# Patient Record
Sex: MALE | Race: WHITE | NOT HISPANIC OR LATINO | Employment: OTHER | URBAN - METROPOLITAN AREA
[De-identification: names, ages, dates, MRNs, and addresses within clinical notes are randomized per-mention and may not be internally consistent; named-entity substitution may affect disease eponyms.]

---

## 2017-01-04 ENCOUNTER — COMMUNICATION - HEALTHEAST (OUTPATIENT)
Dept: FAMILY MEDICINE | Facility: CLINIC | Age: 77
End: 2017-01-04

## 2017-01-04 ENCOUNTER — COMMUNICATION - HEALTHEAST (OUTPATIENT)
Dept: NURSING | Facility: CLINIC | Age: 77
End: 2017-01-04

## 2017-01-04 ENCOUNTER — AMBULATORY - HEALTHEAST (OUTPATIENT)
Dept: LAB | Facility: CLINIC | Age: 77
End: 2017-01-04

## 2017-01-04 DIAGNOSIS — I48.91 ATRIAL FIBRILLATION (H): ICD-10-CM

## 2017-01-05 ENCOUNTER — AMBULATORY - HEALTHEAST (OUTPATIENT)
Dept: NURSING | Facility: CLINIC | Age: 77
End: 2017-01-05

## 2017-01-05 DIAGNOSIS — I48.91 A-FIB (H): ICD-10-CM

## 2017-01-10 ENCOUNTER — COMMUNICATION - HEALTHEAST (OUTPATIENT)
Dept: FAMILY MEDICINE | Facility: CLINIC | Age: 77
End: 2017-01-10

## 2017-01-10 DIAGNOSIS — I48.91 A-FIB (H): ICD-10-CM

## 2017-01-11 ENCOUNTER — AMBULATORY - HEALTHEAST (OUTPATIENT)
Dept: FAMILY MEDICINE | Facility: CLINIC | Age: 77
End: 2017-01-11

## 2017-01-11 ENCOUNTER — COMMUNICATION - HEALTHEAST (OUTPATIENT)
Dept: FAMILY MEDICINE | Facility: CLINIC | Age: 77
End: 2017-01-11

## 2017-01-26 ENCOUNTER — AMBULATORY - HEALTHEAST (OUTPATIENT)
Dept: LAB | Facility: CLINIC | Age: 77
End: 2017-01-26

## 2017-01-26 ENCOUNTER — COMMUNICATION - HEALTHEAST (OUTPATIENT)
Dept: NURSING | Facility: CLINIC | Age: 77
End: 2017-01-26

## 2017-01-26 DIAGNOSIS — I48.91 A-FIB (H): ICD-10-CM

## 2017-01-26 DIAGNOSIS — I48.91 ATRIAL FIBRILLATION (H): ICD-10-CM

## 2017-01-31 ENCOUNTER — RECORDS - HEALTHEAST (OUTPATIENT)
Dept: ADMINISTRATIVE | Facility: OTHER | Age: 77
End: 2017-01-31

## 2017-02-01 ENCOUNTER — RECORDS - HEALTHEAST (OUTPATIENT)
Dept: ADMINISTRATIVE | Facility: OTHER | Age: 77
End: 2017-02-01

## 2017-02-01 ENCOUNTER — COMMUNICATION - HEALTHEAST (OUTPATIENT)
Dept: FAMILY MEDICINE | Facility: CLINIC | Age: 77
End: 2017-02-01

## 2017-02-09 ENCOUNTER — COMMUNICATION - HEALTHEAST (OUTPATIENT)
Dept: NURSING | Facility: CLINIC | Age: 77
End: 2017-02-09

## 2017-02-09 ENCOUNTER — AMBULATORY - HEALTHEAST (OUTPATIENT)
Dept: LAB | Facility: CLINIC | Age: 77
End: 2017-02-09

## 2017-02-09 DIAGNOSIS — I48.91 A-FIB (H): ICD-10-CM

## 2017-02-09 DIAGNOSIS — I48.91 ATRIAL FIBRILLATION (H): ICD-10-CM

## 2017-02-13 ENCOUNTER — RECORDS - HEALTHEAST (OUTPATIENT)
Dept: ADMINISTRATIVE | Facility: OTHER | Age: 77
End: 2017-02-13

## 2017-02-15 ENCOUNTER — RECORDS - HEALTHEAST (OUTPATIENT)
Dept: ADMINISTRATIVE | Facility: OTHER | Age: 77
End: 2017-02-15

## 2017-02-15 ENCOUNTER — COMMUNICATION - HEALTHEAST (OUTPATIENT)
Dept: FAMILY MEDICINE | Facility: CLINIC | Age: 77
End: 2017-02-15

## 2017-02-23 ENCOUNTER — OFFICE VISIT - HEALTHEAST (OUTPATIENT)
Dept: FAMILY MEDICINE | Facility: CLINIC | Age: 77
End: 2017-02-23

## 2017-02-23 ENCOUNTER — COMMUNICATION - HEALTHEAST (OUTPATIENT)
Dept: NURSING | Facility: CLINIC | Age: 77
End: 2017-02-23

## 2017-02-23 DIAGNOSIS — I48.91 ATRIAL FIBRILLATION (H): ICD-10-CM

## 2017-02-23 DIAGNOSIS — Z01.818 PREOPERATIVE EXAMINATION: ICD-10-CM

## 2017-02-23 LAB
ATRIAL RATE - MUSE: 78 BPM
CREAT SERPL-MCNC: 0.82 MG/DL (ref 0.7–1.3)
DIASTOLIC BLOOD PRESSURE - MUSE: NORMAL MMHG
GFR SERPL CREATININE-BSD FRML MDRD: >60 ML/MIN/1.73M2
INTERPRETATION ECG - MUSE: NORMAL
P AXIS - MUSE: NORMAL DEGREES
PR INTERVAL - MUSE: NORMAL MS
QRS DURATION - MUSE: 144 MS
QT - MUSE: 434 MS
QTC - MUSE: 500 MS
R AXIS - MUSE: -60 DEGREES
SYSTOLIC BLOOD PRESSURE - MUSE: NORMAL MMHG
T AXIS - MUSE: 85 DEGREES
VENTRICULAR RATE- MUSE: 80 BPM

## 2017-02-23 ASSESSMENT — MIFFLIN-ST. JEOR: SCORE: 1647.82

## 2017-02-24 ENCOUNTER — COMMUNICATION - HEALTHEAST (OUTPATIENT)
Dept: FAMILY MEDICINE | Facility: CLINIC | Age: 77
End: 2017-02-24

## 2017-02-24 DIAGNOSIS — R60.9 EDEMA: ICD-10-CM

## 2017-02-24 DIAGNOSIS — E78.5 HYPERLIPIDEMIA: ICD-10-CM

## 2017-03-03 ASSESSMENT — MIFFLIN-ST. JEOR: SCORE: 1631.49

## 2017-03-06 ENCOUNTER — ANESTHESIA - HEALTHEAST (OUTPATIENT)
Dept: SURGERY | Facility: HOSPITAL | Age: 77
End: 2017-03-06

## 2017-03-06 ENCOUNTER — SURGERY - HEALTHEAST (OUTPATIENT)
Dept: SURGERY | Facility: HOSPITAL | Age: 77
End: 2017-03-06

## 2017-03-06 ASSESSMENT — MIFFLIN-ST. JEOR: SCORE: 1623.78

## 2017-03-14 ENCOUNTER — COMMUNICATION - HEALTHEAST (OUTPATIENT)
Dept: NURSING | Facility: CLINIC | Age: 77
End: 2017-03-14

## 2017-03-14 ENCOUNTER — AMBULATORY - HEALTHEAST (OUTPATIENT)
Dept: LAB | Facility: CLINIC | Age: 77
End: 2017-03-14

## 2017-03-14 DIAGNOSIS — I48.91 ATRIAL FIBRILLATION (H): ICD-10-CM

## 2017-03-27 ENCOUNTER — RECORDS - HEALTHEAST (OUTPATIENT)
Dept: ADMINISTRATIVE | Facility: OTHER | Age: 77
End: 2017-03-27

## 2017-03-28 ENCOUNTER — COMMUNICATION - HEALTHEAST (OUTPATIENT)
Dept: NURSING | Facility: CLINIC | Age: 77
End: 2017-03-28

## 2017-03-28 ENCOUNTER — AMBULATORY - HEALTHEAST (OUTPATIENT)
Dept: LAB | Facility: CLINIC | Age: 77
End: 2017-03-28

## 2017-03-28 DIAGNOSIS — I48.91 A-FIB (H): ICD-10-CM

## 2017-03-28 DIAGNOSIS — I48.91 ATRIAL FIBRILLATION (H): ICD-10-CM

## 2017-04-17 ENCOUNTER — AMBULATORY - HEALTHEAST (OUTPATIENT)
Dept: LAB | Facility: CLINIC | Age: 77
End: 2017-04-17

## 2017-04-17 ENCOUNTER — COMMUNICATION - HEALTHEAST (OUTPATIENT)
Dept: NURSING | Facility: CLINIC | Age: 77
End: 2017-04-17

## 2017-04-17 DIAGNOSIS — I48.91 ATRIAL FIBRILLATION (H): ICD-10-CM

## 2017-05-15 ENCOUNTER — AMBULATORY - HEALTHEAST (OUTPATIENT)
Dept: LAB | Facility: CLINIC | Age: 77
End: 2017-05-15

## 2017-05-15 ENCOUNTER — COMMUNICATION - HEALTHEAST (OUTPATIENT)
Dept: NURSING | Facility: CLINIC | Age: 77
End: 2017-05-15

## 2017-05-15 DIAGNOSIS — I48.91 A-FIB (H): ICD-10-CM

## 2017-05-15 DIAGNOSIS — I48.91 ATRIAL FIBRILLATION (H): ICD-10-CM

## 2017-05-31 ENCOUNTER — COMMUNICATION - HEALTHEAST (OUTPATIENT)
Dept: FAMILY MEDICINE | Facility: CLINIC | Age: 77
End: 2017-05-31

## 2017-05-31 DIAGNOSIS — E78.5 HYPERLIPIDEMIA: ICD-10-CM

## 2017-06-14 ENCOUNTER — OFFICE VISIT - HEALTHEAST (OUTPATIENT)
Dept: FAMILY MEDICINE | Facility: CLINIC | Age: 77
End: 2017-06-14

## 2017-06-14 ENCOUNTER — COMMUNICATION - HEALTHEAST (OUTPATIENT)
Dept: SCHEDULING | Facility: CLINIC | Age: 77
End: 2017-06-14

## 2017-06-14 ENCOUNTER — COMMUNICATION - HEALTHEAST (OUTPATIENT)
Dept: NURSING | Facility: CLINIC | Age: 77
End: 2017-06-14

## 2017-06-14 DIAGNOSIS — I48.91 ATRIAL FIBRILLATION (H): ICD-10-CM

## 2017-06-14 DIAGNOSIS — M10.9 GOUT: ICD-10-CM

## 2017-06-14 ASSESSMENT — MIFFLIN-ST. JEOR: SCORE: 1640.56

## 2017-06-19 ENCOUNTER — RECORDS - HEALTHEAST (OUTPATIENT)
Dept: ADMINISTRATIVE | Facility: OTHER | Age: 77
End: 2017-06-19

## 2017-06-26 ENCOUNTER — OFFICE VISIT - HEALTHEAST (OUTPATIENT)
Dept: FAMILY MEDICINE | Facility: CLINIC | Age: 77
End: 2017-06-26

## 2017-06-26 DIAGNOSIS — J20.9 ACUTE BRONCHITIS: ICD-10-CM

## 2017-06-26 DIAGNOSIS — H10.30 ACUTE CONJUNCTIVITIS: ICD-10-CM

## 2017-06-29 ENCOUNTER — COMMUNICATION - HEALTHEAST (OUTPATIENT)
Dept: FAMILY MEDICINE | Facility: CLINIC | Age: 77
End: 2017-06-29

## 2017-07-03 ENCOUNTER — COMMUNICATION - HEALTHEAST (OUTPATIENT)
Dept: NURSING | Facility: CLINIC | Age: 77
End: 2017-07-03

## 2017-07-03 ENCOUNTER — AMBULATORY - HEALTHEAST (OUTPATIENT)
Dept: LAB | Facility: CLINIC | Age: 77
End: 2017-07-03

## 2017-07-03 DIAGNOSIS — I48.91 A-FIB (H): ICD-10-CM

## 2017-07-03 DIAGNOSIS — I48.91 ATRIAL FIBRILLATION (H): ICD-10-CM

## 2017-07-05 ENCOUNTER — RECORDS - HEALTHEAST (OUTPATIENT)
Dept: ADMINISTRATIVE | Facility: OTHER | Age: 77
End: 2017-07-05

## 2017-07-10 ENCOUNTER — RECORDS - HEALTHEAST (OUTPATIENT)
Dept: ADMINISTRATIVE | Facility: OTHER | Age: 77
End: 2017-07-10

## 2017-07-11 ENCOUNTER — AMBULATORY - HEALTHEAST (OUTPATIENT)
Dept: LAB | Facility: CLINIC | Age: 77
End: 2017-07-11

## 2017-07-11 ENCOUNTER — COMMUNICATION - HEALTHEAST (OUTPATIENT)
Dept: NURSING | Facility: CLINIC | Age: 77
End: 2017-07-11

## 2017-07-11 DIAGNOSIS — I48.91 A-FIB (H): ICD-10-CM

## 2017-07-11 DIAGNOSIS — I48.91 ATRIAL FIBRILLATION (H): ICD-10-CM

## 2017-07-14 ENCOUNTER — OFFICE VISIT - HEALTHEAST (OUTPATIENT)
Dept: FAMILY MEDICINE | Facility: CLINIC | Age: 77
End: 2017-07-14

## 2017-07-14 DIAGNOSIS — M67.40 GANGLION CYST: ICD-10-CM

## 2017-07-14 DIAGNOSIS — R25.2 MUSCLE CRAMPING: ICD-10-CM

## 2017-07-14 DIAGNOSIS — R05.9 COUGH: ICD-10-CM

## 2017-07-14 DIAGNOSIS — R63.4 WEIGHT LOSS: ICD-10-CM

## 2017-07-14 LAB
CHOLEST SERPL-MCNC: 146 MG/DL
FASTING STATUS PATIENT QL REPORTED: YES
HDLC SERPL-MCNC: 44 MG/DL
LDLC SERPL CALC-MCNC: 86 MG/DL
PSA SERPL-MCNC: 1 NG/ML (ref 0–6.5)
TRIGL SERPL-MCNC: 79 MG/DL

## 2017-07-20 ENCOUNTER — COMMUNICATION - HEALTHEAST (OUTPATIENT)
Dept: FAMILY MEDICINE | Facility: CLINIC | Age: 77
End: 2017-07-20

## 2017-07-20 DIAGNOSIS — I48.91 ATRIAL FIBRILLATION (H): ICD-10-CM

## 2017-07-28 ENCOUNTER — AMBULATORY - HEALTHEAST (OUTPATIENT)
Dept: LAB | Facility: CLINIC | Age: 77
End: 2017-07-28

## 2017-07-28 ENCOUNTER — COMMUNICATION - HEALTHEAST (OUTPATIENT)
Dept: NURSING | Facility: CLINIC | Age: 77
End: 2017-07-28

## 2017-07-28 DIAGNOSIS — I48.91 A-FIB (H): ICD-10-CM

## 2017-08-08 ENCOUNTER — COMMUNICATION - HEALTHEAST (OUTPATIENT)
Dept: FAMILY MEDICINE | Facility: CLINIC | Age: 77
End: 2017-08-08

## 2017-08-08 DIAGNOSIS — F32.A DEPRESSION: ICD-10-CM

## 2017-08-24 ENCOUNTER — COMMUNICATION - HEALTHEAST (OUTPATIENT)
Dept: NURSING | Facility: CLINIC | Age: 77
End: 2017-08-24

## 2017-08-24 ENCOUNTER — AMBULATORY - HEALTHEAST (OUTPATIENT)
Dept: LAB | Facility: CLINIC | Age: 77
End: 2017-08-24

## 2017-08-24 DIAGNOSIS — I48.91 A-FIB (H): ICD-10-CM

## 2017-08-30 ENCOUNTER — COMMUNICATION - HEALTHEAST (OUTPATIENT)
Dept: FAMILY MEDICINE | Facility: CLINIC | Age: 77
End: 2017-08-30

## 2017-08-30 DIAGNOSIS — I10 HYPERTENSION: ICD-10-CM

## 2017-08-30 DIAGNOSIS — E78.5 HYPERLIPIDEMIA: ICD-10-CM

## 2017-09-20 ENCOUNTER — COMMUNICATION - HEALTHEAST (OUTPATIENT)
Dept: NURSING | Facility: CLINIC | Age: 77
End: 2017-09-20

## 2017-09-20 ENCOUNTER — AMBULATORY - HEALTHEAST (OUTPATIENT)
Dept: LAB | Facility: CLINIC | Age: 77
End: 2017-09-20

## 2017-09-20 DIAGNOSIS — I48.91 A-FIB (H): ICD-10-CM

## 2017-10-12 ENCOUNTER — AMBULATORY - HEALTHEAST (OUTPATIENT)
Dept: NURSING | Facility: CLINIC | Age: 77
End: 2017-10-12

## 2017-10-12 DIAGNOSIS — Z23 NEED FOR VACCINATION: ICD-10-CM

## 2017-10-18 ENCOUNTER — COMMUNICATION - HEALTHEAST (OUTPATIENT)
Dept: FAMILY MEDICINE | Facility: CLINIC | Age: 77
End: 2017-10-18

## 2017-10-18 DIAGNOSIS — I48.91 ATRIAL FIBRILLATION (H): ICD-10-CM

## 2017-10-25 ENCOUNTER — AMBULATORY - HEALTHEAST (OUTPATIENT)
Dept: LAB | Facility: CLINIC | Age: 77
End: 2017-10-25

## 2017-10-25 ENCOUNTER — COMMUNICATION - HEALTHEAST (OUTPATIENT)
Dept: NURSING | Facility: CLINIC | Age: 77
End: 2017-10-25

## 2017-10-25 DIAGNOSIS — I48.91 A-FIB (H): ICD-10-CM

## 2017-11-07 ENCOUNTER — RECORDS - HEALTHEAST (OUTPATIENT)
Dept: ADMINISTRATIVE | Facility: OTHER | Age: 77
End: 2017-11-07

## 2017-11-22 ENCOUNTER — AMBULATORY - HEALTHEAST (OUTPATIENT)
Dept: LAB | Facility: CLINIC | Age: 77
End: 2017-11-22

## 2017-11-22 ENCOUNTER — COMMUNICATION - HEALTHEAST (OUTPATIENT)
Dept: NURSING | Facility: CLINIC | Age: 77
End: 2017-11-22

## 2017-11-22 DIAGNOSIS — I48.91 A-FIB (H): ICD-10-CM

## 2017-11-28 ENCOUNTER — COMMUNICATION - HEALTHEAST (OUTPATIENT)
Dept: FAMILY MEDICINE | Facility: CLINIC | Age: 77
End: 2017-11-28

## 2017-11-30 ENCOUNTER — RECORDS - HEALTHEAST (OUTPATIENT)
Dept: ADMINISTRATIVE | Facility: OTHER | Age: 77
End: 2017-11-30

## 2017-12-05 ENCOUNTER — RECORDS - HEALTHEAST (OUTPATIENT)
Dept: ADMINISTRATIVE | Facility: OTHER | Age: 77
End: 2017-12-05

## 2017-12-06 ENCOUNTER — COMMUNICATION - HEALTHEAST (OUTPATIENT)
Dept: FAMILY MEDICINE | Facility: CLINIC | Age: 77
End: 2017-12-06

## 2017-12-08 ENCOUNTER — OFFICE VISIT - HEALTHEAST (OUTPATIENT)
Dept: FAMILY MEDICINE | Facility: CLINIC | Age: 77
End: 2017-12-08

## 2017-12-08 DIAGNOSIS — F32.A DEPRESSION: ICD-10-CM

## 2017-12-18 ENCOUNTER — RECORDS - HEALTHEAST (OUTPATIENT)
Dept: ADMINISTRATIVE | Facility: OTHER | Age: 77
End: 2017-12-18

## 2017-12-22 ENCOUNTER — AMBULATORY - HEALTHEAST (OUTPATIENT)
Dept: FAMILY MEDICINE | Facility: CLINIC | Age: 77
End: 2017-12-22

## 2017-12-22 ENCOUNTER — COMMUNICATION - HEALTHEAST (OUTPATIENT)
Dept: SCHEDULING | Facility: CLINIC | Age: 77
End: 2017-12-22

## 2017-12-27 ENCOUNTER — AMBULATORY - HEALTHEAST (OUTPATIENT)
Dept: LAB | Facility: CLINIC | Age: 77
End: 2017-12-27

## 2017-12-27 ENCOUNTER — COMMUNICATION - HEALTHEAST (OUTPATIENT)
Dept: NURSING | Facility: CLINIC | Age: 77
End: 2017-12-27

## 2017-12-27 DIAGNOSIS — I48.91 A-FIB (H): ICD-10-CM

## 2018-01-10 ENCOUNTER — AMBULATORY - HEALTHEAST (OUTPATIENT)
Dept: LAB | Facility: CLINIC | Age: 78
End: 2018-01-10

## 2018-01-10 ENCOUNTER — COMMUNICATION - HEALTHEAST (OUTPATIENT)
Dept: NURSING | Facility: CLINIC | Age: 78
End: 2018-01-10

## 2018-01-10 DIAGNOSIS — I48.91 A-FIB (H): ICD-10-CM

## 2018-01-10 LAB — INR PPP: 2.4 (ref 0.9–1.1)

## 2018-01-18 ENCOUNTER — COMMUNICATION - HEALTHEAST (OUTPATIENT)
Dept: FAMILY MEDICINE | Facility: CLINIC | Age: 78
End: 2018-01-18

## 2018-01-18 DIAGNOSIS — I48.91 ATRIAL FIBRILLATION (H): ICD-10-CM

## 2018-01-22 ENCOUNTER — RECORDS - HEALTHEAST (OUTPATIENT)
Dept: ADMINISTRATIVE | Facility: OTHER | Age: 78
End: 2018-01-22

## 2018-01-26 ENCOUNTER — RECORDS - HEALTHEAST (OUTPATIENT)
Dept: ADMINISTRATIVE | Facility: OTHER | Age: 78
End: 2018-01-26

## 2018-01-31 ENCOUNTER — RECORDS - HEALTHEAST (OUTPATIENT)
Dept: ADMINISTRATIVE | Facility: OTHER | Age: 78
End: 2018-01-31

## 2018-02-02 ENCOUNTER — OFFICE VISIT - HEALTHEAST (OUTPATIENT)
Dept: FAMILY MEDICINE | Facility: CLINIC | Age: 78
End: 2018-02-02

## 2018-02-02 ENCOUNTER — COMMUNICATION - HEALTHEAST (OUTPATIENT)
Dept: NURSING | Facility: CLINIC | Age: 78
End: 2018-02-02

## 2018-02-02 DIAGNOSIS — Q24.8 ABNORMALITY OF HEART VALVE: ICD-10-CM

## 2018-02-02 DIAGNOSIS — Z86.79 S/P ANEURYSM REPAIR: ICD-10-CM

## 2018-02-02 DIAGNOSIS — Z98.890 S/P ANEURYSM REPAIR: ICD-10-CM

## 2018-02-02 DIAGNOSIS — I48.91 A-FIB (H): ICD-10-CM

## 2018-02-02 LAB — INR PPP: 1.9 (ref 0.9–1.1)

## 2018-02-14 ENCOUNTER — AMBULATORY - HEALTHEAST (OUTPATIENT)
Dept: LAB | Facility: CLINIC | Age: 78
End: 2018-02-14

## 2018-02-14 ENCOUNTER — COMMUNICATION - HEALTHEAST (OUTPATIENT)
Dept: NURSING | Facility: CLINIC | Age: 78
End: 2018-02-14

## 2018-02-14 DIAGNOSIS — I48.91 A-FIB (H): ICD-10-CM

## 2018-02-14 LAB — INR PPP: 3.2 (ref 0.9–1.1)

## 2018-02-15 ENCOUNTER — COMMUNICATION - HEALTHEAST (OUTPATIENT)
Dept: FAMILY MEDICINE | Facility: CLINIC | Age: 78
End: 2018-02-15

## 2018-02-22 ENCOUNTER — COMMUNICATION - HEALTHEAST (OUTPATIENT)
Dept: NURSING | Facility: CLINIC | Age: 78
End: 2018-02-22

## 2018-02-22 ENCOUNTER — OFFICE VISIT - HEALTHEAST (OUTPATIENT)
Dept: FAMILY MEDICINE | Facility: CLINIC | Age: 78
End: 2018-02-22

## 2018-02-22 DIAGNOSIS — I48.91 A-FIB (H): ICD-10-CM

## 2018-02-22 DIAGNOSIS — Z01.818 PREOP EXAMINATION: ICD-10-CM

## 2018-02-22 LAB
ATRIAL RATE - MUSE: NORMAL BPM
CREAT SERPL-MCNC: 0.86 MG/DL (ref 0.7–1.3)
DIASTOLIC BLOOD PRESSURE - MUSE: NORMAL MMHG
ERYTHROCYTE [DISTWIDTH] IN BLOOD BY AUTOMATED COUNT: 12.3 % (ref 11–14.5)
GFR SERPL CREATININE-BSD FRML MDRD: >60 ML/MIN/1.73M2
HCT VFR BLD AUTO: 37 % (ref 40–54)
HGB BLD-MCNC: 12.2 G/DL (ref 14–18)
INR PPP: 2.8 (ref 0.9–1.1)
INTERPRETATION ECG - MUSE: NORMAL
MCH RBC QN AUTO: 31.5 PG (ref 27–34)
MCHC RBC AUTO-ENTMCNC: 33.1 G/DL (ref 32–36)
MCV RBC AUTO: 95 FL (ref 80–100)
P AXIS - MUSE: NORMAL DEGREES
PLATELET # BLD AUTO: 166 THOU/UL (ref 140–440)
PMV BLD AUTO: 7.4 FL (ref 7–10)
PR INTERVAL - MUSE: NORMAL MS
QRS DURATION - MUSE: 146 MS
QT - MUSE: 480 MS
QTC - MUSE: 480 MS
R AXIS - MUSE: -60 DEGREES
RBC # BLD AUTO: 3.88 MILL/UL (ref 4.4–6.2)
SYSTOLIC BLOOD PRESSURE - MUSE: NORMAL MMHG
T AXIS - MUSE: 110 DEGREES
VENTRICULAR RATE- MUSE: 60 BPM
WBC: 6.6 THOU/UL (ref 4–11)

## 2018-03-01 ENCOUNTER — RECORDS - HEALTHEAST (OUTPATIENT)
Dept: ADMINISTRATIVE | Facility: OTHER | Age: 78
End: 2018-03-01

## 2018-03-02 ENCOUNTER — RECORDS - HEALTHEAST (OUTPATIENT)
Dept: ADMINISTRATIVE | Facility: OTHER | Age: 78
End: 2018-03-02

## 2018-03-09 ENCOUNTER — OFFICE VISIT - HEALTHEAST (OUTPATIENT)
Dept: FAMILY MEDICINE | Facility: CLINIC | Age: 78
End: 2018-03-09

## 2018-03-09 ENCOUNTER — COMMUNICATION - HEALTHEAST (OUTPATIENT)
Dept: NURSING | Facility: CLINIC | Age: 78
End: 2018-03-09

## 2018-03-09 DIAGNOSIS — I48.91 A-FIB (H): ICD-10-CM

## 2018-03-09 DIAGNOSIS — Z09 HOSPITAL DISCHARGE FOLLOW-UP: ICD-10-CM

## 2018-03-09 LAB — INR PPP: 3.1 (ref 0.9–1.1)

## 2018-03-23 ENCOUNTER — COMMUNICATION - HEALTHEAST (OUTPATIENT)
Dept: NURSING | Facility: CLINIC | Age: 78
End: 2018-03-23

## 2018-03-23 ENCOUNTER — AMBULATORY - HEALTHEAST (OUTPATIENT)
Dept: LAB | Facility: CLINIC | Age: 78
End: 2018-03-23

## 2018-03-23 DIAGNOSIS — I48.91 A-FIB (H): ICD-10-CM

## 2018-03-23 LAB — INR PPP: 2.7 (ref 0.9–1.1)

## 2018-04-19 ENCOUNTER — COMMUNICATION - HEALTHEAST (OUTPATIENT)
Dept: ANTICOAGULATION | Facility: CLINIC | Age: 78
End: 2018-04-19

## 2018-04-19 ENCOUNTER — AMBULATORY - HEALTHEAST (OUTPATIENT)
Dept: LAB | Facility: CLINIC | Age: 78
End: 2018-04-19

## 2018-04-19 DIAGNOSIS — I48.91 A-FIB (H): ICD-10-CM

## 2018-04-19 LAB — INR PPP: 2.9 (ref 0.9–1.1)

## 2018-05-04 ENCOUNTER — COMMUNICATION - HEALTHEAST (OUTPATIENT)
Dept: FAMILY MEDICINE | Facility: CLINIC | Age: 78
End: 2018-05-04

## 2018-05-14 ENCOUNTER — RECORDS - HEALTHEAST (OUTPATIENT)
Dept: ADMINISTRATIVE | Facility: OTHER | Age: 78
End: 2018-05-14

## 2018-05-17 ENCOUNTER — RECORDS - HEALTHEAST (OUTPATIENT)
Dept: ADMINISTRATIVE | Facility: OTHER | Age: 78
End: 2018-05-17

## 2018-05-18 ENCOUNTER — COMMUNICATION - HEALTHEAST (OUTPATIENT)
Dept: ANTICOAGULATION | Facility: CLINIC | Age: 78
End: 2018-05-18

## 2018-05-18 ENCOUNTER — AMBULATORY - HEALTHEAST (OUTPATIENT)
Dept: LAB | Facility: CLINIC | Age: 78
End: 2018-05-18

## 2018-05-18 DIAGNOSIS — I48.91 A-FIB (H): ICD-10-CM

## 2018-05-18 LAB — INR PPP: 2.5 (ref 0.9–1.1)

## 2018-06-05 ENCOUNTER — COMMUNICATION - HEALTHEAST (OUTPATIENT)
Dept: FAMILY MEDICINE | Facility: CLINIC | Age: 78
End: 2018-06-05

## 2018-06-05 DIAGNOSIS — I48.91 A-FIB (H): ICD-10-CM

## 2018-06-09 ENCOUNTER — COMMUNICATION - HEALTHEAST (OUTPATIENT)
Dept: FAMILY MEDICINE | Facility: CLINIC | Age: 78
End: 2018-06-09

## 2018-06-09 DIAGNOSIS — E78.5 HYPERLIPIDEMIA: ICD-10-CM

## 2018-06-13 ENCOUNTER — COMMUNICATION - HEALTHEAST (OUTPATIENT)
Dept: FAMILY MEDICINE | Facility: CLINIC | Age: 78
End: 2018-06-13

## 2018-06-13 DIAGNOSIS — I48.91 A-FIB (H): ICD-10-CM

## 2018-06-19 ENCOUNTER — COMMUNICATION - HEALTHEAST (OUTPATIENT)
Dept: ANTICOAGULATION | Facility: CLINIC | Age: 78
End: 2018-06-19

## 2018-06-19 DIAGNOSIS — I48.91 A-FIB (H): ICD-10-CM

## 2018-06-22 ENCOUNTER — RECORDS - HEALTHEAST (OUTPATIENT)
Dept: GENERAL RADIOLOGY | Facility: CLINIC | Age: 78
End: 2018-06-22

## 2018-06-22 ENCOUNTER — COMMUNICATION - HEALTHEAST (OUTPATIENT)
Dept: FAMILY MEDICINE | Facility: CLINIC | Age: 78
End: 2018-06-22

## 2018-06-22 ENCOUNTER — COMMUNICATION - HEALTHEAST (OUTPATIENT)
Dept: ANTICOAGULATION | Facility: CLINIC | Age: 78
End: 2018-06-22

## 2018-06-22 ENCOUNTER — OFFICE VISIT - HEALTHEAST (OUTPATIENT)
Dept: FAMILY MEDICINE | Facility: CLINIC | Age: 78
End: 2018-06-22

## 2018-06-22 DIAGNOSIS — Z01.818 PREOPERATIVE EXAMINATION: ICD-10-CM

## 2018-06-22 DIAGNOSIS — I48.91 A-FIB (H): ICD-10-CM

## 2018-06-22 DIAGNOSIS — R05.9 COUGH: ICD-10-CM

## 2018-06-22 LAB
BASOPHILS # BLD AUTO: 0 THOU/UL (ref 0–0.2)
BASOPHILS NFR BLD AUTO: 0 % (ref 0–2)
CREAT SERPL-MCNC: 1.08 MG/DL (ref 0.7–1.3)
EOSINOPHIL # BLD AUTO: 0.2 THOU/UL (ref 0–0.4)
EOSINOPHIL NFR BLD AUTO: 5 % (ref 0–6)
ERYTHROCYTE [DISTWIDTH] IN BLOOD BY AUTOMATED COUNT: 14.2 % (ref 11–14.5)
GFR SERPL CREATININE-BSD FRML MDRD: >60 ML/MIN/1.73M2
HCT VFR BLD AUTO: 36.3 % (ref 40–54)
HGB BLD-MCNC: 11.9 G/DL (ref 14–18)
INR PPP: 2 (ref 0.9–1.1)
LYMPHOCYTES # BLD AUTO: 0.9 THOU/UL (ref 0.8–4.4)
LYMPHOCYTES NFR BLD AUTO: 18 % (ref 20–40)
MCH RBC QN AUTO: 29.2 PG (ref 27–34)
MCHC RBC AUTO-ENTMCNC: 32.8 G/DL (ref 32–36)
MCV RBC AUTO: 89 FL (ref 80–100)
MONOCYTES # BLD AUTO: 0.7 THOU/UL (ref 0–0.9)
MONOCYTES NFR BLD AUTO: 14 % (ref 2–10)
NEUTROPHILS # BLD AUTO: 3 THOU/UL (ref 2–7.7)
NEUTROPHILS NFR BLD AUTO: 63 % (ref 50–70)
PLATELET # BLD AUTO: 130 THOU/UL (ref 140–440)
PMV BLD AUTO: 7 FL (ref 7–10)
RBC # BLD AUTO: 4.08 MILL/UL (ref 4.4–6.2)
WBC: 4.8 THOU/UL (ref 4–11)

## 2018-06-22 ASSESSMENT — MIFFLIN-ST. JEOR: SCORE: 1571.29

## 2018-06-25 ENCOUNTER — AMBULATORY - HEALTHEAST (OUTPATIENT)
Dept: LAB | Facility: CLINIC | Age: 78
End: 2018-06-25

## 2018-06-25 ENCOUNTER — COMMUNICATION - HEALTHEAST (OUTPATIENT)
Dept: ANTICOAGULATION | Facility: CLINIC | Age: 78
End: 2018-06-25

## 2018-06-25 DIAGNOSIS — I48.91 A-FIB (H): ICD-10-CM

## 2018-06-25 LAB — INR PPP: 2 (ref 0.9–1.1)

## 2018-07-09 ENCOUNTER — COMMUNICATION - HEALTHEAST (OUTPATIENT)
Dept: LAB | Facility: CLINIC | Age: 78
End: 2018-07-09

## 2018-07-09 ENCOUNTER — COMMUNICATION - HEALTHEAST (OUTPATIENT)
Dept: ANTICOAGULATION | Facility: CLINIC | Age: 78
End: 2018-07-09

## 2018-07-09 ENCOUNTER — AMBULATORY - HEALTHEAST (OUTPATIENT)
Dept: LAB | Facility: CLINIC | Age: 78
End: 2018-07-09

## 2018-07-09 DIAGNOSIS — I48.91 A-FIB (H): ICD-10-CM

## 2018-07-09 LAB — INR PPP: 2.4 (ref 0.9–1.1)

## 2018-07-10 ENCOUNTER — AMBULATORY - HEALTHEAST (OUTPATIENT)
Dept: FAMILY MEDICINE | Facility: CLINIC | Age: 78
End: 2018-07-10

## 2018-07-10 ENCOUNTER — COMMUNICATION - HEALTHEAST (OUTPATIENT)
Dept: FAMILY MEDICINE | Facility: CLINIC | Age: 78
End: 2018-07-10

## 2018-07-10 DIAGNOSIS — R05.9 COUGH: ICD-10-CM

## 2018-07-12 ENCOUNTER — RECORDS - HEALTHEAST (OUTPATIENT)
Dept: GENERAL RADIOLOGY | Facility: CLINIC | Age: 78
End: 2018-07-12

## 2018-07-12 ENCOUNTER — OFFICE VISIT - HEALTHEAST (OUTPATIENT)
Dept: FAMILY MEDICINE | Facility: CLINIC | Age: 78
End: 2018-07-12

## 2018-07-12 DIAGNOSIS — Z87.09 HISTORY OF BRONCHITIS: ICD-10-CM

## 2018-07-12 DIAGNOSIS — Z87.09 PERSONAL HISTORY OF OTHER DISEASES OF THE RESPIRATORY SYSTEM: ICD-10-CM

## 2018-07-12 DIAGNOSIS — Z01.818 PREOPERATIVE EXAMINATION: ICD-10-CM

## 2018-07-12 LAB
CREAT SERPL-MCNC: 0.99 MG/DL (ref 0.7–1.3)
ERYTHROCYTE [DISTWIDTH] IN BLOOD BY AUTOMATED COUNT: 14.6 % (ref 11–14.5)
GFR SERPL CREATININE-BSD FRML MDRD: >60 ML/MIN/1.73M2
HCT VFR BLD AUTO: 37.7 % (ref 40–54)
HGB BLD-MCNC: 12.4 G/DL (ref 14–18)
MCH RBC QN AUTO: 29.3 PG (ref 27–34)
MCHC RBC AUTO-ENTMCNC: 32.7 G/DL (ref 32–36)
MCV RBC AUTO: 89 FL (ref 80–100)
PLATELET # BLD AUTO: 131 THOU/UL (ref 140–440)
PMV BLD AUTO: 7.3 FL (ref 7–10)
RBC # BLD AUTO: 4.22 MILL/UL (ref 4.4–6.2)
WBC: 5.1 THOU/UL (ref 4–11)

## 2018-07-12 ASSESSMENT — MIFFLIN-ST. JEOR: SCORE: 1557.95

## 2018-07-30 ENCOUNTER — RECORDS - HEALTHEAST (OUTPATIENT)
Dept: ADMINISTRATIVE | Facility: OTHER | Age: 78
End: 2018-07-30

## 2018-08-03 ENCOUNTER — RECORDS - HEALTHEAST (OUTPATIENT)
Dept: ADMINISTRATIVE | Facility: OTHER | Age: 78
End: 2018-08-03

## 2018-08-04 ENCOUNTER — RECORDS - HEALTHEAST (OUTPATIENT)
Dept: LAB | Facility: CLINIC | Age: 78
End: 2018-08-04

## 2018-08-04 LAB
ANION GAP SERPL CALCULATED.3IONS-SCNC: 9 MMOL/L (ref 5–18)
BUN SERPL-MCNC: 34 MG/DL (ref 8–28)
CALCIUM SERPL-MCNC: 9.2 MG/DL (ref 8.5–10.5)
CHLORIDE BLD-SCNC: 101 MMOL/L (ref 98–107)
CO2 SERPL-SCNC: 28 MMOL/L (ref 22–31)
CREAT SERPL-MCNC: 0.93 MG/DL (ref 0.7–1.3)
GFR SERPL CREATININE-BSD FRML MDRD: >60 ML/MIN/1.73M2
GLUCOSE BLD-MCNC: 144 MG/DL (ref 70–125)
INR PPP: 1.49 (ref 0.9–1.1)
INR PPP: 1.49 (ref 0.9–1.1)
POTASSIUM BLD-SCNC: 4.1 MMOL/L (ref 3.5–5)
SODIUM SERPL-SCNC: 138 MMOL/L (ref 136–145)

## 2018-08-06 ENCOUNTER — RECORDS - HEALTHEAST (OUTPATIENT)
Dept: LAB | Facility: CLINIC | Age: 78
End: 2018-08-06

## 2018-08-06 LAB — INR PPP: 2.07 (ref 0.9–1.1)

## 2018-08-07 ENCOUNTER — AMBULATORY - HEALTHEAST (OUTPATIENT)
Dept: ADMINISTRATIVE | Facility: CLINIC | Age: 78
End: 2018-08-07

## 2018-08-07 RX ORDER — AMOXICILLIN 500 MG/1
2000 CAPSULE ORAL
Status: SHIPPED | COMMUNITY
Start: 2018-08-07

## 2018-08-08 ENCOUNTER — AMBULATORY - HEALTHEAST (OUTPATIENT)
Dept: FAMILY MEDICINE | Facility: CLINIC | Age: 78
End: 2018-08-08

## 2018-08-08 DIAGNOSIS — Z95.1 S/P CABG (CORONARY ARTERY BYPASS GRAFT): ICD-10-CM

## 2018-08-09 ENCOUNTER — OFFICE VISIT - HEALTHEAST (OUTPATIENT)
Dept: GERIATRICS | Facility: CLINIC | Age: 78
End: 2018-08-09

## 2018-08-09 DIAGNOSIS — D64.9 ANEMIA: ICD-10-CM

## 2018-08-09 DIAGNOSIS — I10 ESSENTIAL HYPERTENSION: ICD-10-CM

## 2018-08-09 DIAGNOSIS — I48.91 ATRIAL FIBRILLATION (H): ICD-10-CM

## 2018-08-09 DIAGNOSIS — Z95.1 HX OF CABG: ICD-10-CM

## 2018-08-09 DIAGNOSIS — Z95.2 S/P AVR: ICD-10-CM

## 2018-08-11 ENCOUNTER — RECORDS - HEALTHEAST (OUTPATIENT)
Dept: LAB | Facility: CLINIC | Age: 78
End: 2018-08-11

## 2018-08-13 LAB — INR PPP: 4.16 (ref 0.9–1.1)

## 2018-08-14 ENCOUNTER — RECORDS - HEALTHEAST (OUTPATIENT)
Dept: LAB | Facility: CLINIC | Age: 78
End: 2018-08-14

## 2018-08-14 LAB — INR PPP: 4.49 (ref 0.9–1.1)

## 2018-08-15 ENCOUNTER — RECORDS - HEALTHEAST (OUTPATIENT)
Dept: LAB | Facility: CLINIC | Age: 78
End: 2018-08-15

## 2018-08-16 ENCOUNTER — OFFICE VISIT - HEALTHEAST (OUTPATIENT)
Dept: GERIATRICS | Facility: CLINIC | Age: 78
End: 2018-08-16

## 2018-08-16 DIAGNOSIS — I10 ESSENTIAL HYPERTENSION: ICD-10-CM

## 2018-08-16 DIAGNOSIS — Z95.1 S/P CABG X 1: ICD-10-CM

## 2018-08-16 DIAGNOSIS — D64.9 ANEMIA: ICD-10-CM

## 2018-08-16 DIAGNOSIS — Z95.2 S/P AVR: ICD-10-CM

## 2018-08-16 DIAGNOSIS — I48.91 ATRIAL FIBRILLATION (H): ICD-10-CM

## 2018-08-16 LAB — INR PPP: 2.4 (ref 0.9–1.1)

## 2018-08-20 ENCOUNTER — COMMUNICATION - HEALTHEAST (OUTPATIENT)
Dept: GERIATRICS | Facility: CLINIC | Age: 78
End: 2018-08-20

## 2018-08-20 ENCOUNTER — AMBULATORY - HEALTHEAST (OUTPATIENT)
Dept: GERIATRICS | Facility: CLINIC | Age: 78
End: 2018-08-20

## 2018-08-20 ENCOUNTER — COMMUNICATION - HEALTHEAST (OUTPATIENT)
Dept: ANTICOAGULATION | Facility: CLINIC | Age: 78
End: 2018-08-20

## 2018-08-20 ENCOUNTER — AMBULATORY - HEALTHEAST (OUTPATIENT)
Dept: LAB | Facility: CLINIC | Age: 78
End: 2018-08-20

## 2018-08-20 DIAGNOSIS — I48.91 A-FIB (H): ICD-10-CM

## 2018-08-20 LAB — INR PPP: 2.3 (ref 0.9–1.1)

## 2018-08-28 ENCOUNTER — OFFICE VISIT - HEALTHEAST (OUTPATIENT)
Dept: FAMILY MEDICINE | Facility: CLINIC | Age: 78
End: 2018-08-28

## 2018-08-28 ENCOUNTER — COMMUNICATION - HEALTHEAST (OUTPATIENT)
Dept: ANTICOAGULATION | Facility: CLINIC | Age: 78
End: 2018-08-28

## 2018-08-28 DIAGNOSIS — I48.91 A-FIB (H): ICD-10-CM

## 2018-08-28 DIAGNOSIS — R60.9 EDEMA: ICD-10-CM

## 2018-08-28 DIAGNOSIS — D64.9 ANEMIA: ICD-10-CM

## 2018-08-28 DIAGNOSIS — I25.810 CORONARY ARTERY DISEASE INVOLVING CORONARY BYPASS GRAFT OF NATIVE HEART WITHOUT ANGINA PECTORIS: ICD-10-CM

## 2018-08-28 LAB
ALBUMIN SERPL-MCNC: 3.2 G/DL (ref 3.5–5)
ALP SERPL-CCNC: 180 U/L (ref 45–120)
ALT SERPL W P-5'-P-CCNC: 51 U/L (ref 0–45)
ANION GAP SERPL CALCULATED.3IONS-SCNC: 10 MMOL/L (ref 5–18)
AST SERPL W P-5'-P-CCNC: 57 U/L (ref 0–40)
BILIRUB SERPL-MCNC: 0.7 MG/DL (ref 0–1)
BUN SERPL-MCNC: 24 MG/DL (ref 8–28)
CALCIUM SERPL-MCNC: 8.9 MG/DL (ref 8.5–10.5)
CHLORIDE BLD-SCNC: 103 MMOL/L (ref 98–107)
CO2 SERPL-SCNC: 24 MMOL/L (ref 22–31)
CREAT SERPL-MCNC: 0.96 MG/DL (ref 0.7–1.3)
ERYTHROCYTE [DISTWIDTH] IN BLOOD BY AUTOMATED COUNT: 14.6 % (ref 11–14.5)
GFR SERPL CREATININE-BSD FRML MDRD: >60 ML/MIN/1.73M2
GLUCOSE BLD-MCNC: 127 MG/DL (ref 70–125)
HCT VFR BLD AUTO: 32.2 % (ref 40–54)
HGB BLD-MCNC: 10.8 G/DL (ref 14–18)
INR PPP: 2.1 (ref 0.9–1.1)
MCH RBC QN AUTO: 29.8 PG (ref 27–34)
MCHC RBC AUTO-ENTMCNC: 33.4 G/DL (ref 32–36)
MCV RBC AUTO: 89 FL (ref 80–100)
PLATELET # BLD AUTO: 174 THOU/UL (ref 140–440)
PMV BLD AUTO: 8.2 FL (ref 7–10)
POTASSIUM BLD-SCNC: 4.1 MMOL/L (ref 3.5–5)
PROT SERPL-MCNC: 6.4 G/DL (ref 6–8)
RBC # BLD AUTO: 3.62 MILL/UL (ref 4.4–6.2)
SODIUM SERPL-SCNC: 137 MMOL/L (ref 136–145)
WBC: 4.7 THOU/UL (ref 4–11)

## 2018-08-30 ENCOUNTER — RECORDS - HEALTHEAST (OUTPATIENT)
Dept: ADMINISTRATIVE | Facility: OTHER | Age: 78
End: 2018-08-30

## 2018-08-30 ENCOUNTER — COMMUNICATION - HEALTHEAST (OUTPATIENT)
Dept: FAMILY MEDICINE | Facility: CLINIC | Age: 78
End: 2018-08-30

## 2018-08-31 ENCOUNTER — AMBULATORY - HEALTHEAST (OUTPATIENT)
Dept: CARDIAC REHAB | Facility: HOSPITAL | Age: 78
End: 2018-08-31

## 2018-08-31 ENCOUNTER — COMMUNICATION - HEALTHEAST (OUTPATIENT)
Dept: FAMILY MEDICINE | Facility: CLINIC | Age: 78
End: 2018-08-31

## 2018-08-31 DIAGNOSIS — Z95.2 S/P AVR: ICD-10-CM

## 2018-08-31 DIAGNOSIS — Z95.1 S/P CABG (CORONARY ARTERY BYPASS GRAFT): ICD-10-CM

## 2018-09-01 ENCOUNTER — COMMUNICATION - HEALTHEAST (OUTPATIENT)
Dept: FAMILY MEDICINE | Facility: CLINIC | Age: 78
End: 2018-09-01

## 2018-09-04 ENCOUNTER — AMBULATORY - HEALTHEAST (OUTPATIENT)
Dept: FAMILY MEDICINE | Facility: CLINIC | Age: 78
End: 2018-09-04

## 2018-09-07 ENCOUNTER — AMBULATORY - HEALTHEAST (OUTPATIENT)
Dept: CARDIAC REHAB | Facility: HOSPITAL | Age: 78
End: 2018-09-07

## 2018-09-07 DIAGNOSIS — Z95.1 S/P CABG (CORONARY ARTERY BYPASS GRAFT): ICD-10-CM

## 2018-09-07 DIAGNOSIS — Z95.2 S/P AVR: ICD-10-CM

## 2018-09-10 ENCOUNTER — AMBULATORY - HEALTHEAST (OUTPATIENT)
Dept: CARDIAC REHAB | Facility: HOSPITAL | Age: 78
End: 2018-09-10

## 2018-09-10 DIAGNOSIS — Z95.2 S/P AVR: ICD-10-CM

## 2018-09-10 DIAGNOSIS — Z95.1 S/P CABG (CORONARY ARTERY BYPASS GRAFT): ICD-10-CM

## 2018-09-11 ENCOUNTER — AMBULATORY - HEALTHEAST (OUTPATIENT)
Dept: LAB | Facility: CLINIC | Age: 78
End: 2018-09-11

## 2018-09-11 ENCOUNTER — COMMUNICATION - HEALTHEAST (OUTPATIENT)
Dept: ANTICOAGULATION | Facility: CLINIC | Age: 78
End: 2018-09-11

## 2018-09-11 ENCOUNTER — AMBULATORY - HEALTHEAST (OUTPATIENT)
Dept: FAMILY MEDICINE | Facility: CLINIC | Age: 78
End: 2018-09-11

## 2018-09-11 DIAGNOSIS — I48.91 A-FIB (H): ICD-10-CM

## 2018-09-11 LAB — INR PPP: 2.6 (ref 0.9–1.1)

## 2018-09-14 ENCOUNTER — AMBULATORY - HEALTHEAST (OUTPATIENT)
Dept: CARDIAC REHAB | Facility: HOSPITAL | Age: 78
End: 2018-09-14

## 2018-09-14 DIAGNOSIS — Z95.1 S/P CABG (CORONARY ARTERY BYPASS GRAFT): ICD-10-CM

## 2018-09-14 DIAGNOSIS — Z95.2 S/P AVR: ICD-10-CM

## 2018-09-17 ENCOUNTER — AMBULATORY - HEALTHEAST (OUTPATIENT)
Dept: CARDIAC REHAB | Facility: HOSPITAL | Age: 78
End: 2018-09-17

## 2018-09-17 DIAGNOSIS — Z95.2 S/P AVR: ICD-10-CM

## 2018-09-17 DIAGNOSIS — Z95.1 S/P CABG (CORONARY ARTERY BYPASS GRAFT): ICD-10-CM

## 2018-09-21 ENCOUNTER — AMBULATORY - HEALTHEAST (OUTPATIENT)
Dept: CARDIAC REHAB | Facility: HOSPITAL | Age: 78
End: 2018-09-21

## 2018-09-21 DIAGNOSIS — Z95.2 S/P AVR: ICD-10-CM

## 2018-09-21 DIAGNOSIS — Z95.1 S/P CABG (CORONARY ARTERY BYPASS GRAFT): ICD-10-CM

## 2018-09-24 ENCOUNTER — AMBULATORY - HEALTHEAST (OUTPATIENT)
Dept: CARDIAC REHAB | Facility: HOSPITAL | Age: 78
End: 2018-09-24

## 2018-09-24 DIAGNOSIS — Z95.1 S/P CABG (CORONARY ARTERY BYPASS GRAFT): ICD-10-CM

## 2018-09-24 DIAGNOSIS — Z95.2 S/P AVR: ICD-10-CM

## 2018-09-28 ENCOUNTER — AMBULATORY - HEALTHEAST (OUTPATIENT)
Dept: CARDIAC REHAB | Facility: HOSPITAL | Age: 78
End: 2018-09-28

## 2018-09-28 DIAGNOSIS — Z95.2 S/P AVR: ICD-10-CM

## 2018-09-28 DIAGNOSIS — Z95.1 S/P CABG (CORONARY ARTERY BYPASS GRAFT): ICD-10-CM

## 2018-10-01 ENCOUNTER — AMBULATORY - HEALTHEAST (OUTPATIENT)
Dept: CARDIAC REHAB | Facility: HOSPITAL | Age: 78
End: 2018-10-01

## 2018-10-01 DIAGNOSIS — Z95.1 S/P CABG (CORONARY ARTERY BYPASS GRAFT): ICD-10-CM

## 2018-10-01 DIAGNOSIS — Z95.2 S/P AVR: ICD-10-CM

## 2018-10-03 ENCOUNTER — RECORDS - HEALTHEAST (OUTPATIENT)
Dept: ADMINISTRATIVE | Facility: OTHER | Age: 78
End: 2018-10-03

## 2018-10-05 ENCOUNTER — AMBULATORY - HEALTHEAST (OUTPATIENT)
Dept: CARDIAC REHAB | Facility: HOSPITAL | Age: 78
End: 2018-10-05

## 2018-10-05 DIAGNOSIS — Z95.2 S/P AVR: ICD-10-CM

## 2018-10-05 DIAGNOSIS — Z95.1 S/P CABG (CORONARY ARTERY BYPASS GRAFT): ICD-10-CM

## 2018-10-08 ENCOUNTER — AMBULATORY - HEALTHEAST (OUTPATIENT)
Dept: CARDIAC REHAB | Facility: HOSPITAL | Age: 78
End: 2018-10-08

## 2018-10-08 DIAGNOSIS — Z95.2 S/P AVR: ICD-10-CM

## 2018-10-08 DIAGNOSIS — Z95.1 S/P CABG (CORONARY ARTERY BYPASS GRAFT): ICD-10-CM

## 2018-10-09 ENCOUNTER — AMBULATORY - HEALTHEAST (OUTPATIENT)
Dept: NURSING | Facility: CLINIC | Age: 78
End: 2018-10-09

## 2018-10-09 ENCOUNTER — COMMUNICATION - HEALTHEAST (OUTPATIENT)
Dept: ANTICOAGULATION | Facility: CLINIC | Age: 78
End: 2018-10-09

## 2018-10-09 ENCOUNTER — AMBULATORY - HEALTHEAST (OUTPATIENT)
Dept: LAB | Facility: CLINIC | Age: 78
End: 2018-10-09

## 2018-10-09 DIAGNOSIS — I48.91 A-FIB (H): ICD-10-CM

## 2018-10-09 DIAGNOSIS — Z23 NEED FOR VACCINATION: ICD-10-CM

## 2018-10-09 LAB — INR PPP: 1.9 (ref 0.9–1.1)

## 2018-10-12 ENCOUNTER — AMBULATORY - HEALTHEAST (OUTPATIENT)
Dept: CARDIAC REHAB | Facility: HOSPITAL | Age: 78
End: 2018-10-12

## 2018-10-12 DIAGNOSIS — Z95.2 S/P AVR: ICD-10-CM

## 2018-10-12 DIAGNOSIS — Z95.1 S/P CABG (CORONARY ARTERY BYPASS GRAFT): ICD-10-CM

## 2018-10-15 ENCOUNTER — AMBULATORY - HEALTHEAST (OUTPATIENT)
Dept: CARDIAC REHAB | Facility: HOSPITAL | Age: 78
End: 2018-10-15

## 2018-10-15 ENCOUNTER — RECORDS - HEALTHEAST (OUTPATIENT)
Dept: ADMINISTRATIVE | Facility: OTHER | Age: 78
End: 2018-10-15

## 2018-10-15 DIAGNOSIS — Z95.1 S/P CABG (CORONARY ARTERY BYPASS GRAFT): ICD-10-CM

## 2018-10-19 ENCOUNTER — AMBULATORY - HEALTHEAST (OUTPATIENT)
Dept: CARDIAC REHAB | Facility: HOSPITAL | Age: 78
End: 2018-10-19

## 2018-10-19 DIAGNOSIS — Z95.1 S/P CABG (CORONARY ARTERY BYPASS GRAFT): ICD-10-CM

## 2018-10-23 ENCOUNTER — AMBULATORY - HEALTHEAST (OUTPATIENT)
Dept: LAB | Facility: CLINIC | Age: 78
End: 2018-10-23

## 2018-10-23 ENCOUNTER — COMMUNICATION - HEALTHEAST (OUTPATIENT)
Dept: ANTICOAGULATION | Facility: CLINIC | Age: 78
End: 2018-10-23

## 2018-10-23 DIAGNOSIS — I48.91 A-FIB (H): ICD-10-CM

## 2018-10-23 LAB — INR PPP: 1.7 (ref 0.9–1.1)

## 2018-10-26 ENCOUNTER — AMBULATORY - HEALTHEAST (OUTPATIENT)
Dept: CARDIAC REHAB | Facility: HOSPITAL | Age: 78
End: 2018-10-26

## 2018-10-26 DIAGNOSIS — Z95.1 S/P CABG (CORONARY ARTERY BYPASS GRAFT): ICD-10-CM

## 2018-10-26 DIAGNOSIS — Z95.2 S/P AVR: ICD-10-CM

## 2018-10-29 ENCOUNTER — AMBULATORY - HEALTHEAST (OUTPATIENT)
Dept: CARDIAC REHAB | Facility: HOSPITAL | Age: 78
End: 2018-10-29

## 2018-10-29 DIAGNOSIS — Z95.1 S/P CABG (CORONARY ARTERY BYPASS GRAFT): ICD-10-CM

## 2018-10-31 ENCOUNTER — AMBULATORY - HEALTHEAST (OUTPATIENT)
Dept: CARDIAC REHAB | Facility: HOSPITAL | Age: 78
End: 2018-10-31

## 2018-10-31 DIAGNOSIS — Z95.1 S/P CABG (CORONARY ARTERY BYPASS GRAFT): ICD-10-CM

## 2018-10-31 DIAGNOSIS — Z95.2 S/P AVR: ICD-10-CM

## 2018-11-07 ENCOUNTER — AMBULATORY - HEALTHEAST (OUTPATIENT)
Dept: CARDIAC REHAB | Facility: HOSPITAL | Age: 78
End: 2018-11-07

## 2018-11-07 DIAGNOSIS — Z95.2 S/P AVR: ICD-10-CM

## 2018-11-07 DIAGNOSIS — Z95.1 S/P CABG (CORONARY ARTERY BYPASS GRAFT): ICD-10-CM

## 2018-11-08 ENCOUNTER — COMMUNICATION - HEALTHEAST (OUTPATIENT)
Dept: ANTICOAGULATION | Facility: CLINIC | Age: 78
End: 2018-11-08

## 2018-11-08 ENCOUNTER — AMBULATORY - HEALTHEAST (OUTPATIENT)
Dept: LAB | Facility: CLINIC | Age: 78
End: 2018-11-08

## 2018-11-08 DIAGNOSIS — I48.91 A-FIB (H): ICD-10-CM

## 2018-11-08 LAB — INR PPP: 2.1 (ref 0.9–1.1)

## 2018-11-12 ENCOUNTER — AMBULATORY - HEALTHEAST (OUTPATIENT)
Dept: CARDIAC REHAB | Facility: HOSPITAL | Age: 78
End: 2018-11-12

## 2018-11-12 DIAGNOSIS — Z95.1 S/P CABG (CORONARY ARTERY BYPASS GRAFT): ICD-10-CM

## 2018-11-13 ENCOUNTER — COMMUNICATION - HEALTHEAST (OUTPATIENT)
Dept: FAMILY MEDICINE | Facility: CLINIC | Age: 78
End: 2018-11-13

## 2018-11-13 DIAGNOSIS — I48.91 A-FIB (H): ICD-10-CM

## 2018-11-16 ENCOUNTER — AMBULATORY - HEALTHEAST (OUTPATIENT)
Dept: CARDIAC REHAB | Facility: HOSPITAL | Age: 78
End: 2018-11-16

## 2018-11-16 DIAGNOSIS — Z95.1 S/P CABG (CORONARY ARTERY BYPASS GRAFT): ICD-10-CM

## 2018-11-19 ENCOUNTER — AMBULATORY - HEALTHEAST (OUTPATIENT)
Dept: CARDIAC REHAB | Facility: HOSPITAL | Age: 78
End: 2018-11-19

## 2018-11-19 DIAGNOSIS — Z95.1 S/P CABG (CORONARY ARTERY BYPASS GRAFT): ICD-10-CM

## 2018-11-19 DIAGNOSIS — Z95.2 S/P AVR: ICD-10-CM

## 2018-11-21 ENCOUNTER — COMMUNICATION - HEALTHEAST (OUTPATIENT)
Dept: ANTICOAGULATION | Facility: CLINIC | Age: 78
End: 2018-11-21

## 2018-11-21 ENCOUNTER — AMBULATORY - HEALTHEAST (OUTPATIENT)
Dept: LAB | Facility: CLINIC | Age: 78
End: 2018-11-21

## 2018-11-21 DIAGNOSIS — I48.91 A-FIB (H): ICD-10-CM

## 2018-11-21 LAB — INR PPP: 2.1 (ref 0.9–1.1)

## 2018-11-23 ENCOUNTER — AMBULATORY - HEALTHEAST (OUTPATIENT)
Dept: CARDIAC REHAB | Facility: HOSPITAL | Age: 78
End: 2018-11-23

## 2018-11-23 DIAGNOSIS — Z95.1 S/P CABG (CORONARY ARTERY BYPASS GRAFT): ICD-10-CM

## 2018-11-23 DIAGNOSIS — Z95.2 S/P AVR: ICD-10-CM

## 2018-11-26 ENCOUNTER — COMMUNICATION - HEALTHEAST (OUTPATIENT)
Dept: FAMILY MEDICINE | Facility: CLINIC | Age: 78
End: 2018-11-26

## 2018-11-26 ENCOUNTER — AMBULATORY - HEALTHEAST (OUTPATIENT)
Dept: CARDIAC REHAB | Facility: HOSPITAL | Age: 78
End: 2018-11-26

## 2018-11-26 DIAGNOSIS — Z95.1 S/P CABG (CORONARY ARTERY BYPASS GRAFT): ICD-10-CM

## 2018-11-26 DIAGNOSIS — Z95.2 S/P AVR: ICD-10-CM

## 2018-11-27 ENCOUNTER — AMBULATORY - HEALTHEAST (OUTPATIENT)
Dept: FAMILY MEDICINE | Facility: CLINIC | Age: 78
End: 2018-11-27

## 2018-11-27 DIAGNOSIS — Z12.11 COLON CANCER SCREENING: ICD-10-CM

## 2018-11-30 ENCOUNTER — AMBULATORY - HEALTHEAST (OUTPATIENT)
Dept: CARDIAC REHAB | Facility: HOSPITAL | Age: 78
End: 2018-11-30

## 2018-11-30 ENCOUNTER — COMMUNICATION - HEALTHEAST (OUTPATIENT)
Dept: FAMILY MEDICINE | Facility: CLINIC | Age: 78
End: 2018-11-30

## 2018-11-30 DIAGNOSIS — Z95.2 S/P AVR: ICD-10-CM

## 2018-11-30 DIAGNOSIS — Z95.1 S/P CABG (CORONARY ARTERY BYPASS GRAFT): ICD-10-CM

## 2018-11-30 DIAGNOSIS — F41.9 ANXIETY: ICD-10-CM

## 2018-12-20 ENCOUNTER — COMMUNICATION - HEALTHEAST (OUTPATIENT)
Dept: ANTICOAGULATION | Facility: CLINIC | Age: 78
End: 2018-12-20

## 2018-12-20 ENCOUNTER — AMBULATORY - HEALTHEAST (OUTPATIENT)
Dept: LAB | Facility: CLINIC | Age: 78
End: 2018-12-20

## 2018-12-20 DIAGNOSIS — I48.91 A-FIB (H): ICD-10-CM

## 2018-12-20 LAB — INR PPP: 2.2 (ref 0.9–1.1)

## 2019-01-17 ENCOUNTER — COMMUNICATION - HEALTHEAST (OUTPATIENT)
Dept: ANTICOAGULATION | Facility: CLINIC | Age: 79
End: 2019-01-17

## 2019-01-17 ENCOUNTER — AMBULATORY - HEALTHEAST (OUTPATIENT)
Dept: LAB | Facility: CLINIC | Age: 79
End: 2019-01-17

## 2019-01-17 DIAGNOSIS — I48.91 A-FIB (H): ICD-10-CM

## 2019-01-17 LAB — INR PPP: 1.6 (ref 0.9–1.1)

## 2019-01-24 ENCOUNTER — COMMUNICATION - HEALTHEAST (OUTPATIENT)
Dept: FAMILY MEDICINE | Facility: CLINIC | Age: 79
End: 2019-01-24

## 2019-01-25 ENCOUNTER — AMBULATORY - HEALTHEAST (OUTPATIENT)
Dept: LAB | Facility: CLINIC | Age: 79
End: 2019-01-25

## 2019-01-25 ENCOUNTER — COMMUNICATION - HEALTHEAST (OUTPATIENT)
Dept: ANTICOAGULATION | Facility: CLINIC | Age: 79
End: 2019-01-25

## 2019-01-25 DIAGNOSIS — I48.91 A-FIB (H): ICD-10-CM

## 2019-01-25 LAB — INR PPP: 1.9 (ref 0.9–1.1)

## 2019-02-06 ENCOUNTER — AMBULATORY - HEALTHEAST (OUTPATIENT)
Dept: LAB | Facility: CLINIC | Age: 79
End: 2019-02-06

## 2019-02-06 ENCOUNTER — COMMUNICATION - HEALTHEAST (OUTPATIENT)
Dept: ANTICOAGULATION | Facility: CLINIC | Age: 79
End: 2019-02-06

## 2019-02-06 DIAGNOSIS — I48.91 A-FIB (H): ICD-10-CM

## 2019-02-06 LAB — INR PPP: 1.9 (ref 0.9–1.1)

## 2019-02-18 ENCOUNTER — COMMUNICATION - HEALTHEAST (OUTPATIENT)
Dept: FAMILY MEDICINE | Facility: CLINIC | Age: 79
End: 2019-02-18

## 2019-02-18 DIAGNOSIS — I10 HTN (HYPERTENSION): ICD-10-CM

## 2019-02-18 DIAGNOSIS — E78.5 HYPERLIPIDEMIA LDL GOAL <100: ICD-10-CM

## 2019-02-21 ENCOUNTER — AMBULATORY - HEALTHEAST (OUTPATIENT)
Dept: LAB | Facility: CLINIC | Age: 79
End: 2019-02-21

## 2019-02-21 ENCOUNTER — COMMUNICATION - HEALTHEAST (OUTPATIENT)
Dept: ANTICOAGULATION | Facility: CLINIC | Age: 79
End: 2019-02-21

## 2019-02-21 DIAGNOSIS — I48.91 A-FIB (H): ICD-10-CM

## 2019-02-21 LAB — INR PPP: 2.6 (ref 0.9–1.1)

## 2019-03-08 ENCOUNTER — COMMUNICATION - HEALTHEAST (OUTPATIENT)
Dept: ANTICOAGULATION | Facility: CLINIC | Age: 79
End: 2019-03-08

## 2019-03-08 ENCOUNTER — COMMUNICATION - HEALTHEAST (OUTPATIENT)
Dept: FAMILY MEDICINE | Facility: CLINIC | Age: 79
End: 2019-03-08

## 2019-03-08 ENCOUNTER — AMBULATORY - HEALTHEAST (OUTPATIENT)
Dept: LAB | Facility: CLINIC | Age: 79
End: 2019-03-08

## 2019-03-08 DIAGNOSIS — I48.91 A-FIB (H): ICD-10-CM

## 2019-03-08 LAB — INR PPP: 1.9 (ref 0.9–1.1)

## 2019-03-12 ENCOUNTER — COMMUNICATION - HEALTHEAST (OUTPATIENT)
Dept: FAMILY MEDICINE | Facility: CLINIC | Age: 79
End: 2019-03-12

## 2019-03-18 ENCOUNTER — COMMUNICATION - HEALTHEAST (OUTPATIENT)
Dept: SCHEDULING | Facility: CLINIC | Age: 79
End: 2019-03-18

## 2019-03-22 ENCOUNTER — AMBULATORY - HEALTHEAST (OUTPATIENT)
Dept: LAB | Facility: CLINIC | Age: 79
End: 2019-03-22

## 2019-03-22 ENCOUNTER — COMMUNICATION - HEALTHEAST (OUTPATIENT)
Dept: ANTICOAGULATION | Facility: CLINIC | Age: 79
End: 2019-03-22

## 2019-03-22 DIAGNOSIS — I48.91 A-FIB (H): ICD-10-CM

## 2019-03-22 LAB — INR PPP: 2.8 (ref 0.9–1.1)

## 2019-03-27 ENCOUNTER — RECORDS - HEALTHEAST (OUTPATIENT)
Dept: ADMINISTRATIVE | Facility: OTHER | Age: 79
End: 2019-03-27

## 2019-04-05 ENCOUNTER — COMMUNICATION - HEALTHEAST (OUTPATIENT)
Dept: ANTICOAGULATION | Facility: CLINIC | Age: 79
End: 2019-04-05

## 2019-04-05 ENCOUNTER — AMBULATORY - HEALTHEAST (OUTPATIENT)
Dept: LAB | Facility: CLINIC | Age: 79
End: 2019-04-05

## 2019-04-05 DIAGNOSIS — I48.91 A-FIB (H): ICD-10-CM

## 2019-04-05 LAB — INR PPP: 3.7 (ref 0.9–1.1)

## 2019-04-11 ENCOUNTER — RECORDS - HEALTHEAST (OUTPATIENT)
Dept: ADMINISTRATIVE | Facility: OTHER | Age: 79
End: 2019-04-11

## 2019-04-18 ENCOUNTER — COMMUNICATION - HEALTHEAST (OUTPATIENT)
Dept: ANTICOAGULATION | Facility: CLINIC | Age: 79
End: 2019-04-18

## 2019-04-18 ENCOUNTER — AMBULATORY - HEALTHEAST (OUTPATIENT)
Dept: LAB | Facility: CLINIC | Age: 79
End: 2019-04-18

## 2019-04-18 DIAGNOSIS — I48.91 A-FIB (H): ICD-10-CM

## 2019-04-18 LAB — INR PPP: 2.3 (ref 0.9–1.1)

## 2019-05-03 ENCOUNTER — AMBULATORY - HEALTHEAST (OUTPATIENT)
Dept: LAB | Facility: CLINIC | Age: 79
End: 2019-05-03

## 2019-05-03 ENCOUNTER — COMMUNICATION - HEALTHEAST (OUTPATIENT)
Dept: ANTICOAGULATION | Facility: CLINIC | Age: 79
End: 2019-05-03

## 2019-05-03 DIAGNOSIS — I48.91 A-FIB (H): ICD-10-CM

## 2019-05-03 LAB — INR PPP: 2.8 (ref 0.9–1.1)

## 2019-05-16 ENCOUNTER — COMMUNICATION - HEALTHEAST (OUTPATIENT)
Dept: FAMILY MEDICINE | Facility: CLINIC | Age: 79
End: 2019-05-16

## 2019-05-16 DIAGNOSIS — I48.91 A-FIB (H): ICD-10-CM

## 2019-05-20 ENCOUNTER — COMMUNICATION - HEALTHEAST (OUTPATIENT)
Dept: ANTICOAGULATION | Facility: CLINIC | Age: 79
End: 2019-05-20

## 2019-05-20 DIAGNOSIS — I48.91 A-FIB (H): ICD-10-CM

## 2019-05-24 ENCOUNTER — OFFICE VISIT - HEALTHEAST (OUTPATIENT)
Dept: FAMILY MEDICINE | Facility: CLINIC | Age: 79
End: 2019-05-24

## 2019-05-24 DIAGNOSIS — F32.0 CURRENT MILD EPISODE OF MAJOR DEPRESSIVE DISORDER, UNSPECIFIED WHETHER RECURRENT (H): ICD-10-CM

## 2019-05-24 DIAGNOSIS — G56.01 CARPAL TUNNEL SYNDROME OF RIGHT WRIST: ICD-10-CM

## 2019-05-31 ENCOUNTER — AMBULATORY - HEALTHEAST (OUTPATIENT)
Dept: LAB | Facility: CLINIC | Age: 79
End: 2019-05-31

## 2019-05-31 ENCOUNTER — COMMUNICATION - HEALTHEAST (OUTPATIENT)
Dept: ANTICOAGULATION | Facility: CLINIC | Age: 79
End: 2019-05-31

## 2019-05-31 DIAGNOSIS — I48.91 A-FIB (H): ICD-10-CM

## 2019-05-31 LAB — INR PPP: 2.3 (ref 0.9–1.1)

## 2019-07-02 ENCOUNTER — AMBULATORY - HEALTHEAST (OUTPATIENT)
Dept: LAB | Facility: CLINIC | Age: 79
End: 2019-07-02

## 2019-07-02 ENCOUNTER — COMMUNICATION - HEALTHEAST (OUTPATIENT)
Dept: ANTICOAGULATION | Facility: CLINIC | Age: 79
End: 2019-07-02

## 2019-07-02 DIAGNOSIS — I48.91 A-FIB (H): ICD-10-CM

## 2019-07-02 LAB — INR PPP: 2.4 (ref 0.9–1.1)

## 2019-07-29 ENCOUNTER — OFFICE VISIT - HEALTHEAST (OUTPATIENT)
Dept: FAMILY MEDICINE | Facility: CLINIC | Age: 79
End: 2019-07-29

## 2019-07-29 ENCOUNTER — COMMUNICATION - HEALTHEAST (OUTPATIENT)
Dept: SCHEDULING | Facility: CLINIC | Age: 79
End: 2019-07-29

## 2019-07-29 DIAGNOSIS — S81.802A OPEN WOUND OF LEFT LOWER EXTREMITY, INITIAL ENCOUNTER: ICD-10-CM

## 2019-07-29 ASSESSMENT — MIFFLIN-ST. JEOR: SCORE: 1542.93

## 2019-08-06 ENCOUNTER — AMBULATORY - HEALTHEAST (OUTPATIENT)
Dept: LAB | Facility: CLINIC | Age: 79
End: 2019-08-06

## 2019-08-06 ENCOUNTER — COMMUNICATION - HEALTHEAST (OUTPATIENT)
Dept: ANTICOAGULATION | Facility: CLINIC | Age: 79
End: 2019-08-06

## 2019-08-06 DIAGNOSIS — I48.91 A-FIB (H): ICD-10-CM

## 2019-08-06 LAB — INR PPP: 2.6 (ref 0.9–1.1)

## 2019-08-08 ENCOUNTER — RECORDS - HEALTHEAST (OUTPATIENT)
Dept: GENERAL RADIOLOGY | Facility: CLINIC | Age: 79
End: 2019-08-08

## 2019-08-08 ENCOUNTER — OFFICE VISIT - HEALTHEAST (OUTPATIENT)
Dept: FAMILY MEDICINE | Facility: CLINIC | Age: 79
End: 2019-08-08

## 2019-08-08 DIAGNOSIS — F51.5 NIGHTMARES: ICD-10-CM

## 2019-08-08 DIAGNOSIS — M25.531 RIGHT WRIST PAIN: ICD-10-CM

## 2019-08-08 DIAGNOSIS — M25.531 PAIN IN RIGHT WRIST: ICD-10-CM

## 2019-08-08 DIAGNOSIS — S89.90XD INJURY OF LOWER EXTREMITY, UNSPECIFIED LATERALITY, SUBSEQUENT ENCOUNTER: ICD-10-CM

## 2019-08-19 ENCOUNTER — COMMUNICATION - HEALTHEAST (OUTPATIENT)
Dept: FAMILY MEDICINE | Facility: CLINIC | Age: 79
End: 2019-08-19

## 2019-08-19 DIAGNOSIS — F32.0 CURRENT MILD EPISODE OF MAJOR DEPRESSIVE DISORDER, UNSPECIFIED WHETHER RECURRENT (H): ICD-10-CM

## 2019-08-19 DIAGNOSIS — Z79.01 LONG TERM (CURRENT) USE OF ANTICOAGULANTS: ICD-10-CM

## 2019-08-19 DIAGNOSIS — E78.5 HYPERLIPIDEMIA LDL GOAL <100: ICD-10-CM

## 2019-08-19 DIAGNOSIS — I48.91 A-FIB (H): ICD-10-CM

## 2019-08-21 ENCOUNTER — COMMUNICATION - HEALTHEAST (OUTPATIENT)
Dept: FAMILY MEDICINE | Facility: CLINIC | Age: 79
End: 2019-08-21

## 2019-08-21 DIAGNOSIS — Z00.00 HEALTH CARE MAINTENANCE: ICD-10-CM

## 2019-08-26 ENCOUNTER — RECORDS - HEALTHEAST (OUTPATIENT)
Dept: ADMINISTRATIVE | Facility: OTHER | Age: 79
End: 2019-08-26

## 2019-08-29 ENCOUNTER — COMMUNICATION - HEALTHEAST (OUTPATIENT)
Dept: FAMILY MEDICINE | Facility: CLINIC | Age: 79
End: 2019-08-29

## 2019-08-29 DIAGNOSIS — R60.9 EDEMA, UNSPECIFIED TYPE: ICD-10-CM

## 2019-08-29 DIAGNOSIS — I10 HTN (HYPERTENSION): ICD-10-CM

## 2019-08-29 DIAGNOSIS — F41.9 ANXIETY: ICD-10-CM

## 2019-09-18 ENCOUNTER — COMMUNICATION - HEALTHEAST (OUTPATIENT)
Dept: ANTICOAGULATION | Facility: CLINIC | Age: 79
End: 2019-09-18

## 2019-09-18 ENCOUNTER — AMBULATORY - HEALTHEAST (OUTPATIENT)
Dept: LAB | Facility: CLINIC | Age: 79
End: 2019-09-18

## 2019-09-18 DIAGNOSIS — I48.91 A-FIB (H): ICD-10-CM

## 2019-09-18 LAB — INR PPP: 2.7 (ref 0.9–1.1)

## 2019-10-01 ENCOUNTER — AMBULATORY - HEALTHEAST (OUTPATIENT)
Dept: NURSING | Facility: CLINIC | Age: 79
End: 2019-10-01

## 2019-10-01 DIAGNOSIS — Z23 FLU VACCINE NEED: ICD-10-CM

## 2019-10-28 ENCOUNTER — COMMUNICATION - HEALTHEAST (OUTPATIENT)
Dept: ANTICOAGULATION | Facility: CLINIC | Age: 79
End: 2019-10-28

## 2019-10-28 ENCOUNTER — AMBULATORY - HEALTHEAST (OUTPATIENT)
Dept: LAB | Facility: CLINIC | Age: 79
End: 2019-10-28

## 2019-10-28 DIAGNOSIS — I48.91 A-FIB (H): ICD-10-CM

## 2019-10-28 LAB — INR PPP: 2.5 (ref 0.9–1.1)

## 2019-12-02 ENCOUNTER — COMMUNICATION - HEALTHEAST (OUTPATIENT)
Dept: ANTICOAGULATION | Facility: CLINIC | Age: 79
End: 2019-12-02

## 2019-12-02 ENCOUNTER — AMBULATORY - HEALTHEAST (OUTPATIENT)
Dept: LAB | Facility: CLINIC | Age: 79
End: 2019-12-02

## 2019-12-02 DIAGNOSIS — I48.91 A-FIB (H): ICD-10-CM

## 2019-12-02 LAB — INR PPP: 1.9 (ref 0.9–1.1)

## 2019-12-05 ENCOUNTER — COMMUNICATION - HEALTHEAST (OUTPATIENT)
Dept: FAMILY MEDICINE | Facility: CLINIC | Age: 79
End: 2019-12-05

## 2019-12-05 DIAGNOSIS — F41.9 ANXIETY: ICD-10-CM

## 2019-12-17 ENCOUNTER — OFFICE VISIT - HEALTHEAST (OUTPATIENT)
Dept: FAMILY MEDICINE | Facility: CLINIC | Age: 79
End: 2019-12-17

## 2019-12-17 ENCOUNTER — AMBULATORY - HEALTHEAST (OUTPATIENT)
Dept: LAB | Facility: CLINIC | Age: 79
End: 2019-12-17

## 2019-12-17 ENCOUNTER — COMMUNICATION - HEALTHEAST (OUTPATIENT)
Dept: ANTICOAGULATION | Facility: CLINIC | Age: 79
End: 2019-12-17

## 2019-12-17 ENCOUNTER — COMMUNICATION - HEALTHEAST (OUTPATIENT)
Dept: SCHEDULING | Facility: CLINIC | Age: 79
End: 2019-12-17

## 2019-12-17 DIAGNOSIS — I48.91 A-FIB (H): ICD-10-CM

## 2019-12-17 DIAGNOSIS — R04.0 RECURRENT EPISTAXIS: ICD-10-CM

## 2019-12-17 DIAGNOSIS — Z79.01 LONG TERM CURRENT USE OF ANTICOAGULANT THERAPY: ICD-10-CM

## 2019-12-17 LAB — INR PPP: 2.7 (ref 0.9–1.1)

## 2019-12-18 ENCOUNTER — OFFICE VISIT - HEALTHEAST (OUTPATIENT)
Dept: OTOLARYNGOLOGY | Facility: CLINIC | Age: 79
End: 2019-12-18

## 2019-12-18 DIAGNOSIS — R04.0 EPISTAXIS: ICD-10-CM

## 2019-12-30 ENCOUNTER — COMMUNICATION - HEALTHEAST (OUTPATIENT)
Dept: ANTICOAGULATION | Facility: CLINIC | Age: 79
End: 2019-12-30

## 2019-12-30 ENCOUNTER — AMBULATORY - HEALTHEAST (OUTPATIENT)
Dept: LAB | Facility: CLINIC | Age: 79
End: 2019-12-30

## 2019-12-30 DIAGNOSIS — I48.91 A-FIB (H): ICD-10-CM

## 2019-12-30 LAB — INR PPP: 2.5 (ref 0.9–1.1)

## 2020-01-13 ENCOUNTER — COMMUNICATION - HEALTHEAST (OUTPATIENT)
Dept: SCHEDULING | Facility: CLINIC | Age: 80
End: 2020-01-13

## 2020-01-24 ENCOUNTER — OFFICE VISIT - HEALTHEAST (OUTPATIENT)
Dept: FAMILY MEDICINE | Facility: CLINIC | Age: 80
End: 2020-01-24

## 2020-01-24 ENCOUNTER — COMMUNICATION - HEALTHEAST (OUTPATIENT)
Dept: ANTICOAGULATION | Facility: CLINIC | Age: 80
End: 2020-01-24

## 2020-01-24 DIAGNOSIS — I48.91 A-FIB (H): ICD-10-CM

## 2020-01-24 DIAGNOSIS — L57.0 ACTINIC KERATOSIS: ICD-10-CM

## 2020-01-24 DIAGNOSIS — F32.0 MILD MAJOR DEPRESSION (H): ICD-10-CM

## 2020-01-24 DIAGNOSIS — Z00.00 HEALTH CARE MAINTENANCE: ICD-10-CM

## 2020-01-24 LAB
ALBUMIN SERPL-MCNC: 3.7 G/DL (ref 3.5–5)
ALP SERPL-CCNC: 137 U/L (ref 45–120)
ALT SERPL W P-5'-P-CCNC: 31 U/L (ref 0–45)
ANION GAP SERPL CALCULATED.3IONS-SCNC: 9 MMOL/L (ref 5–18)
AST SERPL W P-5'-P-CCNC: 33 U/L (ref 0–40)
BILIRUB SERPL-MCNC: 0.5 MG/DL (ref 0–1)
BUN SERPL-MCNC: 27 MG/DL (ref 8–28)
CALCIUM SERPL-MCNC: 8.5 MG/DL (ref 8.5–10.5)
CHLORIDE BLD-SCNC: 106 MMOL/L (ref 98–107)
CHOLEST SERPL-MCNC: 131 MG/DL
CO2 SERPL-SCNC: 26 MMOL/L (ref 22–31)
CREAT SERPL-MCNC: 1.19 MG/DL (ref 0.7–1.3)
ERYTHROCYTE [DISTWIDTH] IN BLOOD BY AUTOMATED COUNT: 12.7 % (ref 11–14.5)
FASTING STATUS PATIENT QL REPORTED: ABNORMAL
GFR SERPL CREATININE-BSD FRML MDRD: 59 ML/MIN/1.73M2
GLUCOSE BLD-MCNC: 115 MG/DL (ref 70–125)
HCT VFR BLD AUTO: 35.7 % (ref 40–54)
HDLC SERPL-MCNC: 36 MG/DL
HGB BLD-MCNC: 12.2 G/DL (ref 14–18)
INR PPP: 3.5 (ref 0.9–1.1)
LDLC SERPL CALC-MCNC: 68 MG/DL
MCH RBC QN AUTO: 31.4 PG (ref 27–34)
MCHC RBC AUTO-ENTMCNC: 34.2 G/DL (ref 32–36)
MCV RBC AUTO: 92 FL (ref 80–100)
PLATELET # BLD AUTO: 131 THOU/UL (ref 140–440)
PMV BLD AUTO: 7 FL (ref 7–10)
POTASSIUM BLD-SCNC: 4.8 MMOL/L (ref 3.5–5)
PROT SERPL-MCNC: 6.9 G/DL (ref 6–8)
RBC # BLD AUTO: 3.88 MILL/UL (ref 4.4–6.2)
SODIUM SERPL-SCNC: 141 MMOL/L (ref 136–145)
TRIGL SERPL-MCNC: 135 MG/DL
WBC: 4.5 THOU/UL (ref 4–11)

## 2020-01-24 ASSESSMENT — ANXIETY QUESTIONNAIRES
GAD7 TOTAL SCORE: 5
3. WORRYING TOO MUCH ABOUT DIFFERENT THINGS: NOT AT ALL
7. FEELING AFRAID AS IF SOMETHING AWFUL MIGHT HAPPEN: SEVERAL DAYS
1. FEELING NERVOUS, ANXIOUS, OR ON EDGE: NOT AT ALL
2. NOT BEING ABLE TO STOP OR CONTROL WORRYING: NOT AT ALL
4. TROUBLE RELAXING: MORE THAN HALF THE DAYS
6. BECOMING EASILY ANNOYED OR IRRITABLE: NOT AT ALL
IF YOU CHECKED OFF ANY PROBLEMS ON THIS QUESTIONNAIRE, HOW DIFFICULT HAVE THESE PROBLEMS MADE IT FOR YOU TO DO YOUR WORK, TAKE CARE OF THINGS AT HOME, OR GET ALONG WITH OTHER PEOPLE: VERY DIFFICULT
5. BEING SO RESTLESS THAT IT IS HARD TO SIT STILL: MORE THAN HALF THE DAYS

## 2020-01-24 ASSESSMENT — PATIENT HEALTH QUESTIONNAIRE - PHQ9: SUM OF ALL RESPONSES TO PHQ QUESTIONS 1-9: 11

## 2020-01-27 ENCOUNTER — COMMUNICATION - HEALTHEAST (OUTPATIENT)
Dept: FAMILY MEDICINE | Facility: CLINIC | Age: 80
End: 2020-01-27

## 2020-02-03 ENCOUNTER — COMMUNICATION - HEALTHEAST (OUTPATIENT)
Dept: SCHEDULING | Facility: CLINIC | Age: 80
End: 2020-02-03

## 2020-02-03 ENCOUNTER — AMBULATORY - HEALTHEAST (OUTPATIENT)
Dept: FAMILY MEDICINE | Facility: CLINIC | Age: 80
End: 2020-02-03

## 2020-02-03 DIAGNOSIS — L98.9 SKIN LESION: ICD-10-CM

## 2020-02-07 ENCOUNTER — COMMUNICATION - HEALTHEAST (OUTPATIENT)
Dept: ANTICOAGULATION | Facility: CLINIC | Age: 80
End: 2020-02-07

## 2020-02-07 ENCOUNTER — AMBULATORY - HEALTHEAST (OUTPATIENT)
Dept: LAB | Facility: CLINIC | Age: 80
End: 2020-02-07

## 2020-02-07 DIAGNOSIS — I48.91 A-FIB (H): ICD-10-CM

## 2020-02-07 LAB — INR PPP: 2.3 (ref 0.9–1.1)

## 2020-02-17 ENCOUNTER — COMMUNICATION - HEALTHEAST (OUTPATIENT)
Dept: FAMILY MEDICINE | Facility: CLINIC | Age: 80
End: 2020-02-17

## 2020-02-17 DIAGNOSIS — Z79.01 LONG TERM (CURRENT) USE OF ANTICOAGULANTS: ICD-10-CM

## 2020-02-17 DIAGNOSIS — R60.9 EDEMA, UNSPECIFIED TYPE: ICD-10-CM

## 2020-02-17 DIAGNOSIS — I48.91 A-FIB (H): ICD-10-CM

## 2020-02-21 ENCOUNTER — COMMUNICATION - HEALTHEAST (OUTPATIENT)
Dept: ANTICOAGULATION | Facility: CLINIC | Age: 80
End: 2020-02-21

## 2020-02-21 ENCOUNTER — AMBULATORY - HEALTHEAST (OUTPATIENT)
Dept: LAB | Facility: CLINIC | Age: 80
End: 2020-02-21

## 2020-02-21 DIAGNOSIS — I48.91 A-FIB (H): ICD-10-CM

## 2020-02-21 LAB — INR PPP: 2.7 (ref 0.9–1.1)

## 2020-03-04 ENCOUNTER — RECORDS - HEALTHEAST (OUTPATIENT)
Dept: ADMINISTRATIVE | Facility: OTHER | Age: 80
End: 2020-03-04

## 2020-03-20 ENCOUNTER — AMBULATORY - HEALTHEAST (OUTPATIENT)
Dept: LAB | Facility: CLINIC | Age: 80
End: 2020-03-20

## 2020-03-20 ENCOUNTER — COMMUNICATION - HEALTHEAST (OUTPATIENT)
Dept: ANTICOAGULATION | Facility: CLINIC | Age: 80
End: 2020-03-20

## 2020-03-20 DIAGNOSIS — I48.91 A-FIB (H): ICD-10-CM

## 2020-03-20 LAB — INR PPP: 2.8 (ref 0.9–1.1)

## 2020-04-21 ENCOUNTER — COMMUNICATION - HEALTHEAST (OUTPATIENT)
Dept: FAMILY MEDICINE | Facility: CLINIC | Age: 80
End: 2020-04-21

## 2020-04-28 ENCOUNTER — COMMUNICATION - HEALTHEAST (OUTPATIENT)
Dept: ANTICOAGULATION | Facility: CLINIC | Age: 80
End: 2020-04-28

## 2020-04-28 DIAGNOSIS — I48.91 A-FIB (H): ICD-10-CM

## 2020-05-01 ENCOUNTER — COMMUNICATION - HEALTHEAST (OUTPATIENT)
Dept: ANTICOAGULATION | Facility: CLINIC | Age: 80
End: 2020-05-01

## 2020-05-01 ENCOUNTER — AMBULATORY - HEALTHEAST (OUTPATIENT)
Dept: LAB | Facility: CLINIC | Age: 80
End: 2020-05-01

## 2020-05-01 DIAGNOSIS — I48.91 A-FIB (H): ICD-10-CM

## 2020-05-01 LAB — INR PPP: 2.5 (ref 0.9–1.1)

## 2020-05-12 ENCOUNTER — COMMUNICATION - HEALTHEAST (OUTPATIENT)
Dept: FAMILY MEDICINE | Facility: CLINIC | Age: 80
End: 2020-05-12

## 2020-05-12 DIAGNOSIS — I48.91 A-FIB (H): ICD-10-CM

## 2020-05-12 DIAGNOSIS — Z79.01 LONG TERM (CURRENT) USE OF ANTICOAGULANTS: ICD-10-CM

## 2020-06-05 ENCOUNTER — COMMUNICATION - HEALTHEAST (OUTPATIENT)
Dept: FAMILY MEDICINE | Facility: CLINIC | Age: 80
End: 2020-06-05

## 2020-06-05 DIAGNOSIS — F41.9 ANXIETY: ICD-10-CM

## 2020-06-16 ENCOUNTER — AMBULATORY - HEALTHEAST (OUTPATIENT)
Dept: LAB | Facility: CLINIC | Age: 80
End: 2020-06-16

## 2020-06-16 ENCOUNTER — COMMUNICATION - HEALTHEAST (OUTPATIENT)
Dept: ANTICOAGULATION | Facility: CLINIC | Age: 80
End: 2020-06-16

## 2020-06-16 DIAGNOSIS — I48.91 A-FIB (H): ICD-10-CM

## 2020-06-16 LAB — INR PPP: 3 (ref 0.9–1.1)

## 2020-07-28 ENCOUNTER — HOSPITAL ENCOUNTER (INPATIENT)
Dept: MEDSURG UNIT | Facility: HOSPITAL | Age: 80
Discharge: HOME HEALTH | End: 2020-08-03
Attending: EMERGENCY MEDICINE | Admitting: INTERNAL MEDICINE
Payer: MEDICARE

## 2020-07-28 ENCOUNTER — COMMUNICATION - HEALTHEAST (OUTPATIENT)
Dept: SCHEDULING | Facility: CLINIC | Age: 80
End: 2020-07-28

## 2020-07-28 DIAGNOSIS — R33.9 URINARY RETENTION: ICD-10-CM

## 2020-07-28 DIAGNOSIS — Z79.01 LONG TERM CURRENT USE OF ANTICOAGULANT THERAPY: ICD-10-CM

## 2020-07-28 DIAGNOSIS — T84.59XD INFECTION OF PROSTHETIC TOTAL HIP JOINT, SUBSEQUENT ENCOUNTER: ICD-10-CM

## 2020-07-28 DIAGNOSIS — M25.551 RIGHT HIP PAIN: ICD-10-CM

## 2020-07-28 DIAGNOSIS — Z96.649 INFECTION OF PROSTHETIC TOTAL HIP JOINT, SUBSEQUENT ENCOUNTER: ICD-10-CM

## 2020-07-28 DIAGNOSIS — M25.551 HIP PAIN, RIGHT: ICD-10-CM

## 2020-07-28 LAB
ANION GAP SERPL CALCULATED.3IONS-SCNC: 9 MMOL/L (ref 5–18)
BASOPHILS # BLD AUTO: 0 THOU/UL (ref 0–0.2)
BASOPHILS NFR BLD AUTO: 0 % (ref 0–2)
BUN SERPL-MCNC: 29 MG/DL (ref 8–28)
C REACTIVE PROTEIN LHE: 4.1 MG/DL (ref 0–0.8)
CALCIUM SERPL-MCNC: 8.6 MG/DL (ref 8.5–10.5)
CHLORIDE BLD-SCNC: 104 MMOL/L (ref 98–107)
CO2 SERPL-SCNC: 25 MMOL/L (ref 22–31)
CREAT SERPL-MCNC: 1.25 MG/DL (ref 0.7–1.3)
EOSINOPHIL # BLD AUTO: 0 THOU/UL (ref 0–0.4)
EOSINOPHIL NFR BLD AUTO: 0 % (ref 0–6)
ERYTHROCYTE [DISTWIDTH] IN BLOOD BY AUTOMATED COUNT: 13.7 % (ref 11–14.5)
ERYTHROCYTE [SEDIMENTATION RATE] IN BLOOD BY WESTERGREN METHOD: 12 MM/HR (ref 0–15)
GFR SERPL CREATININE-BSD FRML MDRD: 56 ML/MIN/1.73M2
GLUCOSE BLD-MCNC: 119 MG/DL (ref 70–125)
HCT VFR BLD AUTO: 33.8 % (ref 40–54)
HGB BLD-MCNC: 11.3 G/DL (ref 14–18)
INR PPP: 2.77 (ref 0.9–1.1)
LYMPHOCYTES # BLD AUTO: 0.6 THOU/UL (ref 0.8–4.4)
LYMPHOCYTES NFR BLD AUTO: 8 % (ref 20–40)
MAGNESIUM SERPL-MCNC: 1.8 MG/DL (ref 1.8–2.6)
MCH RBC QN AUTO: 31 PG (ref 27–34)
MCHC RBC AUTO-ENTMCNC: 33.4 G/DL (ref 32–36)
MCV RBC AUTO: 93 FL (ref 80–100)
MONOCYTES # BLD AUTO: 1.2 THOU/UL (ref 0–0.9)
MONOCYTES NFR BLD AUTO: 16 % (ref 2–10)
NEUTROPHILS # BLD AUTO: 5.6 THOU/UL (ref 2–7.7)
NEUTROPHILS NFR BLD AUTO: 75 % (ref 50–70)
PLATELET # BLD AUTO: 131 THOU/UL (ref 140–440)
PMV BLD AUTO: 9.8 FL (ref 8.5–12.5)
POTASSIUM BLD-SCNC: 4.4 MMOL/L (ref 3.5–5)
RBC # BLD AUTO: 3.65 MILL/UL (ref 4.4–6.2)
SARS-COV-2 PCR COMMENT: NORMAL
SARS-COV-2 RNA SPEC QL NAA+PROBE: NEGATIVE
SARS-COV-2 VIRUS SPECIMEN SOURCE: NORMAL
SODIUM SERPL-SCNC: 138 MMOL/L (ref 136–145)
WBC: 7.6 THOU/UL (ref 4–11)

## 2020-07-28 ASSESSMENT — MIFFLIN-ST. JEOR
SCORE: 1562.21
SCORE: 1577.18
SCORE: 1562.21
SCORE: 1577.18

## 2020-07-29 LAB
ALBUMIN UR-MCNC: ABNORMAL MG/DL
ANION GAP SERPL CALCULATED.3IONS-SCNC: 6 MMOL/L (ref 5–18)
APPEARANCE UR: CLEAR
BACTERIA #/AREA URNS HPF: ABNORMAL HPF
BILIRUB UR QL STRIP: NEGATIVE
BUN SERPL-MCNC: 26 MG/DL (ref 8–28)
CALCIUM SERPL-MCNC: 8.2 MG/DL (ref 8.5–10.5)
CHLORIDE BLD-SCNC: 106 MMOL/L (ref 98–107)
CO2 SERPL-SCNC: 26 MMOL/L (ref 22–31)
COLOR UR AUTO: YELLOW
CREAT SERPL-MCNC: 1 MG/DL (ref 0.7–1.3)
CRYSTALS SNV MICRO: NORMAL
GFR SERPL CREATININE-BSD FRML MDRD: >60 ML/MIN/1.73M2
GLUCOSE BLD-MCNC: 109 MG/DL (ref 70–125)
GLUCOSE UR STRIP-MCNC: NEGATIVE MG/DL
HGB UR QL STRIP: ABNORMAL
INR PPP: 1.72 (ref 0.9–1.1)
INR PPP: 2.03 (ref 0.9–1.1)
KETONES UR STRIP-MCNC: ABNORMAL MG/DL
LEUKOCYTE ESTERASE UR QL STRIP: NEGATIVE
MUCOUS THREADS #/AREA URNS LPF: ABNORMAL LPF
NITRATE UR QL: NEGATIVE
PH UR STRIP: 6 [PH] (ref 4.5–8)
POTASSIUM BLD-SCNC: 4.1 MMOL/L (ref 3.5–5)
RBC #/AREA URNS AUTO: >100 HPF
SODIUM SERPL-SCNC: 138 MMOL/L (ref 136–145)
SP GR UR STRIP: 1.02 (ref 1–1.03)
SQUAMOUS #/AREA URNS AUTO: ABNORMAL LPF
UROBILINOGEN UR STRIP-ACNC: ABNORMAL
WBC #/AREA URNS AUTO: ABNORMAL HPF

## 2020-07-30 ENCOUNTER — ANESTHESIA - HEALTHEAST (OUTPATIENT)
Dept: SURGERY | Facility: HOSPITAL | Age: 80
End: 2020-07-30

## 2020-07-30 LAB
CREAT SERPL-MCNC: 0.85 MG/DL (ref 0.7–1.3)
ERYTHROCYTE [DISTWIDTH] IN BLOOD BY AUTOMATED COUNT: 13.5 % (ref 11–14.5)
GFR SERPL CREATININE-BSD FRML MDRD: >60 ML/MIN/1.73M2
HCT VFR BLD AUTO: 30.7 % (ref 40–54)
HGB BLD-MCNC: 10.3 G/DL (ref 14–18)
INR PPP: 1.49 (ref 0.9–1.1)
MCH RBC QN AUTO: 31.2 PG (ref 27–34)
MCHC RBC AUTO-ENTMCNC: 33.6 G/DL (ref 32–36)
MCV RBC AUTO: 93 FL (ref 80–100)
PLATELET # BLD AUTO: 94 THOU/UL (ref 140–440)
PMV BLD AUTO: 9.6 FL (ref 8.5–12.5)
RBC # BLD AUTO: 3.3 MILL/UL (ref 4.4–6.2)
WBC: 6.6 THOU/UL (ref 4–11)

## 2020-07-31 ENCOUNTER — COMMUNICATION - HEALTHEAST (OUTPATIENT)
Dept: SCHEDULING | Facility: CLINIC | Age: 80
End: 2020-07-31

## 2020-07-31 ENCOUNTER — SURGERY - HEALTHEAST (OUTPATIENT)
Dept: SURGERY | Facility: HOSPITAL | Age: 80
End: 2020-07-31

## 2020-07-31 LAB
ABO/RH(D): NORMAL
ANTIBODY SCREEN: NEGATIVE
ATRIAL RATE - MUSE: NORMAL
BACTERIA SPEC CULT: ABNORMAL
DIASTOLIC BLOOD PRESSURE - MUSE: NORMAL
ERYTHROCYTE [DISTWIDTH] IN BLOOD BY AUTOMATED COUNT: 13.4 % (ref 11–14.5)
GLUCOSE BLDC GLUCOMTR-MCNC: 181 MG/DL (ref 70–139)
GLUCOSE BLDC GLUCOMTR-MCNC: 211 MG/DL (ref 70–139)
HCT VFR BLD AUTO: 30.3 % (ref 40–54)
HGB BLD-MCNC: 10.2 G/DL (ref 14–18)
INR PPP: 1.38 (ref 0.9–1.1)
INTERPRETATION ECG - MUSE: NORMAL
MCH RBC QN AUTO: 31.5 PG (ref 27–34)
MCHC RBC AUTO-ENTMCNC: 33.7 G/DL (ref 32–36)
MCV RBC AUTO: 94 FL (ref 80–100)
P AXIS - MUSE: NORMAL
PLATELET # BLD AUTO: 132 THOU/UL (ref 140–440)
PMV BLD AUTO: 9.6 FL (ref 8.5–12.5)
PR INTERVAL - MUSE: NORMAL
QRS DURATION - MUSE: 152 MS
QT - MUSE: 482 MS
QTC - MUSE: 482 MS
R AXIS - MUSE: -65 DEGREES
RBC # BLD AUTO: 3.24 MILL/UL (ref 4.4–6.2)
SYSTOLIC BLOOD PRESSURE - MUSE: NORMAL
T AXIS - MUSE: 77 DEGREES
VENTRICULAR RATE- MUSE: 60 BPM
WBC: 5.6 THOU/UL (ref 4–11)

## 2020-08-01 LAB
CREAT SERPL-MCNC: 0.89 MG/DL (ref 0.7–1.3)
GFR SERPL CREATININE-BSD FRML MDRD: >60 ML/MIN/1.73M2
GLUCOSE BLDC GLUCOMTR-MCNC: 123 MG/DL (ref 70–139)
GLUCOSE BLDC GLUCOMTR-MCNC: 132 MG/DL (ref 70–139)
GLUCOSE BLDC GLUCOMTR-MCNC: 155 MG/DL (ref 70–139)
GLUCOSE BLDC GLUCOMTR-MCNC: 194 MG/DL (ref 70–139)
HBA1C MFR BLD: 5.6 %
HGB BLD-MCNC: 9.3 G/DL (ref 14–18)
INR PPP: 1.56 (ref 0.9–1.1)
POTASSIUM BLD-SCNC: 4.5 MMOL/L (ref 3.5–5)

## 2020-08-01 ASSESSMENT — MIFFLIN-ST. JEOR
SCORE: 1593.97
SCORE: 1593.97

## 2020-08-02 LAB
AEROBIC BLOOD CULTURE BOTTLE: NO GROWTH
AEROBIC BLOOD CULTURE BOTTLE: NO GROWTH
ANAEROBIC BLOOD CULTURE BOTTLE: NO GROWTH
ANAEROBIC BLOOD CULTURE BOTTLE: NO GROWTH
BACTERIA SPEC CULT: ABNORMAL
GLUCOSE BLDC GLUCOMTR-MCNC: 101 MG/DL (ref 70–139)
GLUCOSE BLDC GLUCOMTR-MCNC: 103 MG/DL (ref 70–139)
GRAM STAIN RESULT: ABNORMAL
GRAM STAIN RESULT: ABNORMAL
HGB BLD-MCNC: 9.1 G/DL (ref 14–18)
INR PPP: 1.74 (ref 0.9–1.1)

## 2020-08-03 ENCOUNTER — COMMUNICATION - HEALTHEAST (OUTPATIENT)
Dept: FAMILY MEDICINE | Facility: CLINIC | Age: 80
End: 2020-08-03

## 2020-08-03 LAB
BACTERIA SPEC CULT: ABNORMAL
BACTERIA SPEC CULT: NORMAL
GRAM STAIN RESULT: ABNORMAL
GRAM STAIN RESULT: ABNORMAL
INR PPP: 1.95 (ref 0.9–1.1)

## 2020-08-03 RX ORDER — ACETAMINOPHEN 500 MG
1000 TABLET ORAL 3 TIMES DAILY
Refills: 0 | Status: SHIPPED | COMMUNITY
Start: 2020-08-03 | End: 2021-08-23

## 2020-08-04 ENCOUNTER — AMBULATORY - HEALTHEAST (OUTPATIENT)
Dept: ANTICOAGULATION | Facility: CLINIC | Age: 80
End: 2020-08-04

## 2020-08-04 ENCOUNTER — COMMUNICATION - HEALTHEAST (OUTPATIENT)
Dept: FAMILY MEDICINE | Facility: CLINIC | Age: 80
End: 2020-08-04

## 2020-08-04 ENCOUNTER — COMMUNICATION - HEALTHEAST (OUTPATIENT)
Dept: ANTICOAGULATION | Facility: CLINIC | Age: 80
End: 2020-08-04

## 2020-08-04 ENCOUNTER — RECORDS - HEALTHEAST (OUTPATIENT)
Dept: LAB | Facility: HOSPITAL | Age: 80
End: 2020-08-04

## 2020-08-04 DIAGNOSIS — I48.91 A-FIB (H): ICD-10-CM

## 2020-08-04 LAB — INR PPP: 2.33 (ref 0.9–1.1)

## 2020-08-05 ENCOUNTER — COMMUNICATION - HEALTHEAST (OUTPATIENT)
Dept: INFECTIOUS DISEASES | Facility: CLINIC | Age: 80
End: 2020-08-05

## 2020-08-06 ENCOUNTER — COMMUNICATION - HEALTHEAST (OUTPATIENT)
Dept: FAMILY MEDICINE | Facility: CLINIC | Age: 80
End: 2020-08-06

## 2020-08-06 DIAGNOSIS — I48.91 A-FIB (H): ICD-10-CM

## 2020-08-06 LAB — INR PPP: 3.4 (ref 0.9–1.1)

## 2020-08-07 ENCOUNTER — RECORDS - HEALTHEAST (OUTPATIENT)
Dept: ADMINISTRATIVE | Facility: OTHER | Age: 80
End: 2020-08-07

## 2020-08-07 ENCOUNTER — COMMUNICATION - HEALTHEAST (OUTPATIENT)
Dept: FAMILY MEDICINE | Facility: CLINIC | Age: 80
End: 2020-08-07

## 2020-08-07 LAB
CAP COMMENT: NORMAL
LAB AP CHARGES (HE HISTORICAL CONVERSION): NORMAL
LAB MED GENERAL PATH INTERP (HE HISTORICAL CONVERSION): NORMAL
PATH REPORT.COMMENTS IMP SPEC: NORMAL
PATH REPORT.COMMENTS IMP SPEC: NORMAL
PATH REPORT.FINAL DX SPEC: NORMAL
PATH REPORT.MICROSCOPIC SPEC OTHER STN: NORMAL
PATH REPORT.RELEVANT HX SPEC: NORMAL
SPECIMEN DESCRIPTION: NORMAL

## 2020-08-11 ENCOUNTER — COMMUNICATION - HEALTHEAST (OUTPATIENT)
Dept: FAMILY MEDICINE | Facility: CLINIC | Age: 80
End: 2020-08-11

## 2020-08-11 ENCOUNTER — OFFICE VISIT - HEALTHEAST (OUTPATIENT)
Dept: FAMILY MEDICINE | Facility: CLINIC | Age: 80
End: 2020-08-11

## 2020-08-11 ENCOUNTER — RECORDS - HEALTHEAST (OUTPATIENT)
Dept: ADMINISTRATIVE | Facility: OTHER | Age: 80
End: 2020-08-11

## 2020-08-11 DIAGNOSIS — I48.91 A-FIB (H): ICD-10-CM

## 2020-08-11 DIAGNOSIS — Z96.649 INFECTION OF PROSTHETIC HIP JOINT, SUBSEQUENT ENCOUNTER: ICD-10-CM

## 2020-08-11 DIAGNOSIS — R60.9 EDEMA, UNSPECIFIED TYPE: ICD-10-CM

## 2020-08-11 DIAGNOSIS — F41.9 ANXIETY: ICD-10-CM

## 2020-08-11 DIAGNOSIS — I48.20 CHRONIC ATRIAL FIBRILLATION (H): ICD-10-CM

## 2020-08-11 DIAGNOSIS — T84.59XD INFECTION OF PROSTHETIC HIP JOINT, SUBSEQUENT ENCOUNTER: ICD-10-CM

## 2020-08-11 DIAGNOSIS — I10 HTN (HYPERTENSION): ICD-10-CM

## 2020-08-11 DIAGNOSIS — E78.5 HYPERLIPIDEMIA LDL GOAL <100: ICD-10-CM

## 2020-08-11 LAB
ANION GAP SERPL CALCULATED.3IONS-SCNC: 10 MMOL/L (ref 5–18)
BUN SERPL-MCNC: 25 MG/DL (ref 8–28)
CALCIUM SERPL-MCNC: 8.5 MG/DL (ref 8.5–10.5)
CHLORIDE BLD-SCNC: 103 MMOL/L (ref 98–107)
CO2 SERPL-SCNC: 27 MMOL/L (ref 22–31)
CREAT SERPL-MCNC: 1.12 MG/DL (ref 0.7–1.3)
GFR SERPL CREATININE-BSD FRML MDRD: >60 ML/MIN/1.73M2
GLUCOSE BLD-MCNC: 113 MG/DL (ref 70–125)
INR PPP: 3.9 (ref 0.9–1.1)
POTASSIUM BLD-SCNC: 4.6 MMOL/L (ref 3.5–5)
SODIUM SERPL-SCNC: 140 MMOL/L (ref 136–145)

## 2020-08-11 ASSESSMENT — MIFFLIN-ST. JEOR: SCORE: 1603.6

## 2020-08-12 ENCOUNTER — COMMUNICATION - HEALTHEAST (OUTPATIENT)
Dept: FAMILY MEDICINE | Facility: CLINIC | Age: 80
End: 2020-08-12

## 2020-08-17 ENCOUNTER — COMMUNICATION - HEALTHEAST (OUTPATIENT)
Dept: FAMILY MEDICINE | Facility: CLINIC | Age: 80
End: 2020-08-17

## 2020-08-18 ENCOUNTER — AMBULATORY - HEALTHEAST (OUTPATIENT)
Dept: ANTICOAGULATION | Facility: CLINIC | Age: 80
End: 2020-08-18

## 2020-08-18 ENCOUNTER — RECORDS - HEALTHEAST (OUTPATIENT)
Dept: ADMINISTRATIVE | Facility: OTHER | Age: 80
End: 2020-08-18

## 2020-08-19 ENCOUNTER — COMMUNICATION - HEALTHEAST (OUTPATIENT)
Dept: FAMILY MEDICINE | Facility: CLINIC | Age: 80
End: 2020-08-19

## 2020-08-19 ENCOUNTER — OFFICE VISIT - HEALTHEAST (OUTPATIENT)
Dept: FAMILY MEDICINE | Facility: CLINIC | Age: 80
End: 2020-08-19

## 2020-08-19 ENCOUNTER — RECORDS - HEALTHEAST (OUTPATIENT)
Dept: ADMINISTRATIVE | Facility: OTHER | Age: 80
End: 2020-08-19

## 2020-08-19 DIAGNOSIS — F32.0 CURRENT MILD EPISODE OF MAJOR DEPRESSIVE DISORDER, UNSPECIFIED WHETHER RECURRENT (H): ICD-10-CM

## 2020-08-19 DIAGNOSIS — D50.9 IRON DEFICIENCY ANEMIA, UNSPECIFIED IRON DEFICIENCY ANEMIA TYPE: ICD-10-CM

## 2020-08-19 DIAGNOSIS — M25.551 HIP PAIN, RIGHT: ICD-10-CM

## 2020-08-19 DIAGNOSIS — L03.90 CELLULITIS, UNSPECIFIED CELLULITIS SITE: ICD-10-CM

## 2020-08-19 DIAGNOSIS — R06.01 ORTHOPNEA: ICD-10-CM

## 2020-08-20 ENCOUNTER — COMMUNICATION - HEALTHEAST (OUTPATIENT)
Dept: FAMILY MEDICINE | Facility: CLINIC | Age: 80
End: 2020-08-20

## 2020-08-20 ENCOUNTER — OFFICE VISIT - HEALTHEAST (OUTPATIENT)
Dept: INFECTIOUS DISEASES | Facility: CLINIC | Age: 80
End: 2020-08-20

## 2020-08-20 DIAGNOSIS — T84.50XD INFECTION OF PROSTHETIC JOINT, SUBSEQUENT ENCOUNTER: ICD-10-CM

## 2020-08-20 LAB — INR PPP: 3 (ref 0.9–1.1)

## 2020-08-21 ENCOUNTER — RECORDS - HEALTHEAST (OUTPATIENT)
Dept: ADMINISTRATIVE | Facility: OTHER | Age: 80
End: 2020-08-21

## 2020-08-24 ENCOUNTER — RECORDS - HEALTHEAST (OUTPATIENT)
Dept: ADMINISTRATIVE | Facility: OTHER | Age: 80
End: 2020-08-24

## 2020-08-24 LAB
CREAT SERPL-MCNC: 1.15 MG/DL (ref 0.73–1.18)
GFR ESTIMATE EXT - HISTORICAL: 60 ML/MIN/1.73M2

## 2020-08-25 ENCOUNTER — RECORDS - HEALTHEAST (OUTPATIENT)
Dept: ADMINISTRATIVE | Facility: OTHER | Age: 80
End: 2020-08-25

## 2020-08-25 ENCOUNTER — COMMUNICATION - HEALTHEAST (OUTPATIENT)
Dept: FAMILY MEDICINE | Facility: CLINIC | Age: 80
End: 2020-08-25

## 2020-08-25 LAB
ALT SERPL W/O P-5'-P-CCNC: 54 U/L (ref 0–55)
AST SERPL-CCNC: 45 U/L (ref 10–40)
CREAT SERPL-MCNC: 1.12 MG/DL (ref 0.73–1.18)
GFR ESTIMATE EXT - HISTORICAL: >60 ML/MIN/1.73M2
INR PPP: 2.9 (ref 0.9–1.1)

## 2020-08-27 ENCOUNTER — RECORDS - HEALTHEAST (OUTPATIENT)
Dept: ADMINISTRATIVE | Facility: OTHER | Age: 80
End: 2020-08-27

## 2020-08-28 ENCOUNTER — RECORDS - HEALTHEAST (OUTPATIENT)
Dept: ADMINISTRATIVE | Facility: OTHER | Age: 80
End: 2020-08-28

## 2020-08-28 ENCOUNTER — COMMUNICATION - HEALTHEAST (OUTPATIENT)
Dept: INFECTIOUS DISEASES | Facility: CLINIC | Age: 80
End: 2020-08-28

## 2020-08-31 ENCOUNTER — COMMUNICATION - HEALTHEAST (OUTPATIENT)
Dept: FAMILY MEDICINE | Facility: CLINIC | Age: 80
End: 2020-08-31

## 2020-09-01 ENCOUNTER — RECORDS - HEALTHEAST (OUTPATIENT)
Dept: ADMINISTRATIVE | Facility: OTHER | Age: 80
End: 2020-09-01

## 2020-09-01 ENCOUNTER — COMMUNICATION - HEALTHEAST (OUTPATIENT)
Dept: FAMILY MEDICINE | Facility: CLINIC | Age: 80
End: 2020-09-01

## 2020-09-01 LAB
CREAT SERPL-MCNC: 1.06 MG/DL (ref 0.73–1.18)
GFR ESTIMATE EXT - HISTORICAL: >60 ML/MIN/1.73M2
INR PPP: 2.4 (ref 0.9–1.1)

## 2020-09-02 ENCOUNTER — RECORDS - HEALTHEAST (OUTPATIENT)
Dept: ADMINISTRATIVE | Facility: OTHER | Age: 80
End: 2020-09-02

## 2020-09-02 ENCOUNTER — COMMUNICATION - HEALTHEAST (OUTPATIENT)
Dept: FAMILY MEDICINE | Facility: CLINIC | Age: 80
End: 2020-09-02

## 2020-09-02 DIAGNOSIS — R35.0 BENIGN PROSTATIC HYPERPLASIA WITH URINARY FREQUENCY: ICD-10-CM

## 2020-09-02 DIAGNOSIS — N40.1 BENIGN PROSTATIC HYPERPLASIA WITH URINARY FREQUENCY: ICD-10-CM

## 2020-09-03 ENCOUNTER — RECORDS - HEALTHEAST (OUTPATIENT)
Dept: ADMINISTRATIVE | Facility: OTHER | Age: 80
End: 2020-09-03

## 2020-09-04 ENCOUNTER — RECORDS - HEALTHEAST (OUTPATIENT)
Dept: HEALTH INFORMATION MANAGEMENT | Facility: CLINIC | Age: 80
End: 2020-09-04

## 2020-09-08 ENCOUNTER — COMMUNICATION - HEALTHEAST (OUTPATIENT)
Dept: FAMILY MEDICINE | Facility: CLINIC | Age: 80
End: 2020-09-08

## 2020-09-08 LAB — INR PPP: 2.1 (ref 0.9–1.1)

## 2020-09-09 ENCOUNTER — COMMUNICATION - HEALTHEAST (OUTPATIENT)
Dept: FAMILY MEDICINE | Facility: CLINIC | Age: 80
End: 2020-09-09

## 2020-09-09 DIAGNOSIS — Z00.00 HEALTH CARE MAINTENANCE: ICD-10-CM

## 2020-09-10 ENCOUNTER — RECORDS - HEALTHEAST (OUTPATIENT)
Dept: HEALTH INFORMATION MANAGEMENT | Facility: CLINIC | Age: 80
End: 2020-09-10

## 2020-09-11 ENCOUNTER — RECORDS - HEALTHEAST (OUTPATIENT)
Dept: ADMINISTRATIVE | Facility: OTHER | Age: 80
End: 2020-09-11

## 2020-09-14 ENCOUNTER — RECORDS - HEALTHEAST (OUTPATIENT)
Dept: ADMINISTRATIVE | Facility: OTHER | Age: 80
End: 2020-09-14

## 2020-09-15 ENCOUNTER — COMMUNICATION - HEALTHEAST (OUTPATIENT)
Dept: FAMILY MEDICINE | Facility: CLINIC | Age: 80
End: 2020-09-15

## 2020-09-15 ENCOUNTER — RECORDS - HEALTHEAST (OUTPATIENT)
Dept: ADMINISTRATIVE | Facility: OTHER | Age: 80
End: 2020-09-15

## 2020-09-15 LAB — INR PPP: 2.2 (ref 0.9–1.1)

## 2020-09-16 ENCOUNTER — COMMUNICATION - HEALTHEAST (OUTPATIENT)
Dept: FAMILY MEDICINE | Facility: CLINIC | Age: 80
End: 2020-09-16

## 2020-09-17 ENCOUNTER — RECORDS - HEALTHEAST (OUTPATIENT)
Dept: ADMINISTRATIVE | Facility: OTHER | Age: 80
End: 2020-09-17

## 2020-09-22 ENCOUNTER — COMMUNICATION - HEALTHEAST (OUTPATIENT)
Dept: FAMILY MEDICINE | Facility: CLINIC | Age: 80
End: 2020-09-22

## 2020-09-22 DIAGNOSIS — F41.9 ANXIETY: ICD-10-CM

## 2020-09-29 ENCOUNTER — COMMUNICATION - HEALTHEAST (OUTPATIENT)
Dept: FAMILY MEDICINE | Facility: CLINIC | Age: 80
End: 2020-09-29

## 2020-09-29 LAB — INR PPP: 2.4 (ref 0.9–1.1)

## 2020-09-30 ENCOUNTER — RECORDS - HEALTHEAST (OUTPATIENT)
Dept: ADMINISTRATIVE | Facility: OTHER | Age: 80
End: 2020-09-30

## 2020-10-20 ENCOUNTER — COMMUNICATION - HEALTHEAST (OUTPATIENT)
Dept: ANTICOAGULATION | Facility: CLINIC | Age: 80
End: 2020-10-20

## 2020-10-20 ENCOUNTER — AMBULATORY - HEALTHEAST (OUTPATIENT)
Dept: LAB | Facility: CLINIC | Age: 80
End: 2020-10-20

## 2020-10-20 DIAGNOSIS — I48.91 A-FIB (H): ICD-10-CM

## 2020-10-20 LAB — INR PPP: 1.6 (ref 0.9–1.1)

## 2020-10-28 ENCOUNTER — COMMUNICATION - HEALTHEAST (OUTPATIENT)
Dept: FAMILY MEDICINE | Facility: CLINIC | Age: 80
End: 2020-10-28

## 2020-11-03 ENCOUNTER — AMBULATORY - HEALTHEAST (OUTPATIENT)
Dept: LAB | Facility: CLINIC | Age: 80
End: 2020-11-03

## 2020-11-03 ENCOUNTER — COMMUNICATION - HEALTHEAST (OUTPATIENT)
Dept: ANTICOAGULATION | Facility: CLINIC | Age: 80
End: 2020-11-03

## 2020-11-03 DIAGNOSIS — I48.91 A-FIB (H): ICD-10-CM

## 2020-11-03 LAB — INR PPP: 1.7 (ref 0.9–1.1)

## 2020-11-04 ENCOUNTER — OFFICE VISIT - HEALTHEAST (OUTPATIENT)
Dept: FAMILY MEDICINE | Facility: CLINIC | Age: 80
End: 2020-11-04

## 2020-11-04 DIAGNOSIS — N40.1 BENIGN PROSTATIC HYPERPLASIA WITH URINARY FREQUENCY: ICD-10-CM

## 2020-11-04 DIAGNOSIS — F41.9 ANXIETY: ICD-10-CM

## 2020-11-04 DIAGNOSIS — D50.9 IRON DEFICIENCY ANEMIA, UNSPECIFIED IRON DEFICIENCY ANEMIA TYPE: ICD-10-CM

## 2020-11-04 DIAGNOSIS — F32.0 MILD MAJOR DEPRESSION (H): ICD-10-CM

## 2020-11-04 DIAGNOSIS — H61.23 BILATERAL IMPACTED CERUMEN: ICD-10-CM

## 2020-11-04 DIAGNOSIS — R35.0 BENIGN PROSTATIC HYPERPLASIA WITH URINARY FREQUENCY: ICD-10-CM

## 2020-11-04 LAB
ERYTHROCYTE [DISTWIDTH] IN BLOOD BY AUTOMATED COUNT: 13.2 % (ref 11–14.5)
FERRITIN SERPL-MCNC: 108 NG/ML (ref 27–300)
HCT VFR BLD AUTO: 32.3 % (ref 40–54)
HGB BLD-MCNC: 11 G/DL (ref 14–18)
MCH RBC QN AUTO: 31.1 PG (ref 27–34)
MCHC RBC AUTO-ENTMCNC: 33.9 G/DL (ref 32–36)
MCV RBC AUTO: 92 FL (ref 80–100)
PLATELET # BLD AUTO: 123 THOU/UL (ref 140–440)
PMV BLD AUTO: 7.6 FL (ref 7–10)
RBC # BLD AUTO: 3.52 MILL/UL (ref 4.4–6.2)
WBC: 4.6 THOU/UL (ref 4–11)

## 2020-11-04 RX ORDER — HYDROXYZINE HYDROCHLORIDE 25 MG/1
25 TABLET, FILM COATED ORAL EVERY 6 HOURS PRN
Qty: 360 TABLET | Refills: 0 | Status: SHIPPED | OUTPATIENT
Start: 2020-11-04 | End: 2024-08-12

## 2020-11-04 ASSESSMENT — PATIENT HEALTH QUESTIONNAIRE - PHQ9: SUM OF ALL RESPONSES TO PHQ QUESTIONS 1-9: 12

## 2020-11-05 ENCOUNTER — COMMUNICATION - HEALTHEAST (OUTPATIENT)
Dept: FAMILY MEDICINE | Facility: CLINIC | Age: 80
End: 2020-11-05

## 2020-11-17 ENCOUNTER — COMMUNICATION - HEALTHEAST (OUTPATIENT)
Dept: ANTICOAGULATION | Facility: CLINIC | Age: 80
End: 2020-11-17

## 2020-11-17 ENCOUNTER — AMBULATORY - HEALTHEAST (OUTPATIENT)
Dept: LAB | Facility: CLINIC | Age: 80
End: 2020-11-17

## 2020-11-17 DIAGNOSIS — I48.91 A-FIB (H): ICD-10-CM

## 2020-11-17 LAB — INR PPP: 1.9 (ref 0.9–1.1)

## 2020-11-23 ENCOUNTER — COMMUNICATION - HEALTHEAST (OUTPATIENT)
Dept: FAMILY MEDICINE | Facility: CLINIC | Age: 80
End: 2020-11-23

## 2020-11-30 ENCOUNTER — AMBULATORY - HEALTHEAST (OUTPATIENT)
Dept: LAB | Facility: CLINIC | Age: 80
End: 2020-11-30

## 2020-11-30 ENCOUNTER — COMMUNICATION - HEALTHEAST (OUTPATIENT)
Dept: ANTICOAGULATION | Facility: CLINIC | Age: 80
End: 2020-11-30

## 2020-11-30 DIAGNOSIS — I48.91 A-FIB (H): ICD-10-CM

## 2020-11-30 LAB — INR PPP: 2.2 (ref 0.9–1.1)

## 2020-12-10 ENCOUNTER — COMMUNICATION - HEALTHEAST (OUTPATIENT)
Dept: FAMILY MEDICINE | Facility: CLINIC | Age: 80
End: 2020-12-10

## 2020-12-10 DIAGNOSIS — F41.9 ANXIETY: ICD-10-CM

## 2020-12-14 ENCOUNTER — COMMUNICATION - HEALTHEAST (OUTPATIENT)
Dept: FAMILY MEDICINE | Facility: CLINIC | Age: 80
End: 2020-12-14

## 2020-12-14 ENCOUNTER — AMBULATORY - HEALTHEAST (OUTPATIENT)
Dept: LAB | Facility: CLINIC | Age: 80
End: 2020-12-14

## 2020-12-14 ENCOUNTER — COMMUNICATION - HEALTHEAST (OUTPATIENT)
Dept: ANTICOAGULATION | Facility: CLINIC | Age: 80
End: 2020-12-14

## 2020-12-14 DIAGNOSIS — I48.91 A-FIB (H): ICD-10-CM

## 2020-12-14 DIAGNOSIS — R60.9 EDEMA, UNSPECIFIED TYPE: ICD-10-CM

## 2020-12-14 LAB — INR PPP: 2.5 (ref 0.9–1.1)

## 2021-01-11 ENCOUNTER — COMMUNICATION - HEALTHEAST (OUTPATIENT)
Dept: ANTICOAGULATION | Facility: CLINIC | Age: 81
End: 2021-01-11

## 2021-01-11 ENCOUNTER — AMBULATORY - HEALTHEAST (OUTPATIENT)
Dept: LAB | Facility: CLINIC | Age: 81
End: 2021-01-11

## 2021-01-11 DIAGNOSIS — I48.91 A-FIB (H): ICD-10-CM

## 2021-01-11 LAB — INR PPP: 2.4 (ref 0.9–1.1)

## 2021-01-20 ENCOUNTER — COMMUNICATION - HEALTHEAST (OUTPATIENT)
Dept: FAMILY MEDICINE | Facility: CLINIC | Age: 81
End: 2021-01-20

## 2021-01-20 DIAGNOSIS — I48.20 CHRONIC ATRIAL FIBRILLATION (H): ICD-10-CM

## 2021-01-20 DIAGNOSIS — E78.5 HYPERLIPIDEMIA LDL GOAL <100: ICD-10-CM

## 2021-01-20 DIAGNOSIS — N40.1 BENIGN PROSTATIC HYPERPLASIA WITH URINARY FREQUENCY: ICD-10-CM

## 2021-01-20 DIAGNOSIS — R35.0 BENIGN PROSTATIC HYPERPLASIA WITH URINARY FREQUENCY: ICD-10-CM

## 2021-01-20 RX ORDER — ATORVASTATIN CALCIUM 40 MG/1
40 TABLET, FILM COATED ORAL AT BEDTIME
Qty: 90 TABLET | Refills: 3 | Status: SHIPPED | OUTPATIENT
Start: 2021-01-20 | End: 2021-12-27

## 2021-02-02 ENCOUNTER — AMBULATORY - HEALTHEAST (OUTPATIENT)
Dept: LAB | Facility: CLINIC | Age: 81
End: 2021-02-02

## 2021-02-02 DIAGNOSIS — L03.115 CELLULITIS OF LEG, RIGHT: ICD-10-CM

## 2021-02-02 DIAGNOSIS — R60.9 EDEMA, UNSPECIFIED TYPE: ICD-10-CM

## 2021-02-02 LAB
ANION GAP SERPL CALCULATED.3IONS-SCNC: 8 MMOL/L (ref 5–18)
BUN SERPL-MCNC: 24 MG/DL (ref 8–28)
CALCIUM SERPL-MCNC: 8.8 MG/DL (ref 8.5–10.5)
CHLORIDE BLD-SCNC: 102 MMOL/L (ref 98–107)
CO2 SERPL-SCNC: 28 MMOL/L (ref 22–31)
CREAT SERPL-MCNC: 1.09 MG/DL (ref 0.7–1.3)
ERYTHROCYTE [DISTWIDTH] IN BLOOD BY AUTOMATED COUNT: 12.6 % (ref 11–14.5)
GFR SERPL CREATININE-BSD FRML MDRD: >60 ML/MIN/1.73M2
GLUCOSE BLD-MCNC: 113 MG/DL (ref 70–125)
HCT VFR BLD AUTO: 35.7 % (ref 40–54)
HGB BLD-MCNC: 12 G/DL (ref 14–18)
MCH RBC QN AUTO: 30.3 PG (ref 27–34)
MCHC RBC AUTO-ENTMCNC: 33.6 G/DL (ref 32–36)
MCV RBC AUTO: 90 FL (ref 80–100)
PLATELET # BLD AUTO: 147 THOU/UL (ref 140–440)
PMV BLD AUTO: 7.2 FL (ref 7–10)
POTASSIUM BLD-SCNC: 4.6 MMOL/L (ref 3.5–5)
RBC # BLD AUTO: 3.96 MILL/UL (ref 4.4–6.2)
SODIUM SERPL-SCNC: 138 MMOL/L (ref 136–145)
WBC: 4.5 THOU/UL (ref 4–11)

## 2021-02-08 ENCOUNTER — COMMUNICATION - HEALTHEAST (OUTPATIENT)
Dept: ANTICOAGULATION | Facility: CLINIC | Age: 81
End: 2021-02-08

## 2021-02-08 ENCOUNTER — AMBULATORY - HEALTHEAST (OUTPATIENT)
Dept: LAB | Facility: CLINIC | Age: 81
End: 2021-02-08

## 2021-02-08 DIAGNOSIS — I48.91 A-FIB (H): ICD-10-CM

## 2021-02-08 LAB — INR PPP: 2.2 (ref 0.9–1.1)

## 2021-02-11 ENCOUNTER — COMMUNICATION - HEALTHEAST (OUTPATIENT)
Dept: FAMILY MEDICINE | Facility: CLINIC | Age: 81
End: 2021-02-11

## 2021-02-11 DIAGNOSIS — F41.9 ANXIETY: ICD-10-CM

## 2021-02-11 DIAGNOSIS — F32.0 CURRENT MILD EPISODE OF MAJOR DEPRESSIVE DISORDER, UNSPECIFIED WHETHER RECURRENT (H): ICD-10-CM

## 2021-02-11 RX ORDER — BUPROPION HYDROCHLORIDE 150 MG/1
150 TABLET ORAL EVERY MORNING
Qty: 90 TABLET | Refills: 3 | Status: SHIPPED | OUTPATIENT
Start: 2021-02-11 | End: 2021-12-27

## 2021-02-11 RX ORDER — SERTRALINE HYDROCHLORIDE 100 MG/1
TABLET, FILM COATED ORAL
Qty: 90 TABLET | Refills: 3 | Status: SHIPPED | OUTPATIENT
Start: 2021-02-11 | End: 2022-03-21

## 2021-02-17 ENCOUNTER — COMMUNICATION - HEALTHEAST (OUTPATIENT)
Dept: FAMILY MEDICINE | Facility: CLINIC | Age: 81
End: 2021-02-17

## 2021-03-01 ENCOUNTER — COMMUNICATION - HEALTHEAST (OUTPATIENT)
Dept: ADMINISTRATIVE | Facility: CLINIC | Age: 81
End: 2021-03-01

## 2021-03-01 DIAGNOSIS — I10 HTN (HYPERTENSION): ICD-10-CM

## 2021-03-01 DIAGNOSIS — R60.9 EDEMA, UNSPECIFIED TYPE: ICD-10-CM

## 2021-03-01 RX ORDER — METOPROLOL SUCCINATE 50 MG/1
TABLET, EXTENDED RELEASE ORAL
Qty: 90 TABLET | Refills: 3 | Status: SHIPPED | OUTPATIENT
Start: 2021-03-01 | End: 2021-12-27

## 2021-03-15 ENCOUNTER — AMBULATORY - HEALTHEAST (OUTPATIENT)
Dept: LAB | Facility: CLINIC | Age: 81
End: 2021-03-15

## 2021-03-15 ENCOUNTER — COMMUNICATION - HEALTHEAST (OUTPATIENT)
Dept: ANTICOAGULATION | Facility: CLINIC | Age: 81
End: 2021-03-15

## 2021-03-15 DIAGNOSIS — I48.91 A-FIB (H): ICD-10-CM

## 2021-03-15 LAB — INR PPP: 2.3 (ref 0.9–1.1)

## 2021-04-26 ENCOUNTER — COMMUNICATION - HEALTHEAST (OUTPATIENT)
Dept: ANTICOAGULATION | Facility: CLINIC | Age: 81
End: 2021-04-26

## 2021-04-26 ENCOUNTER — AMBULATORY - HEALTHEAST (OUTPATIENT)
Dept: LAB | Facility: CLINIC | Age: 81
End: 2021-04-26

## 2021-04-26 DIAGNOSIS — I48.91 A-FIB (H): ICD-10-CM

## 2021-04-26 LAB — INR PPP: 2.2 (ref 0.9–1.1)

## 2021-04-27 ENCOUNTER — COMMUNICATION - HEALTHEAST (OUTPATIENT)
Dept: ANTICOAGULATION | Facility: CLINIC | Age: 81
End: 2021-04-27

## 2021-04-27 DIAGNOSIS — Z79.01 LONG TERM (CURRENT) USE OF ANTICOAGULANTS: ICD-10-CM

## 2021-04-27 DIAGNOSIS — I48.91 ATRIAL FIBRILLATION (H): ICD-10-CM

## 2021-04-27 DIAGNOSIS — I48.92 ATRIAL FLUTTER, PAROXYSMAL (H): ICD-10-CM

## 2021-05-02 ENCOUNTER — COMMUNICATION - HEALTHEAST (OUTPATIENT)
Dept: FAMILY MEDICINE | Facility: CLINIC | Age: 81
End: 2021-05-02

## 2021-05-02 DIAGNOSIS — R60.9 EDEMA, UNSPECIFIED TYPE: ICD-10-CM

## 2021-05-03 RX ORDER — FUROSEMIDE 20 MG
TABLET ORAL
Qty: 270 TABLET | Refills: 2 | Status: SHIPPED | OUTPATIENT
Start: 2021-05-03 | End: 2021-08-23

## 2021-05-05 ENCOUNTER — COMMUNICATION - HEALTHEAST (OUTPATIENT)
Dept: FAMILY MEDICINE | Facility: CLINIC | Age: 81
End: 2021-05-05

## 2021-05-05 DIAGNOSIS — Z00.00 HEALTH CARE MAINTENANCE: ICD-10-CM

## 2021-05-06 RX ORDER — NITROGLYCERIN 0.4 MG/1
TABLET SUBLINGUAL
Qty: 25 TABLET | Refills: 0 | Status: SHIPPED | OUTPATIENT
Start: 2021-05-06 | End: 2022-11-11

## 2021-05-25 ENCOUNTER — RECORDS - HEALTHEAST (OUTPATIENT)
Dept: FAMILY MEDICINE | Facility: CLINIC | Age: 81
End: 2021-05-25

## 2021-05-26 ENCOUNTER — RECORDS - HEALTHEAST (OUTPATIENT)
Dept: ADMINISTRATIVE | Facility: CLINIC | Age: 81
End: 2021-05-26

## 2021-05-26 NOTE — TELEPHONE ENCOUNTER
Patient Returning Call  Reason for call:  Patient returned missed call  Information relayed to patient:  Please call patient back when available  Patient has additional questions:  No  If YES, what are your questions/concerns:  n/a  Okay to leave a detailed message?: Yes     Attempted to transfer to number left on chart but went to voicemail.

## 2021-05-27 ASSESSMENT — PATIENT HEALTH QUESTIONNAIRE - PHQ9
SUM OF ALL RESPONSES TO PHQ QUESTIONS 1-9: 11
SUM OF ALL RESPONSES TO PHQ QUESTIONS 1-9: 12

## 2021-05-27 NOTE — TELEPHONE ENCOUNTER
ANTICOAGULATION  MANAGEMENT    Assessment     Today's INR result of 3.7 is Supratherapeutic (goal INR of 2.0-3.0)        Warfarin taken differently than instructed, but no impact to total weekly dose    No new diet changes affecting INR    No new medication/supplements affecting INR    Continues to tolerate warfarin with no reported s/s of bleeding or thromboembolism     Previous INR was Therapeutic     4/11 Colonoscopy procedure  ( see encounter dated 3/12/19)    Plan:     Spoke with Ileana regarding INR result and instructed:     Warfarin Dosing Instructions:  take 5 mg today and 5 mg tomorrow    Warfarin interruption plan for colonoscopy procedure:  1. Start holding warfarin 4/7 to 4/10    Post procedure  1. Restart warfarin on 4/11 after the procedure if given okay by [facility] provider  Have patient take extra 2.5 mg on day one of resuming doses then start new warfarin doses of    5 mg every Tue, Thu, Sat; 7.5 mg all other days     5% change        Instructed patient to follow up no later than: one week after resuming warfarin dose    Education provided: importance of therapeutic range and target INR goal and significance of current INR result    Ileana verbalizes understanding and agrees to warfarin dosing plan.    Instructed to call the [facility] for any changes, questions or concerns. (#[phone])   ?   Lorena Barnhart RN    Subjective/Objective:      Parish Bills, a 78 y.o. male is on warfarin.     Parish reports:     Home warfarin dose: verbally confirmed home dose with Ileana and updated on anticoagulation calendar     Missed doses: No     Medication changes:  No     S/S of bleeding or thromboembolism:  No     New Injury or illness:  No     Changes in diet or alcohol consumption:  No     Upcoming surgery, procedure or cardioversion:  Yes: Colonoscopy on 4/11    Anticoagulation Episode Summary     Current INR goal:   2.0-3.0   TTR:   82.4 % (4.3 y)   Next INR check:   4/18/2019   INR from last check:    3.70! (4/5/2019)   Weekly max warfarin dose:      Target end date:      INR check location:      Preferred lab:      Send INR reminders to:   ANTICOAGULATION POOL C (DTN,VAD,CGR,GAV)    Indications    A-fib (H) [I48.91]           Comments:            Anticoagulation Care Providers     Provider Role Specialty Phone number    Isidro Au MD Referring Family Medicine [phone]

## 2021-05-27 NOTE — TELEPHONE ENCOUNTER
ANTICOAGULATION  MANAGEMENT    Assessment     Today's INR result of 2.3 is Therapeutic (goal INR of 2.0-3.0)     Patient had Colonoscopy done on 4/11 and was holding warfarin doses 4 days prior to procedure.      More warfarin taken than instructed which may be affecting INR    No new diet changes affecting INR    No new medication/supplements affecting INR    Continues to tolerate warfarin with no reported s/s of bleeding or thromboembolism     Previous INR was Supratherapeutic    Plan:     Spoke with Parish regarding INR result and instructed:     Warfarin Dosing Instructions:  Continue current warfarin dose    5 mg every Tue, Thu, Sat; 7.5 mg all other days        (0 % change)    Instructed patient to follow up no later than:  2 weeks - appointment made.    Education provided: importance of therapeutic range, target INR goal and significance of current INR result and importance of taking warfarin as instructed    Parish verbalizes understanding and agrees to warfarin dosing plan.    Instructed to call the [facility] for any changes, questions or concerns. (#[phone])   ?   Lorena Barnhart RN    Subjective/Objective:      Parish Bills, a 78 y.o. male is on warfarin.     Parish reports:     Home warfarin dose: verbally confirmed home dose with Parish and updated on anticoagulation calendar   Patient confirmed that he restarted warfarin doses on 4/11 and took 7.5 mg booster dose  Then took 5 mg on Sat and 7.5 mg all there days.    Missed doses: No     Medication changes:  No     S/S of bleeding or thromboembolism:  No     New Injury or illness:  No     Changes in diet or alcohol consumption:  No     Upcoming surgery, procedure or cardioversion:  No    Anticoagulation Episode Summary     Current INR goal:   2.0-3.0   TTR:   82.1 % (4.4 y)   Next INR check:   5/2/2019   INR from last check:   2.30 (4/18/2019)   Weekly max warfarin dose:      Target end date:      INR check location:      Preferred lab:      Send INR  reminders to:   ANTICOAGULATION POOL C (DTN,VAD,CGR,GAV)    Indications    A-fib (H) [I48.91]           Comments:            Anticoagulation Care Providers     Provider Role Specialty Phone number    Isidro Au MD [facility] [phone]

## 2021-05-28 ENCOUNTER — RECORDS - HEALTHEAST (OUTPATIENT)
Dept: ADMINISTRATIVE | Facility: CLINIC | Age: 81
End: 2021-05-28

## 2021-05-28 ASSESSMENT — ANXIETY QUESTIONNAIRES: GAD7 TOTAL SCORE: 5

## 2021-05-28 NOTE — TELEPHONE ENCOUNTER
Lab Results   Component Value Date    INR 2.80 (H) 05/03/2019    INR 2.30 (H) 04/18/2019    INR 3.70 (H) 04/05/2019       Patient's current Warfarin doses:    5 mg every Tue, Thu, Sat; 7.5 mg all other days       Next INR check is on 5/31/19      Patient's last OV with PCP was on 8/28/18    Warfarin prescription 3 month supply  sent to patient's pharmacy today.    Lorena Barnhart RN

## 2021-05-28 NOTE — TELEPHONE ENCOUNTER
Anticoagulation Annual Referral Renewal Review    Parish Bills's chart reviewed for annual renewal of referral to anticoagulation monitoring.        Criteria for anticoagulation nurse and/or pharmacist renewal met   Warfarin indication: Atrial Fibrillation Yes, per indication   Current with INR monitoring/compliant Yes Yes   Date of last office visit 8/28/18 Yes, had office visit within last year   Time in Therapeutic Range (TTR) 69.03 % Yes, TTR > 60%       Parish Bills met all criteria for anticoagulation management program initiated renewal.  New INR standing orders and anticoagulation referral renewal placed.      Trina Orr RN  2:16 PM

## 2021-05-28 NOTE — TELEPHONE ENCOUNTER
RN cannot approve Refill Request    RN can NOT refill this medication med is not covered by policy/route to provider.      Amrita Chase, [facility] Triage/Med Refill 5/16/2019    Requested Prescriptions   Pending Prescriptions Disp Refills     warfarin (COUMADIN/JANTOVEN) 5 MG tablet [Pharmacy Med Name: WARFARIN SODIUM 5 MG TABLET] 110 tablet 0     Sig: TAKE 1 TO 1 & 1/2 TABLETS ( 5- 7.5 MG) BY MOUTH DAILY. ADJUST DOSE PER INR RESULTS AS DIRECTED       Warfarin Refill Protocol  Failed - 5/16/2019  8:20 AM        Failed -  Route to appropriate pool/provider     Last Anticoagulation Summary:   Anticoagulation Episode Summary     Current INR goal:   2.0-3.0   TTR:   82.3 % (4.4 y)   Next INR check:   5/31/2019   INR from last check:   2.80 (5/3/2019)   Weekly max warfarin dose:      Target end date:      INR check location:      Preferred lab:      Send INR reminders to:   ANTICOAGULATION POOL C (DTN,VAD,CGR,GAV)    Indications    A-fib (H) [I48.91]           Comments:            Anticoagulation Care Providers     Provider Role Specialty Phone number    Isidro Au MD Referring Family Medicine [phone]                Passed - Provider visit in last year     Last office visit with prescriber/PCP: 8/28/2018 Isidro Au MD OR same dept: 8/28/2018 Isidro Au MD OR same specialty: 8/28/2018 Isidro Au MD  Last physical: 7/12/2018 Last [facility] visit: Visit date not found    Next appt within 3 mo: Visit date not found Next physical within 3 mo: Visit date not found  Prescriber OR PCP: Isidro Au MD  Last diagnosis associated with med order: 1. A-fib (H)  - warfarin (COUMADIN/JANTOVEN) 5 MG tablet [Pharmacy Med Name: WARFARIN SODIUM 5 MG TABLET]; TAKE 1 TO 1 & 1/2 TABLETS ( 5- 7.5 MG) BY MOUTH DAILY. ADJUST DOSE PER INR RESULTS AS DIRECTED  Dispense: 110 tablet; Refill: 0    If protocol passes may refill for 6 months if within 3 months of last provider visit (or a  total of 9 months).

## 2021-05-28 NOTE — TELEPHONE ENCOUNTER
ANTICOAGULATION  MANAGEMENT    Assessment     Today's INR result of 2.8 is Therapeutic (goal INR of 2.0-3.0)        Warfarin taken as previously instructed    No new diet changes affecting INR    No new medication/supplements affecting INR    Continues to tolerate warfarin with no reported s/s of bleeding or thromboembolism     Previous INR was Therapeutic    Plan:     Spoke with Ileana regarding INR result and instructed:     Warfarin Dosing Instructions:  Continue current warfarin dose    5 mg every Tue, Thu, Sat; 7.5 mg all other days      (0 % change)    Instructed patient to follow up no later than: 4 weeks, appointment made.    Education provided: importance of therapeutic range and target INR goal and significance of current INR result    Ileana verbalizes understanding and agrees to warfarin dosing plan.    Instructed to call the [facility] for any changes, questions or concerns. (#[phone])   ?   Lorena Barnhart RN    Subjective/Objective:      Parish SANCHEZ Sanju, a 78 y.o. male is on warfarin.     Parish reports:     Home warfarin dose: as updated on anticoagulation calendar per template     Missed doses: No     Medication changes:  No     S/S of bleeding or thromboembolism:  No     New Injury or illness:  No     Changes in diet or alcohol consumption:  No     Upcoming surgery, procedure or cardioversion:  No    Anticoagulation Episode Summary     Current INR goal:   2.0-3.0   TTR:   82.3 % (4.4 y)   Next INR check:   5/31/2019   INR from last check:   2.80 (5/3/2019)   Weekly max warfarin dose:      Target end date:      INR check location:      Preferred lab:      Send INR reminders to:   ANTICOAGULATION POOL C (DTN,VAD,CGR,GAV)    Indications    A-fib (H) [I48.91]           Comments:            Anticoagulation Care Providers     Provider Role Specialty Phone number    Isidro Au MD Referring Family Medicine [phone]

## 2021-05-29 ENCOUNTER — RECORDS - HEALTHEAST (OUTPATIENT)
Dept: ADMINISTRATIVE | Facility: CLINIC | Age: 81
End: 2021-05-29

## 2021-05-29 NOTE — PROGRESS NOTES
ASSESSMENT/PLAN  1. Current mild episode of major depressive disorder, unspecified whether recurrent (H)  Patient wife present with concerns over her depression not under ideal control  Patient is been lacking energy and motivation and does not find much pleasure what typically would bring him happiness  He is more bad days than good  He has been on sertraline 150 mg daily and we discussed adding Wellbutrin to his regimen  He will add 150 mg extended release once daily over the next month and follow back up with me at clinic  Contact me sooner for any worsening symptoms or side effects noted  - buPROPion (WELLBUTRIN XL) 150 MG 24 hr tablet; Take 1 tablet (150 mg total) by mouth every morning.  Dispense: 30 tablet; Refill: 2    2. Carpal tunnel syndrome of right wrist  Patient was having symptoms of carpal tunnel in his right wrist and hand  Discussed with him bracing  He chooses to purchase a brace at his pharmacy  We discussed wearing the bracing the majority of the time only take it after shower  Observing and cutting down in activities of repetition that could be exacerbating symptoms        SUBJECTIVE:   Chief Complaint   Patient presents with     Wrist Pain     c/o right wist discomfort, lump in palm of the hand, intermittent tingling/numbness sensation      Depression     Medication check      Parish DANIEL Bills 78 y.o. male    Current Outpatient Medications   Medication Sig Dispense Refill     acetaminophen (TYLENOL) 325 MG tablet Take 325-650 mg by mouth every 4 (four) hours as needed for pain.       amoxicillin (AMOXIL) 500 MG capsule Take 2,000 mg by mouth as needed. As needed for Hx mitral valve repair. Take one hour before appointment       aspirin (ASPIRIN LOW DOSE) 81 MG EC tablet Take 1 tablet by mouth daily.       atorvastatin (LIPITOR) 40 MG tablet Take 1 tablet (40 mg total) by mouth at bedtime. 90 tablet 1     folic acid/multivit-min/lutein (CENTRUM SILVER ORAL) Take 1 tablet by mouth daily.        furosemide (LASIX) 20 MG tablet TAKE 1 TABLET BY MOUTH 2 TIMES A  tablet 0     hydrOXYzine HCl (ATARAX) 25 MG tablet TAKE 1 TABLET BY MOUTH EVERY 6 HOURS AS NEEDED FOR ANXIETY 360 tablet 0     metoprolol succinate (TOPROL-XL) 50 MG 24 hr tablet TAKE ONE TABLET BY MOUTH DAILY. HOLD FOR SYSTOLIC BP <95 OR HEART RATE <55 90 tablet 1     nitroglycerin (NITROSTAT) 0.4 MG SL tablet Place 0.4 mg under the tongue every 5 (five) minutes as needed for chest pain.       sertraline (ZOLOFT) 100 MG tablet TAKE 1&1/2 TABLETS (150 MG TOTAL) BY MOUTH DAILY 135 tablet 2     sertraline (ZOLOFT) 50 MG tablet Take 150 mg by mouth at bedtime.       warfarin (COUMADIN) 7.5 MG tablet Take 7.5 mg by mouth daily. Until 8/12/18  INR 2.07 next INR 8/13       warfarin (COUMADIN/JANTOVEN) 5 MG tablet TAKE 1 TO 1 & 1/2 TABLETS ( 5- 7.5 MG) BY MOUTH DAILY. ADJUST DOSE PER INR RESULTS AS DIRECTED 110 tablet 0     buPROPion (WELLBUTRIN XL) 150 MG 24 hr tablet Take 1 tablet (150 mg total) by mouth every morning. 30 tablet 2     No current facility-administered medications for this visit.      Allergies: Hydrocodone-acetaminophen; Oxycodone-acetaminophen; and Amiodarone   No LMP for male patient.    HPI:   Patient is here for discussion over wrist and hand discomfort on the right side and depression.  Patient has had no injury but is been noting increasing numbness and tingling and discomfort in his right hand-he can sometimes awaken at night with numbness that improves with shaking his hand  We discussed the diagnosis of carpal tunnel we discussed wrist bracing  He wishes to purchase his brace on his own from his pharmacy  He will contact me if symptoms are not improving    Patient has been on sertraline for some time for depression  Both him and his wife are present they do not feel that his depression symptoms are under ideal control  We discussed changing medications versus adding something to his current dosing of sertraline  We discussed  adding Wellbutrin which she is in agreement  We will start 150 mg extended release over the next month and have him update me with symptoms  He will contact me sooner if any side effects or worsening symptoms develop  Patient has noted lack of motivation daily as well as dysthymia not finding pleasure with things that he typically would enjoy doing    ROS: negative except as per HPI    OBJECTIVE:   The patient appears well, alert, oriented x 3, in no distress.  /62 (Patient Site: Left Arm, Patient Position: Sitting, Cuff Size: Adult Regular)   Pulse 60   Temp 97  F (36.1  C) (Oral)   Resp 16   Wt 186 lb 3.2 oz (84.5 kg)   BMI 27.10 kg/m      Lungs: clear, good air entry, no wheezes, rhonchi or rales.   Cardiac: S1 and S2 normal, no murmurs, regular rate and rhythm.   Extremities: show no edema, normal peripheral pulses. Right wrist normal ROM, no swelling  Neurological: normal, no focal findings.  Skin: clear, dry, no rashes/lesions  Psych- normal mood and affect      Pt states an understanding and agrees to the above plan.  Greater than 25 minutes was spent today in interview and examination with Parish Bills with more than 50% of that time in counseling and coordination of care.

## 2021-05-29 NOTE — TELEPHONE ENCOUNTER
ANTICOAGULATION  MANAGEMENT    Assessment     Today's INR result of 2.3 is Therapeutic (goal INR of 2.0-3.0)        Warfarin taken as previously instructed    No new diet changes affecting INR    No interaction expected between Wellbutrin (started on 5/24) and warfarin    Continues to tolerate warfarin with no reported s/s of bleeding or thromboembolism     Previous INR was Therapeutic    Plan:     Left a detailed message for Parish regarding INR result and instructed:     Warfarin Dosing Instructions:  Continue current warfarin dose 5 mg daily on Tues/Thurs/SAt; and 7.5 mg daily rest of week  (0 % change)    Instructed patient to follow up no later than: 4-6 weeks    Education provided: importance of therapeutic range and no interaction anticipated between warfarin and Wellbutrin    Instructed to call the [facility] for any changes, questions or concerns. (#[phone])   ?   Trina Orr RN    Subjective/Objective:      Parish Bills, a 78 y.o. male is on warfarin.     Parish reports:     Home warfarin dose: as updated on anticoagulation calendar per template     Missed doses: No     Medication changes:  Yes Wellbutrin started on 5/24     S/S of bleeding or thromboembolism:  No     New Injury or illness:  No     Changes in diet or alcohol consumption:  No     Upcoming surgery, procedure or cardioversion:  No    Anticoagulation Episode Summary     Current INR goal:   2.0-3.0   TTR:   82.6 % (4.5 y)   Next INR check:   7/12/2019   INR from last check:   2.30 (5/31/2019)   Weekly max warfarin dose:      Target end date:      INR check location:      Preferred lab:      Send INR reminders to:   ANTICOAGULATION POOL C (DTN,VAD,CGR,GAV)    Indications    A-fib (H) [I48.91]           Comments:            Anticoagulation Care Providers     Provider Role Specialty Phone number    Isidro Au MD Referring Family Medicine [phone]

## 2021-05-30 ENCOUNTER — RECORDS - HEALTHEAST (OUTPATIENT)
Dept: ADMINISTRATIVE | Facility: CLINIC | Age: 81
End: 2021-05-30

## 2021-05-30 VITALS — HEIGHT: 69 IN | WEIGHT: 202.7 LBS | BODY MASS INDEX: 30.02 KG/M2

## 2021-05-30 VITALS — WEIGHT: 208 LBS | HEIGHT: 69 IN | BODY MASS INDEX: 30.81 KG/M2

## 2021-05-30 NOTE — TELEPHONE ENCOUNTER
"  Skin wound - possibly infected after fall    > Fell overnight Wed into Thurs am at home     > Not sure how he injured himself - possibly tripped / fell over heavy piece of furniture       Has a puncture wound / deep laceration on L leg -anterior aspect 1\" long which is about 4-5\" above the ankle       Yes does appear infected - red / hot / swollen - no drainage  - not bleeding - scabbed over      Is on warfarin       Has been washed / cleaned       A/P:   > Agree with at home care thus far    > In for appt today to eval     Nothing at usual clinic - did find appt at [facility] for this afternoon        Jose Rodriguez, RN   Triage and Medication Refills      Reason for Disposition    Skin redness around the wound larger than 2 inches (5 cm)    Protocols used: WOUND INFECTION-A-OH      "

## 2021-05-30 NOTE — TELEPHONE ENCOUNTER
ANTICOAGULATION  MANAGEMENT    Assessment     Today's INR result of 2.4 is Therapeutic (goal INR of 2.0-3.0)        Warfarin taken as previously instructed    No new diet changes affecting INR    No new medication/supplements affecting INR    Continues to tolerate warfarin with no reported s/s of bleeding or thromboembolism     Previous INR was Therapeutic    Plan:     Spoke with Parish regarding INR result and instructed:     Warfarin Dosing Instructions:  Continue current warfarin dose    5 mg every Tue, Thu, Sat; 7.5 mg all other days       Instructed patient to follow up no later than: 4-6 weeks    Education provided: importance of therapeutic range    Parish verbalizes understanding and agrees to warfarin dosing plan.    Instructed to call the [facility] for any changes, questions or concerns. (#[phone])   ?   Trina Orr RN    Subjective/Objective:      Parish Bills, a 78 y.o. male is on warfarin.     Parish reports:     Home warfarin dose: as updated on anticoagulation calendar per template     Missed doses: No     Medication changes:  No     S/S of bleeding or thromboembolism:  No     New Injury or illness:  No     Changes in diet or alcohol consumption:  No     Upcoming surgery, procedure or cardioversion:  No    Anticoagulation Episode Summary     Current INR goal:   2.0-3.0   TTR:   82.9 % (4.6 y)   Next INR check:   8/13/2019   INR from last check:   2.40 (7/2/2019)   Weekly max warfarin dose:      Target end date:      INR check location:      Preferred lab:      Send INR reminders to:   [facility]    Indications    A-fib (H) [I48.91]           Comments:            Anticoagulation Care Providers     Provider Role Specialty Phone number    Isidro Au MD Referring Family Medicine [phone]

## 2021-05-31 ENCOUNTER — RECORDS - HEALTHEAST (OUTPATIENT)
Dept: ADMINISTRATIVE | Facility: CLINIC | Age: 81
End: 2021-05-31

## 2021-05-31 VITALS — BODY MASS INDEX: 29.47 KG/M2 | WEIGHT: 201 LBS

## 2021-05-31 VITALS — WEIGHT: 206.4 LBS | HEIGHT: 69 IN | BODY MASS INDEX: 30.57 KG/M2

## 2021-05-31 VITALS — BODY MASS INDEX: 30.35 KG/M2 | WEIGHT: 207 LBS

## 2021-05-31 NOTE — TELEPHONE ENCOUNTER
Refill Approved    Rx renewed per Medication Renewal Policy. Medication was last renewed on 2/20/19.    Ov: 8/8/19    Elizabeth Jansen, [facility] Triage/Med Refill 8/19/2019     Requested Prescriptions   Pending Prescriptions Disp Refills     atorvastatin (LIPITOR) 40 MG tablet [Pharmacy Med Name: ATORVASTATIN 40 MG TABLET] 90 tablet 0     Sig: TAKE 1 TABLET BY MOUTH AT BEDTIME.       Statins Refill Protocol (Hmg CoA Reductase Inhibitors) Passed - 8/19/2019 10:10 AM        Passed - PCP or prescribing provider visit in past 12 months      Last office visit with prescriber/PCP: 8/8/2019 Isidro Au MD OR same dept: 8/8/2019 Isidro Au MD OR same specialty: 8/8/2019 Isidro Au MD  Last physical: 7/12/2018 Last MTM visit: Visit date not found   Next visit within 3 mo: Visit date not found  Next physical within 3 mo: Visit date not found  Prescriber OR PCP: Isidro Au MD  Last diagnosis associated with med order: 1. Hyperlipidemia LDL goal <100  - atorvastatin (LIPITOR) 40 MG tablet [Pharmacy Med Name: ATORVASTATIN 40 MG TABLET]; TAKE 1 TABLET BY MOUTH AT BEDTIME.  Dispense: 90 tablet; Refill: 0    2. A-fib (H)  - warfarin (COUMADIN/JANTOVEN) 5 MG tablet [Pharmacy Med Name: WARFARIN SODIUM 5 MG TABLET]; TAKE 1 TO 1 & 1/2 TABLETS ( 5- 7.5 MG) BY MOUTH DAILY. ADJUST DOSE PER INR RESULTS AS DIRECTED  Dispense: 110 tablet; Refill: 0    If protocol passes may refill for 12 months if within 3 months of last provider visit (or a total of 15 months).             warfarin (COUMADIN/JANTOVEN) 5 MG tablet [Pharmacy Med Name: WARFARIN SODIUM 5 MG TABLET] 110 tablet 0     Sig: TAKE 1 TO 1 & 1/2 TABLETS ( 5- 7.5 MG) BY MOUTH DAILY. ADJUST DOSE PER INR RESULTS AS DIRECTED       Warfarin Refill Protocol  Failed - 8/19/2019 10:10 AM        Failed -  Route to appropriate pool/provider     Last Anticoagulation Summary:   Anticoagulation Episode Summary     Current INR goal:   2.0-3.0    TTR:   83.3 % (4.7 y)   Next INR check:   9/17/2019   INR from last check:   2.60 (8/6/2019)   Weekly max warfarin dose:      Target end date:      INR check location:      Preferred lab:      Send INR reminders to:   [facility]""    Indications    A-fib (H) [I48.91]           Comments:            Anticoagulation Care Providers     Provider Role Specialty Phone number    Isidro Au MD Referring Family Medicine [phone]                Passed - Provider visit in last year     Last office visit with prescriber/PCP: 8/8/2019 Isidro Au MD OR same dept: 8/8/2019 Isidro Au MD OR same specialty: 8/8/2019 Isidro Au MD  Last physical: 7/12/2018 Last MTM visit: Visit date not found    Next appt within 3 mo: Visit date not found Next physical within 3 mo: Visit date not found  Prescriber OR PCP: Isidro Au MD  Last diagnosis associated with med order: 1. Hyperlipidemia LDL goal <100  - atorvastatin (LIPITOR) 40 MG tablet [Pharmacy Med Name: ATORVASTATIN 40 MG TABLET]; TAKE 1 TABLET BY MOUTH AT BEDTIME.  Dispense: 90 tablet; Refill: 0    2. A-fib (H)  - warfarin (COUMADIN/JANTOVEN) 5 MG tablet [Pharmacy Med Name: WARFARIN SODIUM 5 MG TABLET]; TAKE 1 TO 1 & 1/2 TABLETS ( 5- 7.5 MG) BY MOUTH DAILY. ADJUST DOSE PER INR RESULTS AS DIRECTED  Dispense: 110 tablet; Refill: 0    If protocol passes may refill for 6 months if within 3 months of last provider visit (or a total of 9 months).          buPROPion (WELLBUTRIN XL) 150 MG 24 hr tablet [Pharmacy Med Name: BUPROPION HCL  MG TABLET] 30 tablet 0     Sig: TAKE 1 TABLET BY MOUTH EVERY MORNING       Tricyclics/Misc Antidepressant/Antianxiety Meds Refill Protocol Passed - 8/19/2019 10:10 AM        Passed - PCP or prescribing provider visit in last year     Last office visit with prescriber/PCP: 8/8/2019 Isidro Au MD OR same dept: 8/8/2019 Isidro Au MD OR same specialty:  8/8/2019 Isidro Au MD  Last physical: 7/12/2018 Last MTM visit: Visit date not found   Next visit within 3 mo: Visit date not found  Next physical within 3 mo: Visit date not found  Prescriber OR PCP: Isidro Au MD  Last diagnosis associated with med order: 1. Hyperlipidemia LDL goal <100  - atorvastatin (LIPITOR) 40 MG tablet [Pharmacy Med Name: ATORVASTATIN 40 MG TABLET]; TAKE 1 TABLET BY MOUTH AT BEDTIME.  Dispense: 90 tablet; Refill: 0    2. A-fib (H)  - warfarin (COUMADIN/JANTOVEN) 5 MG tablet [Pharmacy Med Name: WARFARIN SODIUM 5 MG TABLET]; TAKE 1 TO 1 & 1/2 TABLETS ( 5- 7.5 MG) BY MOUTH DAILY. ADJUST DOSE PER INR RESULTS AS DIRECTED  Dispense: 110 tablet; Refill: 0    If protocol passes may refill for 12 months if within 3 months of last provider visit (or a total of 15 months).

## 2021-05-31 NOTE — TELEPHONE ENCOUNTER
Routed to the anticoagulation pool    Elizabeth Jansen, RN     [facility] Medication Refill and Triage Nurse  10:34 AM  8/19/2019

## 2021-05-31 NOTE — TELEPHONE ENCOUNTER
RN cannot approve Refill Request    RN can NOT refill this medication Protocol failed and NO refill given      Amrita Chase, [facility] Triage/Med Refill 8/30/2019    Requested Prescriptions   Pending Prescriptions Disp Refills     furosemide (LASIX) 20 MG tablet [Pharmacy Med Name: FUROSEMIDE 20 MG TABLET] 180 tablet 0     Sig: TAKE 1 TABLET BY MOUTH 2 TIMES A DAY       Diuretics/Combination Diuretics Refill Protocol  Failed - 8/29/2019 10:28 AM        Failed - Serum Potassium in past 12 months      No results found for: LN-POTASSIUM          Failed - Serum Sodium in past 12 months      No results found for: LN-SODIUM          Failed - Serum Creatinine in past 12 months      Creatinine   Date Value Ref Range Status   08/28/2018 0.96 0.70 - 1.30 mg/dL Final             Passed - Visit with PCP or prescribing provider visit in past 12 months     Last office visit with prescriber/PCP: 8/8/2019 Isidro Au MD OR same dept: 8/8/2019 Isidro Au MD OR same specialty: 8/8/2019 Isidro Au MD  Last physical: 7/12/2018 Last MTM visit: Visit date not found   Next visit within 3 mo: Visit date not found  Next physical within 3 mo: Visit date not found  Prescriber OR PCP: Isidro Au MD  Last diagnosis associated with med order: 1. HTN (hypertension)  - metoprolol succinate (TOPROL-XL) 50 MG 24 hr tablet; TAKE ONE TABLET BY MOUTH DAILY. HOLD FOR SYSTOLIC BP <95 OR HEART RATE <55  Dispense: 90 tablet; Refill: 3    2. Anxiety  - sertraline (ZOLOFT) 100 MG tablet; TAKE ONE TABLET BY MOUTH ONCE DAILY ALONG WITH 50MG TO EQUAL 150MG  Dispense: 90 tablet; Refill: 3    If protocol passes may refill for 12 months if within 3 months of last provider visit (or a total of 15 months).             Passed - Blood pressure on file in past 12 months     BP Readings from Last 1 Encounters:   08/08/19 109/59           Signed Prescriptions Disp Refills    metoprolol succinate (TOPROL-XL) 50 MG 24 hr  tablet 90 tablet 3     Sig: TAKE ONE TABLET BY MOUTH DAILY. HOLD FOR SYSTOLIC BP <95 OR HEART RATE <55       Beta-Blockers Refill Protocol Passed - 8/29/2019 10:28 AM        Passed - PCP or prescribing provider visit in past 12 months or next 3 months     Last office visit with prescriber/PCP: 8/8/2019 Isidro Au MD OR same dept: 8/8/2019 Isidro Au MD OR same specialty: 8/8/2019 Isidro Au MD  Last physical: 7/12/2018 Last MTM visit: Visit date not found   Next visit within 3 mo: Visit date not found  Next physical within 3 mo: Visit date not found  Prescriber OR PCP: Isidro Au MD  Last diagnosis associated with med order: 1. HTN (hypertension)  - metoprolol succinate (TOPROL-XL) 50 MG 24 hr tablet; TAKE ONE TABLET BY MOUTH DAILY. HOLD FOR SYSTOLIC BP <95 OR HEART RATE <55  Dispense: 90 tablet; Refill: 3    2. Anxiety  - sertraline (ZOLOFT) 100 MG tablet; TAKE ONE TABLET BY MOUTH ONCE DAILY ALONG WITH 50MG TO EQUAL 150MG  Dispense: 90 tablet; Refill: 3    If protocol passes may refill for 12 months if within 3 months of last provider visit (or a total of 15 months).             Passed - Blood pressure filed in past 12 months     BP Readings from Last 1 Encounters:   08/08/19 109/59            sertraline (ZOLOFT) 100 MG tablet 90 tablet 3     Sig: TAKE ONE TABLET BY MOUTH ONCE DAILY ALONG WITH 50MG TO EQUAL 150MG       [facility] Refill Protocol  Passed - 8/29/2019 10:28 AM        Passed - PCP or prescribing provider visit in last year     Last office visit with prescriber/PCP: 8/8/2019 Isidro Au MD OR same dept: 8/8/2019 Isidro Au MD OR same specialty: 8/8/2019 Isidro Au MD  Last physical: 7/12/2018 Last MTM visit: Visit date not found   Next visit within 3 mo: Visit date not found  Next physical within 3 mo: Visit date not found  Prescriber OR PCP: Isidro Au MD  Last diagnosis associated with med order: 1. HTN  (hypertension)  - metoprolol succinate (TOPROL-XL) 50 MG 24 hr tablet; TAKE ONE TABLET BY MOUTH DAILY. HOLD FOR SYSTOLIC BP <95 OR HEART RATE <55  Dispense: 90 tablet; Refill: 3    2. Anxiety  - sertraline (ZOLOFT) 100 MG tablet; TAKE ONE TABLET BY MOUTH ONCE DAILY ALONG WITH 50MG TO EQUAL 150MG  Dispense: 90 tablet; Refill: 3    If protocol passes may refill for 12 months if within 3 months of last provider visit (or a total of 15 months).

## 2021-05-31 NOTE — TELEPHONE ENCOUNTER
ANTICOAGULATION  MANAGEMENT    Assessment     Today's INR result of 2.6 is Therapeutic (goal INR of 2.0-3.0)        Warfarin taken as previously instructed    No new diet changes affecting INR    No new medication/supplements affecting INR    Continues to tolerate warfarin with no reported s/s of bleeding or thromboembolism     Previous INR was Therapeutic    Plan:     Spoke with Parish regarding INR result and instructed:     Warfarin Dosing Instructions:  Continue current warfarin dose 5 mg daily on Tues/Thurs/Sat; and 7.5 mg daily rest of week  (0 % change)    Instructed patient to follow up no later than: 4-6 weeks    Education provided: importance of therapeutic range    Parish verbalizes understanding and agrees to warfarin dosing plan.    Instructed to call the [facility] for any changes, questions or concerns. (#[phone])   ?   Trina Orr RN    Subjective/Objective:      Parish Bills, a 79 y.o. male is on warfarin.     Parish reports:     Home warfarin dose: as updated on anticoagulation calendar per template     Missed doses: No     Medication changes:  No     S/S of bleeding or thromboembolism:  No     New Injury or illness:  No     Changes in diet or alcohol consumption:  No     Upcoming surgery, procedure or cardioversion:  No    Anticoagulation Episode Summary     Current INR goal:   2.0-3.0   TTR:   83.3 % (4.7 y)   Next INR check:   9/17/2019   INR from last check:   2.60 (8/6/2019)   Weekly max warfarin dose:      Target end date:      INR check location:      Preferred lab:      Send INR reminders to:   [facility]    Indications    A-fib (H) [I48.91]           Comments:            Anticoagulation Care Providers     Provider Role Specialty Phone number    Isidro Au MD Referring Family Medicine [phone]

## 2021-05-31 NOTE — TELEPHONE ENCOUNTER
RN cannot approve Refill Request    RN can NOT refill this medication med is not covered by policy/route to provider. Last office visit: 8/8/2019 Isidro Au MD Last Physical: 7/12/2018 Last MTM visit: Visit date not found Last visit same specialty: 8/8/2019 Isidro Au MD.  Next visit within 3 mo: Visit date not found  Next physical within 3 mo: Visit date not found      Jodi Loera, [facility] Triage/Med Refill 8/21/2019    Requested Prescriptions   Pending Prescriptions Disp Refills     nitroglycerin (NITROSTAT) 0.4 MG SL tablet [Pharmacy Med Name: NITROGLYCERIN 0.4 MG TABLET] 25 tablet 0     Sig: PLACE 1 TABLET UNDER THE TONGUE EVERY 5 MINUTES UP TO 3 DOSES AS NEEDED; CALL 911 AFTER TAKING FIRST DOSE       There is no refill protocol information for this order

## 2021-05-31 NOTE — TELEPHONE ENCOUNTER
Refill Approved    Rx renewed per Medication Renewal Policy. Medication was last renewed on 2/20/19.1/29/19    Amrita Chase, [facility] Triage/Med Refill 8/30/2019     Requested Prescriptions   Pending Prescriptions Disp Refills     metoprolol succinate (TOPROL-XL) 50 MG 24 hr tablet [Pharmacy Med Name: METOPROLOL SUCC ER 50 MG TAB] 90 tablet 0     Sig: TAKE ONE TABLET BY MOUTH DAILY. HOLD FOR SYSTOLIC BP <95 OR HEART RATE <55       Beta-Blockers Refill Protocol Passed - 8/29/2019 10:28 AM        Passed - PCP or prescribing provider visit in past 12 months or next 3 months     Last office visit with prescriber/PCP: 8/8/2019 Isidro Au MD OR same dept: 8/8/2019 Isidro Au MD OR same specialty: 8/8/2019 Isidro Au MD  Last physical: 7/12/2018 Last MTM visit: Visit date not found   Next visit within 3 mo: Visit date not found  Next physical within 3 mo: Visit date not found  Prescriber OR PCP: Isidro Au MD  Last diagnosis associated with med order: 1. HTN (hypertension)  - metoprolol succinate (TOPROL-XL) 50 MG 24 hr tablet [Pharmacy Med Name: METOPROLOL SUCC ER 50 MG TAB]; TAKE ONE TABLET BY MOUTH DAILY. HOLD FOR SYSTOLIC BP <95 OR HEART RATE <55  Dispense: 90 tablet; Refill: 0    If protocol passes may refill for 12 months if within 3 months of last provider visit (or a total of 15 months).             Passed - Blood pressure filed in past 12 months     BP Readings from Last 1 Encounters:   08/08/19 109/59             sertraline (ZOLOFT) 100 MG tablet [Pharmacy Med Name: SERTRALINE  MG TABLET] 90 tablet 0     Sig: TAKE ONE TABLET BY MOUTH ONCE DAILY ALONG WITH 50MG TO EQUAL 150MG       [facility] Refill Protocol  Passed - 8/29/2019 10:28 AM        Passed - PCP or prescribing provider visit in last year     Last office visit with prescriber/PCP: 8/8/2019 Isidro Au MD OR same dept: 8/8/2019 Isidro Au MD OR same specialty: 8/8/2019  Isidro Au MD  Last physical: 7/12/2018 Last MTM visit: Visit date not found   Next visit within 3 mo: Visit date not found  Next physical within 3 mo: Visit date not found  Prescriber OR PCP: Isidro Au MD  Last diagnosis associated with med order: 1. HTN (hypertension)  - metoprolol succinate (TOPROL-XL) 50 MG 24 hr tablet [Pharmacy Med Name: METOPROLOL SUCC ER 50 MG TAB]; TAKE ONE TABLET BY MOUTH DAILY. HOLD FOR SYSTOLIC BP <95 OR HEART RATE <55  Dispense: 90 tablet; Refill: 0    If protocol passes may refill for 12 months if within 3 months of last provider visit (or a total of 15 months).             furosemide (LASIX) 20 MG tablet [Pharmacy Med Name: FUROSEMIDE 20 MG TABLET] 180 tablet 0     Sig: TAKE 1 TABLET BY MOUTH 2 TIMES A DAY       Diuretics/Combination Diuretics Refill Protocol  Failed - 8/29/2019 10:28 AM        Failed - Serum Potassium in past 12 months      No results found for: LN-POTASSIUM          Failed - Serum Sodium in past 12 months      No results found for: LN-SODIUM          Failed - Serum Creatinine in past 12 months      Creatinine   Date Value Ref Range Status   08/28/2018 0.96 0.70 - 1.30 mg/dL Final             Passed - Visit with PCP or prescribing provider visit in past 12 months     Last office visit with prescriber/PCP: 8/8/2019 Isidro Au MD OR same dept: 8/8/2019 Isidro Au MD OR same specialty: 8/8/2019 Isidro Au MD  Last physical: 7/12/2018 Last MTM visit: Visit date not found   Next visit within 3 mo: Visit date not found  Next physical within 3 mo: Visit date not found  Prescriber OR PCP: Isidro Au MD  Last diagnosis associated with med order: 1. HTN (hypertension)  - metoprolol succinate (TOPROL-XL) 50 MG 24 hr tablet [Pharmacy Med Name: METOPROLOL SUCC ER 50 MG TAB]; TAKE ONE TABLET BY MOUTH DAILY. HOLD FOR SYSTOLIC BP <95 OR HEART RATE <55  Dispense: 90 tablet; Refill: 0    If protocol passes may  refill for 12 months if within 3 months of last provider visit (or a total of 15 months).             Passed - Blood pressure on file in past 12 months     BP Readings from Last 1 Encounters:   08/08/19 109/59

## 2021-05-31 NOTE — PROGRESS NOTES
ASSESSMENT/PLAN  1. 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 Injury of lower extremity, unspecified laterality, subsequent encounter  Area of injury slowly improving  Erythema receding away from border drawn at outside clinic  Continue to monitor for signs of infection    3. Nightmares  Patient periodically has been having some nightmares at night  Discussed that this could be due to his sertraline -he does not feel the problem is bad enough to warrant medication changes and he like to maintain on current dosing at this time        SUBJECTIVE:   Chief Complaint   Patient presents with     Wrist Pain     Follow up regarding right wrist, feels it has gotten worse      Medication Management     Discuss medications - having nightmares about once a week      Bleeding/Bruising     Parish Bills 79 y.o. male    Current Outpatient Medications   Medication Sig Dispense Refill     amoxicillin (AMOXIL) 500 MG capsule Take 2,000 mg by mouth as needed. As needed for Hx mitral valve repair. Take one hour before appointment       aspirin (ASPIRIN LOW DOSE) 81 MG EC tablet Take 1 tablet by mouth daily.       atorvastatin (LIPITOR) 40 MG tablet Take 1 tablet (40 mg total) by mouth at bedtime. 90 tablet 1     buPROPion (WELLBUTRIN XL) 150 MG 24 hr tablet Take 1 tablet (150 mg total) by mouth every morning. 30 tablet 2     folic acid/multivit-min/lutein (CENTRUM SILVER ORAL) Take 1 tablet by mouth daily.       furosemide (LASIX) 20 MG tablet TAKE 1 TABLET BY MOUTH 2 TIMES A  tablet 0     hydrOXYzine HCl (ATARAX) 25 MG tablet TAKE 1 TABLET BY MOUTH EVERY 6 HOURS AS NEEDED FOR ANXIETY  360 tablet 0     metoprolol succinate (TOPROL-XL) 50 MG 24 hr tablet TAKE ONE TABLET BY MOUTH DAILY. HOLD FOR SYSTOLIC BP <95 OR HEART RATE <55 90 tablet 1     nitroglycerin (NITROSTAT) 0.4 MG SL tablet Place 0.4 mg under the tongue every 5 (five) minutes as needed for chest pain.       sertraline (ZOLOFT) 100 MG tablet TAKE 1&1/2 TABLETS (150 MG TOTAL) BY MOUTH DAILY 135 tablet 2     sertraline (ZOLOFT) 50 MG tablet Take 150 mg by mouth at bedtime.       warfarin (COUMADIN) 7.5 MG tablet Take 7.5 mg by mouth daily. Until 8/12/18  INR 2.07 next INR 8/13       warfarin (COUMADIN/JANTOVEN) 5 MG tablet TAKE 1 TO 1 & 1/2 TABLETS ( 5- 7.5 MG) BY MOUTH DAILY. ADJUST DOSE PER INR RESULTS AS DIRECTED 110 tablet 0     No current facility-administered medications for this visit.      Allergies: Hydrocodone-acetaminophen; Lisinopril; Oxycodone-acetaminophen; and Amiodarone   No LMP for male patient.    HPI:   Patient is here to follow-up in regards to his right wrist and discuss nightmares and reevaluate in lower extremity injury that he sustained a few weeks ago.  Patient has been wearing a wrist immobilizer for suspected carpal tunnel symptoms in his right wrist but this is not been improving his symptoms in the back should been slightly get worse.  Will obtain x-ray imaging today which showed advancing osteoarthritic changes of the wrist and we discussed Tylenol use to try to dull his day-to-day discomfort-due to his anticoagulation is not a candidate for NSAIDs.  We discussed referral options to orthopedics for further treatment as well.  Lower extremity injury seems to be healing well-area of erythema is receding from prior and there is no pain in the region.  Patient is monitoring daily    He has noted some change in his dreaming at night he sometimes has nightmares-we discussed that this could be secondary to his sertraline use-he does not feel the problem is large enough to be changing medications and he wants to  continue with current dosing    ROS: negative except as per HPI    OBJECTIVE:   The patient appears well, alert, oriented x 3, in no distress.  /59 (Patient Site: Left Arm, Patient Position: Sitting, Cuff Size: Adult Regular)   Pulse 62   Temp 98  F (36.7  C) (Oral)   Resp 16   Wt 186 lb (84.4 kg)   BMI 27.07 kg/m        Lungs: clear, good air entry, no wheezes, rhonchi or rales.   Cardiac: S1 and S2 normal, no murmurs, regular rate and regular rhythm.   Abdomen: normal bowel sounds, soft without tenderness  Extremities: show no edema, normal peripheral pulses. 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  Skin: clear, dry, no rashes/lesions  Psych- normal mood and affect      Pt states an understanding and agrees to the above plan.  Greater than 25 minutes was spent today in interview and examination with Parish Bills with more than 50% of that time in counseling and coordination of care.

## 2021-06-01 ENCOUNTER — RECORDS - HEALTHEAST (OUTPATIENT)
Dept: ADMINISTRATIVE | Facility: CLINIC | Age: 81
End: 2021-06-01

## 2021-06-01 VITALS — BODY MASS INDEX: 29.39 KG/M2 | WEIGHT: 200.44 LBS

## 2021-06-01 VITALS — BODY MASS INDEX: 29.18 KG/M2 | WEIGHT: 199 LBS

## 2021-06-01 VITALS — BODY MASS INDEX: 26.81 KG/M2 | WEIGHT: 187.31 LBS | HEIGHT: 70 IN

## 2021-06-01 VITALS — HEIGHT: 69 IN | BODY MASS INDEX: 28.44 KG/M2 | WEIGHT: 192 LBS

## 2021-06-01 VITALS — WEIGHT: 204 LBS | BODY MASS INDEX: 29.91 KG/M2

## 2021-06-01 NOTE — TELEPHONE ENCOUNTER
ANTICOAGULATION  MANAGEMENT    Assessment     Today's INR result of 2.7 is Therapeutic (goal INR of 2.0-3.0)        Warfarin taken as previously instructed    No new diet changes affecting INR    No new medication/supplements affecting INR    Continues to tolerate warfarin with no reported s/s of bleeding or thromboembolism     Previous INR was Therapeutic    Plan:     Left a detailed message for Parish regarding INR result and instructed:     Warfarin Dosing Instructions:  Continue current warfarin dose 5 mg daily on Tues/Thurs/Sat; and 7.5 mg daily rest of week  (0 % change)    Instructed patient to follow up no later than: 6-8 weeks    Education provided: importance of therapeutic range    Instructed to call [facility] for any changes, questions or concerns. (#[phone])   ?   Trina Orr RN    Subjective/Objective:      Parish Bills, a 79 y.o. male is on warfarin.     Parish reports:     Home warfarin dose: as updated on anticoagulation calendar per template     Missed doses: No     Medication changes:  No     S/S of bleeding or thromboembolism:  No     New Injury or illness:  No     Changes in diet or alcohol consumption:  No     Upcoming surgery, procedure or cardioversion:  No    Anticoagulation Episode Summary     Current INR goal:   2.0-3.0   TTR:   74.8 %   Next INR check:   11/13/2019   INR from last check:   2.70 (9/18/2019)   Weekly max warfarin dose:      Target end date:      INR check location:      Preferred lab:      Send INR reminders to:   [facility]    Indications    A-fib (H) [I48.91]           Comments:            Anticoagulation Care Providers     Provider Role Specialty Phone number    Isidro Au MD Referring Family Medicine [phone]

## 2021-06-02 VITALS — WEIGHT: 179 LBS | BODY MASS INDEX: 26.05 KG/M2

## 2021-06-02 VITALS — BODY MASS INDEX: 26.35 KG/M2 | WEIGHT: 181 LBS

## 2021-06-02 VITALS — BODY MASS INDEX: 26.2 KG/M2 | WEIGHT: 180 LBS

## 2021-06-02 VITALS — WEIGHT: 178 LBS | BODY MASS INDEX: 25.91 KG/M2

## 2021-06-02 VITALS — WEIGHT: 180 LBS | BODY MASS INDEX: 26.2 KG/M2

## 2021-06-02 VITALS — WEIGHT: 181 LBS | BODY MASS INDEX: 26.35 KG/M2

## 2021-06-02 VITALS — BODY MASS INDEX: 26.05 KG/M2 | WEIGHT: 179 LBS

## 2021-06-02 VITALS — BODY MASS INDEX: 25.91 KG/M2 | WEIGHT: 178 LBS

## 2021-06-02 VITALS — BODY MASS INDEX: 26.64 KG/M2 | WEIGHT: 183 LBS

## 2021-06-02 VITALS — BODY MASS INDEX: 26.49 KG/M2 | WEIGHT: 182 LBS

## 2021-06-02 VITALS — WEIGHT: 178.6 LBS | BODY MASS INDEX: 26 KG/M2

## 2021-06-02 NOTE — TELEPHONE ENCOUNTER
ANTICOAGULATION  MANAGEMENT    Assessment     Today's INR result of 2.5 is Therapeutic (goal INR of 2.0-3.0)        Warfarin taken as previously instructed    No new diet changes affecting INR    No new medication/supplements affecting INR    Continues to tolerate warfarin with no reported s/s of bleeding or thromboembolism     Previous INR was Therapeutic    Plan:     Left a detailed message for Parish regarding INR result and instructed:     Warfarin Dosing Instructions:  Continue current warfarin dose 5 mg daily on Tuesdays, Thursdays and Saturdays; and 7.5 mg daily rest of week  (0 % change)    Instructed patient to follow up no later than: 6-8 weeks.    Education provided:     Instructed to call the [facility] for any changes, questions or concerns. (#[phone])   ?   Laura Hauser RN    Subjective/Objective:      Parish Bills, a 79 y.o. male is on warfarin.     Parish reports:     Home warfarin dose: as updated on anticoagulation calendar per template     Missed doses: No     Medication changes:  No     S/S of bleeding or thromboembolism:  No     New Injury or illness:  No     Changes in diet or alcohol consumption:  No     Upcoming surgery, procedure or cardioversion:  No    Anticoagulation Episode Summary     Current INR goal:   2.0-3.0   TTR:   81.0 %   Next INR check:   12/23/2019   INR from last check:   2.50 (10/28/2019)   Weekly max warfarin dose:      Target end date:      INR check location:      Preferred lab:      Send INR reminders to:   [facility]    Indications    A-fib (H) [I48.91]           Comments:            Anticoagulation Care Providers     Provider Role Specialty Phone number    Isidro Au MD Referring Family Medicine [phone]

## 2021-06-03 VITALS — WEIGHT: 186.2 LBS | BODY MASS INDEX: 27.1 KG/M2

## 2021-06-03 VITALS — BODY MASS INDEX: 27.07 KG/M2 | WEIGHT: 186 LBS

## 2021-06-03 VITALS — WEIGHT: 184 LBS | HEIGHT: 70 IN | BODY MASS INDEX: 26.34 KG/M2

## 2021-06-03 NOTE — TELEPHONE ENCOUNTER
ANTICOAGULATION  MANAGEMENT    Assessment     Today's INR result of 1.9 is Subtherapeutic (goal INR of 2.0-3.0)        Warfarin taken as previously instructed    Increased greens/vitamin K intake may be affecting INR    No new medication/supplements affecting INR    Continues to tolerate warfarin with no reported s/s of bleeding or thromboembolism     Previous INR was Therapeutic    Plan:     Spoke with Parish regarding INR result and instructed:     Warfarin Dosing Instructions:  one time booster dose of 10mg tonight then continue current warfarin dose 5 mg daily on Tues/Thurs/Sat; and 7.5 mg daily rest of week  (0 % change)    Instructed patient to follow up no later than: two weeks    Education provided: importance of therapeutic range  Parish plans on going back to his normal diet  Parish verbalizes understanding and agrees to warfarin dosing plan.    Instructed to call the [facility] for any changes, questions or concerns. (#[phone])   ?   Trina Orr RN    Subjective/Objective:      Parish Bills, a 79 y.o. male is on warfarin.     Parish reports:     Home warfarin dose: as updated on anticoagulation calendar per template     Missed doses: No     Medication changes:  No     S/S of bleeding or thromboembolism:  No     New Injury or illness:  No     Changes in diet or alcohol consumption:  Possibly more greens over the holiday     Upcoming surgery, procedure or cardioversion:  No    Anticoagulation Episode Summary     Current INR goal:   2.0-3.0   TTR:   81.4 % (1 y)   Next INR check:   12/16/2019   INR from last check:   1.90! (12/2/2019)   Weekly max warfarin dose:      Target end date:      INR check location:      Preferred lab:      Send INR reminders to:   [facility]    Indications    A-fib (H) [I48.91]           Comments:            Anticoagulation Care Providers     Provider Role Specialty Phone number    Isidro Au MD Referring Family Medicine [phone]

## 2021-06-04 VITALS
TEMPERATURE: 98.9 F | WEIGHT: 200 LBS | BODY MASS INDEX: 29.62 KG/M2 | SYSTOLIC BLOOD PRESSURE: 114 MMHG | HEART RATE: 60 BPM | DIASTOLIC BLOOD PRESSURE: 54 MMHG | HEIGHT: 69 IN | OXYGEN SATURATION: 97 %

## 2021-06-04 VITALS
SYSTOLIC BLOOD PRESSURE: 109 MMHG | TEMPERATURE: 98.6 F | HEART RATE: 64 BPM | WEIGHT: 191 LBS | DIASTOLIC BLOOD PRESSURE: 51 MMHG | BODY MASS INDEX: 27.8 KG/M2 | RESPIRATION RATE: 16 BRPM

## 2021-06-04 VITALS
BODY MASS INDEX: 29.18 KG/M2 | WEIGHT: 197 LBS | HEIGHT: 69 IN | WEIGHT: 197 LBS | HEIGHT: 69 IN | BODY MASS INDEX: 29.18 KG/M2

## 2021-06-04 VITALS
DIASTOLIC BLOOD PRESSURE: 74 MMHG | SYSTOLIC BLOOD PRESSURE: 114 MMHG | BODY MASS INDEX: 27.96 KG/M2 | HEART RATE: 59 BPM | OXYGEN SATURATION: 97 % | WEIGHT: 192.1 LBS

## 2021-06-04 NOTE — TELEPHONE ENCOUNTER
Refill Approved    Rx renewed per Medication Renewal Policy. Medication was last renewed on 12/2/18.    Stephanie Scott, [facility] Connection Triage/Med Refill 12/6/2019     Requested Prescriptions   Pending Prescriptions Disp Refills     hydrOXYzine HCl (ATARAX) 25 MG tablet [Pharmacy Med Name: HYDROXYZINE HCL 25 MG TABLET] 360 tablet 0     Sig: TAKE 1 TABLET BY MOUTH EVERY 6 HOURS AS NEEDED FOR ANXIETY       Antihistamine Refill Protocol Passed - 12/5/2019 10:55 AM        Passed - Patient has had office visit/physical in last year     Last office visit with prescriber/PCP: 8/8/2019 Isidro Au MD OR same dept: 8/8/2019 Isidro Au MD OR same specialty: 8/8/2019 Isidro Au MD  Last physical: 7/12/2018 Last MTM visit: Visit date not found   Next visit within 3 mo: Visit date not found  Next physical within 3 mo: Visit date not found  Prescriber OR PCP: Isidro Au MD  Last diagnosis associated with med order: 1. Anxiety  - hydrOXYzine HCl (ATARAX) 25 MG tablet [Pharmacy Med Name: HYDROXYZINE HCL 25 MG TABLET]; TAKE 1 TABLET BY MOUTH EVERY 6 HOURS AS NEEDED FOR ANXIETY  Dispense: 360 tablet; Refill: 0    If protocol passes may refill for 12 months if within 3 months of last provider visit (or a total of 15 months).

## 2021-06-04 NOTE — TELEPHONE ENCOUNTER
ANTICOAGULATION  MANAGEMENT    Assessment     Today's INR result of 2.7 is Therapeutic (goal INR of 2.0-3.0)        Warfarin taken as previously instructed    No new diet changes affecting INR    No new medication/supplements affecting INR    Nosebleeds, saw by doctor today (see note) and will follow up with ENT tomorrow. Per note, okay for patient to continue warfarin     Previous INR was Subtherapeutic    Plan:     Spoke with Parish regarding INR result and instructed:     Warfarin Dosing Instructions:  Continue current warfarin dose 5 mg daily on tues/thurs/sat; and 7.5 mg daily rest of week  (0 % change)    Instructed patient to follow up no later than: 2-3 weeks    Education provided: importance of therapeutic range and target INR goal and significance of current INR result    Parish verbalizes understanding and agrees to warfarin dosing plan.    Instructed to call the [facility] for any changes, questions or concerns. (#[phone])   ?   Trina Orr RN    Subjective/Objective:      Parish Bills, a 79 y.o. male is on warfarin.     Parish reports:     Home warfarin dose: as updated on anticoagulation calendar per template     Missed doses: No     Medication changes:  No     S/S of bleeding or thromboembolism:  Yes: nosebleeds     New Injury or illness:  No     Changes in diet or alcohol consumption:  No     Upcoming surgery, procedure or cardioversion:  No    Anticoagulation Episode Summary     Current INR goal:   2.0-3.0   TTR:   80.9 % (1 y)   Next INR check:   1/7/2020   INR from last check:   2.70 (12/17/2019)   Weekly max warfarin dose:      Target end date:      INR check location:      Preferred lab:      Send INR reminders to:   [facility]    Indications    A-fib (H) [I48.91]           Comments:            Anticoagulation Care Providers     Provider Role Specialty Phone number    Isidro Au MD Referring Family Medicine [phone]

## 2021-06-04 NOTE — TELEPHONE ENCOUNTER
"Pt reports \"nosebleed problem.\"  Onset \"one week ago.\"  \"Sometimes wake up at night and the blood is just coming out.\"  Episodes occur \"4x over the past week.\"  Most recent episode last overnight.    NO bleeding at this time.    Pt states \"history of nosebleeds even before Coumadin\".  \"Had cauterization procedure in the past.\"    Pt is currently at [facility] for lab INR check.  Agrees to also schedule OV with a provider there as well to discuss next best steps for frequent nosebleeds.  Warm transferred to a  for this purpose now.  (Discussed if any severe bleeding recurs while at clinic, go to ED -> Pt verbalizes understanding; agrees to plan.)    Rashmi Hamilton RN  Care Connection Triage     Reason for Disposition    Taking Coumadin (warfarin) or other strong blood thinner, or known bleeding disorder (e.g., thrombocytopenia)    Protocols used: NOSEBLEED-A-OH      "

## 2021-06-04 NOTE — TELEPHONE ENCOUNTER
ANTICOAGULATION  MANAGEMENT    Assessment     Today's INR result of 2.5 is Therapeutic (goal INR of 2.0-3.0)        Warfarin taken as previously instructed    No new diet changes affecting INR    No new medication/supplements affecting INR    Continues to tolerate warfarin with reported s/s of bleeding or thromboembolism -per chart nose bleeds that he seen ENT for cauterization.     Previous INR was Therapeutic    Plan:     Left a detailed message for Parish regarding INR result and instructed:     Warfarin Dosing Instructions:  Continue current warfarin dose 5 mg daily on Tuesdays, Thursdays and Saturdays; and 7.5 mg daily rest of week  (0 % change)    Instructed patient to follow up no later than: one month.    Education provided:     Instructed to call the [facility] for any changes, questions or concerns. (#[phone])   ?   Luara Hauser RN    Subjective/Objective:      Parish Bills, a 79 y.o. male is on warfarin.     Parish reports:     Home warfarin dose: as updated on anticoagulation calendar per template     Missed doses: No     Medication changes:  No     S/S of bleeding or thromboembolism:  Yes: had some nose bleeds that he seen ENT for and had cauterization done per chart.     New Injury or illness:  No     Changes in diet or alcohol consumption:  No     Upcoming surgery, procedure or cardioversion:  No    Anticoagulation Episode Summary     Current INR goal:   2.0-3.0   TTR:   81.0 % (1 y)   Next INR check:   1/27/2020   INR from last check:   2.50 (12/30/2019)   Weekly max warfarin dose:      Target end date:      INR check location:      Preferred lab:      Send INR reminders to:   [facility]    Indications    A-fib (H) [I48.91]           Comments:            Anticoagulation Care Providers     Provider Role Specialty Phone number    Isidro Au MD Referring Family Medicine [phone]

## 2021-06-04 NOTE — PROGRESS NOTES
Assessment/Plan:      Problem List Items Addressed This Visit     None      Visit Diagnoses     Recurrent epistaxis    -  Primary    Relevant Orders    Ambulatory referral to ENT    Long term current use of anticoagulant therapy        Relevant Orders    Ambulatory referral to ENT      I am not able to identify clear source of bleeding. I suspect the source is posterior. Evaluation by ENT this week recommended. Our referral coordinator will assist with scheduling. No change in coumadin dosing at this time.    Patient Instructions   No change in your coumadin dosing. Continue follow up as recommended by INR nurse.  Evaluation by ENT as scheduled.      Subjective:   Parish Bills is a 79 y.o. male who presents today with intermittent nosebleeds for about a week. He reports that the bleeds have been intermittent on the left side only. He is on coumadin. INR this morning was 2.7. No recent dose changes. No other abnormal bleeding or bruising.    Patient Active Problem List   Diagnosis     Aortic Stenosis     Bicuspid Aortic Valve     Benign Prostatic Hypertrophy     Abrasion Of The Ear     Hyperlipidemia     Cardiomyopathy     Strained Left Pectoralis Major Muscle     Essential Hypercholesterolemia     Aneurysm Of The Abdominal Aorta     Chest Pain     Neck Strain     Osteoarthritis     Lumbar Strain     Blood In Urine     Atrial Fibrillation     Esophageal Reflux     Subconjunctival Hemorrhage     Post-operative pain     HTN (hypertension)     Hypotension     Acute blood loss anemia     A-fib (H)     Cough     Edema     Lumbar radiculopathy     Malignant neoplasm of skin     Wheezing     Pacemaker     Rectal cancer (H)      Past Medical History:   Diagnosis Date     Abdominal aortic aneurysm (H)      Alcohol abuse      Anemia      Aortic stenosis      Arthritis      Atrial fibrillation (H)      BPH (benign prostatic hyperplasia)      CAD (coronary artery disease)      Cardiomyopathy (H)      Cataract     left      Chest pain      CHF (congestive heart failure) (H)      Congenital insufficiency of aortic valve      Coronary artery disease      Disease of pancreas     gallstones related     Dysthymia      Enlarged prostate      FH: mitral valve repair      H/O aortic valve repair '     High blood pressure      Hypercholesteremia      Hyperlipidemia      Hypertrophy of prostate      Lumbar radiculopathy      Mitral valve prolapse      Neck strain      Nonrheumatic aortic (valve) stenosis      Osteoarthrosis      Pacemaker      Rectal cancer (H)      Reflux esophagitis      Subconjunctival bleed      Past Surgical History:   Procedure Laterality Date     ABDOMINAL AORTIC ANEURYSM REPAIR  2009     AORTIC VALVE REPLACEMENT       CARDIOVERSION      X3     CARDIOVERSION       CERVICAL FUSION  1972     CHOLECYSTECTOMY       CORONARY ARTERY BYPASS GRAFT      x1 with LIMA to LAD     CT coronary angiogram       EMBOLIZATION  01/31/2018     [facility] AAA REPAIR EXPOSE ILIAC ART ABD INCIS UNILAT   2009     ILIAC ARTERY ANEURYSM REPAIR  03/29/2012     JOINT REPLACEMENT Right     RAMÓN     LAPAROSCOPIC CHOLECYSTECTOMY       LUMBAR LAMINECTOMY Right 9/15/2014    Procedure: OPEN DECOMPRESSION L2-4 RIGHT ;  Surgeon: Elroy French MD;  Location: [facility];  Service:      MITRAL VALVE REPAIR  2006     KY ARTHRODESIS ANT INTERBODY MIN DISCECTOMY, CERVICAL BELOW C2      Description: Cervical Vertebral Fusion;  Recorded: 04/18/2008;     KY EXCIS RECTAL TUMOR, TRANSANAL, PARTIAL THICKNESS N/A 3/6/2017    Procedure: TRANSANAL EXCISION OF RECTAL TUMOR AND PROCTOSCOPY;  Surgeon: Alexander Mcgrath MD;  Location: [facility];  Service: General     KY REANEURYSM/GRFT INS,ABDOMINAL AORTA      Description: Abdominal Aortic Aneurysm Repair For Dilation Or Occlusion;  Recorded: 04/05/2012;     KY SURG ONLY REMOVE CATARACT INTRACAP INSERT LENS   2010     STERNOTOMY      redo     TOTAL HIP ARTHROPLASTY Right 2004     TRANSURETHRAL RESECTION OF  PROSTATE         Review of Systems      Objective:     Vitals:    12/17/19 1100   BP: 114/74   Pulse: (!) 59   SpO2: 97%   Weight: 192 lb 1.6 oz (87.1 kg)       Physical Exam  Vitals signs reviewed.   Constitutional:       General: He is not in acute distress.     Appearance: He is not ill-appearing.   HENT:      Nose:      Comments: Nasal mucosa normal bilaterally. No active bleeding. No visible enlarged vessels or ulceration. Source of bleeding is not apparent.

## 2021-06-04 NOTE — PROGRESS NOTES
HISTORY OF PRESENT ILLNESS  Asked to see by Dr. Padgett for evaluation of epistaxis. Patient reports that he has been bleeding from the left nose. His last nosebleed was yesterday morning. Lasted about 30 minutes. He had the right nose cauterized in 2014. Patient is on blood thinners. No green or purulent nasal discharge. No recent sinus infection. No pain.    REVIEW OF SYSTEMS  Review of Systems: a 10-system review was performed. Pertinent positives are noted in the HPI and on a separate scanned document in the chart.    PMH, PSH, FH and SH has documented in the EHR.      EXAM    CONSTITUTIONAL  General Appearance:  Normal, well developed, well nourished, no obvious distress  Ability to Communicate:  communicates appropriately.    HEAD AND FACE  Appearance and Symmetry:  Normal, no scalp or facial scarring or suspicious lesions.  Paranasal sinuses tenderness:  Normal, Paranasal sinuses non tender    EARS  Clinical speech reception threshold:  Normal, able to hear normal speech.  Auricle:  Normal, Auricles without scars, lesions, masses.  External auditory canal:  Normal, External auditory canal normal.  Tympanic membrane:  Normal, Tympanic membranes normal without swelling or erythema.  Tympanic membrane mobility:  Normal, Normal tympanic membrane mobility.    NOSE (speculum or scope)  Architecture:  Normal, Grossly normal external nasal architecture with no masses or lesions.  Mucosa:  Prominent vascularity left anterior septum.  Septum:  Normal, Septum non-obstructing.  Turbinates:  Normal, No turbinate abnormalities    ORAL CAVITY AND OROPHARYNX  Lips:  Normal.  Dental and gingiva:  Normal, No obvious dental or gingival disease.  Mucosa:  Normal, Moist mucous membranes.  Tongue:  Normal, Tongue mobile with no mucosal abnormalities  Hard and soft palate:  Normal, Hard and soft palate without cleft or mucosal lesions.  Oral pharynx:  Normal, Posterior pharynx without lesions or remarkable asymmetry.  Saliva:   Normal, Clear saliva.  Masses:  Normal, No palpable masses or pathologically enlarged lymph nodes.    NECK  Masses/lymph nodes:  Normal, No worrisome neck masses or lymph nodes.  Salivary glands:  Normal, Parotid and submandibular glands.  Trachea and larynx position:  Normal, Trachea and larynx midline.  Thyroid:  Normal, No thyroid abnormality.  Tenderness:  Normal, No cervical tenderness.  Suppleness:  Normal, Neck supple    NEUROLOGICAL  Speech pattern:  Normal, Proasaic    RESPIRATORY  Symmetry and Respiratory effort:  Normal, Symmetric chest movement and expansion with no increased intercostal retractions or use of accessory muscles.     IMPRESSION  Left epistaxis    RECOMMENDATION    EPISTAXIS TREATMENT  The left nose was sprayed with lidocaine plus epinephrine solution.  After allowing adequate time for anesthesia the area in questions was treated with topical silver nitrate.  The patient tolerated the procedure without difficulty.     Portillo White MD

## 2021-06-04 NOTE — PATIENT INSTRUCTIONS - HE
No change in your coumadin dosing. Continue follow up as recommended by INR nurse.  Evaluation by ENT as scheduled.

## 2021-06-05 ENCOUNTER — RECORDS - HEALTHEAST (OUTPATIENT)
Dept: FAMILY MEDICINE | Facility: CLINIC | Age: 81
End: 2021-06-05

## 2021-06-05 VITALS
DIASTOLIC BLOOD PRESSURE: 55 MMHG | BODY MASS INDEX: 28.15 KG/M2 | RESPIRATION RATE: 20 BRPM | SYSTOLIC BLOOD PRESSURE: 110 MMHG | TEMPERATURE: 99.2 F | HEART RATE: 62 BPM | WEIGHT: 190.6 LBS

## 2021-06-05 NOTE — TELEPHONE ENCOUNTER
Upcoming Appointment Question  When is the appointment:   What is your appointment for?: medication follow up  Who is your appointment scheduled with?: PCP only  What is your question/concern?: Patient has some concerns with how he's been feeling while on the medication he's on. He was hoping to be seen a lot sooner than late February if possible. Please call patient to go over options. He wasn't able to make appointment this week because of a .  Okay to leave a detailed message?: Yes

## 2021-06-05 NOTE — PROGRESS NOTES
ASSESSMENT/PLAN  1. Health care maintenance  Patient is due for labs today he is not fasting so we will obtain a random cholesterol levels and a comp panel  Follow-up based on results  - Comprehensive Metabolic Panel  - Lipid Cascade RANDOM  - HM2(CBC w/o Differential)    2. A-fib (H)  Patient is due for an INR for his history of atrial fibrillation we will check INR today  - INR    3. Actinic keratosis  Patient is an area on his left lateral neck 3 to 4 mm in diameter of an irritated actinic keratosis with scabbing  Discussed treatment with cryotherapy today which patient is in agreement  Liquid nitrogen was used to freeze the area multiple times with a Q-tip  Patient tolerated the procedure well and after cares were discussed  If lesion does not fall away would recommend biopsy    4. Mild major depression (H)  Majority of time spent discussing patient's depression and fatigue with lack of motivation  Patient is moved to a new residence with his wife in independent living  He has lack of motivation and does not get out of the apartment much when he does not have to  He is feeling his energy decreasing and his wife feels that he sleeping more  Discussed with patient and wife that his worsening depression will be worsened by staying in the home and needs to be active and get involved in things in the community to get himself outside of the home otherwise the symptoms will worsen  Patient will work on becoming more active and try to get himself involved in some activities at his current residence        SUBJECTIVE:   Chief Complaint   Patient presents with     Memory Loss     Concerns for short term memory     Skin/SubQ Lesion     Check skin lesion on left neck and scalp      Fatigue     No motivation, tired all the time      Depression     Medication check      INR Check     Parish Bills 79 y.o. male    Current Outpatient Medications   Medication Sig Dispense Refill     amoxicillin (AMOXIL) 500 MG capsule Take  2,000 mg by mouth as needed. As needed for Hx mitral valve repair. Take one hour before appointment       aspirin (ASPIRIN LOW DOSE) 81 MG EC tablet Take 1 tablet by mouth daily.       atorvastatin (LIPITOR) 40 MG tablet TAKE 1 TABLET BY MOUTH AT BEDTIME. 90 tablet 3     buPROPion (WELLBUTRIN XL) 150 MG 24 hr tablet TAKE 1 TABLET BY MOUTH EVERY MORNING 90 tablet 3     folic acid/multivit-min/lutein (CENTRUM SILVER ORAL) Take 1 tablet by mouth daily.       furosemide (LASIX) 20 MG tablet TAKE 1 TABLET BY MOUTH 2 TIMES A  tablet 0     hydrOXYzine HCl (ATARAX) 25 MG tablet Take 1 tablet (25 mg total) by mouth every 6 (six) hours as needed for anxiety. 360 tablet 0     metoprolol succinate (TOPROL-XL) 50 MG 24 hr tablet TAKE ONE TABLET BY MOUTH DAILY. HOLD FOR SYSTOLIC BP <95 OR HEART RATE <55 90 tablet 3     nitroglycerin (NITROSTAT) 0.4 MG SL tablet PLACE 1 TABLET UNDER THE TONGUE EVERY 5 MINUTES UP TO 3 DOSES AS NEEDED; CALL 911 AFTER TAKING FIRST DOSE 25 tablet 0     sertraline (ZOLOFT) 100 MG tablet TAKE ONE TABLET BY MOUTH ONCE DAILY ALONG WITH 50MG TO EQUAL 150MG 90 tablet 3     sertraline (ZOLOFT) 50 MG tablet Take 150 mg by mouth at bedtime.       warfarin (COUMADIN) 7.5 MG tablet Take 7.5 mg by mouth daily. Until 8/12/18  INR 2.07 next INR 8/13       warfarin (COUMADIN/JANTOVEN) 5 MG tablet Take 1 tablet (5mg) to 1 & 1/2 tablets (7.5mg) by mouth daily, as directed.  Adjust dose based on INR results. 120 tablet 1     No current facility-administered medications for this visit.      Allergies: Hydrocodone-acetaminophen; Lisinopril; Oxycodone-acetaminophen; and Amiodarone   No LMP for male patient.    HPI:   Patient is here for an INR check today  Patient is having trouble with increasing lack of motivation fatigue and some memory issues  We discussed the necessity for cognition and to become not only physically active but mentally active  In this discussion we discussed with him and his wife present the  necessity for him to be involved and get outside of his home on a regular basis which he has not been doing  Has been sitting around watching a lot of TV and we discussed that the symptoms that he is concerned with would regards to fatigue and energy some memory changes and lack of motivation will all worsen if he is not get himself out and being active-by recommendation would importance of being more active and become involved in some activities at his current residence.  Patient is a lesion of the left lateral side of his neck that is been irritated and scabbing.  Discussed with patient this is concerning for an irritated actinic keratosis and we will use liquid nitrogen to freeze the area today-this should fall off and if not we need to biopsy the region.    ROS: negative except as per HPI    OBJECTIVE:   The patient appears well, alert, oriented x 3, in no distress.  /51 (Patient Site: Left Arm, Patient Position: Sitting, Cuff Size: Adult Large)   Pulse 64   Temp 98.6  F (37  C) (Oral)   Resp 16   Wt 191 lb (86.6 kg)   BMI 27.80 kg/m      Lungs: clear, good air entry, no wheezes, rhonchi or rales.   Cardiac: Irregular rhythm regular rate  Abdomen: normal bowel sounds, soft  Extremities: show no edema, normal peripheral pulses.   Neurological: normal, no focal findings.  Skin: clear, dry, no rashes/lesions  Psych- normal mood and affect      Pt states an understanding and agrees to the above plan.  Greater than 25 minutes was spent today in interview and examination with Parish Bills with more than 50% of that time in counseling and coordination of care.

## 2021-06-05 NOTE — TELEPHONE ENCOUNTER
Patient Returning Call  Reason for call:  Returning call  Information relayed to patient:  Message below.  Patient did not answer the questions and advised that he will just keep the appointment for 02-20-20. However, patient then did advise that the appointment is for the depression medications and that he does not like the way he feels with the medications that the provider has put him on. Patient declined nurse triage.  Patient has additional questions:  No  If YES, what are your questions/concerns:  n/a  Okay to leave a detailed message?: No call back needed

## 2021-06-05 NOTE — TELEPHONE ENCOUNTER
ANTICOAGULATION  MANAGEMENT    Assessment     Today's INR result of 2.3 is Therapeutic (goal INR of 2.0-3.0)        Warfarin taken as previously instructed    No new diet changes affecting INR    No new medication/supplements affecting INR    Continues to tolerate warfarin with no reported s/s of bleeding or thromboembolism     Previous INR was Supratherapeutic    Plan:     Left a detailed message for Parish regarding INR result and instructed:     Warfarin Dosing Instructions:  Continue current warfarin dose 5 mg daily on Tues/Thurs/Sat; and 7.5 mg daily rest of week  (0 % change)    Instructed patient to follow up no later than: two weeks    Education provided: importance of therapeutic range    Instructed to call the [facility] for any changes, questions or concerns. (#[phone])   ?   Trina Orr RN    Subjective/Objective:      Parish Bills, a 79 y.o. male is on warfarin.     Parish reports:     Home warfarin dose: as updated on anticoagulation calendar per template     Missed doses: No     Medication changes:  No     S/S of bleeding or thromboembolism:  No     New Injury or illness:  No     Changes in diet or alcohol consumption:  No     Upcoming surgery, procedure or cardioversion:  No      Anticoagulation Episode Summary     Current INR goal:   2.0-3.0   TTR:   86.6 % (1 y)   Next INR check:   2/21/2020   INR from last check:   2.30 (2/7/2020)   Weekly max warfarin dose:      Target end date:      INR check location:      Preferred lab:      Send INR reminders to:   [facility]    Indications    A-fib (H) [I48.91]           Comments:            Anticoagulation Care Providers     Provider Role Specialty Phone number    Isidro Au MD Referring Family Medicine [phone]

## 2021-06-05 NOTE — TELEPHONE ENCOUNTER
Anticoagulation Management    Unable to reach Parish today.    Today's INR result of 3.5 is Supratherapeutic (goal INR of 2.0-3.0).     Follow up required to discuss out of range INR .    left message to take a lower dose of 5mg tonight then continue same dosing over weekend       ACN to follow up    Trina Orr RN

## 2021-06-05 NOTE — TELEPHONE ENCOUNTER
RN Assessment/Reason for Call:   Okay to leave Detailed Message  Parish calling in, Dr took something off his neck and Dr told hm if it  doesn't fall off needs to have someone else look at it.    RN Action/Disposition:  Will send to PCP and call him back who he should be referred to.  Agrees to plan.   Rabia RAY contacted, he is out today.    Stephanie Scott, RN    [facility] Triage/med refill  2/3/2020  11:45 AM

## 2021-06-05 NOTE — TELEPHONE ENCOUNTER
Left message to call back for: need more information  Information to relay to patient:  Please find out from pt what symptoms he is having, for how long, and what medication he is concerned about.

## 2021-06-05 NOTE — TELEPHONE ENCOUNTER
ANTICOAGULATION  MANAGEMENT    Assessment     Friday's INR result of 3.5 is Supratherapeutic (goal INR of 2.0-3.0)        Warfarin taken as previously instructed    No new diet changes affecting INR    No new medication/supplements affecting INR    Continues to tolerate warfarin with no reported s/s of bleeding or thromboembolism     Previous INR was Therapeutic    Plan:     Spoke with Parish regarding INR result and instructed:     Warfarin Dosing Instructions:  Continue current warfarin dose 5 mg daily on Tues/thurs/sat; and 7.5 mg daily rest of week  (0 % change)  Took lower dose of 2.5mg on Saturday instead of Friday  Instructed patient to follow up no later than: two weeks    Education provided: importance of therapeutic range    Parish verbalizes understanding and agrees to warfarin dosing plan.    Instructed to call the [facility] for any changes, questions or concerns. (#[phone])   ?   Trina Orr RN    Subjective/Objective:      Parish Bills, a 79 y.o. male is on warfarin.     Parish reports:     Home warfarin dose: as updated on anticoagulation calendar per template     Missed doses: No     Medication changes:  No     S/S of bleeding or thromboembolism:  No     New Injury or illness:  No     Changes in diet or alcohol consumption:  No     Upcoming surgery, procedure or cardioversion:  No    Anticoagulation Episode Summary     Current INR goal:   2.0-3.0   TTR:   85.1 % (1 y)   Next INR check:   2/7/2020   INR from last check:   3.50! (1/24/2020)   Weekly max warfarin dose:      Target end date:      INR check location:      Preferred lab:      Send INR reminders to:   [facility]    Indications    A-fib (H) [I48.91]           Comments:            Anticoagulation Care Providers     Provider Role Specialty Phone number    Isidro uA MD Referring Family Medicine [phone]

## 2021-06-05 NOTE — TELEPHONE ENCOUNTER
Please call and have patient make appointment to derm- referral placed  [facility]  PHONE: [phone]    Recommend Dr. Lopez

## 2021-06-05 NOTE — TELEPHONE ENCOUNTER
----- Message from Isidro Au MD sent at 1/27/2020 11:09 AM CST -----  Please inform patient that kidney function is stable.  Liver function is normal.  Cholesterol levels are at goal range.  No concerns on blood work.

## 2021-06-06 NOTE — TELEPHONE ENCOUNTER
ANTICOAGULATION  MANAGEMENT    Assessment     Today's INR result of 2.7 is Therapeutic (goal INR of 2.0-3.0)        Warfarin taken as previously instructed    No new diet changes affecting INR    No new medication/supplements affecting INR    Continues to tolerate warfarin with no reported s/s of bleeding or thromboembolism     Previous INR was Therapeutic    Plan:     Left a detailed message for Parish regarding INR result and instructed:     Warfarin Dosing Instructions:  Continue current warfarin dose 5 mg daily on Tues/Thurs/Sat; and 7.5 mg daily rest of week  (0 % change)    Instructed patient to follow up no later than: 4 weeks    Education provided: importance of therapeutic range    Instructed to call the [facility] for any changes, questions or concerns. (#[phone])   ?   Trina rOr RN    Subjective/Objective:      Parish Bills, a 79 y.o. male is on warfarin.     Parish reports:     Home warfarin dose: as updated on anticoagulation calendar per template     Missed doses: No     Medication changes:  No     S/S of bleeding or thromboembolism:  No     New Injury or illness:  No     Changes in diet or alcohol consumption:  No     Upcoming surgery, procedure or cardioversion:  No    Anticoagulation Episode Summary     Current INR goal:   2.0-3.0   TTR:   87.1 % (1 y)   Next INR check:   3/20/2020   INR from last check:   2.70 (2/21/2020)   Weekly max warfarin dose:      Target end date:      INR check location:      Preferred lab:      Send INR reminders to:   [facility]    Indications    A-fib (H) [I48.91]           Comments:            Anticoagulation Care Providers     Provider Role Specialty Phone number    Isidro Au MD Referring Family Medicine [phone]

## 2021-06-07 ENCOUNTER — AMBULATORY - HEALTHEAST (OUTPATIENT)
Dept: LAB | Facility: CLINIC | Age: 81
End: 2021-06-07

## 2021-06-07 ENCOUNTER — COMMUNICATION - HEALTHEAST (OUTPATIENT)
Dept: ANTICOAGULATION | Facility: CLINIC | Age: 81
End: 2021-06-07

## 2021-06-07 DIAGNOSIS — Z79.01 LONG TERM (CURRENT) USE OF ANTICOAGULANTS: ICD-10-CM

## 2021-06-07 DIAGNOSIS — I48.92 ATRIAL FLUTTER, PAROXYSMAL (H): ICD-10-CM

## 2021-06-07 DIAGNOSIS — I48.91 ATRIAL FIBRILLATION (H): ICD-10-CM

## 2021-06-07 LAB — INR PPP: 2.1 (ref 0.9–1.1)

## 2021-06-07 NOTE — TELEPHONE ENCOUNTER
Anticoagulation Annual Referral Renewal Review    Parish Bills's chart reviewed for annual renewal of referral to anticoagulation monitoring.        Criteria for anticoagulation nurse and/or pharmacist renewal met   Warfarin indication: Atrial Fibrillation Yes, per indication   Current with INR monitoring/compliant Yes Yes   Date of last office visit 1/24/20 Yes, had office visit within last year   Time in Therapeutic Range (TTR) 88.1 % Yes, TTR > 60%       Parish Bills met all criteria for anticoagulation management program initiated renewal.  New INR standing orders and anticoagulation referral renewal placed.      Trina Orr RN  2:33 PM

## 2021-06-07 NOTE — TELEPHONE ENCOUNTER
ANTICOAGULATION  MANAGEMENT    Assessment     Today's INR result of 2.5 is Therapeutic (goal INR of 2.0-3.0)        Warfarin taken as previously instructed    No new diet changes affecting INR    No new medication/supplements affecting INR    Continues to tolerate warfarin with no reported s/s of bleeding or thromboembolism     Previous INR was Therapeutic    Plan:     Left a detailed message for Parish regarding INR result and instructed:     Warfarin Dosing Instructions:  Continue current warfarin dose 5 mg daily on tues/thurs/sat; and 7.5 mg daily rest of week  (0 % change)    Instructed patient to follow up no later than: 4-6 weeks    Education provided: importance of therapeutic range    Instructed to call the [facility] for any changes, questions or concerns. (#[phone])   ?   Trina Orr RN    Subjective/Objective:      Parish Bills, a 79 y.o. male is on warfarin.     Parish reports:     Home warfarin dose: as updated on anticoagulation calendar per template     Missed doses: No     Medication changes:  No     S/S of bleeding or thromboembolism:  No     New Injury or illness:  No     Changes in diet or alcohol consumption:  No     Upcoming surgery, procedure or cardioversion:  No    Anticoagulation Episode Summary     Current INR goal:   2.0-3.0   TTR:   92.9 % (1 y)   Next INR check:   6/12/2020   INR from last check:   2.50 (5/1/2020)   Weekly max warfarin dose:      Target end date:      INR check location:      Preferred lab:      Send INR reminders to:   [facility]    Indications    A-fib (H) [I48.91]           Comments:            Anticoagulation Care Providers     Provider Role Specialty Phone number    Isidro Au MD Referring Family Medicine [phone]

## 2021-06-08 NOTE — TELEPHONE ENCOUNTER
ANTICOAGULATION  MANAGEMENT    Assessment     Today's INR result of 3.0 is Therapeutic (goal INR of 2.0-3.0)        Warfarin taken as previously instructed    No new diet changes affecting INR    No new medication/supplements affecting INR    Continues to tolerate warfarin with no reported s/s of bleeding or thromboembolism     Previous INR was Therapeutic    Plan:     Spoke with Parish regarding INR result and instructed:     Warfarin Dosing Instructions:  Continue current warfarin dose    5 mg every Tue, Thu, Sat; 7.5 mg all other days        (0 % change)    Instructed patient to follow up no later than: 6 weeks - appointment made.    Education provided: impact of vitamin K foods on INR, importance of therapeutic range and target INR goal and significance of current INR result    Parish verbalizes understanding and agrees to warfarin dosing plan.    Instructed to call the [facility] for any changes, questions or concerns. (#[phone])   ?   Lorena Barnhart RN    Subjective/Objective:      Parish Bills, a 79 y.o. male is on warfarin.     Parish reports:     Home warfarin dose: as updated on anticoagulation calendar per template     Missed doses: No     Medication changes:  No     S/S of bleeding or thromboembolism:  No     New Injury or illness:  No     Changes in diet or alcohol consumption:  No     Upcoming surgery, procedure or cardioversion:  No    Anticoagulation Episode Summary     Current INR goal:   2.0-3.0   TTR:   92.9 % (1 y)   Next INR check:   7/28/2020   INR from last check:   3.00 (6/16/2020)   Weekly max warfarin dose:      Target end date:      INR check location:      Preferred lab:      Send INR reminders to:   [facility]    Indications    A-fib (H) [I48.91]           Comments:            Anticoagulation Care Providers     Provider Role Specialty Phone number    Isidro Au MD Referring Family Medicine [phone]

## 2021-06-08 NOTE — TELEPHONE ENCOUNTER
RN cannot approve Refill Request    RN can NOT refill this medication Protocol failed and NO refill given.       Amrita Chase, [facility] Triage/Med Refill 5/13/2020    Requested Prescriptions   Pending Prescriptions Disp Refills     warfarin ANTICOAGULANT (COUMADIN/JANTOVEN) 5 MG tablet [Pharmacy Med Name: WARFARIN SODIUM 5 MG TABLET] 120 tablet 0     Sig: TAKE 1 TO 1 & 1/2 TABLETS ( 5- 7.5 MG) BY MOUTH DAILY AS DIRECTED.. ADJUST DOSE BASED ON INR RESULTS       Warfarin Refill Protocol  Failed - 5/12/2020 10:07 AM        Failed -  Route to appropriate pool/provider     Last Anticoagulation Summary:   Anticoagulation Episode Summary     Current INR goal:   2.0-3.0   TTR:   92.6 % (11.8 mo)   Next INR check:   6/12/2020   INR from last check:   2.50 (5/1/2020)   Weekly max warfarin dose:      Target end date:      INR check location:      Preferred lab:      Send INR reminders to:   [facility]    Indications    A-fib (H) [I48.91]           Comments:            Anticoagulation Care Providers     Provider Role Specialty Phone number    Isidro Au MD Referring Family Medicine [phone]                Passed - Provider visit in last year     Last office visit with prescriber/PCP: 1/24/2020 Isidro Au MD OR same dept: Visit date not found OR same specialty: 1/24/2020 Isidro Au MD  Last physical: 7/12/2018 Last MTM visit: Visit date not found    Next appt within 3 mo: Visit date not found Next physical within 3 mo: Visit date not found  Prescriber OR PCP: Isidro Au MD  Last diagnosis associated with med order: 1. A-fib (H)  - warfarin ANTICOAGULANT (COUMADIN/JANTOVEN) 5 MG tablet [Pharmacy Med Name: WARFARIN SODIUM 5 MG TABLET]; TAKE 1 TO 1 & 1/2 TABLETS ( 5- 7.5 MG) BY MOUTH DAILY AS DIRECTED.. ADJUST DOSE BASED ON INR RESULTS  Dispense: 120 tablet; Refill: 0    2. Long term (current) use of anticoagulants  - warfarin ANTICOAGULANT (COUMADIN/JANTOVEN)  5 MG tablet [Pharmacy Med Name: WARFARIN SODIUM 5 MG TABLET]; TAKE 1 TO 1 & 1/2 TABLETS ( 5- 7.5 MG) BY MOUTH DAILY AS DIRECTED.. ADJUST DOSE BASED ON INR RESULTS  Dispense: 120 tablet; Refill: 0    If protocol passes may refill for 6 months if within 3 months of last provider visit (or a total of 9 months).

## 2021-06-08 NOTE — TELEPHONE ENCOUNTER
RN cannot approve Refill Request    RN can NOT refill this medication historical medication requested. Last office visit: 1/24/2020 Isidro Au MD Last Physical: 7/12/2018 Last MTM visit: Visit date not found Last visit same specialty: 1/24/2020 Isidro Au MD.  Next visit within 3 mo: Visit date not found  Next physical within 3 mo: Visit date not found      Jodi PRESCOTT Evaristo, [facility] Triage/Med Refill 6/9/2020    Requested Prescriptions   Pending Prescriptions Disp Refills     sertraline (ZOLOFT) 100 MG tablet [Pharmacy Med Name: SERTRALINE  MG TABLET] 90 tablet 0     Sig: TAKE ONE TABLET BY MOUTH ONCE DAILY ALONG WITH 50MG TO EQUAL 150MG       [facility] Refill Protocol  Passed - 6/5/2020  8:47 AM        Passed - PCP or prescribing provider visit in last year     Last office visit with prescriber/PCP: 1/24/2020 Isidro Au MD OR same dept: Visit date not found OR same specialty: 1/24/2020 Isidro Au MD  Last physical: 7/12/2018 Last MTM visit: Visit date not found   Next visit within 3 mo: Visit date not found  Next physical within 3 mo: Visit date not found  Prescriber OR PCP: Isidro Au MD  Last diagnosis associated with med order: 1. Anxiety  - sertraline (ZOLOFT) 100 MG tablet [Pharmacy Med Name: SERTRALINE  MG TABLET]; TAKE ONE TABLET BY MOUTH ONCE DAILY ALONG WITH 50MG TO EQUAL 150MG  Dispense: 90 tablet; Refill: 0    If protocol passes may refill for 12 months if within 3 months of last provider visit (or a total of 15 months).                sertraline (ZOLOFT) 50 MG tablet [Pharmacy Med Name: SERTRALINE HCL 50 MG TABLET] 90 tablet 0     Sig: TAKE ONE TABLET BY MOUTH ONCE DAILY ALONG WITH 100MG TO EQUAL 150MG       [facility] Refill Protocol  Passed - 6/5/2020  8:47 AM        Passed - PCP or prescribing provider visit in last year     Last office visit with prescriber/PCP: 1/24/2020 Isidro Au MD OR same dept: Visit date not found  OR same specialty: 1/24/2020 Isidro Au MD  Last physical: 7/12/2018 Last MTM visit: Visit date not found   Next visit within 3 mo: Visit date not found  Next physical within 3 mo: Visit date not found  Prescriber OR PCP: Isidro Au MD  Last diagnosis associated with med order: 1. Anxiety  - sertraline (ZOLOFT) 100 MG tablet [Pharmacy Med Name: SERTRALINE  MG TABLET]; TAKE ONE TABLET BY MOUTH ONCE DAILY ALONG WITH 50MG TO EQUAL 150MG  Dispense: 90 tablet; Refill: 0    If protocol passes may refill for 12 months if within 3 months of last provider visit (or a total of 15 months).

## 2021-06-09 NOTE — ANESTHESIA PREPROCEDURE EVALUATION
Anesthesia Evaluation        Airway   Mallampati: II   Pulmonary                           Cardiovascular   (+) hypertension well controlled, valvular problems/murmurs AS, CAD, dysrhythmias, CHF, ,        ROS comment: Mitral valve replacement  Has moderate aortic stenosis  50% EF  Chronic at. Fib, with pacer  AAA repair     Neuro/Psych      Endo/Other       GI/Hepatic/Renal    (+) GERD well controlled,             Dental                         Anesthesia Plan  Planned anesthetic: general endotracheal    ASA 3   Induction: intravenous   Anesthetic plan and risks discussed with: patient    Post-op plan: routine recovery

## 2021-06-09 NOTE — PROGRESS NOTES
ASSESSMENT & PLAN:  Parish Bills was seen in preoperative consultation in preparation for transanal excision of a rectal tumor and proctoscopy.  This is a low risk surgery and the patient has no increased risk for major cardiac complications based on exam and history and appears to be medically stable for the proposed  1. Preoperative examination  Patient is cleared for surgery at this time with no anticipated complication.  - Electrocardiogram Perform and Read  - HM2(CBC w/o Differential)  - Potassium  - Creatinine    2. Atrial fibrillation  Patient is due for INR  Patient will hold Coumadin 4 days prior to procedure restart per GIs recommendation based on surgery  - INR      There are no Patient Instructions on file for this visit.    Orders Placed This Encounter   Procedures     HM2(CBC w/o Differential)     Potassium     Creatinine     Electrocardiogram Perform and Read     Medications Discontinued During This Encounter   Medication Reason     sertraline (ZOLOFT) 50 MG tablet Dose adjustment     furosemide (LASIX) 20 MG tablet Dose adjustment     warfarin (COUMADIN) 5 MG tablet Duplicate order     warfarin (COUMADIN) 5 MG tablet Duplicate order     warfarin (COUMADIN) 5 MG tablet Duplicate order     warfarin (COUMADIN) 5 MG tablet Duplicate order     warfarin (COUMADIN) 5 MG tablet Duplicate order       No Follow-up on file.    CHIEF COMPLAINT:  Chief Complaint   Patient presents with     Pre-op Exam     Transanal excision of rectal tumor and proctoscopy on 3/6/17 at [facility] with Dr Mcgrath        HISTORY OF PRESENT ILLNESS:  Parish is a 76 y.o. year-old male here for a pre-operative consultation. The exam is requested by Dr. Mcgrath in preparation for transanal excision of rectal tumor and proctoscopy to be performed at [facility] on March 6, 2017. Today s examination on 02/23/17 is done to review the underlying surgical condition of malignant neoplasm of rectum, clear for anesthesia, and review  medical problems with appropriate changes in medications. He had a colonoscopy done after a stool test returned positive. He had a polyp removed during the colonoscopy that showed a tubular adenoma with some high grade dysplasia and possibly the start of cancer. He states that Dr. Mcgrath could reach the cancerous region with his finger. He hopes to be able to remove the cancer without damaging the sphincter muscle. It was discussed which medications to hold and which to take prior to surgery. He plans to stay in the hospital for at least one night.     Parish has tolerated previous surgeries well without bleeding or anesthesia difficulty.      Atrial Fibrillation: He anticoagulates with warfarin for his atrial fibrillation. He has been told that he is almost always in A-fib. He had to go without warfarin for a few days prior to his colonoscopy, so he is familiar with the protocol that will be required for surgery. He has a three lead pacemaker.     REVIEW OF SYSTEMS:   He has been taking a daily aspirin. He is occasionally out of breath after going up the stairs, but he denies chest pain. He can make his way around the grocery store without any trouble. He has been losing weight since starting sertraline. He has felt good mentally on the medication as well. He denies rashes. He had a valve repaired in the past. All other systems are negative.    PFSH:  Reviewed, as below.     Social History     Social History     Marital status:      Spouse name: N/A     Number of children: N/A     Years of education: N/A     Occupational History     Not on file.     Social History Main Topics     Smoking status: Former Smoker     Quit date: 9/15/1980     Smokeless tobacco: Not on file     Alcohol use No     Drug use: No     Sexual activity: Not on file     Other Topics Concern     Not on file     Social History Narrative    Has not smoked for 35 years.       Past Medical History:   Diagnosis Date     Abdominal aortic  "aneurysm      Arthritis      Atrial fibrillation      CHF (congestive heart failure)      Coronary artery disease      FH: mitral valve repair      High blood pressure      Hyperlipidemia        Past Surgical History:   Procedure Laterality Date     CARDIOVERSION      X3     CHOLECYSTECTOMY       LUMBAR LAMINECTOMY Right 9/15/2014    Procedure: OPEN DECOMPRESSION L2-4 RIGHT ;  Surgeon: Elroy French MD;  Location: [facility];  Service:      MITRAL VALVE REPAIR  2006     IL ARTHRODESIS ANT INTERBODY MIN DISCECTOMY, CERVICAL BELOW C2      Description: Cervical Vertebral Fusion;  Recorded: 04/18/2008;     IL REANEURYSM/GRFT INS,ABDOMINAL AORTA      Description: Abdominal Aortic Aneurysm Repair For Dilation Or Occlusion;  Recorded: 04/05/2012;     IL REMOVAL GALLBLADDER      Description: Cholecystectomy;  Recorded: 04/18/2008;     TOTAL HIP ARTHROPLASTY      Right hip       Allergies   Allergen Reactions     Hydrocodone-Acetaminophen      \"get goofy\"     Oxycodone-Acetaminophen Other (See Comments)     \"get goofy\"     Amiodarone Rash       Active Ambulatory Problems     Diagnosis Date Noted     Aortic Stenosis      Bicuspid Aortic Valve      Benign Prostatic Hypertrophy      Abrasion Of The Ear      Hyperlipidemia      Cardiomyopathy      Strained Left Pectoralis Major Muscle      Essential Hypercholesterolemia      Aneurysm Of The Abdominal Aorta      Chest Pain      Neck Strain      Osteoarthritis      Lumbar Strain      Blood In Urine      Atrial Fibrillation      Esophageal Reflux      Subconjunctival Hemorrhage      Post-operative pain 09/15/2014     HTN (hypertension) 09/15/2014     Hypotension 09/16/2014     Acute blood loss anemia 09/16/2014     A-fib 11/04/2014     Cough 02/09/2015     Edema 02/09/2015     Lumbar radiculopathy 02/09/2015     Malignant neoplasm of skin 02/09/2015     Wheezing 02/09/2015     Pacemaker 01/04/2017     Rectal cancer 02/02/2017     Resolved Ambulatory Problems     " "Diagnosis Date Noted     No Resolved Ambulatory Problems     Past Medical History:   Diagnosis Date     Abdominal aortic aneurysm      Arthritis      Atrial fibrillation      CHF (congestive heart failure)      Coronary artery disease      FH: mitral valve repair      High blood pressure      Hyperlipidemia        No family history on file.    VITALS:  Vitals:    02/23/17 1322   BP: 126/74   Patient Site: Left Arm   Patient Position: Sitting   Cuff Size: Adult Regular   Pulse: 88   Resp: 16   Temp: 98  F (36.7  C)   TempSrc: Oral   Weight: 208 lb (94.3 kg)   Height: 5' 9.25\" (1.759 m)     Wt Readings from Last 3 Encounters:   02/23/17 208 lb (94.3 kg)   10/12/16 214 lb (97.1 kg)   09/08/16 215 lb (97.5 kg)       PHYSICAL:  GENERAL APPEARANCE: Alert, cooperative, no distress, appears stated age   LUNGS: Clear to auscultation bilaterally, respirations unlabored   HEART: Regular rate and rhythm, S1 and S2 normal, no murmur, rub, or gallop. No lower extremity edema.   SKIN: Skin color, texture, turgor normal, no rashes or lesions  EYES: PERRL, conjunctiva/corneas clear, EOM's intact   EARS: Normal TM's and external ear canals, both ears   NOSE: Nares normal, mucosa normal, no drainage   THROAT: Lips, mucosa, and tongue normal; teeth and gums normal   BACK: Symmetric, no curvature, ROM normal, no CVA tenderness   ABDOMEN: Soft, non-tender, bowel sounds active all four quadrants, no masses, no organomegaly   MUSCULOSKELETAL: Normal range of motion. No joint swelling or deformity.   EXTREMITIES: Extremities normal, atraumatic, no cyanosis  NEUROLOGIC: CNII-XII intact. Normal strength, sensation and reflexes   throughout   PSYCHIATRIC: Normal mood and affect.    EKG: Ventricular paced rhythm    ADDITIONAL HISTORY SUMMARIZED (FROM OLD RECORDS OR HISTORY FROM SOMEONE OTHER THAN THE PATIENT OR ANOTHER HEALTHCARE PROVIDER) (2 TOTAL): Reviewed 2/15/2017 colon and rectal surgery note regarding rectal cancer.     DECISION TO " OBTAIN EXTRA INFORMATION (OLD RECORDS REQUESTED OR HISTORY FROM ANOTHER PERSON OR ACCESSING CARE EVERYWHERE) (1 TOTAL): None.     RADIOLOGY TESTS SUMMARIZED OR ORDERED (XRAY/CT/MRI/DXA) (1 TOTAL): None.    LABS REVIEWED OR ORDERED (1 TOTAL): Past labs reviewed. Labs ordered.     MEDICINE TESTS SUMMARIZED OR ORDERED (EKG/ECHO) (1 TOTAL): EKG ordered.     INDEPENDENT REVIEW OF EKG OR X-RAY (2 EACH): EKG from today read    The visit lasted a total of 18 minutes face to face with the patient. Over 50% of the time was spent counseling and educating the patient about his upcoming surgery.    ICatracho, am scribing for and in the presence of, Dr. Au.    I, Dr. Au, personally performed the services described in this documentation, as scribed by Catracho Banuelos in my presence, and it is both accurate and complete.    MEDICATIONS:  Current Outpatient Prescriptions   Medication Sig Dispense Refill     aspirin 81 MG tablet Take 81 mg by mouth daily.       carvedilol (COREG) 3.125 MG tablet 3.125 mg 2 (two) times a day.       carvedilol (COREG) 6.25 MG tablet 6.25 mg 2 (two) times a day with meals.        FOLIC ACID/MULTIVITS-MIN/LUT (CENTRUM SILVER ORAL) Take 1 tablet by mouth daily.       furosemide (LASIX) 20 MG tablet Take 20 mg by mouth daily.       multivitamin capsule Take 1 capsule by mouth daily.       NITROSTAT 0.4 mg SL tablet PLACE 1 TABLET UNDER THE TONGUE EVERY 5 MINUTES UP TO 3 DOSES AS NEEDED; CALL 911 AFTER TAKING FIRST DOSE 25 tablet 0     sertraline (ZOLOFT) 100 MG tablet Take 1 tablet (100 mg total) by mouth daily. 90 tablet 1     simvastatin (ZOCOR) 40 MG tablet TAKE 1 TABLET BY MOUTH EVERY DAY AT BEDTIME 90 tablet 2     warfarin (COUMADIN) 5 MG tablet 7.5 mg (5 mg x 1.5) on Tue, Sat; 5 mg (5 mg x 1) all other days or as last directed by clinic 180 tablet 0     No current facility-administered medications for this visit.        Total Data Points: 6

## 2021-06-09 NOTE — ANESTHESIA CARE TRANSFER NOTE
Last vitals:   Vitals:    03/06/17 1520   BP: 130/71   Pulse: 80   Resp: 16   Temp: 36.2  C (97.2  F)   SpO2: 100%     Patient's level of consciousness is drowsy  Spontaneous respirations: yes  Maintains airway independently: yes  Dentition unchanged: yes  Oropharynx: oropharynx clear of all foreign objects    QCDR Measures:  ASA# 20 - Surgical Safety Checklist: ASA20A - Safety Checks Done  PQRS# 430 - Adult PONV Prevention: 4558F - Pt received => 2 anti-emetic agents (different classes) preop & intraop  ASA# 8 - Peds PONV Prevention: NA - Not pediatric patient, not GA or 2 or more risk factors NOT present  PQRS# 424 - Lyn-op Temp Management: 4559F - At least one body temp DOCUMENTED => 35.5C or 95.9F within required timeframe  PQRS# 426 - PACU Transfer Protocol: - Transfer of care checklist used  ASA# 14 - Acute Post-op Pain: ASA14B - Patient did NOT experience pain >= 7 out of 10    I completed my SBAR handoff to the receiving nurse per policy and procedure.

## 2021-06-09 NOTE — ANESTHESIA POSTPROCEDURE EVALUATION
Patient: Parish Bills  TRANSANAL EXCISION OF RECTAL TUMOR AND PROCTOSCOPY  Anesthesia type: general    Patient location: PACU  Last vitals:   Vitals:    03/06/17 1615   BP: 116/70   Pulse: 80   Resp: 9   Temp: 36.2  C (97.1  F)   SpO2: 98%     Post vital signs: stable  Level of consciousness: awake and responds to simple questions  Post-anesthesia pain: pain controlled  Post-anesthesia nausea and vomiting: no  Pulmonary: unassisted, return to baseline  Cardiovascular: stable and blood pressure at baseline  Hydration: adequate  Anesthetic events: no    QCDR Measures:  ASA# 11 - Lyn-op Cardiac Arrest: ASA11B - Patient did NOT experience unanticipated cardiac arrest  ASA# 12 - Lyn-op Mortality Rate: ASA12B - Patient did NOT die  ASA# 13 - PACU Re-Intubation Rate: ASA13B - Patient did NOT require a new airway mgmt  ASA# 10 - Composite Anes Safety: ASA10A - No serious adverse event  ASA# 38 - New Corneal Injury: ASA38A - No new exposure keratitis or corneal abrasion in PACU    Additional Notes:

## 2021-06-10 ENCOUNTER — COMMUNICATION - HEALTHEAST (OUTPATIENT)
Dept: FAMILY MEDICINE | Facility: CLINIC | Age: 81
End: 2021-06-10

## 2021-06-10 DIAGNOSIS — I48.20 CHRONIC ATRIAL FIBRILLATION (H): ICD-10-CM

## 2021-06-10 DIAGNOSIS — N40.1 BENIGN PROSTATIC HYPERPLASIA WITH URINARY FREQUENCY: ICD-10-CM

## 2021-06-10 DIAGNOSIS — R35.0 BENIGN PROSTATIC HYPERPLASIA WITH URINARY FREQUENCY: ICD-10-CM

## 2021-06-10 DIAGNOSIS — Z79.01 LONG TERM (CURRENT) USE OF ANTICOAGULANTS: ICD-10-CM

## 2021-06-10 NOTE — TELEPHONE ENCOUNTER
Patient Returning Call  Reason for call:  Message from clinic  Information relayed to patient:  How much lasix does patient take?  Patient has additional questions:  Yes  If YES, what are your questions/concerns:  Patient currently takes Lasix 20 mg, two times a day.  Okay to leave a detailed message?: Yes     Arturo now asking for another order.  Please ok the application of ABD pad to right surgical incision, once a day and as needed.

## 2021-06-10 NOTE — PLAN OF CARE
Patients aspiration results now show gram + cocci in pairs    Plan for Right hip I&D, head/liner exchange tomorrow morning    NPO after midnight    Pt will need long term IV abx, appreciate ID recs

## 2021-06-10 NOTE — ANESTHESIA PREPROCEDURE EVALUATION
Anesthesia Evaluation      Patient summary reviewed   No history of anesthetic complications     Airway   Mallampati: II  Neck ROM: limited   Pulmonary     breath sounds clear to auscultation  (+) a smoker (remote smoking history, quit 40 years ago)  (-) asthma, shortness of breath, sleep apnea                         Cardiovascular   Exercise tolerance: > or = 4 METS  (+) pacemaker, hypertension, CAD, dysrhythmias, CHF, ,     ECG reviewed (ventricular paced rhythm at 60 bpm)  Rhythm: regular  Rate: normal,      ROS comment: Atrial fibrillation  AV node ablation s/p biventricular pacemaker  S/p AVR + LIMA to LAD graft 2018    Echo 10/3/2018:  EF 55%, no RWMA, MV repair/ring no stenosis or regurgitation, bioprosthetic AV gradient 9    CTA abdomen pelvis 2019:  IMPRESSION:  1.  Stable to slightly smaller thrombosed abdominal aortic and right common  iliac artery aneurysm status post endovascular repair with bifurcated endograft  and adjunctive embolization of right internal iliac artery. No endoleak,  although no delayed images were obtained.  2.  Normal lower extremity vasculature. No aneurysmal disease in either leg.  3.  Stable hepatic cyst at the dome of the liver. Poorly defined subcentimeter  low-density lesion laterally in the right hepatic lobe, indeterminant.  4.  Cholecystectomy.  5.  Right total hip replacement.     Neuro/Psych    (+) neuromuscular disease (lumbar radiculopathy, numbness in right leg chronic),    (-) no seizures, no CVA    Comments: Hx of cervical fusion    Endo/Other    (-) no diabetes, hypothyroidism, no obesity     GI/Hepatic/Renal    (+) GERD well controlled,        Other findings: Last dose of coumadin 7/27    Covid negative 7/28    Labs 7/31  Hbg 10.2  Plt 132  INR 1.38  Cr 0.85    Labs 7/28  Na 138  K 4.1  BUN/Cr 26/1      Dental      Comment: Fair dentition, no loose or removable teeth                       Anesthesia Plan  Planned anesthetic: general endotracheal  Glidescope  2  PIV  Acetaminophen 1 g (pre-op), Mg 4 g (pre-op), ketamine 30 mg on induction, Precedex gtt  Phenylephrine gtt  Dexamethasone 10 mg, ondansetron 4 mg  Magnet available, to be placed on chest only if needed   Avoid benzodiazepines, anticholinergics    Of note, hx of hallucinations with hydrocodone and oxycodone, tolerates PO hydromorphone.   ASA 3   Induction: intravenous   Anesthetic plan and risks discussed with: patient  Anesthesia plan special considerations: video-assisted, antiemetics, IV therapy two IVs,   Post-op plan: routine recovery

## 2021-06-10 NOTE — PLAN OF CARE
"  Problem: Pain  Goal: Patient's pain/discomfort is manageable  Outcome: Progressing   Pt denied pain at the beginning of the shift. Later he said his pain is 1-2/10. Pt was given scheduled tylenol which was helpful per pt. Pt later got up and sit up in chair. He said his pain started getting bad once he moved to chair. He was given scheduled tylenol again but was not helpful. Writer offered prn dilaudid. 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 I don't want to go back to the same situation. Also it makes me delusional sometimes.\" Pt was grimacing and in pain. Writer offered to give low dose IV dilaudid which he agreed. Gave 0.2 mg dilaudid for right hip pain. His pain was 7/10. After pain medication, his pain came down to 3/10.     Pt was told about the surgery tomorrow and the need to be NPO after midnight.     "

## 2021-06-10 NOTE — PLAN OF CARE
Problem: Pain  Goal: Patient's pain/discomfort is manageable  Outcome: Progressing     Problem: Safety  Goal: Patient will be injury free during hospitalization  Outcome: Progressing     Problem: Daily Care  Goal: Daily care needs are met  Outcome: Progressing   Patient reports minimal pain, Vanco currently running, patient ate 100% of lunch.

## 2021-06-10 NOTE — PROGRESS NOTES
Pharmacy Consult: Warfarin Management    Pharmacy consulted to dose warfarin for Parish Bills, a 80 y.o. male    Ordering provider: Dr. Jerez     Reason for warfarin therapy: Atrial Fibrillation  Goal INR Range: 2-3 (changed from 1.8 - 2.5 for perioperative pharmacoprophylaxis to 2-3 for afib by Dr. CHAUHAN)    Subjective  Medication lists (home and hospital) were reviewed.    New medications that may increase bleeding risk/INR: Rocephin    Restarted home medications that may increase bleeding risk/INR: aspirin, sertraline    Home Warfarin Dosin mg , ,  and 7.5 mg ROW    Other Anticoagulants: none  Patient being bridged: No    Patient Active Problem List   Diagnosis     Aortic Stenosis     Bicuspid Aortic Valve     Benign Prostatic Hypertrophy     Abrasion Of The Ear     Hyperlipidemia     Cardiomyopathy     Strained Left Pectoralis Major Muscle     Essential Hypercholesterolemia     Aneurysm Of The Abdominal Aorta     Chest Pain     Neck Strain     Osteoarthritis     Lumbar Strain     Blood In Urine     Atrial Fibrillation     Esophageal Reflux     Subconjunctival Hemorrhage     Post-operative pain     Hypertension     Hypotension     Postoperative anemia due to acute blood loss     A-fib (H)     Cough     Edema     Lumbar radiculopathy     Malignant neoplasm of skin     Wheezing     Biventricular cardiac pacemaker in situ     Malignant tumor of rectum (H)     Abdominal aortic aneurysm (AAA) (H)     Atrial flutter, paroxysmal (H)     Benign neoplasm of ascending colon     Coagulopathy (H)     Diverticular disease of large intestine     Dysthymia     Encounter for screening for malignant neoplasm of colon     Family history of colon cancer     Family history of malignant neoplasm of digestive organ     H/O mitral valve repair     History of colonic polyps     Iliac artery aneurysm (H)     Nonrheumatic aortic valve stenosis     Polyp of ascending colon, unspecified type     PVC (premature ventricular  contraction)     Rectal polyp     S/P AVR     Stress hyperglycemia     Weight loss     Right hip pain     Infection of prosthetic total hip joint, subsequent encounter    Past Medical History:   Diagnosis Date     Abdominal aortic aneurysm (H)      Alcohol abuse      Anemia      Aortic stenosis      Arthritis      Atrial fibrillation (H)      BPH (benign prostatic hyperplasia)      CAD (coronary artery disease)      Cardiomyopathy (H)      Cataract     left     Chest pain      CHF (congestive heart failure) (H)      Congenital insufficiency of aortic valve      Coronary artery disease      Disease of pancreas     gallstones related     Dysthymia      Enlarged prostate      FH: mitral valve repair      H/O aortic valve repair '     High blood pressure      Hypercholesteremia      Hyperlipidemia      Hypertrophy of prostate      Lumbar radiculopathy      Mitral valve prolapse      Neck strain      Nonrheumatic aortic (valve) stenosis      Osteoarthrosis      Pacemaker      Rectal cancer (H)      Reflux esophagitis      Subconjunctival bleed         Social History     Tobacco Use     Smoking status: Former Smoker     Types: Cigarettes     Last attempt to quit: 9/15/1980     Years since quittin.9     Smokeless tobacco: Never Used   Substance Use Topics     Alcohol use: No     Comment: quit      Drug use: No       Objective   Labs:  Last 3 days:    Recent Labs     20  0609 20  0616   CREATININE 0.89  --    HGB 9.3* 9.1*     Last 7 days:   Recent labs: (last 7 days)     20  0630 20  1301 20  0554 20  0514 20  0609 20  0616 20  0617   INR 2.03* 1.72* 1.49* 1.38* 1.56* 1.74* 1.95*       Warfarin Dosing History:    Date INR Warfarin Dose Comment    1.38 7.5 mg     1.56 7.5 mg    8 1.74 7.5 mg    8/3 1.95 7.5 mg      Assessment  The patient is resuming warfarin for the indication of Atrial Fibrillation with a goal INR of 2-3. INR today is subtherapeutic.  Resume home dosing      Plan  1. Administer warfarin 7.5 mg PO today.  2. Check INR daily or as appropriate.  3. Continue to follow the patient's INR, PLT, and HGB as available.  4. Monitor for potential drug/disease interactions.    Thank you for the consult,  Kaitlin Young, PharmD 8/3/2020 11:37 AM

## 2021-06-10 NOTE — TELEPHONE ENCOUNTER
Please give verbal order.  Please check with patient the name of home nurse- we wanted name to check on getting someone to help with PICC line administration of medication.  Dr. Au

## 2021-06-10 NOTE — PROGRESS NOTES
Pharmacy Note - Admission Medication History    Pertinent Provider Information:      ______________________________________________________________________    Prior To Admission (PTA) med list completed and updated in EMR.       PTA Med List   Medication Sig Last Dose     amoxicillin (AMOXIL) 500 MG capsule Take 2,000 mg by mouth as needed. As needed for Hx mitral valve repair. Take one hour before appointment prn     aspirin (ASPIRIN LOW DOSE) 81 MG EC tablet Take 1 tablet by mouth daily. 7/27/2020 at Unknown time     atorvastatin (LIPITOR) 40 MG tablet TAKE 1 TABLET BY MOUTH AT BEDTIME. 7/27/2020 at Unknown time     buPROPion (WELLBUTRIN XL) 150 MG 24 hr tablet TAKE 1 TABLET BY MOUTH EVERY MORNING 7/27/2020 at Unknown time     folic acid/multivit-min/lutein (CENTRUM SILVER ORAL) Take 1 tablet by mouth daily. 7/27/2020 at Unknown time     furosemide (LASIX) 20 MG tablet TAKE 1 TABLET BY MOUTH 2 TIMES A DAY (Patient taking differently: Take 20 mg by mouth daily. ) 7/27/2020 at Unknown time     hydrOXYzine HCl (ATARAX) 25 MG tablet Take 1 tablet (25 mg total) by mouth every 6 (six) hours as needed for anxiety. prn     metoprolol succinate (TOPROL-XL) 50 MG 24 hr tablet TAKE ONE TABLET BY MOUTH DAILY. HOLD FOR SYSTOLIC BP <95 OR HEART RATE <55 7/27/2020 at Unknown time     nitroglycerin (NITROSTAT) 0.4 MG SL tablet PLACE 1 TABLET UNDER THE TONGUE EVERY 5 MINUTES UP TO 3 DOSES AS NEEDED; CALL 911 AFTER TAKING FIRST DOSE prn     sertraline (ZOLOFT) 100 MG tablet TAKE ONE TABLET BY MOUTH ONCE DAILY ALONG WITH 50MG TO EQUAL 150MG 7/27/2020 at Unknown time     sertraline (ZOLOFT) 50 MG tablet TAKE ONE TABLET BY MOUTH ONCE DAILY ALONG WITH 100MG TO EQUAL 150MG 7/27/2020 at Unknown time     warfarin ANTICOAGULANT (COUMADIN/JANTOVEN) 5 MG tablet Take 5-7.5 mg by mouth See Admin Instructions. Takes 5 mg on Tuesday, Thursday, and Saturday and 7.5 mg all other days 7/27/2020 at Unknown time       Information source(s): Patient  and [facility]    Summary of Changes to PTA Med List  New: n/a  Discontinued: n/a  Changed: furosemide, warfarin    Patient was asked about OTC/herbal products specifically.  PTA med list reflects this.    Based on the pharmacist s assessment, the PTA med list information appears reliable    Patient appears adherent: Yes    Allergies were reviewed, assessed, and updated with the patient.      Patient does not use any multi-dose medications prior to admission.    Thank you for the opportunity to participate in the care of this patient.    Nancy Cagle, PharmD  7/28/2020 12:02 PM

## 2021-06-10 NOTE — TELEPHONE ENCOUNTER
INR result is 3.0  INR   Date Value Ref Range Status   08/17/2020 2.16 (H) 0.90 - 1.10 Final       Will the patient be seen, or did they already see, MD or CNP today? Yes Infectious Disease     Most Recent Warfarin dose day/week  Sunday Monday Tuesday Wednesday Thursday Friday Saturday       5 5 5     Sunday Monday Tuesday Wednesday Thursday Friday Saturday   5 5 5 5          Has the patient missed any doses of Coumadin, Warfarin, Jantoven in the past 7 days? No    Has the patients medications changed since the last visit? No    Has the patient experienced any bleeding recently? No    Has the patient experienced any injuries or illness recently? No    Has the patient experienced any 'new' shortness of breath, severe headaches, or changes in vision recently? No    Has the patient had any changes in their diet, or alcohol consumption? No    Is the patient here today to prepare for any type of upcoming surgery, procedure, or for a cardioversion procedure? No    What phone number can we reach the patient at today? Maddy

## 2021-06-10 NOTE — TELEPHONE ENCOUNTER
Left message to call back for: Verbal orders  Information to relay to patient:  Left detailed message stating ok for verbal orders.

## 2021-06-10 NOTE — PLAN OF CARE
Problem: Physical Therapy  Goal: PT Goals  Description: Patient will demonstrate the following by 08/04/2020, with RAMÓN precautions, in order to maximize independence with functional mobility to facilitate safe discharge:   - Sit<>stand with FWW assistive device, SBA  - Ambulate 300 feet with FWW assistive device, SBA  - Independent with Home Exercise Program for RAMÓN exercise with handout    Goals entered on 8/1/2020 by Liat Vargas DPT      Outcome: Completed     Physical Therapy Discharge Summary    Date of PT Discharge: 8/3/2020   Recommended Equipment: FWW  Discharge Destination: Home with family assist, home care PT  Discharge Comments: d/c to home, goals met. Safe for home dc. Home PT recommended    8/3/2020 by Liat Vargas DPT

## 2021-06-10 NOTE — TELEPHONE ENCOUNTER
INR result is               3.9 via fingerstick   INR   Date Value Ref Range Status   08/06/2020 3.40 (!) 0.9 - 1.1 Final       Will the patient be seen, or did they already see, MD or CNP today? Yes at 2:15 pm with PCP    Most Recent Warfarin dose day/week  Sunday Monday Tuesday Wednesday Thursday Friday Saturday       none 5 5     Sunday Monday Tuesday Wednesday Thursday Friday Saturday   7.5 7.5 on accident was suppose to take 5            Has the patient missed any doses of Coumadin, Warfarin, Jantoven in the past 7 days? No    Has the patients medications changed since the last visit? No    Has the patient experienced any bleeding recently? No    Has the patient experienced any injuries or illness recently? No    Has the patient experienced any 'new' shortness of breath, severe headaches, or changes in vision recently? No    Has the patient had any changes in their diet, or alcohol consumption? No    Is the patient here today to prepare for any type of upcoming surgery, procedure, or for a cardioversion procedure? No    What phone number can we reach the patient at today? home phone listed in demographics.

## 2021-06-10 NOTE — PLAN OF CARE
Problem: Pain  Goal: Patient's pain/discomfort is manageable  Outcome: Progressing     Problem: Safety  Goal: Patient will be injury free during hospitalization  Outcome: Progressing     Problem: Daily Care  Goal: Daily care needs are met  Outcome: Progressing     Problem: Knowledge Deficit  Goal: Patient/family/caregiver demonstrates understanding of disease process, treatment plan, medications, and discharge instructions  Outcome: Progressing     Patient has denied pain tonight. Dressing to right hip CDI. WBAT. Patient declines abductor pillow; however does have folded up pillow between legs. Has been turning self tonight. Indwelling garcia in place and is patent and draining. Tolerating regular diet; therefore IV fluids have been discontinued on evenings and patient is saline locked.

## 2021-06-10 NOTE — PLAN OF CARE
Problem: Pain  Goal: Patient's pain/discomfort is manageable  Outcome: Progressing   Pt's pain went from 5 to 2 with scheduled Tylenol    Problem: Safety  Goal: Patient will be injury free during hospitalization  Outcome: Progressing   Pt has call light in reach, which pt uses as needed.  Bed/chair alarm on    Problem: Discharge Barriers  Goal: Patient's discharge needs are met  Outcome: Progressing   Care manager in process of setting up outpatient IV antibiotics,  Hoping to discharge later today.

## 2021-06-10 NOTE — TELEPHONE ENCOUNTER
Please give verbal order for abd pad.  Please have patient increase furosemide to 40mg am and 20mg pm for 4 days and call with update.  Dr. Au

## 2021-06-10 NOTE — TELEPHONE ENCOUNTER
FYI - Status Update  Who is Calling: Home Care  Update: Maddy was checking the status of her request. Caller stated she would like a call back before noon because that is when she will be seeing the patient. Caller stated they had a nurse go out earlier this week and that nurse forgot to change the cap on the PICC line. Caller stated she does need the order in order to do this.  Okay to leave a detailed message?: yes

## 2021-06-10 NOTE — PROGRESS NOTES
Daily Progress Note    CODE STATUS:  Full Code    08/01/20  Assessment/Plan:  80 y.o. 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 fib, AV node ablation s/p BiV pacemaker, hypertension, right hip replacement 16 years ago who presented to ED for evaluation of atraumatic right hip pain for 1 day duration and admitted for further management     Acute right hip pain, atraumatic; infection of prosthetic RAMÓN  History of right hip replacement 16 years ago;  -No reported trauma.  No reported febrile illness, urinary changes or leg weakness  -CT pelvis reported no evidence of periprosthetic fracture, no dislocation.  -Normal WBC count.  Elevated CRP at 4.1  -Patient had right hip arthrocentesis after reversing INR on 7/29 and culture growing gram-positive cocci in pairs and in process.  -Dr. Jerez did right hip I&D and a right hip arthroplasty revision, head and liner exchange on 7/31   -Continue scheduled Tylenol, PRN PO/V Dilaudid.  Monitor for sedation/hallucination  -Continue IV vancomycin.  Appreciated ID input.  PICC line ordered for long-term antibiotic    History of Tissue AVR with LIMA to LAD in 2018;  Chronic atrial fibrillation;  AV node ablation s/p Bi V pacemaker;  -No chest pain, dyspnea on exertion, dizziness.    -Echocardiogram on 10/2018 showed EF 55%  -Continue PTA medications-Lipitor, Toprol-XL, aspirin  -Given patient complex cardiac history, I did discussed with patient's primary cardiologist Dr. Trujillo about anticoagulation/Coumadin which is currently on hold refer above and reported okay to hold and when we resume Coumadin no need to bridge.  -INR subtherapeutic due to holding Coumadin for surgical procedure, restarted on Coumadin on 7/31, monitor INR closely.  Discussed with pharmacist     Acute kidney injury, mild;  -Creatinine improved with gentle hydration.    -Hold home diuretics for now given variable oral intake/intermittent n.p.o. status    Gross  hematuria; resolved  Acute urinary retention due to BPH;  Proteus mirabilis UTI on 7/29;  -Patient reported passing drops of blood when urinating.  Denied suprapubic pain, dysuria, no fever or chills  -UA showed RBC but negative nitrite, leukocyte and WBC but UC positive for Proteus mirabilis on 7/29  -Appreciated urology input.  Patient had bedside cystoscopy with Francis catheter placement and procedure note reported a status post TURP, bladder neck was slightly tight otherwise unremarkable, no tumors  -Started on Flomax, continue.  Plan to do voiding trial before discharge.  Discussed with urology team.    -Started on Keflex for UTI on 7/31      Chronic normocytic anemia;  Chronic intermittent thrombocytopenia;  -Hemoglobin fairly stable.  Platelet count fluctuating. Monitor labs closely    Hyperglycemia, likely due to stress  Patient denied history of diabetes.  A1c 5.6.  Patient reported history of high blood sugar after surgery in the past.  Insulin sliding scale and hypoglycemic protocol.       DVT prophylaxis;  -Resume home Coumadin on 7/31      Disposition; pending  Barrier to discharge; IV antibiotics, culture pending     LOS: 4 days     Subjective:  Interval History: Patient seen and examined.  Notes, labs, imaging report personally reviewed.  Patient denied short of breath or chest pain or dizziness.  Denied abdomen pain, nausea, vomiting.  Reported right hip pain is continued to improve.  Denied fever or chills.  Patient denied history of diabetes.  Discussed with nursing staff.     Review of Systems:   As mentioned in subjective.    Patient Active Problem List   Diagnosis     Aortic Stenosis     Bicuspid Aortic Valve     Benign Prostatic Hypertrophy     Abrasion Of The Ear     Hyperlipidemia     Cardiomyopathy     Strained Left Pectoralis Major Muscle     Essential Hypercholesterolemia     Aneurysm Of The Abdominal Aorta     Chest Pain     Neck Strain     Osteoarthritis     Lumbar Strain     Blood In  Urine     Atrial Fibrillation     Esophageal Reflux     Subconjunctival Hemorrhage     Post-operative pain     Hypertension     Hypotension     Postoperative anemia due to acute blood loss     A-fib (H)     Cough     Edema     Lumbar radiculopathy     Malignant neoplasm of skin     Wheezing     Biventricular cardiac pacemaker in situ     Malignant tumor of rectum (H)     Abdominal aortic aneurysm (AAA) (H)     Atrial flutter, paroxysmal (H)     Benign neoplasm of ascending colon     Coagulopathy (H)     Diverticular disease of large intestine     Dysthymia     Encounter for screening for malignant neoplasm of colon     Family history of colon cancer     Family history of malignant neoplasm of digestive organ     H/O mitral valve repair     History of colonic polyps     Iliac artery aneurysm (H)     Nonrheumatic aortic valve stenosis     Polyp of ascending colon, unspecified type     PVC (premature ventricular contraction)     Rectal polyp     S/P AVR     Stress hyperglycemia     Weight loss     Right hip pain     Infection of prosthetic total hip joint, subsequent encounter       Scheduled Meds:    acetaminophen  1,000 mg Oral TID     aspirin  81 mg Oral Daily     atorvastatin  40 mg Oral QHS     buPROPion  150 mg Oral QAM     cephalexin  500 mg Oral TID     docusate sodium  100 mg Oral BID     furosemide  20 mg Oral DAILY     gabapentin  100 mg Oral QHS     insulin aspart (NovoLOG) injection   Subcutaneous TID with meals     metoprolol succinate  50 mg Oral DAILY     polyethylene glycol  17 g Oral DAILY     senna-docusate  1 tablet Oral BID     sertraline  150 mg Oral DAILY     sodium chloride  2.5 mL Intravenous Line Care     sodium chloride  3 mL Intravenous Line Care     tamsulosin  0.4 mg Oral Daily after brkfst     vancomycin  1,250 mg Intravenous Q24H     warfarin - daily dose required   Other Med Consult or Protocol     warfarin ANTICOAGULANT  7.5 mg Oral Once Warfarin     Continuous Infusions:    PRN  Meds:.acetaminophen, acetaminophen, aluminum-magnesium hydroxide-simethicone, bisacodyL, dextrose 50 % (D50W), glucagon (human recombinant), HYDROmorphone, HYDROmorphone, hydrOXYzine HCL, loratadine, LORazepam, magnesium hydroxide, metoclopramide **OR** metoclopramide **OR** metoclopramide, naloxone **OR** naloxone, nitroglycerin, ondansetron, ondansetron, sodium chloride bacteriostatic, sodium phosphates 133 mL    Objective:  Vital signs in last 24 hours:  Temp:  [97.6  F (36.4  C)-98.1  F (36.7  C)] 98  F (36.7  C)  Heart Rate:  [60-83] 61  Resp:  [16] 16  BP: (109-134)/(58-70) 132/62  Weight:   197 lb (89.4 kg)      Intake/Output Summary (Last 24 hours) at 8/1/2020 1332  Last data filed at 8/1/2020 0852  Gross per 24 hour   Intake 1645 ml   Output 1600 ml   Net 45 ml       Physical Exam:  General: Not in obvious distress.  HEENT: Normocephalic, supple neck  Chest: Clear to auscultation bilateral anteriorly, no wheezing  Heart: S1S2 normal, irregular  Abdomen: Soft. NT, ND. Bowel sounds- active.  Extremities: No legs swelling  Neuro: alert and awake, moves all extremities  Genitourinary; Francis catheter in place, draining yellow urine  Musculoskeletal; right hip dressing dry/clean/intact    Lab Results:(I have personally reviewed the results)  Results from last 7 days   Lab Units 08/01/20  0609 07/30/20  0554 07/29/20  0630 07/28/20  0950   LN-SODIUM mmol/L  --   --  138 138   LN-POTASSIUM mmol/L 4.5  --  4.1 4.4   LN-CHLORIDE mmol/L  --   --  106 104   LN-CO2 mmol/L  --   --  26 25   LN-BLOOD UREA NITROGEN mg/dL  --   --  26 29*   LN-CREATININE mg/dL 0.89 0.85 1.00 1.25   LN-CALCIUM mg/dL  --   --  8.2* 8.6     Results from last 7 days   Lab Units 08/01/20  0609 07/31/20  0514 07/30/20  0554 07/28/20  0950   LN-WHITE BLOOD CELL COUNT thou/uL  --  5.6 6.6 7.6   LN-HEMOGLOBIN g/dL 9.3* 10.2* 10.3* 11.3*   LN-HEMATOCRIT %  --  30.3* 30.7* 33.8*   LN-PLATELET COUNT thou/uL  --  132* 94* 131*         IV Access:   Peripheral IV 07/31/20 Left Wrist-Line Status: Flushed, Capped  Peripheral IV 07/28/20 Right;Anterior (front);Medial (center/inner) Antecubital-Line Status: Flushed, Capped  PICC: Peripheral IV 07/31/20 Left Wrist-Site Skin Assessment: Checked, Clean, Intact, Dry  Peripheral IV 07/28/20 Right;Anterior (front);Medial (center/inner) Antecubital-Site Skin Assessment: Checked, Clean, Dry, Intact      Ct Pelvis Without Oral Without Iv Contrast  Result Date: 7/28/2020  1.  Postoperative changes related to prior right total hip replacement. There is no evidence of a periprosthetic fracture. No dislocation.    Xr Pelvis W 2 Vw Hip Right  Result Date: 7/28/2020  Postoperative changes of right total hip arthroplasty. Components appear well seated. There is moderate severity degenerative change at the left hip joint. No evidence for fracture. Pelvis otherwise negative. Iliac artery stents.    Latest radiology report personally reviewed.    Note created using dragon voice recognition software so sounds alike errors may have escaped editing.    8/1/2020  BRIGID AKBAR MD  HOSPITALIST, [facility]  PAGER NO. [phone]

## 2021-06-10 NOTE — TELEPHONE ENCOUNTER
Parish had hip surgery  16 years ago and now pain in right hip is unbearable.  Today Parish is not able to walk or bear weight on hip.  Parish states that he is going to call 911 and go to [facility].  Parish is requesting a message be sent to MD Au.    COVID 19 Nurse Triage Plan/Patient Instructions    Please be aware that novel coronavirus (COVID-19) may be circulating in the community. If you develop symptoms such as fever, cough, or SOB or if you have concerns about the presence of another infection including coronavirus (COVID-19), please contact your health care provider or visit [email or web address].     Disposition/Instructions    Call to EMS/911 recommended. Follow protocol based instructions.    Bring Your Own Device:  Please also bring your smart device(s) (smart phones, tablets, laptops) and their charging cables for your personal use and to communicate with your care team during your visit.      Thank you for taking steps to prevent the spread of this virus.  o Limit your contact with others.  o Wear a simple mask to cover your cough.  o Wash your hands well and often.    Resources    [facility]: About COVID-19: [email or web address]    CDC: What to Do If You're Sick: www.cdc.gov/coronavirus/2019-ncov/about/steps-when-sick.html    CDC: Ending Home Isolation: www.cdc.gov/coronavirus/2019-ncov/hcp/disposition-in-home-patients.html     CDC: Caring for Someone: www.cdc.gov/coronavirus/2019-ncov/if-you-are-sick/care-for-someone.html     [facility]: Interim Guidance for Hospital Discharge to Home: www.[email or web address]/diseases/coronavirus/hcp/hospdischarge.pdf    [facility] clinical trials (COVID-19 research studies): [email or web address]     Below are the COVID-19 hotlines at the [place] Department [facility] ([facility]). Interpreters are available.   o For health questions: Call 791-823-7528 or 1-594.121.7574 (7 a.m. to 7 p.m.)  o For questions about schools and  childcare: Call 260-682-8079 or 1-532.303.9421 (7 a.m. to 7 p.m.)             Reason for Disposition    Sounds like a life-threatening emergency to the triager    Additional Information    Negative: Looks like a broken bone or dislocated joint (e.g., crooked or deformed)    Protocols used: HIP PAIN-A-AH

## 2021-06-10 NOTE — PLAN OF CARE
"  Problem: Pain  Goal: Patient's pain/discomfort is manageable  Outcome: Progressing   Pt denied pain this shift. He said, he has right hip pain only when he is moving around. He said,\" pain is better today compared to yesterday.\" Pt was given scheduled tylenol. Pt sleeping comfortably at this time.    Pt had temp of 100.5 at the beginning of the shift. MD was notified. PRN tylenol given. UA was done in morning. MD added UC. Results for urine culture and joint aspiration pending at this time. Pt was notified about need to be NPO after midnight. Francis draining well. Urine jag color and no blood clots noted. Output recorded in flowsheet.   "

## 2021-06-10 NOTE — TELEPHONE ENCOUNTER
----- Message from Isidro Au MD sent at 8/12/2020  7:42 AM CDT -----  Please inform patient that kidney function and electrolytes are normal- no concerns with the water pill.

## 2021-06-10 NOTE — CONSULTS
ORTHOPEDIC CONSULTATION    Consultation  Parish Bills,  1940, MRN [id number]    [facility]  Hip pain, right [M25.551]  Long term current use of anticoagulant therapy [Z79.01]    PCP: Isidro Au MD, [phone]   Code status:  Full Code       Extended Emergency Contact Information  Primary Emergency Contact: Ileana Bills  Address: [address] [place] [address]  Home Phone: [phone]  Mobile Phone: [phone]  Relation: Spouse  Secondary Emergency Contact: Monet Andrade   [facility]  Home Phone: [phone]  Relation: Child         IMPRESSION:   Right hip pain x3 days, differential diagnosis includes infection versus hematoma versus muscle strain versus neurologic     PLAN:  1) Activity: Bedrest, Non-WB right LE  2) Diet: NPO until interventional radiology procedure  3) Infection Prophylaxis:  not indicated at this time  4) DVT Prohylaxis:  Mechanical devices.  Guided right hip aspiration apparently patient needs INR less than 1.5 to have the procedure.  At this point he is undergoing a reversal of his Coumadin.  5) Medical cares as per primary medicine service  7) Discussed I would recommend guided aspiration of the right hip.  I discussed with the patient that he could have a deep infection in his hip replacement.  It is somewhat abnormal to spontaneously get it 16 years after the surgery without any other antecedent event however it is still possible.  Also given the patient being on Coumadin he could have some type of hematoma or muscle injury.  At this point we will wait to see what the aspiration culture results show.  If they do show bacteria, operative I&D with head liner exchange would most likely be indicated.  Orthopedics will continue to follow along and follow-up with aspiration results.  All their questions were answered to their satisfaction.  Informed consent was obtained.      Thank you for including  [facility] in the care of Parish Bills. It has been a pleasure participating in his care.        CHIEF COMPLAINT: Right hip pain    The patient is seen in orthopedic consultation at the request of DANIELA Aragon MD.      HISTORY OF PRESENT ILLNESS:    Mr. Bills is a pleasant 80 y.o. male who developed right hip pain this past Sunday after walking on a store with his wife.  He denies any fevers or chills.  He denies any recent traumatic injuries.  He had a hip replacement on that side 16 years ago and has been functioning properly ever since.  They presented to the ER and underwent a evaluation.  Based on the above diagnosis they were admitted to their primary care service and orthopaedics was consulted.  Today they complain of pain their right hip and groin..  They deny pain in the opposite lower extremity.  They deny any pain in their upper extremities.    Denies any neck pain or loss of consciousness.    Pain: moderate  Nausea, Vomiting:  No  Lightheadedness, Dizziness:  No  Neuro:  Patient denies new onset numbness or paresthesias    PAST MEDICAL HISTORY:  Active Ambulatory (Non-Hospital) Problems    Diagnosis     Polyp of ascending colon, unspecified type     Rectal polyp     Abdominal aortic aneurysm (AAA) (H)     Diverticular disease of large intestine     History of colonic polyps     Weight loss     Coagulopathy (H)     S/P AVR     Stress hyperglycemia     Family history of colon cancer     Malignant tumor of rectum (H)     Benign neoplasm of ascending colon     Encounter for screening for malignant neoplasm of colon     Family history of malignant neoplasm of digestive organ     Dysthymia     Nonrheumatic aortic valve stenosis     Biventricular cardiac pacemaker in situ     Cough     Edema     Lumbar radiculopathy     Malignant neoplasm of skin     Wheezing     Atrial flutter, paroxysmal (H)     H/O mitral valve repair     PVC (premature ventricular contraction)     Hypotension     Postoperative  anemia due to acute blood loss     Post-operative pain     Subconjunctival Hemorrhage     Aortic Stenosis     Bicuspid Aortic Valve     Benign Prostatic Hypertrophy     Abrasion Of The Ear     Hyperlipidemia     Cardiomyopathy     Strained Left Pectoralis Major Muscle     Essential Hypercholesterolemia     Aneurysm Of The Abdominal Aorta     Chest Pain     Neck Strain     Osteoarthritis     Lumbar Strain     Blood In Urine     Atrial Fibrillation     Esophageal Reflux     Hypertension     A-fib (H)     Iliac artery aneurysm (H)     Past Medical History:   Diagnosis Date     Abdominal aortic aneurysm (H)      Alcohol abuse      Anemia      Aortic stenosis      Arthritis      Atrial fibrillation (H)      BPH (benign prostatic hyperplasia)      CAD (coronary artery disease)      Cardiomyopathy (H)      Cataract      Chest pain      CHF (congestive heart failure) (H)      Congenital insufficiency of aortic valve      Coronary artery disease      Disease of pancreas      Dysthymia      Enlarged prostate      FH: mitral valve repair      H/O aortic valve repair '     High blood pressure      Hypercholesteremia      Hyperlipidemia      Hypertrophy of prostate      Lumbar radiculopathy      Mitral valve prolapse      Neck strain      Nonrheumatic aortic (valve) stenosis      Osteoarthrosis      Pacemaker      Rectal cancer (H)      Reflux esophagitis      Subconjunctival bleed          ALLERGIES:   Review of patient's allergies indicates   Allergies   Allergen Reactions     Hydrocodone-Acetaminophen Other (See Comments)     hallucination     Lisinopril Cough     Oxycodone-Acetaminophen Other (See Comments)     hallucination     Amiodarone Rash         MEDICATIONS UPON ADMISSION:  Medications were reviewed.  They include:   Medications Prior to Admission   Medication Sig Dispense Refill Last Dose     amoxicillin (AMOXIL) 500 MG capsule Take 2,000 mg by mouth as needed. As needed for Hx mitral valve repair. Take one hour  before appointment   prn     aspirin (ASPIRIN LOW DOSE) 81 MG EC tablet Take 1 tablet by mouth daily.   7/27/2020 at Unknown time     atorvastatin (LIPITOR) 40 MG tablet TAKE 1 TABLET BY MOUTH AT BEDTIME. 90 tablet 3 7/27/2020 at Unknown time     buPROPion (WELLBUTRIN XL) 150 MG 24 hr tablet TAKE 1 TABLET BY MOUTH EVERY MORNING 90 tablet 3 7/27/2020 at Unknown time     folic acid/multivit-min/lutein (CENTRUM SILVER ORAL) Take 1 tablet by mouth daily.   7/27/2020 at Unknown time     furosemide (LASIX) 20 MG tablet TAKE 1 TABLET BY MOUTH 2 TIMES A DAY (Patient taking differently: Take 20 mg by mouth daily. ) 180 tablet 0 7/27/2020 at Unknown time     hydrOXYzine HCl (ATARAX) 25 MG tablet Take 1 tablet (25 mg total) by mouth every 6 (six) hours as needed for anxiety. 360 tablet 0 prn     metoprolol succinate (TOPROL-XL) 50 MG 24 hr tablet TAKE ONE TABLET BY MOUTH DAILY. HOLD FOR SYSTOLIC BP <95 OR HEART RATE <55 90 tablet 3 7/27/2020 at Unknown time     nitroglycerin (NITROSTAT) 0.4 MG SL tablet PLACE 1 TABLET UNDER THE TONGUE EVERY 5 MINUTES UP TO 3 DOSES AS NEEDED; CALL 911 AFTER TAKING FIRST DOSE 25 tablet 0 prn     sertraline (ZOLOFT) 100 MG tablet TAKE ONE TABLET BY MOUTH ONCE DAILY ALONG WITH 50MG TO EQUAL 150MG 90 tablet 0 7/27/2020 at Unknown time     sertraline (ZOLOFT) 50 MG tablet TAKE ONE TABLET BY MOUTH ONCE DAILY ALONG WITH 100MG TO EQUAL 150MG 90 tablet 0 7/27/2020 at Unknown time     warfarin ANTICOAGULANT (COUMADIN/JANTOVEN) 5 MG tablet Take 5-7.5 mg by mouth See Admin Instructions. Takes 5 mg on Tuesday, Thursday, and Saturday and 7.5 mg all other days   7/27/2020 at Unknown time         SOCIAL HISTORY:   he  reports that he quit smoking about 39 years ago. His smoking use included cigarettes. He has never used smokeless tobacco. He reports that he does not drink alcohol or use drugs.      FAMILY HISTORY:  Family history is unknown by patient.      REVIEW OF SYSTEMS:   Reviewed with patient. See HPI,  otherwise negative       PHYSICAL EXAMINATION:  Vitals: Temp:  [98.1  F (36.7  C)-100.2  F (37.9  C)] 98.2  F (36.8  C)  Heart Rate:  [60-97] 61  Resp:  [18-20] 20  BP: (100-159)/(54-72) 124/61    Exam of the right hip lower extremity shows the skin is intact  No erythema, warmth, or evidence of infection  Mild tenderness right groin, moderate pain with internal or external rotation of the hip.  5/5 motor strength distally, however slightly limited by pain  Sensation is intact to light touch throughout extremity  Calves are soft and non-tender. Leg compartments are soft  Dorsalis pedis pulses intact.    Contralateral side= Full range of motion, Negative joint instability findings, 5/5 motor groups about the joint, Non-tender.       RADIOGRAPHIC EVALUATION:  Personally reviewed AP pelvis and lateral of the right hip show the total hip arthroplasty device is in acceptable position.  There is no evidence of dislocation.  No evidence of fracture.  No osteolysis.    EXAM: CT PELVIS WO ORAL WO IV CONTRAST  LOCATION: [facility]  DATE/TIME: 7/28/2020 11:05 AM     INDICATION: Hip replaced, periprosthetic fracture suspected Atraumatic right hip pain, negative XR, S/P hip replacement.  COMPARISON: 7/28/2020.  TECHNIQUE: CT scan of the pelvis was performed without IV contrast. Multiplanar reformats were obtained. Dose reduction techniques were used.  CONTRAST: None.     FINDINGS:  Postoperative changes related to a right total hip replacement. While assessment at the bone metallic interface is difficult due to artifact, there is no dislocation or evidence of an acute periprosthetic fracture. Stable small amount of lucency   surrounding the acetabular cup bone metallic interface, unchanged.     Chronic appearing heterotopic ossification along the right iliac wing.     Chronic bony ankylosis along the anterior aspect of both SI joints. Prior posterior decompressive changes in the lower lumbar spine.     Aortoiliac  stenting for an abdominal aortic aneurysm. Mild scattered colonic diverticulosis. No free fluid in the pelvis.     IMPRESSION:   1.  Postoperative changes related to prior right total hip replacement. There is no evidence of a periprosthetic fracture. No dislocation.        PERTINENT LABS:  Lab Results: personally reviewed.   Lab Results   Component Value Date    WBC 7.6 07/28/2020    WBC 8.6 06/22/2014    HGB 11.3 (L) 07/28/2020    HCT 33.8 (L) 07/28/2020    MCV 93 07/28/2020     (L) 07/28/2020     CRP elevated at 4.1, sed rate within normal limits at 12    Jason Jerez MD  Date: 7/29/2020  Time: 12:56 PM    CC1:   Butch AKBAR MD    CC2:   Isidro Au MD    COMMENT:  Jason Jerez    COMMENT:  Isidro Au MD

## 2021-06-10 NOTE — PROGRESS NOTES
ANTICOAGULATION  MANAGEMENT: Discharge Review    Parish Bills chart reviewed for anticoagulation continuity of care    Hospital admission on  7/28 to 8/3/2020 for right acute hip pain and infection of prosthetic RAMÓN.   - on 7/31/20 s/p Revision, arthroplasty hip w/ femoral head and acetabular liner replacement with irrigation and debridment.   - INR was reversed on 7/29 -Phytonatione 5mg / 5ml   - PICC line placed by ID for IV Rocephin        Discharge disposition: Home with Home Care (with infusion)    INR Results:       Recent labs: (last 7 days)     07/29/20  0630 07/29/20  1301 07/30/20  0554 07/31/20  0514 08/01/20  0609 08/02/20  0616 08/03/20  0617   INR 2.03* 1.72* 1.49* 1.38* 1.56* 1.74* 1.95*       Warfarin inpatient management: reversed with Vitamin- K 5mg on 7/29/20.  Warfarin resumed on 7/31/20.    Warfarin discharge instructions: home regimen continued     Medication Changes Affecting Anticoagulation: Yes:  Ceftriaxone 2 g (IVPB) every 24 hours starting 8/4/20.    Additional Factors Affecting Anticoagulation: No    Plan     No adjustment to anticoagulation plan needed      Patient not contacted - INR will be checked by Home Care    Anticoagulation calendar updated    Kori Ziegler RN

## 2021-06-10 NOTE — TELEPHONE ENCOUNTER
Orders being requested: skilled nursing   1 visit for tomorrow and 3 PRNS   For piccline complications   Reason service is needed/diagnosis: infection and inflammation reaction due to internal right hip prosthetic   When are orders needed by: asap  Where to send Orders: Phone:  [phone]  Okay to leave detailed message?  Yes

## 2021-06-10 NOTE — CONSULTS
Met with patient to discuss DC plan. Patient is aware he will need IV abx. Explained 3 options to him: home infusion, TCU, infusion center. Patient says he would prefer to do home infusion. Agrees to referrals to [facility] in [place] to check coverage. Referrals sent via epic.

## 2021-06-10 NOTE — BRIEF OP NOTE
Brief Operative Note - Immediate    Name:  Parish Bills  :  1940  MRN:  [id number]  Procedure Date:  2020 - 2020    Preoperative Diagnosis:  Infection of prosthetic total hip joint, subsequent encounter [T84.59XD, Z96.649]    Postoperative Diagnosis:  SAME    Procedures:  1.  Right hip incision, irrigation, excisional debridement  2.  Right hip arthroplasty revision, head and liner exchange    Surgeon(s) and Assistants (if any):  Surgeon(s):   Jason Jerez M.D.     Asst.   Circulator: Diya Perez RN  Physician Assistant: Karrie Huitron PA-C  Relief Scrub: Rhea Russell  Scrub Person: Sergio Ruff Tech PA-C assistance was required for patient positioning, instrumentation assistance, soft tissue retraction and patient safety.      Anesthesia:  General    Drains:  None    Specimens:  Sent purulent fluid    Complications: None    Findings/Conclusions: Septic arthritis    Estimated Blood Loss: 200 cc    Condition on discharge from OR:  Satisfactory    Jason Jerez M.D.   Date: 2020  Time: 9:31 AM    Implants:  [unfilled]

## 2021-06-10 NOTE — TELEPHONE ENCOUNTER
Their daughters have have administering medication via PICC line. Daughters were trained to administer the medications by Ayleen the RN that has come out, they did leave a message for Naseem who is the clinical manager.    I called and spoke with Aylene regarding what home health aid was being discontinued and its just the showering aid. They will still be coming out to assist with dressing changes and INR and labs, cleaning of PICC line.   Today Ayleen discussed with them private pay if someone were to come out to administer the meds. Their insurance doesn't cover it and it would be self pay every time they come out of around $300.

## 2021-06-10 NOTE — PLAN OF CARE
Problem: Pain  Goal: Patient's pain/discomfort is manageable  Outcome: Progressing   Pt reports Right Hip pain- pt only wants to try Tylenol. Will continue to monitor.      Problem: Knowledge Deficit  Goal: Patient/family/caregiver demonstrates understanding of disease process, treatment plan, medications, and discharge instructions  Outcome: Progressing   Npo after Midnight- surgery tentative tomorrow based on INR level recheck in a.m. Vit K given x 1 in ED today.    Soco Ching RN

## 2021-06-10 NOTE — OP NOTE
DATE OF SERVICE: 7/31/2020    PREOPERATIVE DIAGNOSIS: Right hip septic arthritis    POSTOPERATIVE DIAGNOSIS: Right hip septic arthritis    PROCEDURE: Right hip incision, irrigation, excisional debridement, arthroplasty revision with femoral head and acetabular liner exchange    SURGEON: Jason Jerez MD    ASSISTANT: Melvi Huitron PA-C; ILIANA assist was essential and required for all portions of the case, including patient positioning, soft tissue retraction, patient safety, assistance with closure of the wound, and postoperative care    ANESTHESIA: General    EBL: 200 cc    COMPLICATIONS: None    IMPLANTS: [place] 36 mm +2.5 cobalt chrome head, [place] acetabular polyethylene +0 neutral for 64 mm cup    INDICATION FOR PROCEDURE: Parish Bills is a pleasant 80 y.o. male who developed severe right hip pain 2 days ago.  He denies any traumatic injuries.  The patient was admitted to the hospital because of the severe pain.  He is on Coumadin for anticoagulation and he cannot have a hip aspiration until his INR was below 1.5.  This aspiration then did show purulent fluid with bacteria.  General  The above procedure was recommended.  For full details please see my clinic notes.  The risks including, but not limited to, bleeding, infection, nerve injury, dvt, implant failure, implant wear, fracture, limb length inequality, anesthetic complications, heart attack, stroke, and even death were discussed.  Pt verbalized understanding.  Informed consent was obtained    DESCRIPTION OF PROCEDURE: The patient was seen and evaluated in the preop area. Discussion of the surgery and any further questions were answered with the patient and family using easily understandable non-medical terms. The correct site was identified with the patient, and I placed my initials at the surgical site. Pre-procedure verification was completed. Relevant information / documentation available, they were reviewed and properly matched to the patient.   I verified the consent was accurate and complete.  I verified that the proper surgical equipment and supplies were available. The patient was taken to the operating room and placed in position according to the procedure in consideration with all extremities well padded. The patient was given successful general anesthesia.  The patient was then placed in the lateral decubitus position with the operative hip up on a standard operating room table with all extremities well padded.   All bony prominences were well padded and an axillary roll placed. Pelvis was secured with [employer] Universal Hip Positioner.  The operative  Hip was then prepped and draped in sterile fashion.  The leg was covered with an Ioban type drape. The patient received preoperative prophylactic antibiotics based on the patient s procedure, BMI, and allergy profile.  A Time Out was conducted just prior to starting procedure to verify the eight required elements: 1-patient identity, 2-consent accurate and complete, 3-position, 4-correct side/site marked (if applicable), 5-procedure, 6-relevant images / results properly labeled and displayed (if applicable), 7-antibiotics / irrigation fluids (if applicable), 8-safety precautions.     A standard posterior approach incision was made about the proximal femur.  Skin and subcutaneous tissues were incised with a scalpel.  Hemostasis was achieved with  electrocautery. The tensor fascia was split longitudinally with electrocautery.  Any bursa tissue was excised.  The gluteus sophia muscle was split bluntly for approximately 3 cm.  A deep east/west retractor was placed.  The hip was internally rotated.  The Piriformis was identified and taken down and tagged, protecting the sciatic nerve.  The short external rotators and posterior hip capsule were taken down off the posterior femur.  They were tagged with #1 ethibond suture for later repair.  There was no purulent fluid identified from the skin down to the hip  capsule.  Once the hip capsule was taken down from the posterior femur, approximately 10 cc of purulent fluid was expressed from the hip joint itself.  There was obvious septic arthritis of the total hip arthroplasty.  Then using electrocautery as well as a scalpel any and all infected or inflamed tissue was excised.  Majority the tissue still looks fairly healthy indicating to me at least that this is an acute infection.  The hip was then dislocated without difficulty.  A mallet and drift was then used to disimpact the femoral head from the femoral stem without complication.  There was no damage to the trunnion of the stem.  The stem was well fixed without any evidence of loosening.  Careful anterior, posterior and superior retractors were placed about the acetabulum.   Then using an osteotome, the polyethylene was removed from the acetabular shell.  The acetabular shell was well fixed with no evidence of loosening.  There is no evidence of infection behind the acetabular polyethylene.  We irrigated this area with antibiotic saline and excised any infected appearing tissue around the rim of the acetabular cup.  I then further excised any other infected appearing tissue.  Blunt dissection around the hip joint did not show any further pockets of purulence.  We then irrigated with multiple liters of antibiotic saline.  The new acetabular polyethylene for 36 head was placed into the acetabulum without complication.  It had good rigid fit and locking.   The new femoral head was then impacted on the cleansed and dried Almazan taper and hip re-reduced and range of motion confirmed. Excellent stability was achieved.  The hip was thoroughly irrigated with antibiotic saline, and inspection showed excellent hemostasis had been established. The posterior capsule was repaired to the posterior aspect of the greater trochanter through drill holes through bone with #1 Ethibond suture into the gluteus minimus proximally.  The tensor  fascia and gluteus sophia were reapproximated using #1 Vicryl and 0 Stratafix  suture.  The subcutaneous layer was closed with 2-0 Vicryl suture.  The skin was closed with 3-0 monocryl suture and dermabond.  A sterile dressing  and abductor pillow was placed onto the leg.  The patient was brought to the post op recovery area in stable condition.  The sponge and needle counts were correct at the end of the case.        POST OP PLAN:  Pain Control:  IV Narcotics, transition to oral narcotics as bowel function returns  Infection Prophylaxis:  IV Antibiotics as per infectious disease service  DVT Prophylaxis: Restart home Coumadin tomorrow  PT/OT: Eval and treat.  WBAT with Walker.  POSTERIOR APPROACH PREACUTIONS  Medical Management: As per primary medical service  Incision Care: Reinforce Dressing prn,  Remove on POD #3  Disposition:  Consult      [unfilled]      COMMENT:  Jason Jerez    COMMENT:  Isidro Au MD

## 2021-06-10 NOTE — PROGRESS NOTES
[facility] Progress Note    SUBJECTIVE:  Stable.  PICC in..  We have a micro dx now.  He does have an incisor upper teeth in bad shape    REVIEW OF SYSTEMS:  Negative unless as listed above.  Social history, Family history, Medications: reviewed.    OBJECTIVE:  Vitals:    08/02/20 0715   BP: 144/67   Pulse: 64   Resp: 16   Temp: 98.2  F (36.8  C)   SpO2: 96%                 PHYSICAL EXAM:  Alert, awake  Vitals tabulated above, reviewed  Neck supple without lymphadenopathy  Sclera normal color, not injected  CARDIOVASCULAR regular rate and rhythm, no murmur  Lungs CLEAR TO AUSCULTATION   Abdomen soft, NT/ND, absent HEPATOSPLENOMEGALY  Skin normal  Joints normal  Neurologic exam non focal    Antibiotics:vanco    Pertinent labs:  Results from last 7 days   Lab Units 08/02/20  0616 08/01/20  0609 07/31/20  0514 07/30/20  0554 07/28/20  0950   LN-WHITE BLOOD CELL COUNT thou/uL  --   --  5.6 6.6 7.6   LN-HEMOGLOBIN g/dL 9.1* 9.3* 10.2* 10.3* 11.3*   LN-HEMATOCRIT %  --   --  30.3* 30.7* 33.8*   LN-PLATELET COUNT thou/uL  --   --  132* 94* 131*       Results from last 7 days   Lab Units 08/01/20  0609  07/29/20  0630   LN-SODIUM mmol/L  --   --  138   LN-POTASSIUM mmol/L 4.5  --  4.1   LN-CHLORIDE mmol/L  --   --  106   LN-CO2 mmol/L  --   --  26   LN-BLOOD UREA NITROGEN mg/dL  --   --  26   LN-CREATININE mg/dL 0.89   < > 1.00   LN-CALCIUM mg/dL  --   --  8.2*    < > = values in this interval not displayed.                   MICROBIOLOGY DATA:     Culture  50,000-100,000 col/ml Proteus mirabilisAbnormal            Resulting Agency: [facility]    Susceptibility      Proteus mirabilis      FRANCISCA      Amoxicillin + Clavulanate  <=4/2   Sensitive      Ampicillin  <=4   Sensitive      Cefazolin  8   Sensitive      Cefepime  <=1   Sensitive      Ceftriaxone  <=1   Sensitive      Ciprofloxacin  <=0.5   Sensitive      Gentamicin  4   Sensitive      Levofloxacin  <=1   Sensitive      Nitrofurantoin  64   Resistant       Tetracycline  >8   Resistant      Tobramycin  4   Sensitive      Trimethoprim + Sulfamethoxazole  <=0.5   Sensitive                  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  Right hip incision, irrigation, excisional debridement  2.  Right hip arthroplasty revision, head and liner exchange    Possible mild proteus UTI  RECOMMENDATION:  CTX times 28 days  See me office 21-28 days  See orders  SW to see and get DC set up tomorrow  Called wife by phone in his room.     MARJ Pickard MD  Office [phone] option 2 to desk staff

## 2021-06-10 NOTE — ANESTHESIA CARE TRANSFER NOTE
Last vitals:   Vitals:    07/31/20 0955   BP: 124/60   Pulse: 60   Resp: 28   Temp: 36.7  C (98.1  F)   SpO2: 100%     Patient's level of consciousness is drowsy  Spontaneous respirations: yes  Maintains airway independently: yes  Dentition unchanged: yes  Oropharynx: oropharynx clear of all foreign objects    QCDR Measures:  ASA# 20 - Surgical Safety Checklist: WHO surgical safety checklist completed prior to induction    PQRS# 430 - Adult PONV Prevention: 4558F - Pt received => 2 anti-emetic agents (different classes) preop & intraop  ASA# 8 - Peds PONV Prevention: NA - Not pediatric patient, not GA or 2 or more risk factors NOT present  PQRS# 424 - Lyn-op Temp Management: 4559F - At least one body temp DOCUMENTED => 35.5C or 95.9F within required timeframe  PQRS# 426 - PACU Transfer Protocol: - Transfer of care checklist used  ASA# 14 - Acute Post-op Pain: ASA14B - Patient did NOT experience pain >= 7 out of 10

## 2021-06-10 NOTE — TELEPHONE ENCOUNTER
Orders being requested: skilled nursing, 2 x a week for 4 weeks, 1 x a week for 3 weeks, 2 prn  Reason service is needed/diagnosis: IV/PICC line management, labs, wound care, CHF management, also requesting labs orders to be changed to Tuesday 8/25/2020.  When are orders needed by: asap  Where to send Orders: Phone:  [phone]  Okay to leave detailed message?  Yes

## 2021-06-10 NOTE — PROGRESS NOTES
Daily Progress Note    CODE STATUS:  Full Code    07/30/20  Assessment/Plan:  80 y.o. 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 fib, AV node ablation s/p BiV pacemaker, hypertension, right hip replacement 16 years ago who presented to ED for evaluation of atraumatic right hip pain for 1 day duration and admitted for further management     Acute right hip pain, atraumatic; concern for septic arthritis  History of right hip replacement 16 years ago;  -No reported trauma.  No reported febrile illness, urinary changes or leg weakness  -CT pelvis reported no evidence of periprosthetic fracture, no dislocation.  -Normal WBC count.  Elevated CRP at 4.1  -Patient had right hip arthrocentesis after reversing INR on 7/29 and culture growing gram-positive cocci in pairs.  -Had multiple conversations with orthopedic surgeon, Dr. Jerez and plan for right hip I&D on 7/31 morning.  Keep n.p.o. after midnight.  -Continue scheduled Tylenol, as needed small dose of IV Dilaudid.  Monitor for sedation/hallucination  -After discussing with orthopedic surgeon, started on IV vancomycin and also ID consulted.  Monitor labs closely    History of Tissue AVR with LIMA to LAD in 2018;  Chronic atrial fibrillation;  AV node ablation s/p Bi V pacemaker;  -No chest pain, dyspnea on exertion, dizziness.    -Echocardiogram on 10/2018 showed EF 55%  -Coumadin on hold for above issues.  Resume Coumadin once okay with orthopedic team  -Continue PTA medications-Lipitor, Toprol-XL, aspirin  -Given patient complex cardiac history, I did discussed with patient's primary cardiologist Dr. Trujillo about anticoagulation/Coumadin which is currently on hold refer above and reported okay to hold and when we resume Coumadin no need to bridge.     Acute kidney injury, mild;  -Creatinine improved with gentle hydration.  Hold home diuretics for now.    Gross hematuria; resolved  Acute urinary retention due to BPH;  -Patient  reported passing drops of blood when urinating.  Denied suprapubic pain, dysuria, no fever or chills  -UA showed RBC but negative nitrite, leukocyte and WBC  -Appreciated urology input.  Patient had bedside cystoscopy with Francis catheter placement and procedure note reported a status post TURP, bladder neck was slightly tight otherwise unremarkable, no tumors  -Started on Flomax, continue.  Defer to urology to determine when okay todo voiding trial.     Chronic normocytic anemia;  Chronic intermittent thrombocytopenia;  -Hemoglobin fairly stable.  Thrombocytopenia trending down, likely due to infection.  Monitor labs closely     DVT prophylaxis;  -PTA medication includes Coumadin but now on hold as patient went for going for surgical procedure tomorrow      Disposition; pending  Barrier to discharge; procedure     LOS: 2 days     Subjective:  Interval History: Patient seen and examined.  Notes, labs, imaging report personally reviewed.  Patient reported that his right hip pain is improving.  However, when I requested to bring his knee then he was having severe pain.  Denied suprapubic pain, urinary symptoms.  Currently has a Francis catheter.  Denied short of breath or chest pain or dizziness.  Denied nausea or vomiting.  Denied fever or chills.  Discussed with nursing staffs.  Discussed with orthopedic surgeon.    Review of Systems:   As mentioned in subjective.    Patient Active Problem List   Diagnosis     Aortic Stenosis     Bicuspid Aortic Valve     Benign Prostatic Hypertrophy     Abrasion Of The Ear     Hyperlipidemia     Cardiomyopathy     Strained Left Pectoralis Major Muscle     Essential Hypercholesterolemia     Aneurysm Of The Abdominal Aorta     Chest Pain     Neck Strain     Osteoarthritis     Lumbar Strain     Blood In Urine     Atrial Fibrillation     Esophageal Reflux     Subconjunctival Hemorrhage     Post-operative pain     Hypertension     Hypotension     Postoperative anemia due to acute blood  loss     A-fib (H)     Cough     Edema     Lumbar radiculopathy     Malignant neoplasm of skin     Wheezing     Biventricular cardiac pacemaker in situ     Malignant tumor of rectum (H)     Abdominal aortic aneurysm (AAA) (H)     Atrial flutter, paroxysmal (H)     Benign neoplasm of ascending colon     Coagulopathy (H)     Diverticular disease of large intestine     Dysthymia     Encounter for screening for malignant neoplasm of colon     Family history of colon cancer     Family history of malignant neoplasm of digestive organ     H/O mitral valve repair     History of colonic polyps     Iliac artery aneurysm (H)     Nonrheumatic aortic valve stenosis     Polyp of ascending colon, unspecified type     PVC (premature ventricular contraction)     Rectal polyp     S/P AVR     Stress hyperglycemia     Weight loss     Right hip pain     Infection of prosthetic total hip joint, subsequent encounter       Scheduled Meds:    acetaminophen  1,000 mg Oral TID     aspirin  81 mg Oral Daily     atorvastatin  40 mg Oral QHS     buPROPion  150 mg Oral QAM     [Held by provider] furosemide  20 mg Oral DAILY     metoprolol succinate  50 mg Oral DAILY     polyethylene glycol  17 g Oral DAILY     sertraline  150 mg Oral DAILY     sodium chloride  2.5 mL Intravenous Line Care     tamsulosin  0.4 mg Oral Daily after brkfst     vancomycin  1,750 mg Intravenous Once    Followed by     [START ON 7/31/2020] vancomycin  1,250 mg Intravenous Q24H     Continuous Infusions:    sodium chloride 0.9% 50 mL/hr (07/30/20 1203)     PRN Meds:.acetaminophen, acetaminophen, bisacodyL, HYDROmorphone, hydrOXYzine HCL, magnesium hydroxide, naloxone **OR** naloxone, nitroglycerin, ondansetron **OR** ondansetron    Objective:  Vital signs in last 24 hours:  Temp:  [98.2  F (36.8  C)-100.5  F (38.1  C)] 98.2  F (36.8  C)  Heart Rate:  [60] 60  Resp:  [16-20] 16  BP: (128-145)/(62-83) 145/68  Weight:   193 lb 4.8 oz (87.7 kg)      Intake/Output Summary (Last  24 hours) at 7/30/2020 1302  Last data filed at 7/30/2020 1203  Gross per 24 hour   Intake 1240 ml   Output 1850 ml   Net -610 ml       Physical Exam:  General: Not in obvious distress.  HEENT: Normocephalic, supple neck  Chest: Clear to auscultation bilateral anteriorly, no wheezing  Heart: S1S2 normal, irregular  Abdomen: Soft. NT, ND. Bowel sounds- active.  Extremities: No legs swelling  Neuro: alert and awake, moves all extremities but has limited movement on right lower extremity due to right hip pain  Genitourinary; Francis catheter in place, draining yellow urine with scanty blood-tinged    Lab Results:(I have personally reviewed the results)  Results from last 7 days   Lab Units 07/30/20  0554 07/29/20  0630 07/28/20  0950   LN-SODIUM mmol/L  --  138 138   LN-POTASSIUM mmol/L  --  4.1 4.4   LN-CHLORIDE mmol/L  --  106 104   LN-CO2 mmol/L  --  26 25   LN-BLOOD UREA NITROGEN mg/dL  --  26 29*   LN-CREATININE mg/dL 0.85 1.00 1.25   LN-CALCIUM mg/dL  --  8.2* 8.6     Results from last 7 days   Lab Units 07/30/20  0554 07/28/20  0950   LN-WHITE BLOOD CELL COUNT thou/uL 6.6 7.6   LN-HEMOGLOBIN g/dL 10.3* 11.3*   LN-HEMATOCRIT % 30.7* 33.8*   LN-PLATELET COUNT thou/uL 94* 131*         IV Access:  Peripheral IV 07/28/20 Right;Anterior (front);Medial (center/inner) Antecubital-Line Status: Flushed  PICC: Peripheral IV 07/28/20 Right;Anterior (front);Medial (center/inner) Antecubital-Site Skin Assessment: Checked, Clean, Dry, Intact      Ct Pelvis Without Oral Without Iv Contrast  Result Date: 7/28/2020  EXAM: CT PELVIS WO ORAL WO IV CONTRAST LOCATION: [facility] DATE/TIME: 7/28/2020 11:05 AM INDICATION: Hip replaced, periprosthetic fracture suspected Atraumatic right hip pain, negative XR, S/P hip replacement. COMPARISON: 7/28/2020. TECHNIQUE: CT scan of the pelvis was performed without IV contrast. Multiplanar reformats were obtained. Dose reduction techniques were used. CONTRAST: None. FINDINGS:  Postoperative changes related to a right total hip replacement. While assessment at the bone metallic interface is difficult due to artifact, there is no dislocation or evidence of an acute periprosthetic fracture. Stable small amount of lucency surrounding the acetabular cup bone metallic interface, unchanged. Chronic appearing heterotopic ossification along the right iliac wing. Chronic bony ankylosis along the anterior aspect of both SI joints. Prior posterior decompressive changes in the lower lumbar spine. Aortoiliac stenting for an abdominal aortic aneurysm. Mild scattered colonic diverticulosis. No free fluid in the pelvis.     1.  Postoperative changes related to prior right total hip replacement. There is no evidence of a periprosthetic fracture. No dislocation.    Xr Pelvis W 2 Vw Hip Right    Result Date: 7/28/2020  EXAM: XR PELVIS W 2 VW HIP RIGHT LOCATION: [facility] DATE/TIME: 7/28/2020 9:35 AM INDICATION: S/p hip replacement, increased pain. COMPARISON: None.     Postoperative changes of right total hip arthroplasty. Components appear well seated. There is moderate severity degenerative change at the left hip joint. No evidence for fracture. Pelvis otherwise negative. Iliac artery stents.    Latest radiology report personally reviewed.    Note created using dragon voice recognition software so sounds alike errors may have escaped editing.    7/30/2020  BRIGID AKBAR MD  HOSPITALIST, [facility]  PAGER NO. [phone]

## 2021-06-10 NOTE — TELEPHONE ENCOUNTER
Because insurance does not cover this, I would recommend he continue to have his daughters help.  Dr. Au

## 2021-06-10 NOTE — PROGRESS NOTES
Daily Progress Note    Addendum;  -Received page from patient's nurse stating to call lab.   reported that she received only 1 mL right hip joint fluid which is not sufficient to run all the test and asking which one they should do. I am not sure which has most priority so I requested to call orthopedic surgeon, Dr Jerez from [facility].    CODE STATUS:  Full Code    07/29/20  Assessment/Plan:  80 y.o. 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 fib, AV node ablation s/p BiV pacemaker, hypertension, right hip replacement 16 years ago who presented to ED for evaluation of atraumatic right hip pain for 1 day duration and admitted for further management     Acute right hip pain, atraumatic;  History of right hip replacement 16 years ago;  -No reported trauma.  No reported febrile illness, urinary changes or leg weakness  -CT pelvis reported no evidence of periprosthetic fracture, no dislocation.  -Normal WBC count.  Elevated CRP at 4.1  -ED provider discussed with orthopedic surgeon, Dr. Jerez reported concern for septic arthritis and recommended right hip arthrocentesis.  ED provider discussed with radiologist for the procedure who reported INR is high to perform arthrocentesis.    -Patient received vitamin K to reverse INR in ED on 7/28.  INR trending down to 1.7 this afternoon.  Patient getting joint aspiration this afternoon by radiologist.  -Personally discussed with orthopedic surgeon today, awaiting joint aspiration and results and if concerning then likely patient needs surgical intervention.  -Continue scheduled Tylenol, as needed small dose of IV Dilaudid.  Monitor for sedation/hallucination  - PT consulted, nonweightbearing on right lower extremity per orthopedic surgeon  -Monitor off antibiotics at this time    History of Tissue AVR with LIMA to LAD in 2018;  Chronic atrial fibrillation;  AV node ablation s/p Bi V pacemaker;  -No chest pain,  dyspnea on exertion, dizziness.    -Echocardiogram on 10/2018 showed EF 55%  -Coumadin on hold for above issues.  Resume Coumadin once okay with orthopedic team  -Continue PTA medications-Lipitor, Toprol-XL, aspirin    Addendum;  -Given patient complex cardiac history, I did discussed with patient's primary cardiologist Dr. Trujillo about anticoagulation/Coumadin which is currently on hold refer above and reported okay to hold and when we resume Coumadin no need to bridge.     Acute kidney injury, mild Vs CKD;  -Creatinine trending down with gentle hydration which will be discontinued once oral diet resumes.   -Continue holding home exposures diuretics for now    Gross hematuria;   -Patient reported passing drops of blood when urinating.  Denied suprapubic pain, dysuria  -UA showed RBC but negative nitrite, leukocyte and WBC  -Appreciated urology input, they recommended CT imaging which will be done today     Chronic normocytic anemia;  Chronic intermittent thrombocytopenia;  -Fairly stable.  Monitor labs     DVT prophylaxis;  -Patient on Coumadin, which is on hold for procedure and likely undergo orthopedic intervention tomorrow pending joint fluid analysis results so we will hold Coumadin tonight too      Disposition; pending  Barrier to discharge; procedure     LOS: 1 day     Subjective:  Interval History: Patient seen and examined. Notes, labs, imaging reports personally reviewed.  Overnight reported hematuria but no suprapubic pain, dysuria.  Patient denied fever or chills.  No short of breath or chest pain or dizziness.  Patient is still complaining of right hip pain which comes and goes and mostly get worse with movement.  Discussed with nursing staff.  Discussed with orthopedic surgeon.  Called ultrasound department and informed about joint aspiration order and also discussed about INR level and also told them that patient received vitamin K and also I got consent for FFP transfusion if they recommend,  reported radiologist to look into the case and they may change joint aspiration order.    Review of Systems:   As mentioned in subjective.    Patient Active Problem List   Diagnosis     Aortic Stenosis     Bicuspid Aortic Valve     Benign Prostatic Hypertrophy     Abrasion Of The Ear     Hyperlipidemia     Cardiomyopathy     Strained Left Pectoralis Major Muscle     Essential Hypercholesterolemia     Aneurysm Of The Abdominal Aorta     Chest Pain     Neck Strain     Osteoarthritis     Lumbar Strain     Blood In Urine     Atrial Fibrillation     Esophageal Reflux     Subconjunctival Hemorrhage     Post-operative pain     Hypertension     Hypotension     Postoperative anemia due to acute blood loss     A-fib (H)     Cough     Edema     Lumbar radiculopathy     Malignant neoplasm of skin     Wheezing     Biventricular cardiac pacemaker in situ     Malignant tumor of rectum (H)     Abdominal aortic aneurysm (AAA) (H)     Atrial flutter, paroxysmal (H)     Benign neoplasm of ascending colon     Coagulopathy (H)     Diverticular disease of large intestine     Dysthymia     Encounter for screening for malignant neoplasm of colon     Family history of colon cancer     Family history of malignant neoplasm of digestive organ     H/O mitral valve repair     History of colonic polyps     Iliac artery aneurysm (H)     Nonrheumatic aortic valve stenosis     Polyp of ascending colon, unspecified type     PVC (premature ventricular contraction)     Rectal polyp     S/P AVR     Stress hyperglycemia     Weight loss     Right hip pain       Scheduled Meds:    acetaminophen  1,000 mg Oral TID     aspirin  81 mg Oral Daily     atorvastatin  40 mg Oral QHS     buPROPion  150 mg Oral QAM     [Held by provider] furosemide  20 mg Oral DAILY     metoprolol succinate  50 mg Oral DAILY     polyethylene glycol  17 g Oral DAILY     sertraline  150 mg Oral DAILY     sodium chloride  2.5 mL Intravenous Line Care     tamsulosin  0.4 mg Oral Daily  after brkfst     Continuous Infusions:  PRN Meds:.acetaminophen, acetaminophen, bisacodyL, HYDROmorphone, hydrOXYzine HCL, magnesium hydroxide, naloxone **OR** naloxone, nitroglycerin, ondansetron **OR** ondansetron    Objective:  Vital signs in last 24 hours:  Temp:  [98.1  F (36.7  C)-100.2  F (37.9  C)] 98.2  F (36.8  C)  Heart Rate:  [60-67] 61  Resp:  [18-20] 20  BP: (100-159)/(54-72) 124/61  Weight:   193 lb 4.8 oz (87.7 kg)      Intake/Output Summary (Last 24 hours) at 7/29/2020 1346  Last data filed at 7/29/2020 0900  Gross per 24 hour   Intake 674.83 ml   Output 500 ml   Net 174.83 ml       Physical Exam:  General: Not in obvious distress.  HEENT: Normocephalic, supple neck  Chest: Clear to auscultation bilateral anteriorly, no wheezing  Heart: S1S2 normal, irregular  Abdomen: Soft. NT, ND. Bowel sounds- active.  Extremities: No legs swelling  Neuro: alert and awake, moves all extremities but limited movement on right lower extremity due to hip pain  Genitourinary; noticed old crusted blood around penile area    Lab Results:(I have personally reviewed the results)  Results from last 7 days   Lab Units 07/29/20  0630 07/28/20  0950   LN-SODIUM mmol/L 138 138   LN-POTASSIUM mmol/L 4.1 4.4   LN-CHLORIDE mmol/L 106 104   LN-CO2 mmol/L 26 25   LN-BLOOD UREA NITROGEN mg/dL 26 29*   LN-CREATININE mg/dL 1.00 1.25   LN-CALCIUM mg/dL 8.2* 8.6     Results from last 7 days   Lab Units 07/28/20  0950   LN-WHITE BLOOD CELL COUNT thou/uL 7.6   LN-HEMOGLOBIN g/dL 11.3*   LN-HEMATOCRIT % 33.8*   LN-PLATELET COUNT thou/uL 131*         IV Access:  Peripheral IV 07/28/20 Right;Anterior (front);Medial (center/inner) Antecubital-Line Status: Infusing  PICC: Peripheral IV 07/28/20 Right;Anterior (front);Medial (center/inner) Antecubital-Site Skin Assessment: Intact      Ct Pelvis Without Oral Without Iv Contrast  Result Date: 7/28/2020  EXAM: CT PELVIS WO ORAL WO IV CONTRAST LOCATION: [facility] DATE/TIME: 7/28/2020  11:05 AM INDICATION: Hip replaced, periprosthetic fracture suspected Atraumatic right hip pain, negative XR, S/P hip replacement. COMPARISON: 7/28/2020. TECHNIQUE: CT scan of the pelvis was performed without IV contrast. Multiplanar reformats were obtained. Dose reduction techniques were used. CONTRAST: None. FINDINGS: Postoperative changes related to a right total hip replacement. While assessment at the bone metallic interface is difficult due to artifact, there is no dislocation or evidence of an acute periprosthetic fracture. Stable small amount of lucency surrounding the acetabular cup bone metallic interface, unchanged. Chronic appearing heterotopic ossification along the right iliac wing. Chronic bony ankylosis along the anterior aspect of both SI joints. Prior posterior decompressive changes in the lower lumbar spine. Aortoiliac stenting for an abdominal aortic aneurysm. Mild scattered colonic diverticulosis. No free fluid in the pelvis.     1.  Postoperative changes related to prior right total hip replacement. There is no evidence of a periprosthetic fracture. No dislocation.    Xr Pelvis W 2 Vw Hip Right    Result Date: 7/28/2020  EXAM: XR PELVIS W 2 VW HIP RIGHT LOCATION: [facility] DATE/TIME: 7/28/2020 9:35 AM INDICATION: S/p hip replacement, increased pain. COMPARISON: None.     Postoperative changes of right total hip arthroplasty. Components appear well seated. There is moderate severity degenerative change at the left hip joint. No evidence for fracture. Pelvis otherwise negative. Iliac artery stents.    Latest radiology report personally reviewed.      The total time spent is 38 minutes, >50% time spent in coordination of care data gathering, charting, record review, discussion of test/medications/plan of care mentioned above with patient. D/w consultants, nursing staffs and /CM.      Note created using dragon voice recognition software so sounds alike errors may have  escaped editing.    7/29/2020  BRIGID AKBAR MD  HOSPITALIST, [facility]  PAGER NO. [phone]

## 2021-06-10 NOTE — PROGRESS NOTES
Pharmacy Consult: Warfarin Management    Pharmacy consulted to dose warfarin for Parish Bills, a 80 y.o. male    Ordering provider: Dr. Jerez     Reason for warfarin therapy: Atrial Fibrillation  Goal INR Range: 2-3 (changed from 1.8 - 2.5 for perioperative pharmacoprophylaxis to 2-3 for afib by Dr. CHAUHAN)    Subjective  Medication lists (home and hospital) were reviewed.    New medications that may increase bleeding risk/INR: none    Restarted home medications that may increase bleeding risk/INR: aspirin, sertraline    Home Warfarin Dosin mg , ,  and 7.5 mg ROW    Other Anticoagulants: none  Patient being bridged: No    Patient Active Problem List   Diagnosis     Aortic Stenosis     Bicuspid Aortic Valve     Benign Prostatic Hypertrophy     Abrasion Of The Ear     Hyperlipidemia     Cardiomyopathy     Strained Left Pectoralis Major Muscle     Essential Hypercholesterolemia     Aneurysm Of The Abdominal Aorta     Chest Pain     Neck Strain     Osteoarthritis     Lumbar Strain     Blood In Urine     Atrial Fibrillation     Esophageal Reflux     Subconjunctival Hemorrhage     Post-operative pain     Hypertension     Hypotension     Postoperative anemia due to acute blood loss     A-fib (H)     Cough     Edema     Lumbar radiculopathy     Malignant neoplasm of skin     Wheezing     Biventricular cardiac pacemaker in situ     Malignant tumor of rectum (H)     Abdominal aortic aneurysm (AAA) (H)     Atrial flutter, paroxysmal (H)     Benign neoplasm of ascending colon     Coagulopathy (H)     Diverticular disease of large intestine     Dysthymia     Encounter for screening for malignant neoplasm of colon     Family history of colon cancer     Family history of malignant neoplasm of digestive organ     H/O mitral valve repair     History of colonic polyps     Iliac artery aneurysm (H)     Nonrheumatic aortic valve stenosis     Polyp of ascending colon, unspecified type     PVC (premature ventricular  contraction)     Rectal polyp     S/P AVR     Stress hyperglycemia     Weight loss     Right hip pain     Infection of prosthetic total hip joint, subsequent encounter    Past Medical History:   Diagnosis Date     Abdominal aortic aneurysm (H)      Alcohol abuse      Anemia      Aortic stenosis      Arthritis      Atrial fibrillation (H)      BPH (benign prostatic hyperplasia)      CAD (coronary artery disease)      Cardiomyopathy (H)      Cataract     left     Chest pain      CHF (congestive heart failure) (H)      Congenital insufficiency of aortic valve      Coronary artery disease      Disease of pancreas     gallstones related     Dysthymia      Enlarged prostate      FH: mitral valve repair      H/O aortic valve repair '     High blood pressure      Hypercholesteremia      Hyperlipidemia      Hypertrophy of prostate      Lumbar radiculopathy      Mitral valve prolapse      Neck strain      Nonrheumatic aortic (valve) stenosis      Osteoarthrosis      Pacemaker      Rectal cancer (H)      Reflux esophagitis      Subconjunctival bleed         Social History     Tobacco Use     Smoking status: Former Smoker     Types: Cigarettes     Last attempt to quit: 9/15/1980     Years since quittin.9     Smokeless tobacco: Never Used   Substance Use Topics     Alcohol use: No     Comment: quit      Drug use: No       Objective   Labs:  Last 3 days:    Recent Labs     20  0554 20  0514 20  0609   CREATININE 0.85  --  0.89   HGB 10.3* 10.2* 9.3*   HCT 30.7* 30.3*  --    PLT 94* 132*  --      Last 7 days:   Recent labs: (last 7 days)     20  0950 20  0630 20  1301 20  0554 20  0514 20  0609   INR 2.77* 2.03* 1.72* 1.49* 1.38* 1.56*       Warfarin Dosing History:    Date INR Warfarin Dose Comment    1.38 7.5 mg     1.56                   Assessment  The patient is resuming warfarin for the indication of Atrial Fibrillation with a goal INR of 2-3. INR today is  subtherapeutic. Resume home dosing but will give 7.5 mg today instead of 5 mg.    's note 8/1 indicated to restart home Coumadin tomorrow, but it was started yesterday. Paged on-call ortho MD, who stated that it is ok to continue today.    Plan  1. Administer warfarin 7.5 mg PO today.  2. Check INR daily or as appropriate.  3. Continue to follow the patient's INR, PLT, and HGB as available.  4. Monitor for potential drug/disease interactions.    Thank you for the consult,  Yahaira Hdz, PharmD 8/1/2020 12:41 PM

## 2021-06-10 NOTE — PROGRESS NOTES
Ortho Progress Note       1 Day Post-Op    REVISION, ARTHROPLASTY, HIP, WITH FEMORAL HEAD AND  ACETABULAR LINER REPLACEMENT, WITH IRRIGATION + DEBRIDEMENT      Subjective: Pain is under good control this morning.  His hip is still uncomfortable, but not nearly as intense as prior to surgery.  Denies subjective fevers or chills.    Pain: mild  Chest pain, SOB:  No  Nausea, vomiting:  No  Lightheadedness, dizziness:  No  Neuro:  Patient denies numbness or paresthesias    Objective:  Vital signs in last 24 hours  Temp:  [97.6  F (36.4  C)-98.7  F (37.1  C)] 97.9  F (36.6  C)  Heart Rate:  [60-83] 60  Resp:  [14-28] 16  BP: (104-134)/(52-70) 134/60    Wound status: clean, dry and no drainage. Aquacel in place.  Circulation, motion and sensation: Plantar and dorsiflexes equally. Wiggles toes. Toes pink and warm with brisk cap refill. SILT distally.  Swelling: mild  Calf tenderness: no tenderness    Pertinent Labs   Lab Results: personally reviewed.   Results from last 7 days   Lab Units 08/01/20  0609 07/31/20  0514 07/30/20  0554   LN-INR  1.56* 1.38* 1.49*     Results from last 7 days   Lab Units 08/01/20  0609   LN-HEMOGLOBIN g/dL 9.3*     Results from last 7 days   Lab Units 08/01/20  0609   LN-CREATININE mg/dL 0.89     Intraoperative cultures are pending.  Gram stain demonstrates 4+ PMNs and 1+ gram-positive cocci in pairs.    Assessment: Doing well s/p I&D and head/liner exchange R RAMÓN, DOS 7/31/2020.    Plan:   Continue IV antibiotics per infectious disease.  Follow intraoperative cultures for speciation.  Needs PICC line.  Initiate PT/OT.    Weightbearing status: WBAT, posterior precautions  Anticoagulation: Resume home coumadin in addition to SCDs, kathy stockings and early ambulation.  Discharge planning: To be determined pending progress and infectious disease plan for antibiotics.    Report completed by:  Rebel Carrington MD ....... 8/1/2020 6:55 AM

## 2021-06-10 NOTE — PLAN OF CARE
Problem: Pain  Goal: Patient's pain/discomfort is manageable  Outcome: Progressing   He took a prn tylenol 500 at midnight for his right hip pain; called  to get an order for IV pain med as he is NPO since midnight.  He knows its available if needed.  Problem: Psychosocial Needs  Goal: Collaborate with patient/family/caregiver to identify patient specific goals for this hospitalization  Outcome: Progressing   He knows his INR will be rechecked this morning & if low enough will have his arthrocentesis.

## 2021-06-10 NOTE — PLAN OF CARE
Problem: Pain  Goal: Patient's pain/discomfort is manageable  Outcome: Progressing   Pt reports minimal pain. Prefers to take scheduled tylenol.  Problem: Safety  Goal: Patient will be injury free during hospitalization  Outcome: Progressing   Call light and items placed within reach.  Problem: Daily Care  Goal: Daily care needs are met  Outcome: Progressing   Up and walking in the hallway with assist of 1. Francis in place and patent. CMS intact.

## 2021-06-10 NOTE — TELEPHONE ENCOUNTER
ANTICOAGULATION  MANAGEMENT- Home Care/Care Facility Result    Assessment     Today's INR result of 2.9 is Therapeutic (goal INR of 2.0-3.0)        Warfarin taken as previously instructed    No new diet changes affecting INR    No new medication/supplements affecting INR    Continues to tolerate warfarin with no reported s/s of bleeding or thromboembolism     Previous INR was Therapeutic    Plan:     Spoke with Elisa home care nurse, discussed INR result and instructed:     Warfarin Dosing Instructions: Continue current warfarin dose 2.5 mg daily on Tue; and 5 mg daily rest of week  (0 % change)    Next INR to be drawn: 1 week    Education provided: target INR goal and significance of current INR result    Elisa verbalizes understanding and agrees to warfarin dosing plan.   ?   Liat Mancia RN    Subjective/Objective:      Parish Bills, a 80 y.o. male is established on warfarin.     Home care/care facility RN's report of Parish INR, recent warfarin dosing, diet changes, medication changes, and symptoms is documented below.    Additional findings: template incorrect; verbally confirmed home dose with Elisa and updated on anticoagulation calendar    Anticoagulation Episode Summary     Current INR goal:   2.0-3.0   TTR:   90.1 % (11.7 mo)   Next INR check:   9/1/2020   INR from last check:   2.90 (8/25/2020)   Weekly max warfarin dose:      Target end date:      INR check location:      Preferred lab:      Send INR reminders to:   [facility]    Indications    A-fib (H) (Resolved) [I48.91]           Comments:            Anticoagulation Care Providers     Provider Role Specialty Phone number    Isidro Au MD Referring Family Medicine [phone]

## 2021-06-10 NOTE — PROCEDURES
Operative Note    Name:  Parish Bills  Location: * No surgery found *  Procedure Date:  7/29/2020  PCP:      Surgery : Bedside cysto with garcia placement (difficult).     Pre-Procedure Diagnosis:Urinary Retention, hematuria.     Post-Procedure Diagnosis:    Same as pre-operative diagnosis      Bedside Cystoscopy with garcia placement.     Anesthesia Type:  Anesthesia type cannot be found on the log.    Past Medical History:   Diagnosis Date     Abdominal aortic aneurysm (H)      Alcohol abuse      Anemia      Aortic stenosis      Arthritis      Atrial fibrillation (H)      BPH (benign prostatic hyperplasia)      CAD (coronary artery disease)      Cardiomyopathy (H)      Cataract     left     Chest pain      CHF (congestive heart failure) (H)      Congenital insufficiency of aortic valve      Coronary artery disease      Disease of pancreas     gallstones related     Dysthymia      Enlarged prostate      FH: mitral valve repair      H/O aortic valve repair '     High blood pressure      Hypercholesteremia      Hyperlipidemia      Hypertrophy of prostate      Lumbar radiculopathy      Mitral valve prolapse      Neck strain      Nonrheumatic aortic (valve) stenosis      Osteoarthrosis      Pacemaker      Rectal cancer (H)      Reflux esophagitis      Subconjunctival bleed        Patient Active Problem List    Diagnosis Date Noted     Polyp of ascending colon, unspecified type 07/28/2020     Rectal polyp 07/28/2020     Right hip pain 07/28/2020     Abdominal aortic aneurysm (AAA) (H) 08/26/2019     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     History of colonic polyps 04/11/2019     Weight loss 09/10/2018     Coagulopathy (H) 07/30/2018     S/P AVR 07/30/2018     Stress hyperglycemia 07/30/2018     Family history of colon cancer 02/02/2017     Malignant tumor of rectum (H) 01/31/2017     Benign neoplasm of ascending colon 01/31/2017     Encounter for screening for malignant neoplasm of colon 01/31/2017      Family history of malignant neoplasm of digestive organ 01/31/2017     Dysthymia 11/03/2016     Nonrheumatic aortic valve stenosis 11/03/2016     Biventricular cardiac pacemaker in situ 02/17/2015     Cough 02/09/2015     Edema 02/09/2015     Lumbar radiculopathy 02/09/2015     Malignant neoplasm of skin 02/09/2015     Wheezing 02/09/2015     Atrial flutter, paroxysmal (H) 12/03/2014     H/O mitral valve repair 12/03/2014     PVC (premature ventricular contraction) 12/03/2014     Hypotension 09/16/2014     Postoperative anemia due to acute blood loss 09/16/2014     Post-operative pain 09/15/2014     Subconjunctival Hemorrhage      Aortic Stenosis      Bicuspid Aortic Valve      Benign Prostatic Hypertrophy      Abrasion Of The Ear      Hyperlipidemia      Cardiomyopathy      Strained Left Pectoralis Major Muscle      Essential Hypercholesterolemia      Aneurysm Of The Abdominal Aorta      Chest Pain      Neck Strain      Osteoarthritis      Lumbar Strain      Blood In Urine      Atrial Fibrillation      Esophageal Reflux      Hypertension 03/31/2012     A-fib (H) 03/31/2012     Iliac artery aneurysm (H) 03/31/2012       Findings:  The bladder was distended.  Patient is status post TURP.  The bladder neck was slightly tight to the flexible scope.  The bladder was otherwise unremarkable.  The mucosa was normal other than 3+ trabeculation.  There were no tumors.    Operative Report:    Patient was prepped and draped in the bedside.  The flexible scope was introduced in the bladder and negotiated through the prostatic urethra in the bladder.  A wire was advanced through the scope and the scope was removed.  A 18 [place] tip catheter was inserted into the bladder with return of fairly clear urine.    Estimated Blood Loss: None      Specimens:    * Cannot find log *       Drains:   Urethral Catheter Coude 18 Fr. (Active)   Output (mL) 700 mL 07/29/20 1501       Implants:  [unfilled]    Complications:     None    Ender Landry     Date: 7/29/2020  Time: 5:25 PM

## 2021-06-10 NOTE — PLAN OF CARE
Problem: Pain  Goal: Patient's pain/discomfort is manageable  Outcome: Progressing   Patient has not requested pain medicine this shift. Patient sleeping comfortably to this point of the night shift.

## 2021-06-10 NOTE — TREATMENT PLAN
07/31/20 1144   Reason for Assessment (RCAT)   RCAT Assesment Initial   Assessment Reason  Other (comment)  (Hip surgery right side)   Vitals (RCAT)   Resp 16   SpO2   (1L NC)   Chart Assessment (RCAT)   Pulmonary Status  2   Surgical Status  1   Chest Xray  0   Patient Assessment (RCAT)    Respiratory Pattern  0   Mental Status  0   Breath Sounds  0   Cough Effectiveness  0   Level of Activity  1   O2 Required SpO2 >= 92%  1   Chart + Pt. Assessment Total Points  5   RCAT Acuity Score 5 (Acuity 1)   Clinical Indications (RCAT)   Volume Expansion Therapy Prevent atelectasis   RCAT Order Placed  Yes;Continue current therapy   RACT assessment done for IS Q4hr WA.  Patient is a former smoker, >1 ppd, quite 40 years ago.  BS clear, dry strong non-productive, Score 5, acuity 1, will follow IS Q4hr WA x 2 days.  RCAT PRN.

## 2021-06-10 NOTE — ED PROVIDER NOTES
Emergency Department Staff Physician Note     I had a face to face encounter with this patient seen by the Advanced Practice Provider (JACQUI).  I have seen, examined, and discussed the patient with the JACQUI and agree with their assessment and plan of management.    I, Dara Mariano, am serving as a scribe to document services personally performed by Dr. Russell, based on my observations and the provider's statements to me.       Relevant HPI:     Parish Bills is a 80 y.o. male who presents to the Emergency Department for evaluation of right hip pain.     The patient reports onset of mild right hip pain yesterday that worsened throughout the day. Normally, the patient walks unassisted at home, but last night he had to use a cane due to the severity of the pain. This morning, the patient was unable to walk. The patient's pain worsens with any movement involving his right hip and occasionally radiates to his right shin. He notes that this hip was replaced ~16 years ago. For pain, the patient took 2 tylenol this morning without relief. He denies any trauma to the hip or recent falls. Also denies fever, abdominal pain, new numbness or tingling in the right leg, and any other physical complaints at this time.     The patient reports history of hypertension, multiple open heart surgeries, and a AAA. He is anticoagulated on warfarin.     The patient lives at home with his wife.    I, Alphonse Russell, attest that Dara Mariano was acting in a scribe capacity, has observed my performance of the services and has documented them in accordance with my direction.      ED Course:  11:57 AM I received the patient report from the JACQUI (Estrellita Henning PA-C). I agree with her assessment and plan of management, and I will see the patient myself.  11:59 AM I met with the patient to introduce myself, gather additional history, perform my initial exam, and discuss the plan.     Exam:  /60   Pulse (!) 59   Temp 98.7  F  "(37.1  C) (Temporal)   Resp 20   Ht 5' 9\" (1.753 m)   Wt 190 lb (86.2 kg)   SpO2 93%   BMI 28.06 kg/m    General: Well developed elderly male patient, resting in bed  CV: RRR, extremities well perfused  Respiratory: Breathing comfortably on room air  Abdomen: Soft  MSK:        Impression / ED Plan:  Parish Bills is a 80 y.o. male presents to the ED for evaluation of hip pain.  He has significant pain with any attempted passive flexion or abduction of the right hip.  Plain films negative for acute fracture and there is not any trauma history.  Differential would include occult fracture versus septic arthritis, CRP is somewhat elevated but white blood cell count is normal and the patient is afebrile.  Unfortunately is also anticoagulated for chronic atrial fibrillation, so will need to be reversed prior to proceeding with IR guided arthrocentesis to rule out infection.  He will be admitted for ongoing management, orthopedic consultation.  He was stable otherwise in the ER with well-controlled pain while at rest.      Alphonse Russell MD  Staff Physician  [facility] Emergency Department     Alphonse Russell MD  08/04/20 [id number]    "

## 2021-06-10 NOTE — PLAN OF CARE
Problem: Pain  Goal: Patient's pain/discomfort is manageable  Outcome: Progressing     Problem: Safety  Goal: Patient will be injury free during hospitalization  Outcome: Progressing     Problem: Psychosocial Needs  Goal: Demonstrates ability to cope with hospitalization/illness  Outcome: Progressing  Goal: Collaborate with patient/family/caregiver to identify patient specific goals for this hospitalization  Outcome: Progressing   No verbal or nonverbal expressions of pain, clear liquid diet, advance as tolerated.

## 2021-06-10 NOTE — PLAN OF CARE
Problem: Psychosocial Needs  Goal: Collaborate with patient/family/caregiver to identify patient specific goals for this hospitalization  Outcome: Progressing   Pt up working with therapy. Pt prefers to just use tylenol for pain. Pt has garcia in place.

## 2021-06-10 NOTE — PLAN OF CARE
Problem: Pain  Goal: Patient's pain/discomfort is manageable  Outcome: Progressing     Pt states pain has mostly resolved and denied need for pain medication. Francis catheter patent and draining. NPO since midnight for possible surgery today.

## 2021-06-10 NOTE — ED TRIAGE NOTES
W/c to triage.  Pt states had R hip replaced 16 yrs ago and no trouble since but started having pain in R hip yesterday while walking around a store.

## 2021-06-10 NOTE — ANESTHESIA POSTPROCEDURE EVALUATION
Patient: Parish Bills  Procedure(s):  REVISION, ARTHROPLASTY, HIP, WITH FEMORAL HEAD AND  ACETABULAR LINER REPLACEMENT, WITH IRRIGATION + DEBRIDEMENT (Right)  Anesthesia type: general    Patient location: PACU  Last vitals:   Vitals Value Taken Time   /62 7/31/2020  3:20 PM   Temp 36.4  C (97.6  F) 7/31/2020  3:20 PM   Pulse 83 7/31/2020  3:20 PM   Resp 16 7/31/2020  3:20 PM   SpO2 93 % 7/31/2020  3:20 PM     Post vital signs: stable  Level of consciousness: awake and responds to simple questions  Post-anesthesia pain: pain controlled  Post-anesthesia nausea and vomiting: no  Pulmonary: unassisted, return to baseline  Cardiovascular: stable and blood pressure at baseline  Hydration: adequate  Anesthetic events: no    QCDR Measures:  ASA# 11 - Lyn-op Cardiac Arrest: ASA11B - Patient did NOT experience unanticipated cardiac arrest  ASA# 12 - Lyn-op Mortality Rate: ASA12B - Patient did NOT die  ASA# 13 - PACU Re-Intubation Rate: ASA13B - Patient did NOT require a new airway mgmt  ASA# 10 - Composite Anes Safety: ASA10A - No serious adverse event    Additional Notes:

## 2021-06-10 NOTE — TELEPHONE ENCOUNTER
ID Team/ Dr Pickard      States that pt is suppose to f/u in 2 weeks after hospital discharge/surgery.     Please confirm date & if ok for in person/phone appt. States they do not have video capability.

## 2021-06-10 NOTE — PLAN OF CARE
[id number]:    Problem: Pain  Goal: Patient's pain/discomfort is manageable  Outcome: Progressing   C/O minimal pain to R hip overnight. Declined intervention. Educated on when and how to ask for pain medication; verbalized understanding.     Problem: Daily Care  Goal: Daily care needs are met  Outcome: Progressing   NPO since midnight. Maintenance IVF running per order. Francis catheter intact. Pre-op checklist complete prior to surgery. Transferred to [facility] around 0630. Beta blockers administered prior to [facility] transfer.

## 2021-06-10 NOTE — TELEPHONE ENCOUNTER
Received a faxed INR result for Parish Bills  From [facility]  INR result dated 8/4/2020 is 2.33

## 2021-06-10 NOTE — TELEPHONE ENCOUNTER
ANTICOAGULATION  MANAGEMENT- Home Care/Care Facility Result    Assessment     Today's INR result of 3.0 is Therapeutic (goal INR of 2.0-3.0)        More warfarin taken than instructed which may be affecting INR Maddy says he took 5 mg daily since leaving the hospital    No new diet changes affecting INR    No new medication/supplements affecting INR    Continues to tolerate warfarin with no reported s/s of bleeding or thromboembolism     Previous INR was Therapeutic    Plan:     Spoke with [facility] nurse maddy discussed INR result and instructed:     Warfarin Dosing Instructions: 2.5 mg tonight then continue current warfarin dose 2.5 mg daily on tue; and 5 mg daily rest of week  (0 % change)    Next INR to be drawn: tue 8/25 with home care    Maddy verbalizes understanding and agrees to warfarin dosing plan.   ?   Zahra Gonzalez RN    Subjective/Objective:      Parish Bills, a 80 y.o. male is established on warfarin.     Home care/care facility RN's report of Parish INR, recent warfarin dosing, diet changes, medication changes, and symptoms is documented below.    Additional findings: none    Anticoagulation Episode Summary     Current INR goal:   2.0-3.0   TTR:   90.1 % (11.7 mo)   Next INR check:   8/25/2020   INR from last check:   3.00 (8/20/2020)   Weekly max warfarin dose:      Target end date:      INR check location:      Preferred lab:      Send INR reminders to:   [facility]    Indications    A-fib (H) (Resolved) [I48.91]           Comments:            Anticoagulation Care Providers     Provider Role Specialty Phone number    Isidro Au MD Referring Family Medicine [phone]

## 2021-06-10 NOTE — PROGRESS NOTES
Daily Progress Note    CODE STATUS:  Full Code    08/02/20  Assessment/Plan:  80 y.o. 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 fib, AV node ablation s/p BiV pacemaker, hypertension, right hip replacement 16 years ago who presented to ED for evaluation of atraumatic right hip pain for 1 day duration and admitted for further management     Acute right hip pain, atraumatic; infection of prosthetic RAMÓN  History of right hip replacement 16 years ago;  -No reported trauma.  No reported febrile illness, urinary changes or leg weakness  -CT pelvis reported no evidence of periprosthetic fracture, no dislocation.  -Normal WBC count.  Elevated CRP at 4.1  -Patient had right hip arthrocentesis after reversing INR on 7/29 and culture grew Streptococcus mutans  -Dr. Jerez did right hip I&D and a right hip arthroplasty revision, head and liner exchange on 7/31   -Continue scheduled Tylenol, PRN PO/V Dilaudid.  Monitor for sedation/hallucination  -IV vancomycin changed to IV Rocephin after culture results by ID on 8/2, recommended to continue total of 28 days.  - consulted for TCU versus home with home care arrangement on discharge    History of Tissue AVR with LIMA to LAD in 2018;  Chronic atrial fibrillation;  AV node ablation s/p Bi V pacemaker;  -No chest pain, dyspnea on exertion, dizziness.    -Echocardiogram on 10/2018 showed EF 55%  -Continue PTA medications-Lipitor, Toprol-XL, aspirin  -Given patient complex cardiac history, I did discussed with patient's primary cardiologist Dr. Trujillo about anticoagulation/Coumadin which is currently on hold refer above and reported okay to hold and when we resume Coumadin no need to bridge.  -INR subtherapeutic due to holding Coumadin for surgical procedure, restarted on Coumadin on 7/31, monitor INR closely.  Discussed with pharmacist     Acute kidney injury, mild;  -Creatinine improved with gentle hydration.    Gross hematuria;  resolved  Acute urinary retention due to BPH;  Proteus mirabilis UTI on 7/29;  -Patient reported passing drops of blood when urinating.  Denied suprapubic pain, dysuria, no fever or chills  -UA showed RBC but negative nitrite, leukocyte and WBC but UC positive for Proteus mirabilis on 7/29  -Appreciated urology input.  Patient had bedside cystoscopy with Francis catheter placement and procedure note reported a status post TURP, bladder neck was slightly tight otherwise unremarkable, no tumors  -Started on Flomax, continue.  Plan to do voiding trial before discharge.  Discussed with urology team.    -Patient received antibiotics for UTI.     Chronic normocytic anemia;  Chronic intermittent thrombocytopenia;  -Slight drop in hemoglobin likely due to hemodilution and postop.  Platelet count fluctuating. Monitor labs     Hyperglycemia, likely due to stress, improved  Patient denied history of diabetes.  A1c 5.6.  Patient reported history of high blood sugar after surgery in the past.      DVT prophylaxis;  -Resumed home Coumadin on 7/31      Disposition; home with home care versus TCU  Barrier to discharge; IV antibiotics, culture pending     LOS: 5 days     Subjective:  Interval History: Patient seen and examined.  Notes, labs, imaging report personally reviewed.  Patient denies short of breath or chest pain or dizziness.  Denied abdomen pain, nausea, vomiting.  Denied fever or chills.  Reported right hip pain is fairly controlled.  Reported overnight feeling stiffness which likely due to therapy he had yesterday after several days.  Discussed with nursing staff.    Review of Systems:   As mentioned in subjective.    Patient Active Problem List   Diagnosis     Aortic Stenosis     Bicuspid Aortic Valve     Benign Prostatic Hypertrophy     Abrasion Of The Ear     Hyperlipidemia     Cardiomyopathy     Strained Left Pectoralis Major Muscle     Essential Hypercholesterolemia     Aneurysm Of The Abdominal Aorta     Chest Pain      Neck Strain     Osteoarthritis     Lumbar Strain     Blood In Urine     Atrial Fibrillation     Esophageal Reflux     Subconjunctival Hemorrhage     Post-operative pain     Hypertension     Hypotension     Postoperative anemia due to acute blood loss     A-fib (H)     Cough     Edema     Lumbar radiculopathy     Malignant neoplasm of skin     Wheezing     Biventricular cardiac pacemaker in situ     Malignant tumor of rectum (H)     Abdominal aortic aneurysm (AAA) (H)     Atrial flutter, paroxysmal (H)     Benign neoplasm of ascending colon     Coagulopathy (H)     Diverticular disease of large intestine     Dysthymia     Encounter for screening for malignant neoplasm of colon     Family history of colon cancer     Family history of malignant neoplasm of digestive organ     H/O mitral valve repair     History of colonic polyps     Iliac artery aneurysm (H)     Nonrheumatic aortic valve stenosis     Polyp of ascending colon, unspecified type     PVC (premature ventricular contraction)     Rectal polyp     S/P AVR     Stress hyperglycemia     Weight loss     Right hip pain     Infection of prosthetic total hip joint, subsequent encounter       Scheduled Meds:    acetaminophen  1,000 mg Oral TID     aspirin  81 mg Oral Daily     atorvastatin  40 mg Oral QHS     buPROPion  150 mg Oral QAM     cefTRIAXone  2 g Intravenous Q24H     docusate sodium  100 mg Oral BID     furosemide  20 mg Oral DAILY     gabapentin  100 mg Oral QHS     insulin aspart (NovoLOG) injection   Subcutaneous TID with meals     metoprolol succinate  50 mg Oral DAILY     polyethylene glycol  17 g Oral DAILY     senna-docusate  1 tablet Oral BID     sertraline  150 mg Oral DAILY     sodium chloride  10-30 mL Intravenous Q8H FIXED TIMES     sodium chloride  2.5 mL Intravenous Line Care     sodium chloride  3 mL Intravenous Line Care     tamsulosin  0.4 mg Oral Daily after brkfst     warfarin - daily dose required   Other Med Consult or Protocol      Continuous Infusions:    PRN Meds:.acetaminophen, acetaminophen, aluminum-magnesium hydroxide-simethicone, bisacodyL, dextrose 50 % (D50W), glucagon (human recombinant), HYDROmorphone, HYDROmorphone, hydrOXYzine HCL, lidocaine (PF), loratadine, LORazepam, magnesium hydroxide, metoclopramide **OR** metoclopramide **OR** metoclopramide, naloxone **OR** naloxone, nitroglycerin, ondansetron, ondansetron, sodium chloride bacteriostatic, sodium chloride bacteriostatic, sodium chloride, sodium chloride, sodium chloride, sodium phosphates 133 mL    Objective:  Vital signs in last 24 hours:  Temp:  [98.2  F (36.8  C)-98.8  F (37.1  C)] 98.2  F (36.8  C)  Heart Rate:  [58-64] 64  Resp:  [14-16] 16  BP: (110-144)/(53-67) 144/67  Weight:   197 lb (89.4 kg)      Intake/Output Summary (Last 24 hours) at 8/2/2020 1101  Last data filed at 8/2/2020 0555  Gross per 24 hour   Intake 1080 ml   Output 1850 ml   Net -770 ml       Physical Exam:  General: Not in obvious distress.  HEENT: Normocephalic, supple neck  Chest: Clear to auscultation bilateral anteriorly, no wheezing  Heart: S1S2 normal, irregular  Abdomen: Soft. NT, ND. Bowel sounds- active.  Extremities: No legs swelling  Neuro: alert and awake, moves all extremities  Genitourinary; Francis catheter in place, draining yellow urine  Musculoskeletal; right hip dressi in place, noticed mild swelling around but no bruises    Lab Results:(I have personally reviewed the results)  Results from last 7 days   Lab Units 08/01/20  0609 07/30/20  0554 07/29/20  0630 07/28/20  0950   LN-SODIUM mmol/L  --   --  138 138   LN-POTASSIUM mmol/L 4.5  --  4.1 4.4   LN-CHLORIDE mmol/L  --   --  106 104   LN-CO2 mmol/L  --   --  26 25   LN-BLOOD UREA NITROGEN mg/dL  --   --  26 29*   LN-CREATININE mg/dL 0.89 0.85 1.00 1.25   LN-CALCIUM mg/dL  --   --  8.2* 8.6     Results from last 7 days   Lab Units 08/02/20  0616 08/01/20  0609 07/31/20  0514 07/30/20  0554 07/28/20  0950   LN-WHITE BLOOD CELL  COUNT thou/uL  --   --  5.6 6.6 7.6   LN-HEMOGLOBIN g/dL 9.1* 9.3* 10.2* 10.3* 11.3*   LN-HEMATOCRIT %  --   --  30.3* 30.7* 33.8*   LN-PLATELET COUNT thou/uL  --   --  132* 94* 131*         IV Access:  [REMOVED] Peripheral IV 07/31/20 Left Wrist-Line Status: Capped, Saline locked  Peripheral IV 07/28/20 Right;Anterior (front);Medial (center/inner) Antecubital-Line Status: Saline locked, Flushed  PICC Single Lumen 08/01/20 Basilic Right-Line Status: Blood return noted, Flushed, Capped  PICC: [REMOVED] Peripheral IV 07/31/20 Left Wrist-Site Skin Assessment: Checked, Clean, Dry, Intact  Peripheral IV 07/28/20 Right;Anterior (front);Medial (center/inner) Antecubital-Site Skin Assessment: Checked, Dry, Intact  PICC Single Lumen 08/01/20 Basilic Right-Site Skin Assessment: Checked, Dry, Intact      Ct Pelvis Without Oral Without Iv Contrast  Result Date: 7/28/2020  1.  Postoperative changes related to prior right total hip replacement. There is no evidence of a periprosthetic fracture. No dislocation.    Xr Pelvis W 2 Vw Hip Right  Result Date: 7/28/2020  Postoperative changes of right total hip arthroplasty. Components appear well seated. There is moderate severity degenerative change at the left hip joint. No evidence for fracture. Pelvis otherwise negative. Iliac artery stents.    Latest radiology report personally reviewed.    Note created using dragon voice recognition software so sounds alike errors may have escaped editing.    8/2/2020  BRIGID AKBAR MD  HOSPITALIST, [facility]  [facility] [phone]

## 2021-06-10 NOTE — PROGRESS NOTES
Tele-Visit Details    Type of service:  Video Visit    Time Service Began (time 1st connected with pt): 240    Time Service Ended (time completely finished with pt): 257    Video Start Time (time video started): above    Video End Time (time video stopped): above    Originating Location (pt. Location): Patient Home    Distant Location (provider location):  [facility] INFECTIOUS DISEASE     Reason for Televisit: hospital FU    Mode of Communication:  Video Conference via [employer]    Physician has received verbal consent for a video visit from the patient? Yes    Pt interviewed by video.  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  Right hip incision, irrigation, excisional debridement  2.  Right hip arthroplasty revision, head and liner exchange     Possible mild proteus UTI  RECOMMENDATION:  CTX times 28 days  See me office 21-28 days    AUGUST 14 he got admitted with a ipsilateral R leg/thigh hematoma and redness which responded to IV abx during admission.  That last note is here:  ASSESSMENT:  Recent revision right total hip arthroplasty, Streptococcus mitis infection- active issue  Status post I&D 2 weeks ago, large 14 cm fluid collection?  Hematoma?  Infection  Patient had been on IV ceftriaxone at home  Acute spontaneous right hip pain, drainage, Ortho evaluation suggests hematoma  Clinically improving  Lower extremity extremity swelling and redness w/o warm or tenderness, abating with wraps.  Also LE edema. Albumin only 2.8.  Hx AVR     PLAN:  Continue IV ceftriaxone z 28 days from 7/31 per previous plan.  Lasix po added today for edema.  Cont weekly cbc, crp, creat  F/u wkt Dr. Pickard.    VIDEO VISIT TODAY:   Hip looks good, seeing Stroemer tomorrow.  ON CTX still.  Plan is till about sept 1.  CRP is around 6.5 this admit last week but could be from the  hematoma.  Otherwise stable, picc ok    EXAM:  Leg and hip wound examined improving, wound is clean    LAB:  Above    IMP:  Strep infection of R hip post one stage washout, c/b hematoma aug 14 (2 wks later), just got out of [facility] 3 days ago.  Plan was kept same    REC:  CTX to sept 1  Labs next Monday and the Monday after  Reassess once those labs are reviewed.      Matt Pickard

## 2021-06-10 NOTE — TELEPHONE ENCOUNTER
ANTICOAGULATION  MANAGEMENT    Assessment     Today's INR result of 2.3 is Therapeutic (goal INR of 2.0-3.0)        Warfarin taken as previously instructed    No new diet changes affecting INR    No new medication/supplements affecting INR    Continues to tolerate warfarin with no reported s/s of bleeding or thromboembolism     Previous INR was Subtherapeutic     Weekly inr scheduled with [facility] [place] [phone]    Plan:     Spoke with Parish regarding INR result and instructed:     Warfarin Dosing Instructions:  Continue current warfarin dose 5 mg daily on tue/thu/sat; and 7.5 mg daily rest of week      Instructed patient to follow up no later than: one week with home infusion      Parish verbalizes understanding and agrees to warfarin dosing plan.    Instructed to call the [facility] for any changes, questions or concerns. (#[phone])   ?   Zahra Gonzalez RN    Subjective/Objective:      Parish Bills, a 80 y.o. male is on warfarin.     Parish reports:     Home warfarin dose: verbally confirmed home dose with Parish and updated on anticoagulation calendar     Missed doses: No    Anticoagulation Episode Summary     Current INR goal:   2.0-3.0   TTR:   92.7 % (11.9 mo)   Next INR check:   8/11/2020   INR from last check:   2.33 (8/4/2020)   Weekly max warfarin dose:      Target end date:      INR check location:      Preferred lab:      Send INR reminders to:   [facility]    Indications    A-fib (H) [I48.91]           Comments:            Anticoagulation Care Providers     Provider Role Specialty Phone number    Isidro Au MD Referring Family Medicine [phone]

## 2021-06-10 NOTE — CONSULTS
[facility]   Consultation - GROSS HEMATURIA    Type of Consult: Inpatient  Place of Service: [facility]  Reason for Consultation: Gross hematuria   Consult Requested by: Dr. Rae    History of Present Illness: 79 y/o male with hx of BPH and chronic A.fib admitted for right hip pain. While admitted, patient reported development of gross hematuria with blood clots. No prior hx of gross hematuria. He is voiding similar to baseline urinary function and denies suprapubic fullness/pain. Patient takes coumadin daily which has been held since admission. INR 2.03 which is down from 2.77.   Patient previously seen by Dr. Landry greater than 10 years ago. A TURP was completed 3/1/2005. Patient voiding well after procedure until the last 3-4 years. He has noticed weakening in his urine stream, terminal dribbling, and difficulty starting stream.  Currently, patient is resting in bed with wife at bedside. He denies dysuria, abdominal pain, fevers, N/V, and penile discharge. Patient reports a penile lesion that has been present on foreskin for 10-15 years without change.     Cultures:  Urine Culture: pending  Blood Culture: pending    Pertinent Labs:   Lab Results: personally reviewed   Lab Results   Component Value Date     07/29/2020     07/28/2020     01/24/2020    K 4.1 07/29/2020    K 4.4 07/28/2020    K 4.8 01/24/2020    CO2 26 07/29/2020    CO2 25 07/28/2020    CO2 26 01/24/2020    BUN 26 07/29/2020    BUN 29 (H) 07/28/2020    BUN 27 01/24/2020    CREATININE 1.00 07/29/2020    CREATININE 1.25 07/28/2020    CREATININE 1.19 01/24/2020    CALCIUM 8.2 (L) 07/29/2020    CALCIUM 8.6 07/28/2020    CALCIUM 8.5 01/24/2020     Lab Results   Component Value Date    WBC 7.6 07/28/2020    WBC 4.5 01/24/2020    WBC 4.7 08/28/2018    WBC 8.6 06/22/2014    HGB 11.3 (L) 07/28/2020    HGB 12.2 (L) 01/24/2020    HGB 10.8 (L) 08/28/2018    HCT 33.8 (L) 07/28/2020    HCT 35.7 (L) 01/24/2020    HCT 32.2 (L) 08/28/2018    MCV  93 07/28/2020    MCV 92 01/24/2020    MCV 89 08/28/2018     (L) 07/28/2020     (L) 01/24/2020     08/28/2018       Pertinent Radiology:   EXAM: CT PELVIS WO ORAL WO IV CONTRAST  LOCATION: [facility]  DATE/TIME: 7/28/2020 11:05 AM     INDICATION: Hip replaced, periprosthetic fracture suspected Atraumatic right hip pain, negative XR, S/P hip replacement.  COMPARISON: 7/28/2020.  TECHNIQUE: CT scan of the pelvis was performed without IV contrast. Multiplanar reformats were obtained. Dose reduction techniques were used.  CONTRAST: None.     FINDINGS:  Postoperative changes related to a right total hip replacement. While assessment at the bone metallic interface is difficult due to artifact, there is no dislocation or evidence of an acute periprosthetic fracture. Stable small amount of lucency   surrounding the acetabular cup bone metallic interface, unchanged.     Chronic appearing heterotopic ossification along the right iliac wing.     Chronic bony ankylosis along the anterior aspect of both SI joints. Prior posterior decompressive changes in the lower lumbar spine.     Aortoiliac stenting for an abdominal aortic aneurysm. Mild scattered colonic diverticulosis. No free fluid in the pelvis.     IMPRESSION:   1.  Postoperative changes related to prior right total hip replacement. There is no evidence of a periprosthetic fracture. No dislocation.    Past Medical History:   Diagnosis Date     Abdominal aortic aneurysm (H)      Alcohol abuse      Anemia      Aortic stenosis      Arthritis      Atrial fibrillation (H)      BPH (benign prostatic hyperplasia)      CAD (coronary artery disease)      Cardiomyopathy (H)      Cataract     left     Chest pain      CHF (congestive heart failure) (H)      Congenital insufficiency of aortic valve      Coronary artery disease      Disease of pancreas     gallstones related     Dysthymia      Enlarged prostate      FH: mitral valve repair      H/O aortic  valve repair '     High blood pressure      Hypercholesteremia      Hyperlipidemia      Hypertrophy of prostate      Lumbar radiculopathy      Mitral valve prolapse      Neck strain      Nonrheumatic aortic (valve) stenosis      Osteoarthrosis      Pacemaker      Rectal cancer (H)      Reflux esophagitis      Subconjunctival bleed        Past Surgical History:   Procedure Laterality Date     ABDOMINAL AORTIC ANEURYSM REPAIR  2009     AORTIC VALVE REPLACEMENT       CARDIOVERSION      X3     CARDIOVERSION       CERVICAL FUSION  1972     CHOLECYSTECTOMY       CORONARY ARTERY BYPASS GRAFT      x1 with LIMA to LAD     CT coronary angiogram       EMBOLIZATION  01/31/2018     [facility] AAA REPAIR EXPOSE ILIAC ART ABD INCIS UNILAT   2009     ILIAC ARTERY ANEURYSM REPAIR  03/29/2012     JOINT REPLACEMENT Right     RAMÓN     LAPAROSCOPIC CHOLECYSTECTOMY       LUMBAR LAMINECTOMY Right 9/15/2014    Procedure: OPEN DECOMPRESSION L2-4 RIGHT ;  Surgeon: Elroy French MD;  Location: [facility];  Service:      MITRAL VALVE REPAIR  2006     MD ARTHRODESIS ANT INTERBODY MIN DISCECTOMY, CERVICAL BELOW C2      Description: Cervical Vertebral Fusion;  Recorded: 04/18/2008;     MD EXCIS RECTAL TUMOR, TRANSANAL, PARTIAL THICKNESS N/A 3/6/2017    Procedure: TRANSANAL EXCISION OF RECTAL TUMOR AND PROCTOSCOPY;  Surgeon: Alexander Mcgrath MD;  Location: [facility];  Service: General     MD REANEURYSM/GRFT INS,ABDOMINAL AORTA      Description: Abdominal Aortic Aneurysm Repair For Dilation Or Occlusion;  Recorded: 04/05/2012;     MD SURG ONLY REMOVE CATARACT INTRACAP INSERT LENS   2010     STERNOTOMY      redo     TOTAL HIP ARTHROPLASTY Right 2004     TRANSURETHRAL RESECTION OF PROSTATE         Family History   Family history unknown: Yes       Social History     Socioeconomic History     Marital status:      Spouse name: Not on file     Number of children: Not on file     Years of education: Not on file     Highest  education level: Not on file   Occupational History     Not on file   Social Needs     Financial resource strain: Not on file     Food insecurity     Worry: Not on file     Inability: Not on file     Transportation needs     Medical: Not on file     Non-medical: Not on file   Tobacco Use     Smoking status: Former Smoker     Types: Cigarettes     Last attempt to quit: 9/15/1980     Years since quittin.8     Smokeless tobacco: Never Used   Substance and Sexual Activity     Alcohol use: No     Comment: quit      Drug use: No     Sexual activity: Not on file   Lifestyle     Physical activity     Days per week: Not on file     Minutes per session: Not on file     Stress: Not on file   Relationships     Social connections     Talks on phone: Not on file     Gets together: Not on file     Attends Sabianism service: Not on file     Active member of club or organization: Not on file     Attends meetings of clubs or organizations: Not on file     Relationship status: Not on file     Intimate partner violence     Fear of current or ex partner: Not on file     Emotionally abused: Not on file     Physically abused: Not on file     Forced sexual activity: Not on file   Other Topics Concern     Not on file   Social History Narrative    Has not smoked for 35 years.       No current facility-administered medications on file prior to encounter.      Current Outpatient Medications on File Prior to Encounter   Medication Sig Dispense Refill     amoxicillin (AMOXIL) 500 MG capsule Take 2,000 mg by mouth as needed. As needed for Hx mitral valve repair. Take one hour before appointment       aspirin (ASPIRIN LOW DOSE) 81 MG EC tablet Take 1 tablet by mouth daily.       atorvastatin (LIPITOR) 40 MG tablet TAKE 1 TABLET BY MOUTH AT BEDTIME. 90 tablet 3     buPROPion (WELLBUTRIN XL) 150 MG 24 hr tablet TAKE 1 TABLET BY MOUTH EVERY MORNING 90 tablet 3     folic acid/multivit-min/lutein (CENTRUM SILVER ORAL) Take 1 tablet by mouth  daily.       furosemide (LASIX) 20 MG tablet TAKE 1 TABLET BY MOUTH 2 TIMES A DAY (Patient taking differently: Take 20 mg by mouth daily. ) 180 tablet 0     hydrOXYzine HCl (ATARAX) 25 MG tablet Take 1 tablet (25 mg total) by mouth every 6 (six) hours as needed for anxiety. 360 tablet 0     metoprolol succinate (TOPROL-XL) 50 MG 24 hr tablet TAKE ONE TABLET BY MOUTH DAILY. HOLD FOR SYSTOLIC BP <95 OR HEART RATE <55 90 tablet 3     nitroglycerin (NITROSTAT) 0.4 MG SL tablet PLACE 1 TABLET UNDER THE TONGUE EVERY 5 MINUTES UP TO 3 DOSES AS NEEDED; CALL 911 AFTER TAKING FIRST DOSE 25 tablet 0     sertraline (ZOLOFT) 100 MG tablet TAKE ONE TABLET BY MOUTH ONCE DAILY ALONG WITH 50MG TO EQUAL 150MG 90 tablet 0     sertraline (ZOLOFT) 50 MG tablet TAKE ONE TABLET BY MOUTH ONCE DAILY ALONG WITH 100MG TO EQUAL 150MG 90 tablet 0     warfarin ANTICOAGULANT (COUMADIN/JANTOVEN) 5 MG tablet Take 5-7.5 mg by mouth See Admin Instructions. Takes 5 mg on Tuesday, Thursday, and Saturday and 7.5 mg all other days         Review of Systems:   Consitutional / General: Denies wt changes, fevers, chills or sweats.  Allergies:   Allergies   Allergen Reactions     Hydrocodone-Acetaminophen Other (See Comments)     hallucination     Lisinopril Cough     Oxycodone-Acetaminophen Other (See Comments)     hallucination     Amiodarone Rash   Respiratory: Denies SOB, cough, sputum production or dyspnea on exertion.   Cardiovascular: Denies chest pain, palpitations, orthostatic hypotension  Gastrointestinal:  Denies loss of appetite, dysphagia, N/V, constipation or diarrhea.   Neurologic: Denies headache, LOC, memory loss, vertigo, syncope or seizures.   Musculoskeletal: Denies back pain, numbness, tingling or hx of back surgery  Psychological: alert and oriented    Intake/Output past 24 hrs:    Intake/Output Summary (Last 24 hours) at 7/29/2020 1224  Last data filed at 7/29/2020 0645  Gross per 24 hour   Intake 674.83 ml   Output 500 ml   Net 174.83  ml       Physical Exam:  Temp:  [98.1  F (36.7  C)-100.2  F (37.9  C)] 98.2  F (36.8  C)  Heart Rate:  [60-97] 61  Resp:  [18-20] 20  BP: (100-159)/(54-72) 124/61  General: NAD, alert, cooperative  Abdomen: soft, non-tender, non-distended. No suprapubic fullness or tenderness.   : No urine available to evaluate. Uncircumcised penis. Raised, circular, rough lesion on distal, right foreskin. Lesion is non-tender.   Psychological: alert and oriented, answers questions appropriately    Assessment: Parish Bills is being seen by [facility] for gross hematuria.   1. PVR bladder scan ordered to further evaluate bladder emptying.   2. CT Urogram and urine culture ordered to further evaluate gross hematuria.   3. Continue tamsulosin. Encouraged patient to increase water intake once NPO status removed.   4. INR being reversed. Gross hematuria may resolve with INR reversal.     The patient and I talked at length about etiologies of hematuria.  We discussed the implications of hematuria and many of the possible etiologies including infection, malignancy, scar tissue, idiopathic, urinary tract stones, intrinsic renal disease, the use of blood thinners, bacteriuria, heavy lifting, straining, constipation, UTI, and others.  We discussed the fact that hematuria can be indicative of serious urinary tract pathology.  Alternative diagnostic and treatment options were discussed with the patient in detail.      Thank you for consulting [facility] regarding this patient's care. We will continue to follow. Please contact us with questions or concerns.     Domenico Sosa  [facility]  Office Phone: #[phone]    Addendum: PVR bladder scan elevated for greater than 450 cc. Due to elevated bladder scan and gross hematuria, will place garcia catheter.     Procedure:  The patient's urethra was prepped with Betadine solution. A 20 Fr 3-way garcia catheter was liberally lubricated and inserted with significant difficulty due to  enlarged prostate. The catheter was unable to pass beyond the prostate. The patient's urethra was prepped again with Betadine solution. A 16 Fr garcia catheter was liberally lubricated and inserted with significant difficulty due to enlarged prostate. The catheter was unable to pass beyond the prostate. Dr. Landry was called in for assistance with cystoscopy and use of medrano wire. 18 fr catheter was placed over medrano wire successfully. 10 cc balloon was inflated. The patient appeared to tolerate the procedure with moderate discomfort.  The catheter was connected to a gravity drainage bag and secured to the left thigh. Yellow urine draining freely through catheter tubing.     No bladder abnormalities noted by Dr. Landry during cystoscopy.

## 2021-06-10 NOTE — PROGRESS NOTES
[facility] Progress Note    SUBJECTIVE:  Stable.      REVIEW OF SYSTEMS:  Negative unless as listed above.  Social history, Family history, Medications: reviewed.    OBJECTIVE:  Vitals:    08/01/20 0720   BP: 132/62   Pulse: 61   Resp: 16   Temp: 98  F (36.7  C)   SpO2: 96%                 PHYSICAL EXAM:  Alert, awake  Vitals tabulated above, reviewed  Neck supple without lymphadenopathy  Sclera normal color, not injected  CARDIOVASCULAR regular rate and rhythm, no murmur  Lungs CLEAR TO AUSCULTATION   Abdomen soft, NT/ND, absent HEPATOSPLENOMEGALY  Skin normal  Joints normal  Neurologic exam non focal    Antibiotics:vanco    Pertinent labs:  Results from last 7 days   Lab Units 08/01/20  0609 07/31/20  0514 07/30/20  0554 07/28/20  0950   LN-WHITE BLOOD CELL COUNT thou/uL  --  5.6 6.6 7.6   LN-HEMOGLOBIN g/dL 9.3* 10.2* 10.3* 11.3*   LN-HEMATOCRIT %  --  30.3* 30.7* 33.8*   LN-PLATELET COUNT thou/uL  --  132* 94* 131*       Results from last 7 days   Lab Units 08/01/20  0609  07/29/20  0630   LN-SODIUM mmol/L  --   --  138   LN-POTASSIUM mmol/L 4.5  --  4.1   LN-CHLORIDE mmol/L  --   --  106   LN-CO2 mmol/L  --   --  26   LN-BLOOD UREA NITROGEN mg/dL  --   --  26   LN-CREATININE mg/dL 0.89   < > 1.00   LN-CALCIUM mg/dL  --   --  8.2*    < > = values in this interval not displayed.                   MICROBIOLOGY DATA:     Culture  50,000-100,000 col/ml Proteus mirabilisAbnormal            Resulting Agency: [facility]    Susceptibility      Proteus mirabilis      FRANCISCA      Amoxicillin + Clavulanate  <=4/2   Sensitive      Ampicillin  <=4   Sensitive      Cefazolin  8   Sensitive      Cefepime  <=1   Sensitive      Ceftriaxone  <=1   Sensitive      Ciprofloxacin  <=0.5   Sensitive      Gentamicin  4   Sensitive      Levofloxacin  <=1   Sensitive      Nitrofurantoin  64   Resistant      Tetracycline  >8   Resistant      Tobramycin  4   Sensitive      Trimethoprim + Sulfamethoxazole  <=0.5   Sensitive                       IMAGING/RADIOLOGY:          ASSESSMENT:  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  Right hip incision, irrigation, excisional debridement  2.  Right hip arthroplasty revision, head and liner exchange    Possible mild proteus UTI  RECOMMENDATION:  Keflex for urine  vanco for hip infection, pending cx from initial small amount of IR fluid and  OR fluid was obtained.  Thanks to ortho again  Here thru weekend, picc today  Pt and wife understand    MARJ Pickard MD  Office [phone] option 2 to desk staff

## 2021-06-10 NOTE — TELEPHONE ENCOUNTER
Arturo notified of verbal orders. She will inform patient and his wife of dosing change on Furosemide. Patient is scheduled to see Dr Au on Tuesday and can recheck edema at that time.

## 2021-06-10 NOTE — PLAN OF CARE
Problem: Pain  Goal: Patient's pain/discomfort is manageable  Outcome: Progressing     Problem: Safety  Goal: Patient will be injury free during hospitalization  Outcome: Progressing     Problem: Daily Care  Goal: Daily care needs are met  Outcome: Progressing   Patient alert and orientated.  Patient c/o mild back pain but denies interventions.  PICC and PIV flushing and patent.   Some small clots and pink tinge in urine at beginning of shift but resolved.

## 2021-06-10 NOTE — PLAN OF CARE
Goal: Patient s discharge needs are met.  Outcome: Care Progression reviewed with Hospitalist, Care Manager.  Discharge Disposition: Discussed and plan to discharge to: Home, possibly with home care  Planned Discharge Date: 1-2 days  Problem: Barriers to discharge include: Right hip pain, orthopedic consult, pain management, urology consult, 02 needs, waiting for culture report  Transportation needs/Ride Time: Family    CM note-CM spoke with patient & wife re discharge planning. They live in a senior apartment, handicap accessible, and receive no services at this time. CM discussed possibility of home with home care with therapy. Both would be agreeable, no referral yet, will wait for PT/OT recommendations.     CM spoke with patient and his wife about surgery scheduled for 7/31 and for the need for LT IV antibiotics. Pt & wife are overwhelmed with information, say they need some time to think about this. CM explained that we will help with entire process, whether he goes home with IV abx & PT/OT or if he goes to TCU. CM will follow for surgical recommendations & PT/OT recommendations.

## 2021-06-10 NOTE — PROGRESS NOTES
[facility]   Daily Progress Note    Place of Service: [facility]  Reason for Follow up: gross hematuria and urinary retention    Subjective  Patient reports feeling well. Tolerating garcia catheter well. Urine yellow without blood clots.   Creatinine: 0.85    Objective  Intake/Output last 24 hrs:    Intake/Output Summary (Last 24 hours) at 7/30/2020 1329  Last data filed at 7/30/2020 1203  Gross per 24 hour   Intake 1240 ml   Output 1850 ml   Net -610 ml     Temp:  [98.2  F (36.8  C)-100.5  F (38.1  C)] 98.2  F (36.8  C)  Heart Rate:  [60] 60  Resp:  [16-20] 16  BP: (128-145)/(62-83) 145/68    Physical Exam:  General: NAD, alert, cooperative  Abdomen: soft, non-tender, non-distended. No suprapubic fullness or tenderness.   Genitourinary: Garcia catheter present. Urine yellow without no blood clots or sediment.   Psychological: alert and oriented, answers questions appropriately    Pertinent Labs   Lab Results: personally reviewed.   Lab Results   Component Value Date     07/29/2020     07/28/2020     01/24/2020    K 4.1 07/29/2020    K 4.4 07/28/2020    K 4.8 01/24/2020    CO2 26 07/29/2020    CO2 25 07/28/2020    CO2 26 01/24/2020    BUN 26 07/29/2020    BUN 29 (H) 07/28/2020    BUN 27 01/24/2020    CREATININE 0.85 07/30/2020    CREATININE 1.00 07/29/2020    CREATININE 1.25 07/28/2020    CALCIUM 8.2 (L) 07/29/2020    CALCIUM 8.6 07/28/2020    CALCIUM 8.5 01/24/2020     Lab Results   Component Value Date    WBC 6.6 07/30/2020    WBC 7.6 07/28/2020    WBC 4.5 01/24/2020    WBC 8.6 06/22/2014    HGB 10.3 (L) 07/30/2020    HGB 11.3 (L) 07/28/2020    HGB 12.2 (L) 01/24/2020    HCT 30.7 (L) 07/30/2020    HCT 33.8 (L) 07/28/2020    HCT 35.7 (L) 01/24/2020    MCV 93 07/30/2020    MCV 93 07/28/2020    MCV 92 01/24/2020    PLT 94 (L) 07/30/2020     (L) 07/28/2020     (L) 01/24/2020     Assessment/Plan  Parish DANIEL MagallonSanju is being seen by [facility] for gross hematuria and urinary retention  1.  Gross hematuria resolved. No masses noted on imaging or cystoscopy. Urine cytology ordered to further evaluate.   2. Maintain garcia catheter until nearing discharge. Recommend voiding trial on morning of discharge with PVR bladder scans to follow. If fails, recommend replacement of garcia catheter with follow up as an outpatient 1-2 weeks after replacement.     We will sign off.     Domenico Sosa  [facility]  Phone #[phone]

## 2021-06-10 NOTE — PLAN OF CARE
Received pricing or patients infusion services at home.     Initial referral to [facility] , [facility] is not in network with [employer], which means cares would be out of pocket    [facility] called back and they informed that patients out of pocket cost would be for Supplies/Weekly - $123.20 and for Medications/Weekly $42.84    Spoke with patients spouse Ileana and they are evaluating if Infusion center vs home infusion would be the options. Family comes to visit in the hospital to receive teach for IV Abx and if family feels comfortable they plan to sign on with Home infusion versus discharging and having to come to the infusion center.    Spoke with Ileana and Daughter in the room and they are planning to d/c with Home Infusion.    Family to transport at d/c.

## 2021-06-10 NOTE — PROGRESS NOTES
Pharmacy Consult: Warfarin Management    Pharmacy consulted to dose warfarin for Parish Bills, a 80 y.o. male    Ordering provider: Dr. Jerez     Reason for warfarin therapy: perioperative pharmacoprophylaxis  (also on PTA for PAULfib)  Goal INR Range: 1.8-2.5    Subjective  Medication lists (home and hospital) were reviewed.    New medications that may increase bleeding risk/INR: none    Restarted home medications that may increase bleeding risk/INR: aspirin, sertraline    Home Warfarin Dosin mg , ,  and 7.5 mg ROW    Other Anticoagulants: none  Patient being bridged: No    Patient Active Problem List   Diagnosis     Aortic Stenosis     Bicuspid Aortic Valve     Benign Prostatic Hypertrophy     Abrasion Of The Ear     Hyperlipidemia     Cardiomyopathy     Strained Left Pectoralis Major Muscle     Essential Hypercholesterolemia     Aneurysm Of The Abdominal Aorta     Chest Pain     Neck Strain     Osteoarthritis     Lumbar Strain     Blood In Urine     Atrial Fibrillation     Esophageal Reflux     Subconjunctival Hemorrhage     Post-operative pain     Hypertension     Hypotension     Postoperative anemia due to acute blood loss     A-fib (H)     Cough     Edema     Lumbar radiculopathy     Malignant neoplasm of skin     Wheezing     Biventricular cardiac pacemaker in situ     Malignant tumor of rectum (H)     Abdominal aortic aneurysm (AAA) (H)     Atrial flutter, paroxysmal (H)     Benign neoplasm of ascending colon     Coagulopathy (H)     Diverticular disease of large intestine     Dysthymia     Encounter for screening for malignant neoplasm of colon     Family history of colon cancer     Family history of malignant neoplasm of digestive organ     H/O mitral valve repair     History of colonic polyps     Iliac artery aneurysm (H)     Nonrheumatic aortic valve stenosis     Polyp of ascending colon, unspecified type     PVC (premature ventricular contraction)     Rectal polyp     S/P AVR     Stress  hyperglycemia     Weight loss     Right hip pain     Infection of prosthetic total hip joint, subsequent encounter    Past Medical History:   Diagnosis Date     Abdominal aortic aneurysm (H)      Alcohol abuse      Anemia      Aortic stenosis      Arthritis      Atrial fibrillation (H)      BPH (benign prostatic hyperplasia)      CAD (coronary artery disease)      Cardiomyopathy (H)      Cataract     left     Chest pain      CHF (congestive heart failure) (H)      Congenital insufficiency of aortic valve      Coronary artery disease      Disease of pancreas     gallstones related     Dysthymia      Enlarged prostate      FH: mitral valve repair      H/O aortic valve repair '     High blood pressure      Hypercholesteremia      Hyperlipidemia      Hypertrophy of prostate      Lumbar radiculopathy      Mitral valve prolapse      Neck strain      Nonrheumatic aortic (valve) stenosis      Osteoarthrosis      Pacemaker      Rectal cancer (H)      Reflux esophagitis      Subconjunctival bleed         Social History     Tobacco Use     Smoking status: Former Smoker     Types: Cigarettes     Last attempt to quit: 9/15/1980     Years since quittin.9     Smokeless tobacco: Never Used   Substance Use Topics     Alcohol use: No     Comment: quit      Drug use: No       Objective   Labs:  Last 3 days:    Recent Labs     20  0630 20  0554 20  0514   CREATININE 1.00 0.85  --    HGB  --  10.3* 10.2*   HCT  --  30.7* 30.3*   PLT  --  94* 132*     Last 7 days:   Recent labs: (last 7 days)     20  0950 20  0630 20  1301 20  0554 20  0514   INR 2.77* 2.03* 1.72* 1.49* 1.38*       Warfarin Dosing History:    Date INR Warfarin Dose Comment    1.38 7.5 mg                        Assessment  The patient is resuming warfarin for the indication of perioperative pharmacoprophylaxis  and A.fib with a goal INR of 1.8-2.5. INR today is subtherapeutic. Resume home dosing. Will need to  clarify goal going forward given patient's previous goal was 2-3 for Afib.     Plan  1. Administer warfarin 7.5 mg PO today.  2. Check INR daily or as appropriate.  3. Continue to follow the patient's INR, PLT, and HGB as available.  4. Monitor for potential drug/disease interactions.    Thank you for the consult,  Pato HessD 7/31/2020 11:47 AM

## 2021-06-10 NOTE — PROGRESS NOTES
Orthopedic Progress Note      Assessment: 2 Days Post-Op  S/P REVISION, ARTHROPLASTY, HIP, WITH FEMORAL HEAD AND  ACETABULAR LINER REPLACEMENT, WITH IRRIGATION + DEBRIDEMENT    PLAN:  1) Pain Control:  Continue current regimen of narcotics, anti inflammatories  2) DVT Prophylaxis: Home dose coumadin , mechanical devices  3) Infection Treatment: as Per ID Service  4) PT/OT: Eval and Treat, WBAT. POSTERIOR APPROACH PRECAUTIONS  5) Activity: WBAT. POSTERIOR APPROACH PRECAUTIONS  6) Dressing/wound management: change prn  5) Medical Cares: [facility] Primary Medicine/Hospitalist Service  6) Dispostion: Discharge planning, Social Service Consult. F?U with Dr. SEAMAN at [facility] IN 2 WEEKS      Subjective:  Pain: mild  Nausea, Vomiting:  no  Lightheadedness, Dizziness:  No  Neuro:  Patient denies new onset numbness or paresthesias    Patient w/o compaints. Pain much improved from pre op. Denies  CP/SOB    Objective:  Vitals:    08/02/20 0715   BP: 144/67   Pulse: 64   Resp: 16   Temp: 98.2  F (36.8  C)   SpO2: 96%     The patient is A&Ox3. Appears comfortable.   Sensation is intact.  Dorsiflexion and plantar flexion is intact.  Dorsalis pedis pulse intact.  Calves are soft and non-tender. Negative Kiet's.  The incision is covered. Dressing C/D/I.    No drain     Pertinent Labs   Lab Results: personally reviewed.   Lab Results   Component Value Date    INR 1.74 (H) 08/02/2020    INR 1.56 (H) 08/01/2020    INR 1.38 (H) 07/31/2020     Lab Results   Component Value Date    WBC 5.6 07/31/2020    HGB 9.1 (L) 08/02/2020    HCT 30.3 (L) 07/31/2020    MCV 94 07/31/2020     (L) 07/31/2020     Lab Results   Component Value Date     07/29/2020    K 4.5 08/01/2020     07/29/2020    CO2 26 07/29/2020         Report completed by:  Jason Seaman MD  Date: 8/2/2020  Time: 10:16 AM

## 2021-06-10 NOTE — TELEPHONE ENCOUNTER
New Appointment Needed  What is the reason for the visit:    Inpatient/ED Follow Up Appt Request  At what hospital or facility were you seen?: [facility]   What is the reason you were seen?: right hip pain - infection required I&D surgery  What date were you admitted?: date: 07/28/2020  What date were you discharged?: date: 08/03/2020  What was the recommended timeframe for your follow up appointment?: 7 days  Provider Preference: PCP or available in-clinic provider  How soon do you need to be seen?: 7 days   Waitlist offered?: No  Okay to leave a detailed message:  Yes

## 2021-06-10 NOTE — PROGRESS NOTES
Hospital Follow-up Visit:    Assessment/Plan:     1. Infection of prosthetic hip joint, subsequent encounter  Patient is here for hospital follow-up  Patient was hospitalized and found to have an infected left hip joint which was artificial  Orthopedics clean the area and replaced hip joint  Patient is seen by infectious disease and is on a IV antibiotics through PICC line  Has follow-up scheduled infectious disease and orthopedics    2. Edema, unspecified type  Patient had improvement of lower extremity edema when we increased his furosemide for a few days last week after discharge from the hospital  His edema is improved but is starting to increase again on his previous dosing so we will go back to 2 tablets 40 mg in the morning, 20 mg 1 tablet at noon  They will contact me if not improving  Check kidney function and electrolytes today  - furosemide (LASIX) 20 MG tablet; Take two tablets in am, one tablet at noon  Dispense: 270 tablet; Refill: 0  - Basic Metabolic Panel    3. Hyperlipidemia LDL goal <100  Patient is on statin therapy and look for refill of medication  - atorvastatin (LIPITOR) 40 MG tablet; Take 1 tablet (40 mg total) by mouth at bedtime.  Dispense: 90 tablet; Refill: 3    4. Anxiety  Patient feels current dosing of medication SSRI working well we will continue with current dosing    5. HTN (hypertension)  Blood pressure goal range refills of metoprolol sent to the pharmacy  - metoprolol succinate (TOPROL-XL) 50 MG 24 hr tablet; TAKE ONE TABLET BY MOUTH DAILY. HOLD FOR SYSTOLIC BP <95 OR HEART RATE <55  Dispense: 90 tablet; Refill: 3    6. Chronic atrial fibrillation (H)  Patient is on anticoagulation for his A. fib refill sent to the pharmacy  - warfarin ANTICOAGULANT (COUMADIN/JANTOVEN) 5 MG tablet; Take 1-1.5 tablets (5-7.5 mg total) by mouth See Admin Instructions. Takes 5 mg on Tuesday, Thursday, and Saturday and 7.5 mg all other days  Dispense: 90 tablet; Refill: 1        Subjective:     Parish  DANIEL Bills is a 80 y.o. male who presents for a hospital discharge follow up.  Patient is here for hospital follow-up found to have an infected left hip joint-this was cleaned and replaced by orthopedics.  Patient is followed by infectious disease and is on course for 4 to 6 weeks of IV antibiotics through a PICC line.  Patient was post have nursing come out to the home to help him but this has not been done-he has a daughter who has been coming in administering the medications-we will give me the name of the company is coming out to the home so we can contact them to try to change the orders so they are coming out to administer the antibiotics.  Patient had some increasing edema in the lower extremities bilaterally after discharge from the hospital, we increased his diuretics and had some improvement after a few days but going back to his previous dosing he is started noticing increasing edema.  We will be checking kidney function electrolytes today but will need to go back up on his frusemide dosing.  His hip is been feeling better he still has some twinges of discomfort but overall markedly improved.  He still notes to have some fatigue but this is not new for him he states that this was there prior to hospitalization.  Reviewed all records labs and imaging with him and his wife was present today.  We discussed the necessity of regular exercise.  Patient is looking for some medication refills today.  They are trying to their best with staying as busy as it can during COVID times they are residing in a new independent living facility near [place], he is still adjusting to the community.      Hospital/Nursing Home/IP Rehab Facility: [facility]  Date of Admission: 7/28/2020  Date of Discharge: 8/3/2020  Reason(s) for Admission: Infected artificial left hip            Do you have any problems taking your medication regularly?  None       Have you had any changes in your medication since discharge?  Yes we  "have changed his diuretics       Have you had any difficulty following your discharge or treatment plan?  No    Summary of hospitalization:  Hospital discharge summary reviewed  Diagnostic Tests/Treatments reviewed.  Follow up needed: Checking a BMP panel today for electrolytes and kidney function, will be following up in a couple weeks based on edema changes  Other Healthcare Providers Involved in Patient's Care: Patient Care Team:  Isidro Au MD as PCP - General  Isidro Au MD as Assigned PCP      Update since discharge: {improved   Information from family, SNF, care coordination: We did increase patient's diuretic for a few days last week after discharge from the hospital, this improved his leg swelling he is back now to his previous regimen-edema is increasing again and so we are to go back to 2 tablets in the morning, and 1 tablet at noon.    Post Discharge Medication Reconciliation: discharge medications reconciled and changed, per note/orders  Plan of care communicated with: patient and significant other    Objective:     Vitals:    08/11/20 1421   BP: 114/54   Pulse: 60   Temp: 98.9  F (37.2  C)   TempSrc: Oral   SpO2: 97%   Weight: 200 lb (90.7 kg)   Height: 5' 8.75\" (1.746 m)         Physical Exam:  Physical Examination: General appearance - alert, well appearing, and in no distress  Eyes - pupils equal and reactive, extraocular eye movements intact  Mouth - mucous membranes moist, pharynx normal without lesions  Chest - clear to auscultation, no wheezes, rales or rhonchi, symmetric air entry  Heart - irregular rhythm,no murmur  Abdomen - soft, nontender, nondistended, no masses or organomegaly  Neurological - alert, oriented, normal speech, no focal findings or movement disorder noted  Musculoskeletal - no joint tenderness, deformity or swelling  Extremities - pedal edema 1 + BL LE to knees        Coding guidelines for this visit:  Type of Medical   Decision Making Face-to-Face " Visit       within 7 Days of discharge Face-to-Face Visit        within 14 days of discharge   Moderate Complexity [id number]   High Complexity [id number]       Electronically signed by Isidro Au MD 08/12/20 7:46 AM

## 2021-06-10 NOTE — PLAN OF CARE
Problem: Pain  Goal: Patient's pain/discomfort is manageable  Outcome: Progressing   Pain control with PRN Tylenol.

## 2021-06-10 NOTE — PLAN OF CARE
Problem: Pain  Goal: Patient's pain/discomfort is manageable  Outcome: Progressing  Denies pain, reports having sensation to feet. Refused another ice pack.     Problem: Daily Care  Goal: Daily care needs are met  Outcome: Progressing  Dressing CDI. Francis in place. Tolerating a regular diet. VSS. To dangle & stand soon with walker & aides.

## 2021-06-10 NOTE — TELEPHONE ENCOUNTER
Wife Ileana notified of message below and agrees to the appointment time below. Will be able to do video, but call [phone] for rooming and she will give a different number for Dr Au to send link to for video. Please add pt to schedule.

## 2021-06-10 NOTE — PLAN OF CARE
Problem: Pain  Goal: Patient's pain/discomfort is manageable  Outcome: Progressing     Problem: Safety  Goal: Patient will be injury free during hospitalization  Outcome: Progressing     Problem: Daily Care  Goal: Daily care needs are met  Outcome: Progressing   Patient c/o right hip pain, Pain rated a 2 when sitting still. Pain comes and goes. VSS. Blood in urine, urinalysis has been ordered.

## 2021-06-10 NOTE — PLAN OF CARE
Problem: Pain  Goal: Patient's pain/discomfort is manageable  Outcome: Progressing     Problem: Potential for Falls  Goal: Patient will remain free of falls  Outcome: Progressing     Problem: Potential for Compromised Skin Integrity  Goal: Nutritional status is improving  Outcome: Progressing     Problem: Urinary Incontinence  Goal: Perineal skin integrity is maintained or improved  Outcome: Progressing   Pt alert and oriented, denied pain when at rest but discomfort with activities on the incision site. Area appeared red but blanchable, dressing clean and intact. Up in a chair with one assist and walked on the hallway with walker with stand by assist and tolerated well.  BG after meal was 194 and notified MD as ordered. Change made in his medication and started SSI and BG before meal. BG this afternoon was 155 and given 2 unit of SSI.  A1c checked this afternoon and was 5.6. Hgb 9.3 today and recheck tomorrow. Spouse at bed side.   Will continue monitor.    Trudy Johnston

## 2021-06-10 NOTE — PROGRESS NOTES
Patient is still complaining of right hip pain, worse with movement.  No fever.    Patient received 5 mg IV vitamin K yesterday, INR still 2.03.    Nursing staff reported blood in the urine.  Patient denied urinary symptoms    Awaiting orthopedic input.  Meantime I did get consent for blood product/ FFP transfusion if indicated by orthopedic team.    Also get urine analysis and urology consult for hematuria.    Full note to follow.      Note created using dragon voice recognition software.  Errors in spelling or words which seems out of context are unintentional.  Sounds alike errors may have escaped editing.    7/29/2020  BRIGID AKBAR MD  HOSPITALIST, [facility]  PAGER NO. [phone]

## 2021-06-10 NOTE — TELEPHONE ENCOUNTER
New Appointment Needed  What is the reason for the visit:    Inpatient/ED Follow Up Appt Request  At what hospital or facility were you seen?: [facility]  What is the reason you were seen?: Right leg infection  What date were you admitted?: date: 8/14/20  What date were you discharged?: date: 8/17/20  What was the recommended timeframe for your follow up appointment?: 3 - 5 days  Provider Preference: Prefers Dr Au, but any available.   Can do virtual appt, or F2F  How soon do you need to be seen?: this week  Waitlist offered?: No  Okay to leave a detailed message:  Yes      Please call patient at home number  [phone] to schedule

## 2021-06-10 NOTE — TELEPHONE ENCOUNTER
Orders being requested: Physical Therapy twice a week for four weeks.  Reason service is needed/diagnosis: Gait Training, Balance Training, and Therapeutic Exercise.  When are orders needed by: As Able  Where to send Orders: Please provide verbal orders to [facility] at: [phone].  Okay to leave detailed message?  Yes

## 2021-06-10 NOTE — PROGRESS NOTES
Occupational Therapy     08/01/20 0900   Visit Specifics   Eval Type Inital eval   Inital OT Consult  08/01/20   Treatment Time   ADL Training 8   General   Onset date 08/01/20   Chart Reviewed Yes   PT/OT Patient/Caregiver Stated Goals none stated   Family/Caregiver Present No   Precautions   Total Hip Replacement 90 degree flexion;Hip ABductor brace/pillow   Weight Bearing Status WBAT   General Precautions Bed alarm/Tab alarm   Home Living   Type of Home Senior apartment/independent living   ADL Devices Sock aid;Reacher   Bathroom Shower/Tub Walk-in shower   Bathroom Toilet Raised   Bathroom Equipment Grab bars in shower;Grab bars around toilet   Mobility Equipment Cane;Walker-front wheeled   Prior Status   Independent With All ADL's/IADL's;Driving   Lives With Spouse   Device Used No   ADL   ADL Comments Reviewed hip precautions, LB drsg with AE   Bed Mobility   Sit to Supine Stand by assistance;With railing  (using non surgical leg to lift surgical leg)   Functional Transfers   Functional Transfers Bed Transfers;Chair Transfers;Toilet Transfers   Bed Transfers SBA   Chair Transfers SBA   Toilet Transfers SBA   Transfer Cues Technique   Ambulation   Location In room;To chair;To bathroom;To bed   Assistance SBA;CGA   Device Rolling Walker   Cognition   Overall Cognitive Status WFL   Behavior    Behavior During Session Cooperative   Assessment   Assessment Decreased ADL status;Decreased funtional mobility   Prognosis Good   Treatment/Interventions ADL training;Functional transfer training   OT Frequency Daily   Goal Formulation Patient   Recommendations   OT Discharge Recommendation Home with family support/assistance;Home OT eval   Treatment Suggestions for Next Session trsfs, LB drsg with AE   OT Care Plan REVIEWED DAILY Yes, goals remain appropriate

## 2021-06-10 NOTE — TELEPHONE ENCOUNTER
Left message to call back for: Orders   Information to relay to patient:  Left message to call back with current dosing of furosemide.

## 2021-06-10 NOTE — TELEPHONE ENCOUNTER
Patient Returning Call  Reason for call:  Patient returning clinic call    Information relayed to patient:  Patient returning Tonja's call regarding INR.    Patient has additional questions:  Yes     If YES, what are your questions/concerns:  INR results    Okay to leave a detailed message?: Yes

## 2021-06-10 NOTE — TELEPHONE ENCOUNTER
ANTICOAGULATION  MANAGEMENT- Home Care/Care Facility Result    Assessment     Today's INR result of 3.9 is Supratherapeutic (goal INR of 2.0-3.0)        More warfarin taken than instructed which may be affecting INR    No new diet changes affecting INR    No new medication/supplements affecting INR    Continues to tolerate warfarin with no reported s/s of bleeding or thromboembolism     Previous INR was Supratherapeutic    Plan:     Spoke with Ayleen DE LA TORRE discussed INR result and instructed:     Warfarin Dosing Instructions: Change warfarin dose to 2.5 mg daily on Tue; and 5 mg daily rest of week  (13 % change)    Next INR to be drawn: Fri 8/14    Education provided: target INR goal and significance of current INR result    Ayleen verbalizes understanding and agrees to warfarin dosing plan.   ?   Corina Gillespie RN    Subjective/Objective:      Parish Bills, a 80 y.o. male is established on warfarin.     Home care/care facility RN's report of Parish INR, recent warfarin dosing, diet changes, medication changes, and symptoms is documented below.    Additional findings: none    Anticoagulation Episode Summary     Current INR goal:   2.0-3.0   TTR:   91.2 % (11.9 mo)   Next INR check:   8/14/2020   INR from last check:   3.90! (8/11/2020)   Weekly max warfarin dose:      Target end date:      INR check location:      Preferred lab:      Send INR reminders to:   [facility]    Indications    A-fib (H) [I48.91]           Comments:            Anticoagulation Care Providers     Provider Role Specialty Phone number    Isidro Au MD Referring Family Medicine [phone]

## 2021-06-10 NOTE — PROGRESS NOTES
"Parish Bills is a 80 y.o. male who is being evaluated via a billable video visit.      The patient has been notified of following:     \"This video visit will be conducted via a call between you and your physician/provider. We have found that certain health care needs can be provided without the need for an in-person physical exam.  This service lets us provide the care you need with a video conversation.  If a prescription is necessary we can send it directly to your pharmacy.  If lab work is needed we can place an order for that and you can then stop by our lab to have the test done at a later time.    Video visits are billed at different rates depending on your insurance coverage. Please reach out to your insurance provider with any questions.    If during the course of the call the physician/provider feels a video visit is not appropriate, you will not be charged for this service.\"    Patient has given verbal consent to a Video visit? Yes  How would you like to obtain your AVS? AVS Preference: Mail a copy.  If dropped by the video visit, the video invitation should be sent to: Text to cell phone: [phone]  Will anyone else be joining your video visit? No            Tri Bethea CMA  "

## 2021-06-10 NOTE — PLAN OF CARE
Goal: Patient s discharge needs are met.  Outcome: Care Progression reviewed with Hospitalist, Care Manager.  Discharge Disposition: Discussed and plan to discharge to: TBD  Planned Discharge Date: multiple days  Problem: Barriers to discharge include: surgery today  Transportation needs/Ride Time:  Family

## 2021-06-10 NOTE — UTILIZATION REVIEW
Admission Status; Secondary Review Determination   Under the authority of the Utilization Management Committee, the utilization review process indicated a secondary review on Parish Bills. The review outcome is based on review of the medical records, discussions with staff, and applying clinical experience noted on the date of the review.   (x) Inpatient Status Appropriate - This patient's medical care is consistent with medical management for inpatient care and reasonable inpatient medical practice.     RATIONALE FOR DETERMINATION   80 yr old male s/p right hip arthroplasty 16 years ago presented with right hip pain. CRP elevated.  Pain continues.  Concern as also on warfarin.  IR to do aspiration to assess for possible septic joint vs hematoma.  Monitoring for fever.  No abx yet to not cloud picture of possible septic joint.  Holding warfarin and given FFP.    At the time of admission with the information available to the attending physician more than 2 nights Hospital complex care was anticipated, based on patient risk of adverse outcome if treated as outpatient and complex care required. Inpatient admission is appropriate based on the Medicare guidelines.   The information on this document is developed by the utilization review team in order for the business office to ensure compliance. This only denotes the appropriateness of proper admission status and does not reflect the quality of care rendered.   The definitions of Inpatient Status and Observation Status used in making the determination above are those provided in the CMS Coverage Manual, Chapter 1 and Chapter 6, section 70.4.   Sincerely,   Rochelle Head MD  Utilization Review  Physician Advisor  [facility]

## 2021-06-10 NOTE — PROGRESS NOTES
Daily Progress Note    CODE STATUS:  Full Code    07/31/20  Assessment/Plan:  80 y.o. 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 fib, AV node ablation s/p BiV pacemaker, hypertension, right hip replacement 16 years ago who presented to ED for evaluation of atraumatic right hip pain for 1 day duration and admitted for further management     Acute right hip pain, atraumatic; infection of prosthetic RAMÓN  History of right hip replacement 16 years ago;  -No reported trauma.  No reported febrile illness, urinary changes or leg weakness  -CT pelvis reported no evidence of periprosthetic fracture, no dislocation.  -Normal WBC count.  Elevated CRP at 4.1  -Patient had right hip arthrocentesis after reversing INR on 7/29 and culture growing gram-positive cocci in pairs and in process.  -Dr. Jerez did right hip I&D and a right hip arthroplasty revision, head and liner exchange on 7/31   -Continue scheduled Tylenol, PRN PO/V Dilaudid.  Monitor for sedation/hallucination  -Continue IV vancomycin.  Appreciated ID input.  Monitor labs closely    History of Tissue AVR with LIMA to LAD in 2018;  Chronic atrial fibrillation;  AV node ablation s/p Bi V pacemaker;  -No chest pain, dyspnea on exertion, dizziness.    -Echocardiogram on 10/2018 showed EF 55%  -Continue PTA medications-Lipitor, Toprol-XL, aspirin  -Given patient complex cardiac history, I did discussed with patient's primary cardiologist Dr. Trujillo about anticoagulation/Coumadin which is currently on hold refer above and reported okay to hold and when we resume Coumadin no need to bridge.  -INR subtherapeutic due to holding Coumadin for surgical procedure, restarted on Coumadin on 7/31, monitor INR closely     Acute kidney injury, mild;  -Creatinine improved with gentle hydration.    -Hold home diuretics for now given variable oral intake/intermittent n.p.o. status    Gross hematuria; resolved  Acute urinary retention due to  BPH;  Proteus mirabilis UTI on 7/29;  -Patient reported passing drops of blood when urinating.  Denied suprapubic pain, dysuria, no fever or chills  -UA showed RBC but negative nitrite, leukocyte and WBC but UC positive for Proteus mirabilis on 7/29  -Appreciated urology input.  Patient had bedside cystoscopy with Francis catheter placement and procedure note reported a status post TURP, bladder neck was slightly tight otherwise unremarkable, no tumors  -Started on Flomax, continue.  Plan to do voiding trial before discharge.  Discussed with urology team.    -Started on Keflex for UTI on 7/31      Chronic normocytic anemia;  Chronic intermittent thrombocytopenia;  -Hemoglobin fairly stable.  Platelet count fluctuating. Monitor labs closely     DVT prophylaxis;  -Resume home Coumadin on 7/31      Disposition; pending  Barrier to discharge; IV antibiotics, culture pending     LOS: 3 days     Subjective:  Interval History: Patient seen and examined.  Notes, labs, imaging report personally reviewed.  Patient underwent orthopedic procedure this morning.  Currently patient lying in the bed, reported right hip pain is fairly controlled.  Denied short of breath or chest pain or dizziness.  Denied abdomen pain, nausea, vomiting.  Denied fever or chills.  Discussed with patient and his wife at bedside about culture result, antibiotic management, pain management, UTI on antibiotics, monitoring labs, resuming Coumadin and possibly voiding trial on the day of discharge when patient is medically stable.  Discussed with nursing staffs.      Review of Systems:   As mentioned in subjective.    Patient Active Problem List   Diagnosis     Aortic Stenosis     Bicuspid Aortic Valve     Benign Prostatic Hypertrophy     Abrasion Of The Ear     Hyperlipidemia     Cardiomyopathy     Strained Left Pectoralis Major Muscle     Essential Hypercholesterolemia     Aneurysm Of The Abdominal Aorta     Chest Pain     Neck Strain     Osteoarthritis      Lumbar Strain     Blood In Urine     Atrial Fibrillation     Esophageal Reflux     Subconjunctival Hemorrhage     Post-operative pain     Hypertension     Hypotension     Postoperative anemia due to acute blood loss     A-fib (H)     Cough     Edema     Lumbar radiculopathy     Malignant neoplasm of skin     Wheezing     Biventricular cardiac pacemaker in situ     Malignant tumor of rectum (H)     Abdominal aortic aneurysm (AAA) (H)     Atrial flutter, paroxysmal (H)     Benign neoplasm of ascending colon     Coagulopathy (H)     Diverticular disease of large intestine     Dysthymia     Encounter for screening for malignant neoplasm of colon     Family history of colon cancer     Family history of malignant neoplasm of digestive organ     H/O mitral valve repair     History of colonic polyps     Iliac artery aneurysm (H)     Nonrheumatic aortic valve stenosis     Polyp of ascending colon, unspecified type     PVC (premature ventricular contraction)     Rectal polyp     S/P AVR     Stress hyperglycemia     Weight loss     Right hip pain     Infection of prosthetic total hip joint, subsequent encounter       Scheduled Meds:    acetaminophen  1,000 mg Oral TID     aspirin  81 mg Oral Daily     atorvastatin  40 mg Oral QHS     buPROPion  150 mg Oral QAM     cephalexin  500 mg Oral TID     docusate sodium  100 mg Oral BID     [Held by provider] furosemide  20 mg Oral DAILY     [START ON 8/1/2020] gabapentin  100 mg Oral QHS     metoprolol succinate  50 mg Oral DAILY     [START ON 8/1/2020] polyethylene glycol  17 g Oral DAILY     senna-docusate  1 tablet Oral BID     sertraline  150 mg Oral DAILY     sodium chloride  2.5 mL Intravenous Line Care     sodium chloride  3 mL Intravenous Line Care     tamsulosin  0.4 mg Oral Daily after brkfst     vancomycin  1,250 mg Intravenous Q24H     warfarin - daily dose required   Other Med Consult or Protocol     warfarin ANTICOAGULANT  7.5 mg Oral Once Warfarin     Continuous  Infusions:    sodium chloride 0.9% 25 mL/hr (07/31/20 0113)     PRN Meds:.acetaminophen, acetaminophen, aluminum-magnesium hydroxide-simethicone, bisacodyL, HYDROmorphone, HYDROmorphone, HYDROmorphone, hydrOXYzine HCL, loratadine, LORazepam, magnesium hydroxide, metoclopramide **OR** metoclopramide **OR** metoclopramide, naloxone **OR** naloxone, nitroglycerin, ondansetron, ondansetron, sodium chloride bacteriostatic, sodium phosphates 133 mL    Objective:  Vital signs in last 24 hours:  Temp:  [98.1  F (36.7  C)-98.7  F (37.1  C)] 98.2  F (36.8  C)  Heart Rate:  [60-92] 62  Resp:  [14-28] 16  BP: (104-141)/(52-72) 107/59  Weight:   193 lb 4.8 oz (87.7 kg)      Intake/Output Summary (Last 24 hours) at 7/31/2020 1316  Last data filed at 7/31/2020 1030  Gross per 24 hour   Intake 1462.5 ml   Output 1500 ml   Net -37.5 ml       Physical Exam:  General: Not in obvious distress.  HEENT: Normocephalic, supple neck  Chest: Clear to auscultation bilateral anteriorly, no wheezing  Heart: S1S2 normal, irregular  Abdomen: Soft. NT, ND. Bowel sounds- active.  Extremities: No legs swelling  Neuro: alert and awake, moves all extremities  Genitourinary; Francis catheter in place, draining yellow urine  Musculoskeletal; right hip dressing dry/clean/intact    Lab Results:(I have personally reviewed the results)  Results from last 7 days   Lab Units 07/30/20  0554 07/29/20  0630 07/28/20  0950   LN-SODIUM mmol/L  --  138 138   LN-POTASSIUM mmol/L  --  4.1 4.4   LN-CHLORIDE mmol/L  --  106 104   LN-CO2 mmol/L  --  26 25   LN-BLOOD UREA NITROGEN mg/dL  --  26 29*   LN-CREATININE mg/dL 0.85 1.00 1.25   LN-CALCIUM mg/dL  --  8.2* 8.6     Results from last 7 days   Lab Units 07/31/20  0514 07/30/20  0554 07/28/20  0950   LN-WHITE BLOOD CELL COUNT thou/uL 5.6 6.6 7.6   LN-HEMOGLOBIN g/dL 10.2* 10.3* 11.3*   LN-HEMATOCRIT % 30.3* 30.7* 33.8*   LN-PLATELET COUNT thou/uL 132* 94* 131*         IV Access:  Peripheral IV 07/31/20 Left Wrist-Line  Status: Saline locked, Capped  Peripheral IV 07/28/20 Right;Anterior (front);Medial (center/inner) Antecubital-Line Status: Infusing  PICC: Peripheral IV 07/31/20 Left Wrist-Site Skin Assessment: Checked, Intact  Peripheral IV 07/28/20 Right;Anterior (front);Medial (center/inner) Antecubital-Site Skin Assessment: Checked, Dry, Intact, Clean      Ct Pelvis Without Oral Without Iv Contrast  Result Date: 7/28/2020  1.  Postoperative changes related to prior right total hip replacement. There is no evidence of a periprosthetic fracture. No dislocation.    Xr Pelvis W 2 Vw Hip Right  Result Date: 7/28/2020  Postoperative changes of right total hip arthroplasty. Components appear well seated. There is moderate severity degenerative change at the left hip joint. No evidence for fracture. Pelvis otherwise negative. Iliac artery stents.    Latest radiology report personally reviewed.      The total time spent is 38 minutes, >50% time spent in coordination of care data gathering, charting, record review, discussion of test/medications/plan of care as mentioned above with patient/family. D/w consultants, nursing staffs and /CM.    Note created using dragon voice recognition software so sounds alike errors may have escaped editing.    7/31/2020  BRIGID AKBAR MD  HOSPITALIST, [facility]  PAGER NO. [phone]

## 2021-06-10 NOTE — PROGRESS NOTES
"Parish Bills is a 80 y.o. male who is being evaluated via a billable video visit.      The patient has been notified of following:     \"This video visit will be conducted via a call between you and your physician/provider. We have found that certain health care needs can be provided without the need for an in-person physical exam.  This service lets us provide the care you need with a video conversation.  If a prescription is necessary we can send it directly to your pharmacy.  If lab work is needed we can place an order for that and you can then stop by our lab to have the test done at a later time.    Video visits are billed at different rates depending on your insurance coverage. Please reach out to your insurance provider with any questions.    If during the course of the call the physician/provider feels a video visit is not appropriate, you will not be charged for this service.\"    Patient has given verbal consent to a Video visit? Yes  How would you like to obtain your AVS? AVS Preference: Mail a copy.  If dropped by the video visit, the video invitation should be sent to: Text to cell phone: [phone]  Will anyone else be joining your video visit? Yes, wife and daughter      ASSESSMENT/PLAN  1. Current mild episode of major depressive disorder, unspecified whether recurrent (H)  Refill requested for bupropion  Sent to the pharmacy  - buPROPion (WELLBUTRIN XL) 150 MG 24 hr tablet; Take 1 tablet (150 mg total) by mouth every morning.  Dispense: 90 tablet; Refill: 3    2. Hip pain, right  Patient is doing better since being home  Continues IV antibiotics  Using Tylenol only for pain  Continue with PT OT  Reviewed hospital records and notes labs and imaging with him and his wife were present as well as a daughter on the video visit    3. Cellulitis, unspecified cellulitis site  Continue with IV antibiotics continue to follow-up with infectious disease    4. Orthopnea  Patient expresses symptoms of " orthopnea  He has had improvement with increase in diuretics and elevation at night for sleeping  We will have him titrate down on hydroxyzine  He will keep his follow-up appointment with his cardiologist  Reviewed elevated BNP slightly along with decreased ejection fraction on echo  He is due for a potassium and kidney checked this week and labs are pended    5. Iron deficiency anemia, unspecified iron deficiency anemia type  Patient's hemoglobin stable around mid 8 range during hospitalization found to have low levels of iron  Started on iron supplement discussed and warned of possible side effects of constipation will start stool softener if any complications arise  Recheck hemoglobin this week        SUBJECTIVE:   Chief Complaint   Patient presents with     Follow-up     right leg cellulitis at [facility] on 08/14-08/17, Leg is doing better but feels a little warm today.     Parish Bills 80 y.o. male    Current Outpatient Medications   Medication Sig Dispense Refill     acetaminophen (TYLENOL) 500 MG tablet Take 2 tablets (1,000 mg total) by mouth 3 (three) times a day.  0     amoxicillin (AMOXIL) 500 MG capsule Take 2,000 mg by mouth once as needed (for dental work). As needed for Hx mitral valve repair. Take one hour before appointment        aspirin (ASPIRIN LOW DOSE) 81 MG EC tablet Take 1 tablet by mouth daily.       atorvastatin (LIPITOR) 40 MG tablet Take 1 tablet (40 mg total) by mouth at bedtime. 90 tablet 3     buPROPion (WELLBUTRIN XL) 150 MG 24 hr tablet Take 1 tablet (150 mg total) by mouth every morning. 90 tablet 3     cefTRIAXone 2 g in NaCl 0.9 % 0.9 % 50 mL IVPB Infuse 2 g into a venous catheter daily.  0     ferrous sulfate 325 (65 FE) MG tablet Take 1 tablet (325 mg total) by mouth daily with breakfast. 90 tablet 0     folic acid/multivit-min/lutein (CENTRUM SILVER ORAL) Take 1 tablet by mouth daily.       furosemide (LASIX) 40 MG tablet Take 1 tablet (40 mg total) by mouth 2 (two) times a  day at 9am and 6pm. 60 tablet 0     HYDROmorphone (DILAUDID) 2 MG tablet Take 1 tablet (2 mg total) by mouth every 6 (six) hours as needed. 12 tablet 0     hydrOXYzine HCl (ATARAX) 25 MG tablet Take 1 tablet (25 mg total) by mouth every 6 (six) hours as needed for anxiety. 360 tablet 0     metoprolol succinate (TOPROL-XL) 50 MG 24 hr tablet TAKE ONE TABLET BY MOUTH DAILY. HOLD FOR SYSTOLIC BP <95 OR HEART RATE <55 90 tablet 3     nitroglycerin (NITROSTAT) 0.4 MG SL tablet PLACE 1 TABLET UNDER THE TONGUE EVERY 5 MINUTES UP TO 3 DOSES AS NEEDED; CALL 911 AFTER TAKING FIRST DOSE 25 tablet 0     senna-docusate (PERICOLACE) 8.6-50 mg tablet Take 2 tablets by mouth at bedtime.  0     sertraline (ZOLOFT) 100 MG tablet TAKE ONE TABLET BY MOUTH ONCE DAILY ALONG WITH 50MG TO EQUAL 150MG 90 tablet 0     sertraline (ZOLOFT) 50 MG tablet TAKE ONE TABLET BY MOUTH ONCE DAILY ALONG WITH 100MG TO EQUAL 150MG 90 tablet 0     tamsulosin (FLOMAX) 0.4 mg cap Take 1 capsule (0.4 mg total) by mouth Daily after breakfast. 30 capsule 0     warfarin ANTICOAGULANT (COUMADIN/JANTOVEN) 5 MG tablet Take 1 tablet (5 mg total) by mouth See Admin Instructions. Take 5 mg daily till follow up INR check. 90 tablet 1     No current facility-administered medications for this visit.      Allergies: Hydrocodone-acetaminophen; Lisinopril; Oxycodone-acetaminophen; and Amiodarone   No LMP for male patient.    HPI:   Patient is here for hospital follow-up video visit completed with oximetry during COVID times  Reviewed patient's records imaging and findings with him and his wife were present on the video visit  He is doing better since being back at home  Continues to follow with infectious disease and orthopedics  Echo during hospitalization found a slightly elevated BNP and a decreased ejection fraction he has follow-up with his cardiologist but is found improved sleep now that he is on higher dosing of diuretics and elevating his bed at night for  sleeping  Discussed that his nighttime anxiety was likely orthopnea and I recommend titrating down and off of the hydroxyzine as it is likely not needed.  He is not taking the pain medication Dilaudid we will remove this from his medication list.    ROS: negative except as per HPI    OBJECTIVE:   There were no vitals taken for this visit.    Video Start Time: 815am    Additional provider notes: Pt doing better since discharge  GENERAL: Healthy, alert and no distress  EYES: Eyes grossly normal to inspection. No discharge or erythema, or obvious scleral/conjunctival abnormalities.  RESP: No audible wheeze, cough, or visible cyanosis.  No visible retractions or increased work of breathing.    NEURO: Cranial nerves grossly intact. Mentation and speech appropriate for age.  PSYCH: Mentation appears normal, affect normal/bright, judgement and insight intact, normal speech and appearance well-groomed      Video-Visit Details    Type of service:  Video Visit    Video End Time (time video stopped): 828am  Originating Location (pt. Location): Home    Distant Location (provider location):  [facility]     Platform used for Video Visit: [email or web address]      Isidro Au MD

## 2021-06-10 NOTE — PATIENT INSTRUCTIONS - HE
Contact Dr. Au with name of company for nursing control of picc line  -Increase furosemide to 40mg in am, 20mg at noon- send update to Dr. Au in one week with leg changes  -Get and move around house hourly for exercise and fluid movement for the legs

## 2021-06-10 NOTE — TELEPHONE ENCOUNTER
1. Orders being requested: Skilled Nursing 3 times a week for 2 weeks, 1 time a week for 3 weeks, and 3 PRN   Reason service is needed/diagnosis: IV management starting today  When are orders needed by: Today  Where to send Orders: Phone:  [phone]  Okay to leave detailed message?  Yes      2. Orders being requested: Home Health Aide 2 times a week for 4 weeks   Reason service is needed/diagnosis: Assisted with bathing   When are orders needed by: Today  Where to send Orders: Phone:  [phone]  Okay to leave detailed message?  Yes      3. Update: Caller reported the patient had increased lower extremity edema. Caller stated it is a +3 on the right and a +2 on left. Caller stated the patient reported he has gained 9 lbs from surgery to now and doesn't feel like he is producing enough urine     4. Medication interaction update: Sertraline and warfarin     5. Caller stated she will need the date of most recent immunization for influenza, pneumonia, and shingles.

## 2021-06-10 NOTE — PROGRESS NOTES
An informational handout was provided to the patient which included instructions that addressed activity level, diet, discharge medications, follow-up appointments, weight monitoring and what to do if symptoms worsen. Went through AVS with pt, pt's wife and pt's daughter. Answered any questions/concerns pt and family members had. Pt had no belongings in the safe or medications to be returned. Pt's surgical dressing had a copious amount of bloody drainage on it, so it was changed prior to discharge. Taught wife and pt how to change dressing if it were to get soiled. When writer changed dressing there was no active bleeding. Pt's daughter transporting pt and spouse home.    Denver Moore RN

## 2021-06-10 NOTE — PROGRESS NOTES
"Pt reported urinary changes today on admission to RN. States enlarged prostate & sometimes has difficulty urinating. Tried to void x 2 & after voided 500ml pt reported \"hadn't voided in 2 days\" & noticed bloody drainage after voiding. PVR- 210ml. Pt denies pain. Urine was dark jag- Coumadin is on hold for INR-2.77 but is on ASA yet. Notified H.O. Dr. Villanueva- Continue to monitor- Call if continues to have bloody urine.    Soco PRESCOTT Beager RN  "

## 2021-06-10 NOTE — PLAN OF CARE
Problem: Occupational Therapy  Goal: OT Goals  Description: Patient will demonstrate the following by 8/4/20, in order to maximize independence with ADL/IADL performance:    -Demonstrate safety with Bed/Chair/Toilet transfer with SBA   -Demonstrate safety with Tub/Shower/Car transfer with SBA    -Complete LB dressing with SBA, using appropriate adaptive equipment PRN   -Participate in grooming/hygiene tasks with SBA    Goals entered on 8/1/2020 by Rochelle Nash OT      Outcome: Completed   Occupational Therapy Discharge Summary    Date of OT Discharge: 8/3/20  Refer to daily doc flowsheet for equipment issued and current functional status.  Discharge Destination: Home  Discharge Comments: Goals met      8/4/2020 by Rochelle Nash, OT

## 2021-06-10 NOTE — PROGRESS NOTES
[facility] Progress Note    SUBJECTIVE:  Post one stage with Dr. Jerez.  He sent fluid (Thanks).  I talked to pt and wife about the likely plan going forward.  His urinary sxs were mostly obstructive.        REVIEW OF SYSTEMS:  Negative unless as listed above.  Social history, Family history, Medications: reviewed.    OBJECTIVE:  Vitals:    07/31/20 1144   BP:    Pulse: 60   Resp: 16   Temp:    SpO2: 94%                 PHYSICAL EXAM:  Alert, awake  Vitals tabulated above, reviewed  Neck supple without lymphadenopathy  Sclera normal color, not injected  CARDIOVASCULAR regular rate and rhythm, no murmur  Lungs CLEAR TO AUSCULTATION   Abdomen soft, NT/ND, absent HEPATOSPLENOMEGALY  Skin normal  Joints normal  Neurologic exam non focal    Antibiotics:vanco    Pertinent labs:  Results from last 7 days   Lab Units 07/31/20  0514 07/30/20  0554 07/28/20  0950   LN-WHITE BLOOD CELL COUNT thou/uL 5.6 6.6 7.6   LN-HEMOGLOBIN g/dL 10.2* 10.3* 11.3*   LN-HEMATOCRIT % 30.3* 30.7* 33.8*   LN-PLATELET COUNT thou/uL 132* 94* 131*       Results from last 7 days   Lab Units 07/30/20  0554 07/29/20  0630   LN-SODIUM mmol/L  --  138   LN-POTASSIUM mmol/L  --  4.1   LN-CHLORIDE mmol/L  --  106   LN-CO2 mmol/L  --  26   LN-BLOOD UREA NITROGEN mg/dL  --  26   LN-CREATININE mg/dL 0.85 1.00   LN-CALCIUM mg/dL  --  8.2*                   MICROBIOLOGY DATA:     Culture  50,000-100,000 col/ml Proteus mirabilisAbnormal            Resulting Agency: [facility]    Susceptibility      Proteus mirabilis      FRANCISCA      Amoxicillin + Clavulanate  <=4/2   Sensitive      Ampicillin  <=4   Sensitive      Cefazolin  8   Sensitive      Cefepime  <=1   Sensitive      Ceftriaxone  <=1   Sensitive      Ciprofloxacin  <=0.5   Sensitive      Gentamicin  4   Sensitive      Levofloxacin  <=1   Sensitive      Nitrofurantoin  64   Resistant      Tetracycline  >8   Resistant      Tobramycin  4   Sensitive      Trimethoprim + Sulfamethoxazole  <=0.5    Sensitive                      IMAGING/RADIOLOGY:          ASSESSMENT:  Septic R hip, 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  Right hip incision, irrigation, excisional debridement  2.  Right hip arthroplasty revision, head and liner exchange    Possible mild proteus UTI  RECOMMENDATION:  Keflex for urine  vanco for hip infection, pending cx from initial small amount of IR fluid and now today OR fluid was obtained.  Thanks to jaida Pickard MD  Office [phone] option 2 to desk staff

## 2021-06-10 NOTE — TELEPHONE ENCOUNTER
Orders being requested: discontinue  home health aid   Reason service is needed/diagnosis: patient and spouse will do it them self now.   When are orders needed by: as soon as possible   Where to send Orders: Phone:  [phone]  Okay to leave detailed message?  Yes

## 2021-06-10 NOTE — TELEPHONE ENCOUNTER
Who is calling:  Ayleen from [facility]  Reason for Call:  The caller states she is calling to get the verbal okay for the orders requested earlier. Writer gave the caller verbal okay for HC orders per Isidro Au MD below.  The caller is requesting to have the patient's labs drawn on 9/1/20, Tuesdays instead of Mondays?   Date of last appointment with primary care:   Okay to leave a detailed message: Yes

## 2021-06-10 NOTE — PROGRESS NOTES
"Pharmacy Consult: Vancomycin Dosing    Pharmacist consulted to dose vancomycin for Parish Bills, a 80 y.o. male.    Ordering provider: Dr. MAHONEY     Indication for vancomycin therapy: Bone and/or Joint Infection    Goal Trough Range:  15-20 mcg/mL based on indication    Other current antimicrobials              vancomycin 1,250 mg in sodium chloride 0.9% 500 mL (VANCOCIN)  Every 24 hours          vancomycin 1,750 mg in sodium chloride 0.9% 500 mL (VANCOCIN)  Once                   Subjective/Objective:    Patient was admitted for Right hip pain on 7/28/2020    Height: 5' 9\" (1.753 m)    Actual Body Weight (ABW): 87.7 kg (193 lb 4.8 oz)    Ideal body weight: 70.7 kg (155 lb 13.8 oz)  Adjusted ideal body weight: 77.5 kg (170 lb 13.4 oz)    BMI: Body mass index is 28.55 kg/m .    Allergies   Allergen Reactions     Hydrocodone-Acetaminophen Other (See Comments)     hallucination     Lisinopril Cough     Oxycodone-Acetaminophen Other (See Comments)     hallucination     Amiodarone Rash       Patient Active Problem List   Diagnosis     Aortic Stenosis     Bicuspid Aortic Valve     Benign Prostatic Hypertrophy     Abrasion Of The Ear     Hyperlipidemia     Cardiomyopathy     Strained Left Pectoralis Major Muscle     Essential Hypercholesterolemia     Aneurysm Of The Abdominal Aorta     Chest Pain     Neck Strain     Osteoarthritis     Lumbar Strain     Blood In Urine     Atrial Fibrillation     Esophageal Reflux     Subconjunctival Hemorrhage     Post-operative pain     Hypertension     Hypotension     Postoperative anemia due to acute blood loss     A-fib (H)     Cough     Edema     Lumbar radiculopathy     Malignant neoplasm of skin     Wheezing     Biventricular cardiac pacemaker in situ     Malignant tumor of rectum (H)     Abdominal aortic aneurysm (AAA) (H)     Atrial flutter, paroxysmal (H)     Benign neoplasm of ascending colon     Coagulopathy (H)     Diverticular disease of large intestine     Dysthymia     " Encounter for screening for malignant neoplasm of colon     Family history of colon cancer     Family history of malignant neoplasm of digestive organ     H/O mitral valve repair     History of colonic polyps     Iliac artery aneurysm (H)     Nonrheumatic aortic valve stenosis     Polyp of ascending colon, unspecified type     PVC (premature ventricular contraction)     Rectal polyp     S/P AVR     Stress hyperglycemia     Weight loss     Right hip pain    Past Medical History:   Diagnosis Date     Abdominal aortic aneurysm (H)      Alcohol abuse      Anemia      Aortic stenosis      Arthritis      Atrial fibrillation (H)      BPH (benign prostatic hyperplasia)      CAD (coronary artery disease)      Cardiomyopathy (H)      Cataract     left     Chest pain      CHF (congestive heart failure) (H)      Congenital insufficiency of aortic valve      Coronary artery disease      Disease of pancreas     gallstones related     Dysthymia      Enlarged prostate      FH: mitral valve repair      H/O aortic valve repair '     High blood pressure      Hypercholesteremia      Hyperlipidemia      Hypertrophy of prostate      Lumbar radiculopathy      Mitral valve prolapse      Neck strain      Nonrheumatic aortic (valve) stenosis      Osteoarthrosis      Pacemaker      Rectal cancer (H)      Reflux esophagitis      Subconjunctival bleed         Temp Readings from Current Encounter:     07/29/20 1751 07/29/20 2349 07/30/20 0753   Temp: 98.3  F (36.8  C) 98.2  F (36.8  C) 98.2  F (36.8  C)     Net Intake/Output (last 24 hours):  I/O last 3 completed shifts:  In: 990 [P.O.:990]  Out: 1850 [Urine:1850]    Recent Labs     07/28/20  0950 07/29/20  0630 07/30/20  0554   WBC 7.6  --  6.6   NEUTROABS 5.6  --   --    CRP 4.1*  --   --    BUN 29* 26  --    CREATININE 1.25 1.00 0.85     Estimated Creatinine Clearance: 76 mL/min (by C-G formula based on SCr of 0.85 mg/dL).    No results for input(s): CULTURE in the last 72 hours.    No  results found for any visits on 07/28/20.    No results for input(s): VANCOMYCIN in the last 168 hours.    Vancomycin administrations: (last 120 hours)     None          Assessment/Plan:    Pharmacist consulted to dose vancomycin for Bone and/or Joint Infection, goal trough range 15-20 mcg/mL.  1. Initiate vancomycin 1750 mg IV once followed by 1250 mg IV every 24 hours (14.3 mg/kg actual body weight).  2. No vancomycin level available for assessment.  3. Pharmacist will plan to check a vancomycin trough level prior to 4th or 5th dose.  4. Pharmacist will continue to follow.    Thank you for the consult.  Torsten Xiao, PharmD 7/30/2020 10:57 AM

## 2021-06-10 NOTE — TELEPHONE ENCOUNTER
Orders being requested: Home care  Physical Therapy    2 times a week for 4 weeks.  Reason service is needed/diagnosis: to work on gait, transfers, therapeutic exercise and balance.   When are orders needed by: ASAP  Where to send Orders: Phone:  verbal orders to caller.   Okay to leave detailed message?  Yes

## 2021-06-10 NOTE — H&P
ADMISSION HISTORY & PHYSICAL    CODE STATUS:  Full Code    Isidro Au MD, [phone]    Patient YOB: 1940  Admit date: 7/28/2020  Date of Service: 7/28/2020    ASSESSMENT AND PLAN:  80 y.o. 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 fib, AV node ablation s/p BiV pacemaker, hypertension, right hip replacement 16 years ago who presented to ED for evaluation of atraumatic right hip pain for 1 day duration and admitted for further management    Acute right hip pain, atraumatic;  History of right hip replacement 16 years ago;  -No reported trauma.  No reported febrile illness, urinary changes or leg weakness  -CT pelvis reported no evidence of periprosthetic fracture, no dislocation.  -Normal WBC count.  Elevated CRP at 4.1  -ED provider discussed with orthopedic surgeon, Dr. Jerez reported concern for septic arthritis and recommended right hip arthrocentesis.  ED provider discussed with radiologist for the procedure who reported INR is high to perform arthrocentesis.  Patient received vitamin K to reverse INR in ED  -Orthopedic provider recommended to monitor off antibiotics until arthrocentesis and possibly  -Patient is allergic to hydrocodone/oxycodone reported hallucination.  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    -Echocardiogram on 10/2018 showed EF 55%  -Coumadin on hold for above issues.  Resume Coumadin once okay with orthopedic team  -Continue PTA medications-Lipitor, Toprol-XL, aspirin    Acute kidney injury, mild Vs CKD;  - Gentle hydration.  Hold home diuretics.  Check BMP in a.m.    Chronic normocytic anemia;  Chronic intermittent  thrombocytopenia;  -Fairly stable.  Monitor labs    DVT prophylaxis;  -Patient on Coumadin, which is on hold for procedure.       Disposition:  -Anticipated Length of Stay in midnights and medical necessity (including a midnight in the Emergency Department after triage if applicable): >2      CHIEF COMPLAINT:  Right hip pain    HISTORY OF PRESENTING ILLNESS:  History obtained from patient, ED provider, previous medical records from [facility].  Patient is a 80 y.o. 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 fib, AV node ablation s/p BiV pacemaker, hypertension, right hip replacement 16 years ago who presented to ED for evaluation of atraumatic right hip pain for 1 day duration and admitted for further management.    Patient reported that he was walking at the store yesterday and then he started having right hip pain and overnight got progressively worse, 5/10 in intensity when resting and 8/10 in intensity with movement.  Patient reported that normally he walks without any assistant but last night had to use a cane due to severe pain on his right hip.  Patient denied trauma.  Patient denied recent febrile illness.  Patient denied low back pain, radiculopathy, bowel and bladder issues.  Patient reported having chronic numbness on his right leg on lateral aspect below knee and denied worsening.  Patient denied short of breath or chest pain or dizziness.  Denied cough or congestion.  Patient denied abdomen pain, nausea, vomiting.  Denied urinary symptoms.  Denied sick contact.    PMH/PSH:  Patient Active Problem List   Diagnosis     Aortic Stenosis     Bicuspid Aortic Valve     Benign Prostatic Hypertrophy     Abrasion Of The Ear     Hyperlipidemia     Cardiomyopathy     Strained Left Pectoralis Major Muscle     Essential Hypercholesterolemia     Aneurysm Of The Abdominal Aorta     Chest Pain     Neck Strain     Osteoarthritis     Lumbar Strain     Blood In Urine     Atrial Fibrillation      Esophageal Reflux     Subconjunctival Hemorrhage     Post-operative pain     Hypertension     Hypotension     Postoperative anemia due to acute blood loss     A-fib (H)     Cough     Edema     Lumbar radiculopathy     Malignant neoplasm of skin     Wheezing     Biventricular cardiac pacemaker in situ     Malignant tumor of rectum (H)     Abdominal aortic aneurysm (AAA) (H)     Atrial flutter, paroxysmal (H)     Benign neoplasm of ascending colon     Coagulopathy (H)     Diverticular disease of large intestine     Dysthymia     Encounter for screening for malignant neoplasm of colon     Family history of colon cancer     Family history of malignant neoplasm of digestive organ     H/O mitral valve repair     History of colonic polyps     Iliac artery aneurysm (H)     Nonrheumatic aortic valve stenosis     Polyp of ascending colon, unspecified type     PVC (premature ventricular contraction)     Rectal polyp     S/P AVR     Stress hyperglycemia     Weight loss     Right hip pain       Past Medical History:   Diagnosis Date     Abdominal aortic aneurysm (H)      Alcohol abuse      Anemia      Aortic stenosis      Arthritis      Atrial fibrillation (H)      BPH (benign prostatic hyperplasia)      CAD (coronary artery disease)      Cardiomyopathy (H)      Cataract     left     Chest pain      CHF (congestive heart failure) (H)      Congenital insufficiency of aortic valve      Coronary artery disease      Disease of pancreas     gallstones related     Dysthymia      Enlarged prostate      FH: mitral valve repair      H/O aortic valve repair '     High blood pressure      Hypercholesteremia      Hyperlipidemia      Hypertrophy of prostate      Lumbar radiculopathy      Mitral valve prolapse      Neck strain      Nonrheumatic aortic (valve) stenosis      Osteoarthrosis      Pacemaker      Rectal cancer (H)      Reflux esophagitis      Subconjunctival bleed         Past Surgical History:   Procedure Laterality Date      ABDOMINAL AORTIC ANEURYSM REPAIR  2009     AORTIC VALVE REPLACEMENT       CARDIOVERSION      X3     CARDIOVERSION       CERVICAL FUSION  1972     CHOLECYSTECTOMY       CORONARY ARTERY BYPASS GRAFT      x1 with LIMA to LAD     CT coronary angiogram       EMBOLIZATION  01/31/2018     [facility] AAA REPAIR EXPOSE ILIAC ART ABD INCIS UNILAT   2009     ILIAC ARTERY ANEURYSM REPAIR  03/29/2012     JOINT REPLACEMENT Right     RAMÓN     LAPAROSCOPIC CHOLECYSTECTOMY       LUMBAR LAMINECTOMY Right 9/15/2014    Procedure: OPEN DECOMPRESSION L2-4 RIGHT ;  Surgeon: Elroy French MD;  Location: [facility];  Service:      MITRAL VALVE REPAIR  2006     MO ARTHRODESIS ANT INTERBODY MIN DISCECTOMY, CERVICAL BELOW C2      Description: Cervical Vertebral Fusion;  Recorded: 04/18/2008;     MO EXCIS RECTAL TUMOR, TRANSANAL, PARTIAL THICKNESS N/A 3/6/2017    Procedure: TRANSANAL EXCISION OF RECTAL TUMOR AND PROCTOSCOPY;  Surgeon: Alexander Mcgrath MD;  Location: [facility];  Service: General     MO REANEURYSM/GRFT INS,ABDOMINAL AORTA      Description: Abdominal Aortic Aneurysm Repair For Dilation Or Occlusion;  Recorded: 04/05/2012;     MO SURG ONLY REMOVE CATARACT INTRACAP INSERT LENS   2010     STERNOTOMY      redo     TOTAL HIP ARTHROPLASTY Right 2004     TRANSURETHRAL RESECTION OF PROSTATE            ALLERGIES:  Allergies   Allergen Reactions     Hydrocodone-Acetaminophen Other (See Comments)     hallucination     Lisinopril Cough     Oxycodone-Acetaminophen Other (See Comments)     hallucination     Amiodarone Rash       MEDICATIONS:  Reviewed.  No current facility-administered medications on file prior to encounter.      Current Outpatient Medications on File Prior to Encounter   Medication Sig Dispense Refill     amoxicillin (AMOXIL) 500 MG capsule Take 2,000 mg by mouth as needed. As needed for Hx mitral valve repair. Take one hour before appointment       aspirin (ASPIRIN LOW DOSE) 81 MG EC tablet Take 1 tablet by  mouth daily.       atorvastatin (LIPITOR) 40 MG tablet TAKE 1 TABLET BY MOUTH AT BEDTIME. 90 tablet 3     buPROPion (WELLBUTRIN XL) 150 MG 24 hr tablet TAKE 1 TABLET BY MOUTH EVERY MORNING 90 tablet 3     folic acid/multivit-min/lutein (CENTRUM SILVER ORAL) Take 1 tablet by mouth daily.       furosemide (LASIX) 20 MG tablet TAKE 1 TABLET BY MOUTH 2 TIMES A DAY (Patient taking differently: Take 20 mg by mouth daily. ) 180 tablet 0     hydrOXYzine HCl (ATARAX) 25 MG tablet Take 1 tablet (25 mg total) by mouth every 6 (six) hours as needed for anxiety. 360 tablet 0     metoprolol succinate (TOPROL-XL) 50 MG 24 hr tablet TAKE ONE TABLET BY MOUTH DAILY. HOLD FOR SYSTOLIC BP <95 OR HEART RATE <55 90 tablet 3     nitroglycerin (NITROSTAT) 0.4 MG SL tablet PLACE 1 TABLET UNDER THE TONGUE EVERY 5 MINUTES UP TO 3 DOSES AS NEEDED; CALL 911 AFTER TAKING FIRST DOSE 25 tablet 0     sertraline (ZOLOFT) 100 MG tablet TAKE ONE TABLET BY MOUTH ONCE DAILY ALONG WITH 50MG TO EQUAL 150MG 90 tablet 0     sertraline (ZOLOFT) 50 MG tablet TAKE ONE TABLET BY MOUTH ONCE DAILY ALONG WITH 100MG TO EQUAL 150MG 90 tablet 0     warfarin ANTICOAGULANT (COUMADIN/JANTOVEN) 5 MG tablet Take 5-7.5 mg by mouth See Admin Instructions. Takes 5 mg on Tuesday, Thursday, and Saturday and 7.5 mg all other days       [DISCONTINUED] warfarin (COUMADIN) 7.5 MG tablet Take 7.5 mg by mouth daily. Until 8/12/18  INR 2.07 next INR 8/13       [DISCONTINUED] warfarin ANTICOAGULANT (COUMADIN/JANTOVEN) 5 MG tablet TAKE 1 TO 1 & 1/2 TABLETS ( 5- 7.5 MG) BY MOUTH DAILY AS DIRECTED.. ADJUST DOSE BASED ON INR RESULTS 120 tablet 1       Current Facility-Administered Medications:      acetaminophen suppository 650 mg (TYLENOL), 650 mg, Rectal, Q4H PRN, K.C., MD Butch     acetaminophen tablet 500-1,000 mg (TYLENOL), 500-1,000 mg, Oral, Q4H PRN, K.CJuan Ramon, MD Butch, 500 mg at 07/28/20 1617     aspirin EC tablet 81 mg, 81 mg, Oral, Daily, K.C., MD Butch     atorvastatin tablet 40  mg (LIPITOR), 40 mg, Oral, QHS, K.CJuan Ramon, MD Butch     bisacodyL suppository 10 mg (DULCOLAX), 10 mg, Rectal, Daily PRN, RAFFY, MD Butch     [START ON 7/29/2020] buPROPion 24 hr tablet 150 mg (WELLBUTRIN XL), 150 mg, Oral, QAM, K.CJuan Ramon, MD Butch     furosemide tablet 20 mg (LASIX), 20 mg, Oral, DAILY, K.CJuan Ramon, MD Butch, 20 mg at 07/28/20 1617     hydrOXYzine HCL tablet 25 mg (ATARAX), 25 mg, Oral, Q6H PRN, K.CJuan Ramon, MD Butch     magnesium hydroxide suspension 30 mL (MILK OF MAG), 30 mL, Oral, Daily PRN, K.CButch Delatorre MD     metoprolol succinate 24 hr tablet 50 mg (TOPROL-XL), 50 mg, Oral, DAILY, K.CJuan Ramon, MD Butch, 50 mg at 07/28/20 1617     naloxone injection 0.2-0.4 mg (NARCAN), 0.2-0.4 mg, Intravenous, PRN **OR** naloxone injection 0.2-0.4 mg (NARCAN), 0.2-0.4 mg, Intramuscular, PRN, K.CJuan Ramon, MD Butch     nitroglycerin SL tablet 0.4 mg (NITROSTAT), 0.4 mg, Sublingual, Q5 Min PRN, K.CJuan Ramon, MD Butch     ondansetron injection 4 mg (ZOFRAN), 4 mg, Intravenous, Once PRN, Estrellita Hernandez PA-C     ondansetron injection 4 mg (ZOFRAN), 4 mg, Intravenous, Q4H PRN **OR** ondansetron tablet 8 mg (ZOFRAN), 8 mg, Oral, Q8H PRN, Butch AKBAR MD     polyethylene glycol packet 17 g (MIRALAX), 17 g, Oral, DAILY, K.CJuan Ramon, MD Butch     sertraline tablet 150 mg (ZOLOFT), 150 mg, Oral, DAILY, K.CJuan Ramon, MD Butch     sodium chloride flush 2.5 mL (NS), 2.5 mL, Intravenous, Line Care, Butch AKBAR MD   Scheduled Meds:    aspirin  81 mg Oral Daily     atorvastatin  40 mg Oral QHS     [START ON 7/29/2020] buPROPion  150 mg Oral QAM     furosemide  20 mg Oral DAILY     metoprolol succinate  50 mg Oral DAILY     polyethylene glycol  17 g Oral DAILY     sertraline  150 mg Oral DAILY     sodium chloride  2.5 mL Intravenous Line Care     Continuous Infusions:  PRN Meds:.acetaminophen, acetaminophen, bisacodyL, hydrOXYzine HCL, magnesium hydroxide, naloxone **OR** naloxone, nitroglycerin, ondansetron, ondansetron **OR** ondansetron    SOCIAL HISTORY:  Social History  "    Socioeconomic History     Marital status:      Spouse name: Not on file     Number of children: Not on file     Years of education: Not on file     Highest education level: Not on file   Occupational History     Not on file   Social Needs     Financial resource strain: Not on file     Food insecurity     Worry: Not on file     Inability: Not on file     Transportation needs     Medical: Not on file     Non-medical: Not on file   Tobacco Use     Smoking status: Former Smoker     Types: Cigarettes     Last attempt to quit: 9/15/1980     Years since quittin.8     Smokeless tobacco: Never Used   Substance and Sexual Activity     Alcohol use: No     Comment: quit      Drug use: No     Sexual activity: Not on file   Lifestyle     Physical activity     Days per week: Not on file     Minutes per session: Not on file     Stress: Not on file   Relationships     Social connections     Talks on phone: Not on file     Gets together: Not on file     Attends Holiness service: Not on file     Active member of club or organization: Not on file     Attends meetings of clubs or organizations: Not on file     Relationship status: Not on file     Intimate partner violence     Fear of current or ex partner: Not on file     Emotionally abused: Not on file     Physically abused: Not on file     Forced sexual activity: Not on file   Other Topics Concern     Not on file   Social History Narrative    Has not smoked for 35 years.       FAMILY HISTORY:  Family History   Family history unknown: Yes   Denied family still premature CAD    ROS:  12 point review of systems reviewed and is negative except for what has already been mentioned in HPI.       PHYSICAL EXAM:  GENRL:  Not in acute distress   /65 (Patient Position: Semi-mccartney)   Pulse 67   Temp 100  F (37.8  C) (Oral)   Resp 18   Ht 5' 9\" (1.753 m)   Wt 193 lb 4.8 oz (87.7 kg)   SpO2 95%   BMI 28.55 kg/m    No intake/output data recorded.  No intake/output " data recorded.  HEENT: NC/AT      Eyes-  Pupil round and reactive to light bilaterally       Neck- supple, no JVP elevation,       Sclera- anicteric      Oropharynx- moist and pink  CHEST: Clear to auscultation bilateral anteriorly, no ronchi or wheezing  HEART: S1S2 normal, irregular  ABDMN: Soft. Non-tender, non-distended.  No guarding or rigidity. Bowel sounds- active  EXTRM: Trace pedal edema  INTGM: No skin rash, no cyanosis or clubbing  MUSCULOSKELETAL: No right hip tenderness appreciated on palpation.  However, reported significant pain when he moves.  No tenderness appreciated on spinal/paraspinal area  NEURO: Alert and awake. Cranial nerves II-XII grossly intact. No focal neurological deficit.  PSYCH:   Normal affect and mood, cooperative      DIAGNOSTIC DATA:    Results from last 7 days   Lab Units 07/28/20  0950   LN-WHITE BLOOD CELL COUNT thou/uL 7.6   LN-HEMOGLOBIN g/dL 11.3*   LN-HEMATOCRIT % 33.8*   LN-PLATELET COUNT thou/uL 131*   LN-NEUTROPHILS RELATIVE PERCENT % 75*   LN-MONOCYTES RELATIVE PERCENT % 16*       Results from last 7 days   Lab Units 07/28/20  0950   LN-SODIUM mmol/L 138   LN-POTASSIUM mmol/L 4.4   LN-CHLORIDE mmol/L 104   LN-CO2 mmol/L 25   LN-BLOOD UREA NITROGEN mg/dL 29*   LN-CREATININE mg/dL 1.25   LN-CALCIUM mg/dL 8.6       Results from last 7 days   Lab Units 07/28/20  0950   LN-INR  2.77*       Results from last 7 days   Lab Units 07/28/20  0950   LN-SODIUM mmol/L 138   LN-POTASSIUM mmol/L 4.4   LN-CHLORIDE mmol/L 104   LN-CO2 mmol/L 25   LN-BLOOD UREA NITROGEN mg/dL 29*   LN-CREATININE mg/dL 1.25   LN-CALCIUM mg/dL 8.6       Recent Results (from the past 24 hour(s))   INR    Collection Time: 07/28/20  9:50 AM   Result Value Ref Range    INR 2.77 (H) 0.90 - 1.10   Basic Metabolic Panel    Collection Time: 07/28/20  9:50 AM   Result Value Ref Range    Sodium 138 136 - 145 mmol/L    Potassium 4.4 3.5 - 5.0 mmol/L    Chloride 104 98 - 107 mmol/L    CO2 25 22 - 31 mmol/L    Anion  Gap, Calculation 9 5 - 18 mmol/L    Glucose 119 70 - 125 mg/dL    Calcium 8.6 8.5 - 10.5 mg/dL    BUN 29 (H) 8 - 28 mg/dL    Creatinine 1.25 0.70 - 1.30 mg/dL    GFR MDRD Af Amer >60 >60 mL/min/1.73m2    GFR MDRD Non Af Amer 56 (L) >60 mL/min/1.73m2   Sedimentation Rate    Collection Time: 07/28/20  9:50 AM   Result Value Ref Range    Sed Rate 12 0 - 15 mm/hr   C-Reactive Protein    Collection Time: 07/28/20  9:50 AM   Result Value Ref Range    CRP 4.1 (H) 0.0 - 0.8 mg/dL   HM1 (CBC with Diff)    Collection Time: 07/28/20  9:50 AM   Result Value Ref Range    WBC 7.6 4.0 - 11.0 thou/uL    RBC 3.65 (L) 4.40 - 6.20 mill/uL    Hemoglobin 11.3 (L) 14.0 - 18.0 g/dL    Hematocrit 33.8 (L) 40.0 - 54.0 %    MCV 93 80 - 100 fL    MCH 31.0 27.0 - 34.0 pg    MCHC 33.4 32.0 - 36.0 g/dL    RDW 13.7 11.0 - 14.5 %    Platelets 131 (L) 140 - 440 thou/uL    MPV 9.8 8.5 - 12.5 fL    Neutrophils % 75 (H) 50 - 70 %    Lymphocytes % 8 (L) 20 - 40 %    Monocytes % 16 (H) 2 - 10 %    Eosinophils % 0 0 - 6 %    Basophils % 0 0 - 2 %    Neutrophils Absolute 5.6 2.0 - 7.7 thou/uL    Lymphocytes Absolute 0.6 (L) 0.8 - 4.4 thou/uL    Monocytes Absolute 1.2 (H) 0.0 - 0.9 thou/uL    Eosinophils Absolute 0.0 0.0 - 0.4 thou/uL    Basophils Absolute 0.0 0.0 - 0.2 thou/uL       IV Access:  Peripheral IV 07/28/20 Right;Anterior (front);Medial (center/inner) Antecubital-Line Status: Saline locked, Flushed, Capped  PICC: Peripheral IV 07/28/20 Right;Anterior (front);Medial (center/inner) Antecubital-Site Skin Assessment: Checked, Clean, Dry, Intact          Ct Pelvis Without Oral Without Iv Contrast    Result Date: 7/28/2020  EXAM: CT PELVIS WO ORAL WO IV CONTRAST LOCATION: [facility] DATE/TIME: 7/28/2020 11:05 AM INDICATION: Hip replaced, periprosthetic fracture suspected Atraumatic right hip pain, negative XR, S/P hip replacement. COMPARISON: 7/28/2020. TECHNIQUE: CT scan of the pelvis was performed without IV contrast. Multiplanar reformats  were obtained. Dose reduction techniques were used. CONTRAST: None. FINDINGS: Postoperative changes related to a right total hip replacement. While assessment at the bone metallic interface is difficult due to artifact, there is no dislocation or evidence of an acute periprosthetic fracture. Stable small amount of lucency surrounding the acetabular cup bone metallic interface, unchanged. Chronic appearing heterotopic ossification along the right iliac wing. Chronic bony ankylosis along the anterior aspect of both SI joints. Prior posterior decompressive changes in the lower lumbar spine. Aortoiliac stenting for an abdominal aortic aneurysm. Mild scattered colonic diverticulosis. No free fluid in the pelvis.     1.  Postoperative changes related to prior right total hip replacement. There is no evidence of a periprosthetic fracture. No dislocation.    Xr Pelvis W 2 Vw Hip Right    Result Date: 7/28/2020  EXAM: XR PELVIS W 2 VW HIP RIGHT LOCATION: [facility] DATE/TIME: 7/28/2020 9:35 AM INDICATION: S/p hip replacement, increased pain. COMPARISON: None.     Postoperative changes of right total hip arthroplasty. Components appear well seated. There is moderate severity degenerative change at the left hip joint. No evidence for fracture. Pelvis otherwise negative. Iliac artery stents.        Patient's new lab studies reviewed personally.  Patient's new radiology reports reviewed personally.      Note created using dragon voice recognition software.  Errors in spelling or words which seems out of context are unintentional.  Sounds alike errors may have escaped editing.     7/28/2020  BRIGID AKBAR MD  HOSPITALIST, [facility]  PAGER NO. [phone]

## 2021-06-10 NOTE — PROCEDURES
"Procedures          PICC Line Insertion Procedure Note  Pt. Name: Parish Bills  MRN:        [id number]    Procedure: Insertion of a  single Lumen  4 fr  Bard SOLO (valved) Power PICC, Lot number [id number]    Indications: IV Antibiotics    Contraindications : biventricular pacemaker on left    Procedure Details   Patient identified with 2 identifiers and \"Time Out\" conducted.  .     Central line insertion bundle followed: hand hygeine performed prior to procedure, site cleansed with cholraprep, hat, mask, sterile gloves,sterile gown worn, patient draped with maximum barrier head to toe drape, sterile field maintained.    Vein was assessed and found to be compressible and of adequate size. Two ml 1% Lidocaine administered sq to the insertion site. A 4 Fr PICC was inserted into the basilic vein of the right arm with ultrasound guidance. One attempt(s) required to access vein.   Catheter threaded without difficulty. Good blood return noted.    Modified Seldinger Technique used for insertion.    The 8 sharps that are included in the PICC kit were accounted for and disposed of in the sharps container prior to the breakdown of the sterile field.     Catheter secured with Statlock, biopatch and Tegaderm dressing applied.    Findings:  Total catheter length  47 cm, with 1 cm exposed. Mid upper arm circumference is 29 cm. Catheter was flushed with 10 cc NS. Patient  tolerated procedure well.    Tip placement verified by xray. Xray read by Dr. Jacky Christopher . Tip placement in the lower SVC.    CLABSI prevention brochure left at bedside.    Patient's primary RN notified PICC is ready for use.    Comments:          Roxy David RN    [facility]  [phone]      "

## 2021-06-10 NOTE — TELEPHONE ENCOUNTER
INR result is  2.9   8/25/20      INR   Date Value Ref Range Status   08/20/2020 3.00 (!) 0.9 - 1.1 Final       Will the patient be seen, or did they already see, MD or CNP today? No    Most Recent Warfarin dose day/week  Sunday Monday Tuesday Wednesday Thursday Friday Saturday     2.5 mg 5 mg 5 mg 5 mg 5 mg     Sunday Monday Tuesday Wednesday Thursday Friday Saturday   5 mg 5 mg            Has the patient missed any doses of Coumadin, Warfarin, Jantoven in the past 7 days? No    Has the patients medications changed since the last visit? No    Has the patient experienced any bleeding recently? No    Has the patient experienced any injuries or illness recently? No    Has the patient experienced any 'new' shortness of breath, severe headaches, or changes in vision recently? No    Has the patient had any changes in their diet, or alcohol consumption? No    Is the patient here today to prepare for any type of upcoming surgery, procedure, or for a cardioversion procedure? No    What phone number can we reach the patient at today? [facility]  [phone].

## 2021-06-10 NOTE — PLAN OF CARE
Problem: Pain  Goal: Patient's pain/discomfort is manageable  Outcome: Progressing   Denies pain. On scheduled Tylenol    Problem: Potential for Falls  Goal: Patient will remain free of falls  Outcome: Progressing   Up with SBA  Problem: Potential for Compromised Skin Integrity  Goal: Skin integrity is maintained or improved  Outcome: Progressing   Encouraged to shift weight in bed  Problem: Urinary Incontinence  Goal: Perineal skin integrity is maintained or improved  Outcome: Progressing

## 2021-06-10 NOTE — PROGRESS NOTES
[facility] Progress Note    SUBJECTIVE:  Feels ok. A few questions about antibiotics.     REVIEW OF SYSTEMS:  Negative unless as listed above.  Social history, Family history, Medications: reviewed.    OBJECTIVE:  Vitals:    08/03/20 0724   BP: 121/60   Pulse: 61   Resp: 18   Temp: 98.3  F (36.8  C)   SpO2: 96%     PHYSICAL EXAM:  Alert, awake  Vitals tabulated above, reviewed  Neck supple without lymphadenopathy  Sclera normal color, not injected  CARDIOVASCULAR regular rate and rhythm, no murmur  Lungs CLEAR TO AUSCULTATION   Abdomen soft, NT/ND, absent HEPATOSPLENOMEGALY  Skin normal  Joints normal  Neurologic exam non focal    Antibiotics:vanco    Pertinent labs:  Results from last 7 days   Lab Units 08/02/20  0616 08/01/20  0609 07/31/20  0514 07/30/20  0554 07/28/20  0950   LN-WHITE BLOOD CELL COUNT thou/uL  --   --  5.6 6.6 7.6   LN-HEMOGLOBIN g/dL 9.1* 9.3* 10.2* 10.3* 11.3*   LN-HEMATOCRIT %  --   --  30.3* 30.7* 33.8*   LN-PLATELET COUNT thou/uL  --   --  132* 94* 131*       Results from last 7 days   Lab Units 08/01/20  0609  07/29/20  0630   LN-SODIUM mmol/L  --   --  138   LN-POTASSIUM mmol/L 4.5  --  4.1   LN-CHLORIDE mmol/L  --   --  106   LN-CO2 mmol/L  --   --  26   LN-BLOOD UREA NITROGEN mg/dL  --   --  26   LN-CREATININE mg/dL 0.89   < > 1.00   LN-CALCIUM mg/dL  --   --  8.2*    < > = values in this interval not displayed.                   MICROBIOLOGY DATA:     Culture  50,000-100,000 col/ml Proteus mirabilisAbnormal            Resulting Agency: [facility]    Susceptibility      Proteus mirabilis      FRANCISCA      Amoxicillin + Clavulanate  <=4/2   Sensitive      Ampicillin  <=4   Sensitive      Cefazolin  8   Sensitive      Cefepime  <=1   Sensitive      Ceftriaxone  <=1   Sensitive      Ciprofloxacin  <=0.5   Sensitive      Gentamicin  4   Sensitive      Levofloxacin  <=1   Sensitive      Nitrofurantoin  64   Resistant      Tetracycline  >8   Resistant      Tobramycin  4   Sensitive       Trimethoprim + Sulfamethoxazole  <=0.5   Sensitive                  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  Right hip incision, irrigation, excisional debridement  2.  Right hip arthroplasty revision, head and liner exchange    Possible mild proteus UTI  RECOMMENDATION:  CTX times 28 days  See Dr Pickard in office 21-28 days  See orders  SW to see and get DC set up today?  Dr Pickard placed orders over the weekend  - ID will sign off. Please call with additional questions or change in clinical status.     Kathy Parson MD  Office [phone] option 2 to desk staff

## 2021-06-10 NOTE — CONSULTS
Infectious Disease Consultation:  Requesting MDsara  Reason for consult:Infected R hip    HISTORY:  Nice man with 17 yo prosthesis on R hip.  Walking thru [employer] with wife this week and pain without redness or trauma commenced.  Admitted.  Tap done but minimal fluid.  The small amount obtained has:    Culture/Gram Stain: Body Fluid (Order [id number])   Culture/Gram Stain: Body Fluid   Order: [id number]   Status:  Preliminary result   Visible to patient:  No (not released) Next appt:  Today at 01:30 PM in Occupational Therapy (Jayleen Mueller OT)   Specimen Information:  Hip, Right; Body Fluid          Gram Stain Result   4+ Polymorphonuclear leukocytes       3+ Gram positive cocci in pairs             Resulting Agency: [facility]          Specimen Collected: 07/29/20 14:56  Last Resulted: 07/30/20 10:35  Order Details View Encounter           Pt is on vanco and has dull ache in hip, prior it was more spasmodic        Pertinent past history, past infectious disease history:  Patient Active Problem List   Diagnosis     Aortic Stenosis     Bicuspid Aortic Valve     Benign Prostatic Hypertrophy     Abrasion Of The Ear     Hyperlipidemia     Cardiomyopathy     Strained Left Pectoralis Major Muscle     Essential Hypercholesterolemia     Aneurysm Of The Abdominal Aorta     Chest Pain     Neck Strain     Osteoarthritis     Lumbar Strain     Blood In Urine     Atrial Fibrillation     Esophageal Reflux     Subconjunctival Hemorrhage     Post-operative pain     Hypertension     Hypotension     Postoperative anemia due to acute blood loss     A-fib (H)     Cough     Edema     Lumbar radiculopathy     Malignant neoplasm of skin     Wheezing     Biventricular cardiac pacemaker in situ     Malignant tumor of rectum (H)     Abdominal aortic aneurysm (AAA) (H)     Atrial flutter, paroxysmal (H)     Benign neoplasm of ascending colon     Coagulopathy (H)     Diverticular disease of large intestine     Dysthymia     Encounter for  screening for malignant neoplasm of colon     Family history of colon cancer     Family history of malignant neoplasm of digestive organ     H/O mitral valve repair     History of colonic polyps     Iliac artery aneurysm (H)     Nonrheumatic aortic valve stenosis     Polyp of ascending colon, unspecified type     PVC (premature ventricular contraction)     Rectal polyp     S/P AVR     Stress hyperglycemia     Weight loss     Right hip pain        Past Medical History:   Diagnosis Date     Abdominal aortic aneurysm (H)       Alcohol abuse       Anemia       Aortic stenosis       Arthritis       Atrial fibrillation (H)       BPH (benign prostatic hyperplasia)       CAD (coronary artery disease)       Cardiomyopathy (H)       Cataract       left     Chest pain       CHF (congestive heart failure) (H)       Congenital insufficiency of aortic valve       Coronary artery disease       Disease of pancreas       gallstones related     Dysthymia       Enlarged prostate       FH: mitral valve repair       H/O aortic valve repair '     High blood pressure       Hypercholesteremia       Hyperlipidemia       Hypertrophy of prostate       Lumbar radiculopathy       Mitral valve prolapse       Neck strain       Nonrheumatic aortic (valve) stenosis       Osteoarthrosis       Pacemaker       Rectal cancer (H)       Reflux esophagitis       Subconjunctival bleed                 Past Surgical History:   Procedure Laterality Date     ABDOMINAL AORTIC ANEURYSM REPAIR   2009     AORTIC VALVE REPLACEMENT         CARDIOVERSION         X3     CARDIOVERSION         CERVICAL FUSION   1972     CHOLECYSTECTOMY         CORONARY ARTERY BYPASS GRAFT         x1 with LIMA to LAD     CT coronary angiogram         EMBOLIZATION   01/31/2018     [facility] AAA REPAIR EXPOSE ILIAC ART ABD INCIS UNILAT    2009     ILIAC ARTERY ANEURYSM REPAIR   03/29/2012     JOINT REPLACEMENT Right       RAMÓN     LAPAROSCOPIC CHOLECYSTECTOMY         LUMBAR LAMINECTOMY Right  9/15/2014     Procedure: OPEN DECOMPRESSION L2-4 RIGHT ;  Surgeon: Elroy French MD;  Location: [facility];  Service:      MITRAL VALVE REPAIR   2006     OK ARTHRODESIS ANT INTERBODY MIN DISCECTOMY, CERVICAL BELOW C2         Description: Cervical Vertebral Fusion;  Recorded: 04/18/2008;     OK EXCIS RECTAL TUMOR, TRANSANAL, PARTIAL THICKNESS N/A 3/6/2017     Procedure: TRANSANAL EXCISION OF RECTAL TUMOR AND PROCTOSCOPY;  Surgeon: Alexander Mcgrath MD;  Location: [facility] OR;  Service: General     OK REANEURYSM/GRFT INS,ABDOMINAL AORTA         Description: Abdominal Aortic Aneurysm Repair For Dilation Or Occlusion;  Recorded: 04/05/2012;     OK SURG ONLY REMOVE CATARACT INTRACAP INSERT LENS    2010     STERNOTOMY         redo     TOTAL HIP ARTHROPLASTY Right 2004     TRANSURETHRAL RESECTION OF PROSTATE                ALLERGIES:        Allergies   Allergen Reactions     Hydrocodone-Acetaminophen Other (See Comments)       hallucination     Lisinopril Cough     Oxycodone-Acetaminophen Other (See Comments)       hallucination     Amiodarone Rash        MEDICATIONS:  Reviewed.  No current facility-administered medications on file prior to encounter.              Current Outpatient Medications on File Prior to Encounter   Medication Sig Dispense Refill     amoxicillin (AMOXIL) 500 MG capsule Take 2,000 mg by mouth as needed. As needed for Hx mitral valve repair. Take one hour before appointment         aspirin (ASPIRIN LOW DOSE) 81 MG EC tablet Take 1 tablet by mouth daily.         atorvastatin (LIPITOR) 40 MG tablet TAKE 1 TABLET BY MOUTH AT BEDTIME. 90 tablet 3     buPROPion (WELLBUTRIN XL) 150 MG 24 hr tablet TAKE 1 TABLET BY MOUTH EVERY MORNING 90 tablet 3     folic acid/multivit-min/lutein (CENTRUM SILVER ORAL) Take 1 tablet by mouth daily.         furosemide (LASIX) 20 MG tablet TAKE 1 TABLET BY MOUTH 2 TIMES A DAY (Patient taking differently: Take 20 mg by mouth daily. ) 180 tablet 0      hydrOXYzine HCl (ATARAX) 25 MG tablet Take 1 tablet (25 mg total) by mouth every 6 (six) hours as needed for anxiety. 360 tablet 0     metoprolol succinate (TOPROL-XL) 50 MG 24 hr tablet TAKE ONE TABLET BY MOUTH DAILY. HOLD FOR SYSTOLIC BP <95 OR HEART RATE <55 90 tablet 3     nitroglycerin (NITROSTAT) 0.4 MG SL tablet PLACE 1 TABLET UNDER THE TONGUE EVERY 5 MINUTES UP TO 3 DOSES AS NEEDED; CALL 911 AFTER TAKING FIRST DOSE 25 tablet 0     sertraline (ZOLOFT) 100 MG tablet TAKE ONE TABLET BY MOUTH ONCE DAILY ALONG WITH 50MG TO EQUAL 150MG 90 tablet 0     sertraline (ZOLOFT) 50 MG tablet TAKE ONE TABLET BY MOUTH ONCE DAILY ALONG WITH 100MG TO EQUAL 150MG 90 tablet 0     warfarin ANTICOAGULANT (COUMADIN/JANTOVEN) 5 MG tablet Take 5-7.5 mg by mouth See Admin Instructions. Takes 5 mg on Tuesday, Thursday, and Saturday and 7.5 mg all other days         [DISCONTINUED] warfarin (COUMADIN) 7.5 MG tablet Take 7.5 mg by mouth daily. Until 8/12/18  INR 2.07 next INR 8/13         [DISCONTINUED] warfarin ANTICOAGULANT (COUMADIN/JANTOVEN) 5 MG tablet TAKE 1 TO 1 & 1/2 TABLETS ( 5- 7.5 MG) BY MOUTH DAILY AS DIRECTED.. ADJUST DOSE BASED ON INR RESULTS 120 tablet 1       Current Facility-Administered Medications:      acetaminophen suppository 650 mg (TYLENOL), 650 mg, Rectal, Q4H PRN, K.CButch Delatorre MD     acetaminophen tablet 500-1,000 mg (TYLENOL), 500-1,000 mg, Oral, Q4H PRN, K.CJuan Ramon, MD Butch, 500 mg at 07/28/20 1617     aspirin EC tablet 81 mg, 81 mg, Oral, Daily, K.CButch Delatorre MD     atorvastatin tablet 40 mg (LIPITOR), 40 mg, Oral, QHS, K.CButch Delatorre MD     bisacodyL suppository 10 mg (DULCOLAX), 10 mg, Rectal, Daily PRN, K.CButch Delatorre MD     [START ON 7/29/2020] buPROPion 24 hr tablet 150 mg (WELLBUTRIN XL), 150 mg, Oral, QA, K.C., MD Butch     furosemide tablet 20 mg (LASIX), 20 mg, Oral, DAILY, K.C., MD Butch, 20 mg at 07/28/20 1617     hydrOXYzine HCL tablet 25 mg (ATARAX), 25 mg, Oral, Q6H PRN, K.DIMITRIOS., MD Butch     magnesium  hydroxide suspension 30 mL (MILK OF MAG), 30 mL, Oral, Daily PRN, Butch AKBAR MD     metoprolol succinate 24 hr tablet 50 mg (TOPROL-XL), 50 mg, Oral, DAILY, YESSICA.THALIA, MD Butch, 50 mg at 07/28/20 1617     naloxone injection 0.2-0.4 mg (NARCAN), 0.2-0.4 mg, Intravenous, PRN **OR** naloxone injection 0.2-0.4 mg (NARCAN), 0.2-0.4 mg, Intramuscular, PRN, Butch AKBAR MD     nitroglycerin SL tablet 0.4 mg (NITROSTAT), 0.4 mg, Sublingual, Q5 Min PRN, Butch AKBAR MD     ondansetron injection 4 mg (ZOFRAN), 4 mg, Intravenous, Once PRN, Estrellita Hernandez PA-C     ondansetron injection 4 mg (ZOFRAN), 4 mg, Intravenous, Q4H PRN **OR** ondansetron tablet 8 mg (ZOFRAN), 8 mg, Oral, Q8H PRN, Butch AKBAR MD     polyethylene glycol packet 17 g (MIRALAX), 17 g, Oral, DAILY, Butch AKBAR MD     sertraline tablet 150 mg (ZOLOFT), 150 mg, Oral, DAILY, K.Butch VÁSQUEZ MD     sodium chloride flush 2.5 mL (NS), 2.5 mL, Intravenous, Line Care, Butch AKBAR MD   Scheduled Meds:    aspirin  81 mg Oral Daily     atorvastatin  40 mg Oral QHS     [START ON 7/29/2020] buPROPion  150 mg Oral QAM     furosemide  20 mg Oral DAILY     metoprolol succinate  50 mg Oral DAILY     polyethylene glycol  17 g Oral DAILY     sertraline  150 mg Oral DAILY     sodium chloride  2.5 mL Intravenous Line Care     Continuous Infusions:  PRN Meds:.acetaminophen, acetaminophen, bisacodyL, hydrOXYzine HCL, magnesium hydroxide, naloxone **OR** naloxone, nitroglycerin, ondansetron, ondansetron **OR** ondansetron     SOCIAL HISTORY:  Social History               Socioeconomic History     Marital status:        Spouse name: Not on file     Number of children: Not on file     Years of education: Not on file     Highest education level: Not on file   Occupational History     Not on file   Social Needs     Financial resource strain: Not on file     Food insecurity       Worry: Not on file       Inability: Not on file     Transportation needs       Medical: Not on file        Non-medical: Not on file   Tobacco Use     Smoking status: Former Smoker       Types: Cigarettes       Last attempt to quit: 9/15/1980       Years since quittin.8     Smokeless tobacco: Never Used   Substance and Sexual Activity     Alcohol use: No       Comment: quit      Drug use: No     Sexual activity: Not on file   Lifestyle     Physical activity       Days per week: Not on file       Minutes per session: Not on file     Stress: Not on file   Relationships     Social connections       Talks on phone: Not on file       Gets together: Not on file       Attends Synagogue service: Not on file       Active member of club or organization: Not on file       Attends meetings of clubs or organizations: Not on file       Relationship status: Not on file     Intimate partner violence       Fear of current or ex partner: Not on file       Emotionally abused: Not on file       Physically abused: Not on file       Forced sexual activity: Not on file   Other Topics Concern     Not on file   Social History Narrative     Has not smoked for 35 years.           FAMILY HISTORY:  Family History   Family history unknown: Yes   Denied family still premature CAD      Medications:  Reviewed prior to admission meds as applicable in chart review.  Current meds are reviewed in the EMR listed MAR.     ANTIBIOTICS:    Current:vanco   Prior:   Allergy to:    SH/FH and  travel history(if applicable to consult):  above  REVIEW OF SYSTEMS:  All other systems negative    EXAMINATION:  Vitals:    20 0753   BP: 145/68   Pulse: 60   Resp: 16   Temp: 98.2  F (36.8  C)   SpO2: 99%     Alert, awake pt is thin  Vitals tabulated above, reviewed  HEENT:glasses  Neck supple without lymphadenopathy  Sclera clear  CARDIOVASCULAR regular rate and rhythm, no murmur  Lungs CLEAR TO AUSCULTATION   Abdomen soft, NT/ND, absent HEPATOSPLENOMEGALY  Skin normal  Joints normal  Neurologic exam non focal  Wound:  R hip tender to  palpation        CLINICAL DATABASE FOR---LAB/MICRO/CULTURES/IMAGING STUDIES:  Results from last 7 days   Lab Units 07/30/20  0554 07/28/20  0950   LN-WHITE BLOOD CELL COUNT thou/uL 6.6 7.6   LN-HEMOGLOBIN g/dL 10.3* 11.3*   LN-HEMATOCRIT % 30.7* 33.8*   LN-PLATELET COUNT thou/uL 94* 131*       Results from last 7 days   Lab Units 07/30/20  0554 07/29/20  0630   LN-SODIUM mmol/L  --  138   LN-POTASSIUM mmol/L  --  4.1   LN-CHLORIDE mmol/L  --  106   LN-CO2 mmol/L  --  26   LN-BLOOD UREA NITROGEN mg/dL  --  26   LN-CREATININE mg/dL 0.85 1.00   LN-CALCIUM mg/dL  --  8.2*     Micro above    IMPRESSION:  Septic R RAMÓN(?)  Crystal negative on fluid    PLAN:  Ate lunch  Washout tomorrow?  On vanco, will follow        MARJ PAYNE MD  [facility]  Office [phone]

## 2021-06-10 NOTE — PROGRESS NOTES
Physical Therapy     08/01/20 1030   Visit Specifics   Eval Type Initial eval   Inital PT Consult 08/01/20   Bed/Tabs/Pad Alarm Applied Yes   General   Onset date 08/01/20   Chart Reviewed Yes   PT/OT Patient/Caregiver Stated Goals none stated   Family/Caregiver Present No   Treatment Time   Gait Training 10   Precautions   Total Hip Replacement 90 degree flexion;Hip ABductor brace/pillow   Weight Bearing Status wbat   General Precautions Bed alarm/Tab alarm   Home Living   Additional Comments see OT note   Prior Status   Comments see OT note   Cognition   Overall Cognitive Status WFL   RLE Assessment   RLE Assessment   (inc pain limiting mobility)   LLE Assessment   LLE Assessment WFL   Bed Mobility   Supine to Sit CGA;HOB elevated;With rail   Sit to Supine CGA;HOB elevated;With rail   Bed Mobility Comments pt using LLE to get RLE OOB, good mobility, some pain   Transfer    Sit To Stand CGA;Min assist;Verbal cues   Stand To Sit CGA;Min assist;Verbal cues   Transfer Comments needing cues for hand and foot placement. edu re: hip precautions   Ambulation    Weight Bearing Status wbat   Distance (ft)  200' x 2   Assistance CGA   Assistive Device Rolling walker   Verbal Cues Safety;Posture;Device advancement;LE advancement;Weight bearing   Quality of Gait/Comment needing cues for upright and proper use of walker, no LOB noted. inc pain with stepping on RLE, fatigue of BUEs on walker.   Pattern Antalgic;Step through;R Decreased stance time;Flexed posture   Endurance Deficit   Endurance Deficit Yes   Endurance Deficit Description arms fatigue and RLE fatigues with ambulation, seated rest break needed   Fall Risk   Fall Risk Medium   Plan   Treatment/Interventions Functional transfer training;Gait/stair training;Home exercise program;Neuromuscular re-ed;Strengthening/ROM;TENS/NMES/Modalitues   Assessment   Prognosis Good   Problem List Decreased strength;Decreased endurance;Impaired balance;Decreased mobility;Decreased  range of motion;Pain;Orthopedic restrictions   Barriers to Discharge None   Recommendation   PT Discharge Recommendation Home with assist;Home PT   PT Equipment Recommendation Rolling walker / FWW   Treatment Suggestions for Next Session RAMÓN protocol   PT Care Plan REVIEWED DAILY Yes, goals remain appropriate     Liat Vargas DPT  8/1/2020

## 2021-06-10 NOTE — TELEPHONE ENCOUNTER
Please give verbal orders for nursing and health aid  Please see how much furosemide patient ist taking- would like him to increase for 4 days to help with edema.  Dr. Au

## 2021-06-10 NOTE — TELEPHONE ENCOUNTER
INR result is   3.4    Will the patient be seen, or did they already see, MD or CNP today? No    Most Recent Warfarin dose day/week  Sunday Monday Tuesday Wednesday Thursday Friday Saturday Sunday Monday Tuesday Wednesday Thursday Friday Saturday 5 5          Has the patient missed any doses of Coumadin, Warfarin, Jantoven in the past 7 days? No    Has the patients medications changed since the last visit? Yes ceftriaxone IV bag daily.    Has the patient experienced any bleeding recently? No    Has the patient experienced any injuries or illness recently? Yes, infected right hardware.    Has the patient experienced any 'new' shortness of breath, severe headaches, or changes in vision recently? No    Has the patient had any changes in their diet, or alcohol consumption? No    Is the patient here today to prepare for any type of upcoming surgery, procedure, or for a cardioversion procedure? No    What phone number can we reach the patient at today? [phone]

## 2021-06-10 NOTE — PLAN OF CARE
Problem: Pain  Goal: Patient's pain/discomfort is manageable  Outcome: Progressing     Pt A&O x 4. VSS. Denies pain this shift. Pt is WBAT, assist of 2 with a walker. Pt was out of bed and ambulated a couple of steps to the chair this shift. BGS was 211 at AC and 181 at HS. On-call hospitalist notified as orders state to notify primary of to BGSs greater than 180 in 24 h. Awaiting response. Foot pumps are on patient this shift. Pt refused abductor pillow this shift but has a pillow placed between his legs. Pt is resting in bed with bed alarm on and call light within reach. Nursing to cont to monitor.

## 2021-06-10 NOTE — ED PROVIDER NOTES
EMERGENCY DEPARTMENT ENCOUNTER      NAME: Parish Bills  AGE: 80 y.o. male  YOB: 1940  MRN: [id number]  EVALUATION DATE & TIME: 2020  9:09 AM    PCP: Isidro Au MD    ED PROVIDER: Estrellita Henning PA-C      Chief Complaint   Patient presents with     Hip Pain         FINAL IMPRESSION:  1. Hip pain, right    2. Long term current use of anticoagulant therapy          MEDICAL DECISION MAKIN y.o. male s/p right hip replacement 16 years ago, A. fib on Coumadin, s/p AVR (not mechanical) presents emergency department for evaluation of atraumatic right hip pain for the last 1 day.  Patient noticed gradual increasing pain in the right hip after walking around at the grocery store yesterday.  Pain mostly with movement. Denies fevers, urinary changes, leg weakness.     Vital signs within normal range. On exam, patient appears slightly uncomfortable.  There is no focal tenderness of the right hip but patient does have significant pain with flexion or extension of the hip.  Pelvis stable and nontender. Strength 5/5 bilateral lower extremities, sensation intact.  Patient's pain does seem to be coming from his right hip rather than his low back, abdomen, pelvis.  Low suspicion for septic arthritis, osteomyelitis, although these remain on the differential with atraumatic hip pain.  No signs of trochanteric bursitis.  ROM is intact and therefore low concern for hip dislocation, there is concern for hardware dislodgment versus fracture.  Also consider destructive bony lesion or other malignancy.  No midline back pain or sciatic notch tenderness to suggest lumbar etiology.  Patient given intranasal fentanyl for pain control.    Labs are notable for elevated CRP of 4.1 with normal ESR, normal WBC but left shift is present, non-thrombocytopenia at 131, INR 2.77, UA not yet obtained.  X-ray shows postoperative changes of the right total hip arthroplasty, no abnormality seen in the right hip.  CT  right hip obtained to assess for obvious joint effusion or periprosthetic fracture, this is also negative for acute findings.  Patient has required repeat pain medication dosing here and he is still unwilling to walk due to the severe right hip pain.  I did speak with ultrasound who states the radiologist will not perform a right hip arthrocentesis with patient's elevated INR.  He does need a right hip arthrocentesis to assess for septic arthritis.  He will need hospital admission for pain control and PT/OT evaluation as well.  I did speak with Dr. Jerez with [facility] who agrees with admission, reversing INR, and obtaining the right hip arthrocentesis hopefully this evening.  If there are signs of septic arthritis, patient will need surgical washout in the morning.  Patient given a dose of vitamin K here.  Blood cultures drawn, will hold off on empiric antibiotics prior to arthrocentesis.  No signs of sepsis.  No signs of endocarditis.  Dr. MAHONEY, hospitalist, accepts this patient to [facility] full admit.  Asymptomatic COVID swab obtained for admission and possible presurgical purposes.  Patient is agreeable to the above plan.  He is resting fairly comfortably in bed when he is not moving.  He will be admitted when a bed becomes available.      At the conclusion of the encounter I discussed the results of all of the tests and the disposition. The questions were answered. The patient or family acknowledged understanding and was agreeable with the care plan.     I saw this patient alongside Dr. Russell. Please see his note for detail.     ED COURSE:  9:22 AM I met with the patient, obtained history, performed an initial exam, and discussed options and plan for diagnostics and treatment here in the ED.   10:34 AM Rechecked patient and updated with results. The patient is feeling a lot better.  12:00 PM I staffed the patient with my colleague, Dr. Russell, who will evaluate the patient. Dr. Russell was updated on the  patient throughout ED course.    12:12 PM Discussed case with Dr. Russell after his initial assessment.  12:34 PM Spoke to IR regarding patient. Radiology states that the patient's INR is too high to have the procedure.  12:35 PM Paged ortho.  2:08 PM Spoke to Dr. Jerez, [facility], regarding patient and plan. Dr. Jerez says to admit the patient, reverse his INR, and then do a arthrocentesis with possible surgery in the morning.  2:12 PM Paged hospitalist.  2:13 PM Spoke to Dr. AKBAR, Hospitalist, regarding patient and plan for admission. Dr. AKBAR accepts patient for full admission to Med Surg. Dr. AKBAR would like us to start vitamin K here in the ED.  2:23 PM Rechecked patient. Updated him on the plan for admission and possible surgical intervention.    MEDICATIONS GIVEN IN THE EMERGENCY:  Medications   naloxone injection 0.2-0.4 mg (NARCAN) (has no administration in time range)     Or   naloxone injection 0.2-0.4 mg (NARCAN) (has no administration in time range)   fentaNYL intranasal solution 100 mcg (SUBLIMAZE) (100 mcg Intranasal Given 7/28/20 1010)   phytonadione 5 mg/5 mL solution 5 mg (VITAMIN K1) (5 mg Oral Given 7/28/20 1448)       NEW PRESCRIPTIONS STARTED AT TODAY'S ER VISIT  Current Discharge Medication List      CONTINUE these medications which have NOT CHANGED    Details   amoxicillin (AMOXIL) 500 MG capsule Take 2,000 mg by mouth as needed. As needed for Hx mitral valve repair. Take one hour before appointment      aspirin (ASPIRIN LOW DOSE) 81 MG EC tablet Take 1 tablet by mouth daily.      atorvastatin (LIPITOR) 40 MG tablet TAKE 1 TABLET BY MOUTH AT BEDTIME.  Qty: 90 tablet, Refills: 3    Associated Diagnoses: Hyperlipidemia LDL goal <100      buPROPion (WELLBUTRIN XL) 150 MG 24 hr tablet TAKE 1 TABLET BY MOUTH EVERY MORNING  Qty: 90 tablet, Refills: 3    Associated Diagnoses: Current mild episode of major depressive disorder, unspecified whether recurrent (H)      folic  acid/multivit-min/lutein (CENTRUM SILVER ORAL) Take 1 tablet by mouth daily.      furosemide (LASIX) 20 MG tablet TAKE 1 TABLET BY MOUTH 2 TIMES A DAY  Qty: 180 tablet, Refills: 0    Associated Diagnoses: Edema, unspecified type      hydrOXYzine HCl (ATARAX) 25 MG tablet Take 1 tablet (25 mg total) by mouth every 6 (six) hours as needed for anxiety.  Qty: 360 tablet, Refills: 0    Associated Diagnoses: Anxiety      metoprolol succinate (TOPROL-XL) 50 MG 24 hr tablet TAKE ONE TABLET BY MOUTH DAILY. HOLD FOR SYSTOLIC BP <95 OR HEART RATE <55  Qty: 90 tablet, Refills: 3    Comments: $  Associated Diagnoses: HTN (hypertension)      nitroglycerin (NITROSTAT) 0.4 MG SL tablet PLACE 1 TABLET UNDER THE TONGUE EVERY 5 MINUTES UP TO 3 DOSES AS NEEDED; CALL 911 AFTER TAKING FIRST DOSE  Qty: 25 tablet, Refills: 0    Comments: $  Associated Diagnoses: Health care maintenance      !! sertraline (ZOLOFT) 100 MG tablet TAKE ONE TABLET BY MOUTH ONCE DAILY ALONG WITH 50MG TO EQUAL 150MG  Qty: 90 tablet, Refills: 0    Associated Diagnoses: Anxiety      !! sertraline (ZOLOFT) 50 MG tablet TAKE ONE TABLET BY MOUTH ONCE DAILY ALONG WITH 100MG TO EQUAL 150MG  Qty: 90 tablet, Refills: 0    Associated Diagnoses: Anxiety      warfarin ANTICOAGULANT (COUMADIN/JANTOVEN) 5 MG tablet Take 5-7.5 mg by mouth See Admin Instructions. Takes 5 mg on Tuesday, Thursday, and Saturday and 7.5 mg all other days       !! - Potential duplicate medications found. Please discuss with provider.             =================================================================    HPI    Patient information was obtained from: Patient    Use of : N/A       Parish Bills is a 80 y.o. male with a pertinent history of atrial fibrillation, CHF, AAA, hyperlipidemia, alcohol abuse, CAD, cardiomyopathy, hypercholesteremia, s/p total right hip arthroplasty, who presents to this ED via walk-in, alone, for evaluation of right hip pain.    The patient reports onset  of mild right hip pain yesterday that worsened throughout the day. Normally, the patient walks unassisted at home, but last night he had to use a cane due to the severity of the pain. This morning, the patient was unable to walk. The patient's pain worsens with any movement involving his right hip. No true radiation of the pain. He notes that this hip was replaced ~16 years ago. For pain, the patient took 2 tylenol this morning without relief. He denies any trauma to the hip or recent falls.  No low back issues.  Also denies fever, abdominal pain, new numbness or tingling in the right leg, and any other physical complaints at this time.    The patient reports history of hypertension, multiple open heart surgeries, and a AAA. He is anticoagulated on warfarin.    The patient lives at home with his wife.      REVIEW OF SYSTEMS   Review of Systems   Constitutional: Negative for fever.   Gastrointestinal: Negative for abdominal pain.   Musculoskeletal: Positive for arthralgias (right hip, worsens with movement) and gait problem (secondary to right hip pain).   Neurological: Negative for numbness (right leg).   All other systems reviewed and are negative.       PAST MEDICAL HISTORY:  Past Medical History:   Diagnosis Date     Abdominal aortic aneurysm (H)      Alcohol abuse      Anemia      Aortic stenosis      Arthritis      Atrial fibrillation (H)      BPH (benign prostatic hyperplasia)      CAD (coronary artery disease)      Cardiomyopathy (H)      Cataract     left     Chest pain      CHF (congestive heart failure) (H)      Congenital insufficiency of aortic valve      Coronary artery disease      Disease of pancreas     gallstones related     Dysthymia      Enlarged prostate      FH: mitral valve repair      H/O aortic valve repair '     High blood pressure      Hypercholesteremia      Hyperlipidemia      Hypertrophy of prostate      Lumbar radiculopathy      Mitral valve prolapse      Neck strain      Nonrheumatic  aortic (valve) stenosis      Osteoarthrosis      Pacemaker      Rectal cancer (H)      Reflux esophagitis      Subconjunctival bleed        PAST SURGICAL HISTORY:  Past Surgical History:   Procedure Laterality Date     ABDOMINAL AORTIC ANEURYSM REPAIR  2009     AORTIC VALVE REPLACEMENT       CARDIOVERSION      X3     CARDIOVERSION       CERVICAL FUSION  1972     CHOLECYSTECTOMY       CORONARY ARTERY BYPASS GRAFT      x1 with LIMA to LAD     CT coronary angiogram       EMBOLIZATION  01/31/2018     [facility] AAA REPAIR EXPOSE ILIAC ART ABD INCIS UNILAT   2009     ILIAC ARTERY ANEURYSM REPAIR  03/29/2012     JOINT REPLACEMENT Right     RAMÓN     LAPAROSCOPIC CHOLECYSTECTOMY       LUMBAR LAMINECTOMY Right 9/15/2014    Procedure: OPEN DECOMPRESSION L2-4 RIGHT ;  Surgeon: Elroy French MD;  Location: [facility];  Service:      MITRAL VALVE REPAIR  2006     IN ARTHRODESIS ANT INTERBODY MIN DISCECTOMY, CERVICAL BELOW C2      Description: Cervical Vertebral Fusion;  Recorded: 04/18/2008;     IN EXCIS RECTAL TUMOR, TRANSANAL, PARTIAL THICKNESS N/A 3/6/2017    Procedure: TRANSANAL EXCISION OF RECTAL TUMOR AND PROCTOSCOPY;  Surgeon: Alexander Mcgrath MD;  Location: [facility];  Service: General     IN REANEURYSM/GRFT INS,ABDOMINAL AORTA      Description: Abdominal Aortic Aneurysm Repair For Dilation Or Occlusion;  Recorded: 04/05/2012;     IN SURG ONLY REMOVE CATARACT INTRACAP INSERT LENS   2010     STERNOTOMY      redo     TOTAL HIP ARTHROPLASTY Right 2004     TRANSURETHRAL RESECTION OF PROSTATE             CURRENT MEDICATIONS:    No current facility-administered medications on file prior to encounter.      Current Outpatient Medications on File Prior to Encounter   Medication Sig     amoxicillin (AMOXIL) 500 MG capsule Take 2,000 mg by mouth as needed. As needed for Hx mitral valve repair. Take one hour before appointment     aspirin (ASPIRIN LOW DOSE) 81 MG EC tablet Take 1 tablet by mouth daily.      atorvastatin (LIPITOR) 40 MG tablet TAKE 1 TABLET BY MOUTH AT BEDTIME.     buPROPion (WELLBUTRIN XL) 150 MG 24 hr tablet TAKE 1 TABLET BY MOUTH EVERY MORNING     folic acid/multivit-min/lutein (CENTRUM SILVER ORAL) Take 1 tablet by mouth daily.     furosemide (LASIX) 20 MG tablet TAKE 1 TABLET BY MOUTH 2 TIMES A DAY (Patient taking differently: Take 20 mg by mouth daily. )     hydrOXYzine HCl (ATARAX) 25 MG tablet Take 1 tablet (25 mg total) by mouth every 6 (six) hours as needed for anxiety.     metoprolol succinate (TOPROL-XL) 50 MG 24 hr tablet TAKE ONE TABLET BY MOUTH DAILY. HOLD FOR SYSTOLIC BP <95 OR HEART RATE <55     nitroglycerin (NITROSTAT) 0.4 MG SL tablet PLACE 1 TABLET UNDER THE TONGUE EVERY 5 MINUTES UP TO 3 DOSES AS NEEDED; CALL 911 AFTER TAKING FIRST DOSE     sertraline (ZOLOFT) 100 MG tablet TAKE ONE TABLET BY MOUTH ONCE DAILY ALONG WITH 50MG TO EQUAL 150MG     sertraline (ZOLOFT) 50 MG tablet TAKE ONE TABLET BY MOUTH ONCE DAILY ALONG WITH 100MG TO EQUAL 150MG     warfarin ANTICOAGULANT (COUMADIN/JANTOVEN) 5 MG tablet Take 5-7.5 mg by mouth See Admin Instructions. Takes 5 mg on Tuesday, Thursday, and Saturday and 7.5 mg all other days     [DISCONTINUED] warfarin (COUMADIN) 7.5 MG tablet Take 7.5 mg by mouth daily. Until 8/12/18  INR 2.07 next INR 8/13     [DISCONTINUED] warfarin ANTICOAGULANT (COUMADIN/JANTOVEN) 5 MG tablet TAKE 1 TO 1 & 1/2 TABLETS ( 5- 7.5 MG) BY MOUTH DAILY AS DIRECTED.. ADJUST DOSE BASED ON INR RESULTS       ALLERGIES:  Allergies   Allergen Reactions     Hydrocodone-Acetaminophen Other (See Comments)     hallucination     Lisinopril Cough     Oxycodone-Acetaminophen Other (See Comments)     hallucination     Amiodarone Rash       FAMILY HISTORY:  Family History   Family history unknown: Yes       SOCIAL HISTORY:   Social History     Socioeconomic History     Marital status:      Spouse name: None     Number of children: None     Years of education: None     Highest  education level: None   Occupational History     None   Social Needs     Financial resource strain: None     Food insecurity     Worry: None     Inability: None     Transportation needs     Medical: None     Non-medical: None   Tobacco Use     Smoking status: Former Smoker     Types: Cigarettes     Last attempt to quit: 9/15/1980     Years since quittin.8     Smokeless tobacco: Never Used   Substance and Sexual Activity     Alcohol use: No     Comment: quit      Drug use: No     Sexual activity: None   Lifestyle     Physical activity     Days per week: None     Minutes per session: None     Stress: None   Relationships     Social connections     Talks on phone: None     Gets together: None     Attends [employer] service: None     Active member of club or organization: None     Attends meetings of clubs or organizations: None     Relationship status: None     Intimate partner violence     Fear of current or ex partner: None     Emotionally abused: None     Physically abused: None     Forced sexual activity: None   Other Topics Concern     None   Social History Narrative    Has not smoked for 35 years.       VITALS:  Patient Vitals for the past 24 hrs:   BP Temp Temp src Pulse Resp SpO2 Height Weight   20 1430 159/71 -- -- 60 -- 96 % -- --   20 1415 -- -- -- 60 -- 95 % -- --   20 1400 153/72 -- -- 60 -- 97 % -- --   20 1345 -- -- -- 60 -- 97 % -- --   20 1330 144/68 -- -- 60 -- 97 % -- --   20 1315 -- -- -- 60 -- 99 % -- --   20 1314 139/67 98.2  F (36.8  C) Oral 97 18 97 % -- --   20 1300 139/67 -- -- 60 -- 98 % -- --   20 1245 -- -- -- 60 -- 98 % -- --   20 1230 129/62 -- -- 60 -- 95 % -- --   20 1215 -- -- -- 60 -- 95 % -- --   20 1200 136/64 -- -- (!) 59 -- 97 % -- --   20 1045 -- -- -- (!) 59 -- 93 % -- --   20 1030 120/60 -- -- 60 -- 92 % -- --   20 1010 137/64 -- -- 61 -- 97 % -- --   20 0921 119/57 -- -- 60  "-- 96 % -- --   07/28/20 0907 120/61 98.7  F (37.1  C) Temporal (!) 59 20 98 % 5' 9\" (1.753 m) 190 lb (86.2 kg)       PHYSICAL EXAM    General Appearance:  Well developed, well nourished. Alert, cooperative, no acute distress, nontoxic, appears stated age.   HENT: Normocephalic without obvious deformity, atraumatic. Mucous membranes moist. Oropharynx without erythema, exudate, uvular deviation, or soft palate edema. Neck is supple, no cervical lymphadenopathy, no nuchal rigidity. Normal left and right TM.   Eyes: Conjunctiva clear. Lids normal. No discharge.   Respiratory: No distress. Lungs clear to ausculation bilaterally. No wheezes, rhonchi, rales, or stridor.  Cardiovascular: Regular rate and rhythm. No gallops, rubs, or murmurs. Capillary refill less than 2 seconds. No peripheral edema.   GI: Abdomen soft, nontender, normal bowel sounds. No rebound or guarding.   : No CVA tenderness.   Musculoskeletal: No focal tenderness of the hip but extreme hip pain with flexion and extension.  No erythema, warmth, or obvious joint effusion to the hip, no overlying skin lesions.  Pelvis is stable and nontender.  Moving all extremities. No swelling. No deformity.  No midline spinal tenderness or step-offs.  Unable to ambulate independently due to pain.  Integument: Warm, dry, no rashes, petechiae, or lesions.   Neurologic: Alert and orientated x3. No focal deficits. Normal motor function. Normal sensory function. GCS 15.   Psych: Normal mood and affect. Judgement normal.      LAB:  All pertinent labs reviewed and interpreted.  Results for orders placed or performed during the hospital encounter of 07/28/20   INR   Result Value Ref Range    INR 2.77 (H) 0.90 - 1.10   Basic Metabolic Panel   Result Value Ref Range    Sodium 138 136 - 145 mmol/L    Potassium 4.4 3.5 - 5.0 mmol/L    Chloride 104 98 - 107 mmol/L    CO2 25 22 - 31 mmol/L    Anion Gap, Calculation 9 5 - 18 mmol/L    Glucose 119 70 - 125 mg/dL    Calcium 8.6 8.5 " - 10.5 mg/dL    BUN 29 (H) 8 - 28 mg/dL    Creatinine 1.25 0.70 - 1.30 mg/dL    GFR MDRD Af Amer >60 >60 mL/min/1.73m2    GFR MDRD Non Af Amer 56 (L) >60 mL/min/1.73m2   Sedimentation Rate   Result Value Ref Range    Sed Rate 12 0 - 15 mm/hr   C-Reactive Protein   Result Value Ref Range    CRP 4.1 (H) 0.0 - 0.8 mg/dL   HM1 (CBC with Diff)   Result Value Ref Range    WBC 7.6 4.0 - 11.0 thou/uL    RBC 3.65 (L) 4.40 - 6.20 mill/uL    Hemoglobin 11.3 (L) 14.0 - 18.0 g/dL    Hematocrit 33.8 (L) 40.0 - 54.0 %    MCV 93 80 - 100 fL    MCH 31.0 27.0 - 34.0 pg    MCHC 33.4 32.0 - 36.0 g/dL    RDW 13.7 11.0 - 14.5 %    Platelets 131 (L) 140 - 440 thou/uL    MPV 9.8 8.5 - 12.5 fL    Neutrophils % 75 (H) 50 - 70 %    Lymphocytes % 8 (L) 20 - 40 %    Monocytes % 16 (H) 2 - 10 %    Eosinophils % 0 0 - 6 %    Basophils % 0 0 - 2 %    Neutrophils Absolute 5.6 2.0 - 7.7 thou/uL    Lymphocytes Absolute 0.6 (L) 0.8 - 4.4 thou/uL    Monocytes Absolute 1.2 (H) 0.0 - 0.9 thou/uL    Eosinophils Absolute 0.0 0.0 - 0.4 thou/uL    Basophils Absolute 0.0 0.0 - 0.2 thou/uL       RADIOLOGY:  Reviewed all pertinent imaging. Please see official radiology report.  Ct Pelvis Without Oral Without Iv Contrast    Result Date: 7/28/2020  EXAM: CT PELVIS WO ORAL WO IV CONTRAST LOCATION: [facility] DATE/TIME: 7/28/2020 11:05 AM INDICATION: Hip replaced, periprosthetic fracture suspected Atraumatic right hip pain, negative XR, S/P hip replacement. COMPARISON: 7/28/2020. TECHNIQUE: CT scan of the pelvis was performed without IV contrast. Multiplanar reformats were obtained. Dose reduction techniques were used. CONTRAST: None. FINDINGS: Postoperative changes related to a right total hip replacement. While assessment at the bone metallic interface is difficult due to artifact, there is no dislocation or evidence of an acute periprosthetic fracture. Stable small amount of lucency surrounding the acetabular cup bone metallic interface, unchanged.  Chronic appearing heterotopic ossification along the right iliac wing. Chronic bony ankylosis along the anterior aspect of both SI joints. Prior posterior decompressive changes in the lower lumbar spine. Aortoiliac stenting for an abdominal aortic aneurysm. Mild scattered colonic diverticulosis. No free fluid in the pelvis.     1.  Postoperative changes related to prior right total hip replacement. There is no evidence of a periprosthetic fracture. No dislocation.    Xr Pelvis W 2 Vw Hip Right    Result Date: 7/28/2020  EXAM: XR PELVIS W 2 VW HIP RIGHT LOCATION: [facility] DATE/TIME: 7/28/2020 9:35 AM INDICATION: S/p hip replacement, increased pain. COMPARISON: None.     Postoperative changes of right total hip arthroplasty. Components appear well seated. There is moderate severity degenerative change at the left hip joint. No evidence for fracture. Pelvis otherwise negative. Iliac artery stents.      PROCEDURES:   None.      Maggy MCNAMARA, am serving as a scribe to document services personally performed by Estrellita Henning PA-C based on my observation and the provider's statements to me. IEstrellita PA-C, attest that Maggy Turner is acting in a scribe capacity, has observed my performance of the services and has documented them in accordance with my direction.      This note has been dictated using voice recognition software. Any grammatical or context distortions are unintentional and inherent to the software.      Estrellita Henning PA-C  Emergency Medicine  [facility] EMERGENCY DEPARTMENT  [address]  [facility] [address]  Dept: [phone]  Loc: [phone]     Estrellita Hernandez PA-C  07/28/20 1512

## 2021-06-11 RX ORDER — WARFARIN SODIUM 5 MG/1
TABLET ORAL
Qty: 110 TABLET | Refills: 3 | Status: SHIPPED | OUTPATIENT
Start: 2021-06-11 | End: 2022-03-24

## 2021-06-11 NOTE — TELEPHONE ENCOUNTER
FYI - Status Update  Who is Calling: Home Care  Update: Calling to inform provider orders are being faxed to:  [phone]. Please complete, and fax back to the number provided.  Okay to leave a detailed message?:  No return call needed

## 2021-06-11 NOTE — TELEPHONE ENCOUNTER
Left message to call back for: Verbal orders  Information to relay to patient:  Left detailed message, ok for verbal orders.

## 2021-06-11 NOTE — TELEPHONE ENCOUNTER
Doesn't feel the fatigue is related to medication change. Feels he has had the fatigue since recent hospitalization/surgery.   Recent labs came back and hgb was 9.5 - notified per Dr Au to keep medications the same and call with update next week.

## 2021-06-11 NOTE — TELEPHONE ENCOUNTER
Who is calling:  Ayleen lantigua [facility]  Reason for Call:  Questioning if provider would like any labs drawn prior to patient's PIC Line being removed.   Date of last appointment with primary care: 08/19/2020  Okay to leave a detailed message: Yes

## 2021-06-11 NOTE — TELEPHONE ENCOUNTER
ANTICOAGULATION  MANAGEMENT- Home Care/Care Facility Result    Assessment     Today's INR result of 2.2 is Therapeutic (goal INR of 2.0-3.0)        Warfarin taken as previously instructed    No new diet changes affecting INR    No new medication/supplements affecting INR    Continues to tolerate warfarin with no reported s/s of bleeding or thromboembolism     Previous INR was Therapeutic    Plan:     Spoke with Home Care Nurse Elisa discussed INR result and instructed:     Warfarin Dosing Instructions: Continue current warfarin dose    2.5 mg every Tue; 5 mg all other days       (0 % change)    Next INR to be drawn: 2 weeks    Education provided: importance of therapeutic range    Elisa verbalizes understanding and agrees to warfarin dosing plan.   ?   Lorena Barnhart RN    Subjective/Objective:      Parish Bills, a 80 y.o. male is established on warfarin.     Home care/care facility RN's report of Parish INR, recent warfarin dosing, diet changes, medication changes, and symptoms is documented below.    Additional findings: template incorrect; verbally confirmed home dose with Parish and updated on anticoagulation calendar    Anticoagulation Episode Summary     Current INR goal:   2.0-3.0   TTR:   90.1 % (11.7 mo)   Next INR check:   9/29/2020   INR from last check:   2.20 (9/15/2020)   Weekly max warfarin dose:      Target end date:      INR check location:      Preferred lab:      Send INR reminders to:   [facility]    Indications    A-fib (H) (Resolved) [I48.91]           Comments:            Anticoagulation Care Providers     Provider Role Specialty Phone number    Isidro Au MD Referring Family Medicine [phone]

## 2021-06-11 NOTE — TELEPHONE ENCOUNTER
INR result is  2.4  INR   Date Value Ref Range Status   09/15/2020 2.20 (!) 0.9 - 1.1 Final       Will the patient be seen, or did they already see, MD or CNP today? No    Most Recent Warfarin dose day/week  Sunday Monday Tuesday Wednesday Thursday Friday Saturday     2.5 5 5 5 5     Sunday Monday Tuesday Wednesday Thursday Friday Saturday   5 5            Has the patient missed any doses of Coumadin, Warfarin, Jantoven in the past 7 days? No    Has the patients medications changed since the last visit? No    Has the patient experienced any bleeding recently? No    Has the patient experienced any injuries or illness recently? No    Has the patient experienced any 'new' shortness of breath, severe headaches, or changes in vision recently? No    Has the patient had any changes in their diet, or alcohol consumption? No    Is the patient here today to prepare for any type of upcoming surgery, procedure, or for a cardioversion procedure? No    What phone number can we reach the patient at today? home phone listed in demographics.

## 2021-06-11 NOTE — PROGRESS NOTES
ASSESSMENT & PLAN:  1. Acute bronchitis     2. Acute conjunctivitis         Patient Instructions   Augmentin 875 mg 1 pill twice a day for 10 days.     Decrease Coumadin to 5 mg daily while on augmentin.   Recheck INR in 1 week.     Ventolin inhaler with spacer, 1-2 puffs up to 4 times per day as needed for cough. May try at night, but if this is too energizing, may avoid taking at night. Use consistently three times per day for 2-3 days, then up to 4 times a day as needed.     Over-the-counter cough syrup at bedtime or as needed.     If not improving or if worsening, will order chest x-ray.     Polytrim eye drops, 2 drops each eye four times per day for 1 week.     If you feel you need another medication for cough: may call for Tessalon Perles.         He does not wish a cough syrup with codeine because he is a recovered addict.    Medications Discontinued During This Encounter   Medication Reason     indomethacin (INDOCIN) 50 MG capsule Therapy completed       No Follow-up on file.    CHIEF COMPLAINT:  Chief Complaint   Patient presents with     Cough     cough w/ yellow phlegm x 6 days     Night Sweats     night sweats- soaked bedding x 6 days     Eye Problem     eye redness/drainage/watery/itchy and burning x 6 days        HISTORY OF PRESENT ILLNESS:  Parish is a 76 y.o. male presenting to the clinic today for cough, night sweats, and eye problem. His symptoms all started 6 days ago on 6/19/17. He is coughing up yellow phlegm, and coughed up blood twice. He was coughing so hard yesterday that he almost passed out twice. He has mild cheek and forehead pain. He is unsure if he has had a fever, but has had night sweats, and has to change his shirt when he wakes up in the morning.     Eye Issue: He has redness, drainage, watering, itching, and burning in his eyes, which also started 6 days ago on 6/19/17. In the mornings, he has to wipe his eyes with a damp cloth to be able to open his eyes.     REVIEW OF SYSTEMS:    He had a colonoscopy 1 week ago today to recheck following colon cancer surgery. He is not currently on any cancer therapy. All other systems are negative.    PFSH:  He lives at home with his wife. He has a history of chemical dependence, so prefers to avoid any narcotic medications.     TOBACCO USE:  History   Smoking Status     Former Smoker     Quit date: 9/15/1980   Smokeless Tobacco     Not on file       VITALS:  There were no vitals filed for this visit.  Wt Readings from Last 3 Encounters:   06/14/17 206 lb 6.4 oz (93.6 kg)   03/06/17 202 lb 11.2 oz (91.9 kg)   02/23/17 208 lb (94.3 kg)     There is no height or weight on file to calculate BMI.    PHYSICAL EXAM:  General Appearance: Alert, cooperative, no distress, appears stated age, tired and ill-appearing  Head: Normal, no sinus tenderness  Eyes: redness of conjunctiva bilaterally, left worse than right.   Ears: Normal.  Throat: Normal.   Neck: Normal.  Lungs: Rhonchi throughout all lung fields, no crackles, respirations unlabored  Heart: Regular rate and rhythm, S1 and S2 normal, no murmur, rub or gallop    ADDITIONAL HISTORY SUMMARIZED (2): None.  DECISION TO OBTAIN EXTRA INFORMATION (1): None.   RADIOLOGY TESTS (1): None.  LABS (1): None.  MEDICINE TESTS (1): None.  INDEPENDENT REVIEW (2 each): None.     The visit lasted a total of 18 minutes face to face with the patient. Over 50% of the time was spent counseling and educating the patient about cough and eye infection.    IAngeli, am scribing for and in the presence of, Dr. Dara Harper MD.    IDr. Dara MD, personally performed the services described in this documentation, as scribed by Angeli Mendoza in my presence, and it is both accurate and complete.    MEDICATIONS:  Current Outpatient Prescriptions   Medication Sig Dispense Refill     aspirin 81 MG tablet Take 81 mg by mouth daily.       carvedilol (COREG) 3.125 MG tablet 3.125 mg 2 (two) times a day.        carvedilol (COREG) 6.25 MG tablet 6.25 mg 2 (two) times a day with meals.        FOLIC ACID/MULTIVITS-MIN/LUT (CENTRUM SILVER ORAL) Take 1 tablet by mouth daily.       furosemide (LASIX) 20 MG tablet Take 20 mg by mouth daily.       nitroglycerin (NITROSTAT) 0.4 MG SL tablet Place 0.4 mg under the tongue every 5 (five) minutes as needed for chest pain.       sertraline (ZOLOFT) 100 MG tablet Take 1 tablet (100 mg total) by mouth daily. 90 tablet 1     simvastatin (ZOCOR) 40 MG tablet TAKE 1 TABLET BY MOUTH AT BEDTIME. 90 tablet 0     warfarin (COUMADIN) 5 MG tablet 7.5 mg (5 mg x 1.5) on Tue, Sat; 5 mg (5 mg x 1) all other days or as last directed by clinic 180 tablet 0     albuterol (PROAIR HFA;PROVENTIL HFA;VENTOLIN HFA) 90 mcg/actuation inhaler Inhale 2 puffs 4 (four) times a day. 1 each 0     amoxicillin-clavulanate (AUGMENTIN) 875-125 mg per tablet Take 1 tablet by mouth 2 (two) times a day for 10 days. 20 tablet 0     polymyxin B-trimethoprim (POLYTRIM) 10,000 unit- 1 mg/mL Drop ophthalmic drops 2 drops to the affected eye or eyes 4 times a day for 1 week 10 mL 0     No current facility-administered medications for this visit.        Total data points: 0

## 2021-06-11 NOTE — TELEPHONE ENCOUNTER
INR result is 2.1- NOT IN THE HOME WITH THE PATIENT AT THE TIME OF THE CALL  INR   Date Value Ref Range Status   09/01/2020 2.40 (!) 0.9 - 1.1 Final       Will the patient be seen, or did they already see, MD or CNP today? No    Most Recent Warfarin dose day/week  Sunday Monday Tuesday Wednesday Thursday Friday Saturday     2.5 5 5 5 5     Sunday Monday Tuesday Wednesday Thursday Friday Saturday   5 5            Has the patient missed any doses of Coumadin, Warfarin, Jantoven in the past 7 days? No    Has the patients medications changed since the last visit? No    Has the patient experienced any bleeding recently? No    Has the patient experienced any injuries or illness recently? No    Has the patient experienced any 'new' shortness of breath, severe headaches, or changes in vision recently? No    Has the patient had any changes in their diet, or alcohol consumption? No    Is the patient here today to prepare for any type of upcoming surgery, procedure, or for a cardioversion procedure? No    What phone number can we reach the patient at today? home phone listed in demographics.

## 2021-06-11 NOTE — TELEPHONE ENCOUNTER
Naseem from [facility] calling for PICC d/c orders for end date of abx 9/1. I do not have recent labs. Naseem will fax and I will c/b to [phone].

## 2021-06-11 NOTE — TELEPHONE ENCOUNTER
Ileana- spouse called. She is calling to find out if the Picc line will be removed by tomorrow.    Ileana @ [phone]

## 2021-06-11 NOTE — PROGRESS NOTES
SUBJECTIVE:   Chief Complaint   Patient presents with     Weight Loss     Over the past year noticing weight loss, some decrease in appetite      Mass     Check lump on the right palm X1 week, painful      Leg Pain     Bilateral leg and feet cramps - worse in the right leg      URI     Cough and sore throat, raspy voice X2-3 weeks      Parish Bills 76 y.o. male    Current Outpatient Prescriptions   Medication Sig Dispense Refill     aspirin 81 MG tablet Take 81 mg by mouth daily.       carvedilol (COREG) 3.125 MG tablet 3.125 mg 2 (two) times a day.       carvedilol (COREG) 6.25 MG tablet 6.25 mg 2 (two) times a day with meals.        FOLIC ACID/MULTIVITS-MIN/LUT (CENTRUM SILVER ORAL) Take 1 tablet by mouth daily.       furosemide (LASIX) 20 MG tablet Take 20 mg by mouth daily.       nitroglycerin (NITROSTAT) 0.4 MG SL tablet Place 0.4 mg under the tongue every 5 (five) minutes as needed for chest pain.       sertraline (ZOLOFT) 100 MG tablet Take 1 tablet (100 mg total) by mouth daily. 90 tablet 1     simvastatin (ZOCOR) 40 MG tablet TAKE 1 TABLET BY MOUTH AT BEDTIME. 90 tablet 0     warfarin (COUMADIN) 5 MG tablet 7.5 mg (5 mg x 1.5) on Tue, Sat; 5 mg (5 mg x 1) all other days or as last directed by clinic 180 tablet 0     albuterol (PROAIR HFA;PROVENTIL HFA;VENTOLIN HFA) 90 mcg/actuation inhaler Inhale 2 puffs 4 (four) times a day. 1 each 0     azithromycin (ZITHROMAX Z-FRANK) 250 MG tablet Take 2 tablets (500 mg) on  Day 1,  followed by 1 tablet (250 mg) once daily on Days 2 through 5. 6 tablet 0     No current facility-administered medications for this visit.      Allergies: Hydrocodone-acetaminophen; Oxycodone-acetaminophen; and Amiodarone   No LMP for male patient.    HPI:   Patient presents today for concerns over a hand bump that he has, muscle cramping is been experiencing the last few weeks, continued symptoms of an upper respiratory infection after completion of antibiotics a couple weeks ago and  unfortunately weight loss over the last year.  Since being on sertraline he feels that his appetite is gone down and he has been noticing weight loss which is unintentional to him.  To note patient did undergo cancer surgery for rectal cancer this year.  He has been doing well in regards to that but his body was dealing with a cancerous process.  He does not feel he is more less active.  He does realize that his appetite is down and he is fine sometimes is just not eating.  The weight loss could be secondary to just caloric decrease.  The hand bump on his right hand is just proximal to his MCP joint of the third digit on the palmar side.  He was seen by another provider back in the end of June and put on 10 day course of Augmentin for suspected bronchitis his symptoms improved but not back to baseline is continue to have a cough.  He feels his cough is coming from the mid throat region and not deep down in his lungs.  He has had no fevers or chills.    ROS: negative except as per HPI    OBJECTIVE:   The patient appears well, alert, oriented x 3, in no distress.  /70 (Patient Site: Left Arm, Patient Position: Sitting, Cuff Size: Adult Regular)  Pulse 78  Temp 98.1  F (36.7  C) (Oral)   Resp 16  Wt 201 lb (91.2 kg)  BMI 29.47 kg/m2    HEENT:  Nose/mouth moist, no erythema/lesions. Neck supple. No adenopathy or thyromegaly. TM's normal BL  Lungs: clear, good air entry, no wheezes, rhonchi or rales.   Cardiac: S1 and S2 normal, no murmurs, regular rate and rhythm.   Abdomen: normal bowel sounds, soft without tenderness, guarding, mass or organomegaly.   Extremities: show no edema, normal peripheral pulses.  Small ganglion cyst that is mobile with flexion of the third digit roughly 2 mm in diameter  Neurological: normal, no focal findings.  Skin: clear, dry, no rashes/lesions  Psych- normal mood and affect      ASSESSMENT/PLAN  1. Weight loss  Etiology unknown we will assess with blood work and follow-up based  on results  Patient is due for yearly fasting labs as well  If all laboratory work is normal consideration for titrating off the sertraline as he saw a correlated time with decrease in appetite and starting sertraline although we typically see an opposite effect  - HM1(CBC and Differential)  - C-Reactive Protein(CRP)  - Sedimentation Rate  - Comprehensive Metabolic Panel  - Lipid Cascade FASTING  - Magnesium  - PSA, Annual Screen (Prostatic-Specific Antigen)  - HM1 (CBC with Diff)  - Thyroid [place]    2. Cough  Lingering cough status post antibiotic treatment with Augmentin will piggyback with azithromycin ×5 days  Patient is in agreement will contact me if cough not improving    3. Muscle cramping  Check electrolytes today such as magnesium and potassium  Have patient increase water intake and monitor for resolution of cramping    4. Ganglion cyst  Reassurance given by ganglion cyst we will continue to monitor      Pt states an understanding and agrees to the above plan.

## 2021-06-11 NOTE — TELEPHONE ENCOUNTER
RN cannot approve Refill Request    RN can NOT refill this medication med is not covered by policy/route to provider. Last office visit: 8/11/2020 Isidro Au MD Last Physical: 7/12/2018 Last MTM visit: Visit date not found Last visit same specialty: 8/11/2020 Isidro Au MD.  Next visit within 3 mo: Visit date not found  Next physical within 3 mo: Visit date not found      Heather Arreola, [facility] Triage/Med Refill 9/9/2020    Requested Prescriptions   Pending Prescriptions Disp Refills     nitroglycerin (NITROSTAT) 0.4 MG SL tablet [Pharmacy Med Name: NITROGLYCERIN 0.4 MG TABLET SL] 25 tablet 0     Sig: PLACE 1 TABLET UNDER THE TONGUE EVERY 5 MINUTES UP TO 3 DOSES AS NEEDED; CALL 911 AFTER TAKING FIRST DOSE       There is no refill protocol information for this order

## 2021-06-11 NOTE — TELEPHONE ENCOUNTER
INR result is 2.4 nurse was not in the home with the patient at the time of the call  INR   Date Value Ref Range Status   08/25/2020 2.90 (!) 0.9 - 1.1 Final       Will the patient be seen, or did they already see, MD or CNP today? No    Most Recent Warfarin dose day/week  Sunday Monday Tuesday Wednesday Thursday Friday Saturday     2.5 5 5 5 5     Sunday Monday Tuesday Wednesday Thursday Friday Saturday   5 5            Has the patient missed any doses of Coumadin, Warfarin, Jantoven in the past 7 days? No    Has the patients medications changed since the last visit? Yes stopped his IV through his PIC Line    Has the patient experienced any bleeding recently? No    Has the patient experienced any injuries or illness recently? No    Has the patient experienced any 'new' shortness of breath, severe headaches, or changes in vision recently? No    Has the patient had any changes in their diet, or alcohol consumption? No    Is the patient here today to prepare for any type of upcoming surgery, procedure, or for a cardioversion procedure? No    What phone number can we reach the patient at today? home phone listed in demographics.

## 2021-06-11 NOTE — TELEPHONE ENCOUNTER
INR result is   2.2      INR   Date Value Ref Range Status   09/08/2020 2.10 (!) 0.9 - 1.1 Final       Will the patient be seen, or did they already see, MD or CNP today? No    Most Recent Warfarin dose day/week  Sunday Monday Tuesday Wednesday Thursday Friday Saturday     2 mg 5 mg 5 mg 5 mg 5 mg     Sunday Monday Tuesday Wednesday Thursday Friday Saturday   5 mg 5 mg            Has the patient missed any doses of Coumadin, Warfarin, Jantoven in the past 7 days? No    Has the patients medications changed since the last visit? No    Has the patient experienced any bleeding recently? No    Has the patient experienced any injuries or illness recently? No    Has the patient experienced any 'new' shortness of breath, severe headaches, or changes in vision recently? No    Has the patient had any changes in their diet, or alcohol consumption? No    Is the patient here today to prepare for any type of upcoming surgery, procedure, or for a cardioversion procedure? No    What phone number can we reach the patient [facility] today? [facility]  [phone].

## 2021-06-11 NOTE — TELEPHONE ENCOUNTER
ANTICOAGULATION  MANAGEMENT- Home Care/Care Facility Result    Assessment     Today's INR result of 2.1 is Therapeutic (goal INR of 2.0-3.0)        Warfarin taken as previously instructed    No new diet changes affecting INR    No new medication/supplements affecting INR    Continues to tolerate warfarin with no reported s/s of bleeding or thromboembolism     Previous INR was Therapeutic    Plan:     Left a detailed message for Home Care Nurse Maddy discussed INR result and instructed:     Warfarin Dosing Instructions: Continue current warfarin dose    2.5 mg every Tue; 5 mg all other days     (0 % change)    Next INR to be drawn: one week due to INR is noted to be trending down.    Education provided: importance of therapeutic range      ?   Lorena Barnhart RN    Subjective/Objective:      Parish Bills, a 80 y.o. male is established on warfarin.     Home care/care facility RN's report of Parish INR, recent warfarin dosing, diet changes, medication changes, and symptoms is documented below.    Additional findings: none    Anticoagulation Episode Summary     Current INR goal:   2.0-3.0   TTR:   90.1 % (11.7 mo)   Next INR check:   9/15/2020   INR from last check:   2.10 (9/8/2020)   Weekly max warfarin dose:      Target end date:      INR check location:      Preferred lab:      Send INR reminders to:   [facility]    Indications    A-fib (H) (Resolved) [I48.91]           Comments:            Anticoagulation Care Providers     Provider Role Specialty Phone number    Isidro Au MD Referring Family Medicine [phone]

## 2021-06-11 NOTE — TELEPHONE ENCOUNTER
ANTICOAGULATION  MANAGEMENT- Home Care/Care Facility Result    Assessment     Today's INR result of 2.40 is Therapeutic (goal INR of 2.0-3.0)        Warfarin taken as previously instructed    No new diet changes affecting INR    No new medication/supplements affecting INR    Continues to tolerate warfarin with no reported s/s of bleeding or thromboembolism     Previous INR was Therapeutic at 2.20 on 9/15/20.  (last 9 INR's therapeutic).    Dicharged from [facility] today.  Will return to clinic for future INR's.    Plan:     Spoke with WIL Abbasi with [facility] discussed INR result and instructed:    1.  Also spoke with Parish and wife, Ileana.  Doing well.    Warfarin Dosing Instructions:  (has 5mg tabs)   - Continue current warfarin dose 2.5 mg daily on Tuesdays; and 5 mg daily rest of week.    Next INR to be drawn:  2 wks.   - INR scheduled on 10/20/20 @ [facility].    Education provided: importance of consistent vitamin K intake and target INR goal and significance of current INR result    WIL Abbasi verbalizes understanding and agrees to warfarin dosing plan.   ?   Kori Ziegler RN    Subjective/Objective:      Parish Bills, a 80 y.o. male is established on warfarin.     Home care/care facility RN's report of Parish INR, recent warfarin dosing, diet changes, medication changes, and symptoms is documented below.    Additional findings: none    Anticoagulation Episode Summary     Current INR goal:   2.0-3.0   TTR:   90.1 % (11.7 mo)   Next INR check:   10/20/2020   INR from last check:   2.40 (9/29/2020)   Weekly max warfarin dose:      Target end date:      INR check location:      Preferred lab:      Send INR reminders to:   [facility]    Indications    A-fib (H) (Resolved) [I48.91]           Comments:            Anticoagulation Care Providers     Provider Role Specialty Phone number    Isidro Au MD Referring Family Medicine [phone]

## 2021-06-11 NOTE — TELEPHONE ENCOUNTER
ANTICOAGULATION  MANAGEMENT- Home Care/Care Facility Result    Assessment     Today's INR result of 2.4 is Therapeutic (goal INR of 2.0-3.0)        Warfarin taken as previously instructed    No new diet changes affecting INR     Patient received  last dose of ceftriaxone IV today ( 8/4-9/1) and PICC line taken out by Home Care Nurse.    No new medication/supplements affecting INR    Continues to tolerate warfarin with no reported s/s of bleeding or thromboembolism     Previous INR was Therapeutic    Plan:     Spoke with Home Care Nurse Maddy discussed INR result and instructed:     Warfarin Dosing Instructions: Continue current warfarin dose    2.5 mg every Tue; 5 mg all other days     (0 % change)    Next INR to be drawn: one week    Education provided: importance of therapeutic range    Maddy verbalizes understanding and agrees to warfarin dosing plan.   ?   Lorena Barnhart RN    Subjective/Objective:      Parish Bills, a 80 y.o. male is established on warfarin.     Home care/care facility RN's report of Parish INR, recent warfarin dosing, diet changes, medication changes, and symptoms is documented below.    Additional findings: none    Anticoagulation Episode Summary     Current INR goal:   2.0-3.0   TTR:   90.1 % (11.7 mo)   Next INR check:   9/8/2020   INR from last check:   2.40 (9/1/2020)   Weekly max warfarin dose:      Target end date:      INR check location:      Preferred lab:      Send INR reminders to:   [facility]    Indications    A-fib (H) (Resolved) [I48.91]           Comments:            Anticoagulation Care Providers     Provider Role Specialty Phone number    Isidro Au MD Referring Family Medicine [phone]

## 2021-06-11 NOTE — PROGRESS NOTES
"S: Very pleasant 76-year-old gentleman here for 2 issues.  The first is pain in his left great toe.  The pain is moderate to severe, and a started last night at 6 PM.  Located in the left great toe only.  He has never had gout in the past.  It is associated with erythema and warmth.  Socially does not smoke, he is recovering alcoholic.  He is also here to check his INR.  Past history of coronary disease which is stable.  Hyperlipidemia which is stable.  Depression which is stable.    Meds: Coreg, Lasix, Nitrostat, Zoloft, Zocor and Coumadin (7.5 Saturday and Tuesday and 5 mg the rest of the days)    O:  /66 (Patient Site: Right Arm, Patient Position: Sitting, Cuff Size: Adult Regular)  Pulse 80  Temp 98.3  F (36.8  C) (Oral)   Ht 5' 9.25\" (1.759 m)  Wt 206 lb 6.4 oz (93.6 kg)  BMI 30.26 kg/m2  Examination of left great toe shows erythema and pain to palpation.  Normal distal neurological and vascular function.    Laboratory: INR pending, uric acid pending    A:   Gout  Heart disease-stable  Depression-stable  Hyperlipidemia-stable    P: Indomethacin 50 mg 3 times daily as needed (with food)  INR to be checked today and medications adjusted.  Check uric acid.  If symptoms recur or do not resolve in the next week consider starting Zyloprim.                                      "

## 2021-06-11 NOTE — TELEPHONE ENCOUNTER
Medication Question or Clarification  Who is calling:  Wife Paula  What medication are you calling about (include dose and sig)?: Tamsulosin 0.4mg.   Who prescribed the medication?: Butch AKBAR MD  What is your question/concern?: Wife would like to know if it needs to be refilled as this was prescribe in the hospital.  Also has questions about Lasix in which dosing was changed from the Cardiologist to 40/80 every other day and if primary physician agrees to these changes.  Wife given weight reports:  8/26 patient weighed 187 and today 9/2 was 182.  is concerned about the weight loss and is also currently feeling fatigue and if this is normal.  Please advise.  Requested Pharmacy: [facility]  Okay to leave a detailed message?: Yes

## 2021-06-11 NOTE — TELEPHONE ENCOUNTER
Orders being requested: Skilled nursing once a week for 4 weeks and 2 visits as needed   Reason service is needed/diagnosis: wound care and INR management  When are orders needed by: today  Where to send Orders: Phone:  [phone]  Okay to leave detailed message?  Yes

## 2021-06-11 NOTE — TELEPHONE ENCOUNTER
I spoke to Dr Pickard, we discussed labs, per Dr Pickard I called [facility] and informed ok to d/c PICC after last dose of abx.

## 2021-06-11 NOTE — TELEPHONE ENCOUNTER
Pt should continue with tamsulosin.  If he is feeling fatigue since the change in furosemide- we should decrease slightly- if he is taking 40mg alternating with 80mg I would recommend changing to 40/60mg alternating.  If they need refill of this please let me know.

## 2021-06-11 NOTE — TELEPHONE ENCOUNTER
Refill Approved    Rx renewed per Medication Renewal Policy. Medication was last renewed on 6/9/20.    Amrita Chase, [facility] Connection Triage/Med Refill 9/24/2020     Requested Prescriptions   Pending Prescriptions Disp Refills     sertraline (ZOLOFT) 50 MG tablet [Pharmacy Med Name: SERTRALINE HCL 50 MG TABLET] 90 tablet 0     Sig: TAKE ONE TABLET BY MOUTH ONCE DAILY ALONG WITH 100MG TO EQUAL 150MG       SSRI Refill Protocol  Passed - 9/22/2020  7:36 PM        Passed - PCP or prescribing provider visit in last year     Last office visit with prescriber/PCP: 8/11/2020 Isidro Au MD OR same dept: 8/11/2020 Isidro Au MD OR same specialty: 8/11/2020 Isidro Au MD  Last physical: 7/12/2018 Last MTM visit: Visit date not found   Next visit within 3 mo: Visit date not found  Next physical within 3 mo: Visit date not found  Prescriber OR PCP: Isidro Au MD  Last diagnosis associated with med order: 1. Anxiety  - sertraline (ZOLOFT) 50 MG tablet [Pharmacy Med Name: SERTRALINE HCL 50 MG TABLET]; TAKE ONE TABLET BY MOUTH ONCE DAILY ALONG WITH 100MG TO EQUAL 150MG  Dispense: 90 tablet; Refill: 0    If protocol passes may refill for 12 months if within 3 months of last provider visit (or a total of 15 months).

## 2021-06-11 NOTE — TELEPHONE ENCOUNTER
Orders being requested: to discontinue home care physical therapy.  Reason service is needed/diagnosis: Patient has met all goals and is doing very well.  When are orders needed by: ASAP  Where to send Orders: Phone:  verbal orders to caller  Okay to leave detailed message?  Yes

## 2021-06-12 RX ORDER — TAMSULOSIN HYDROCHLORIDE 0.4 MG/1
CAPSULE ORAL
Qty: 90 CAPSULE | Refills: 1 | Status: SHIPPED | OUTPATIENT
Start: 2021-06-12 | End: 2021-12-27

## 2021-06-12 NOTE — PROGRESS NOTES
ASSESSMENT/PLAN  1. Mild major depression (H)  Patient feels current dosing between sertraline and Wellbutrin has not been changing his symptoms  Interested in changing regimen  Discussed titrating down off the medications to see if there is any changes  We will titrate off Wellbutrin and start to come down off sertraline and give me an update in a couple weeks.  Consider alternative regimen if symptoms are getting worse    2. Anxiety  Patient uses hydroxyzine as needed for increased anxiety and to help him sleep  Refill sent to the pharmacy  - hydrOXYzine HCL (ATARAX) 25 MG tablet; Take 1 tablet (25 mg total) by mouth every 6 (six) hours as needed for anxiety.  Dispense: 360 tablet; Refill: 0    3. Benign prostatic hyperplasia with urinary frequency  Patient is on get up once at night to go to the bathroom and feels tamsulosin is working well we will continue with current dosing  - tamsulosin (FLOMAX) 0.4 mg cap; Take 1 capsule (0.4 mg total) by mouth Daily after breakfast.  Dispense: 90 capsule; Refill: 1    4. Iron deficiency anemia, unspecified iron deficiency anemia type  Patient is continued on iron supplement and is wondering how long he needs to take this we will check ferritin levels and hemoglobin today  - HM2(CBC w/o Differential)  - Ferritin    5. Bilateral impacted cerumen  Patient has had some gurgling sounds in his ears he has bilateral cerumen impaction this was cleared by nursing today with water lavage        SUBJECTIVE:   Chief Complaint   Patient presents with     Medication Management     Wondering how long he needs to take ferrous sulfate, Sertraline & Bupropion not helping depression/anxiety     Ear Fullness     Right ear plugged, crackling in ear x 2 months     Parish Bills 80 y.o. male    Current Outpatient Medications   Medication Sig Dispense Refill     acetaminophen (TYLENOL) 500 MG tablet Take 2 tablets (1,000 mg total) by mouth 3 (three) times a day.  0     amoxicillin (AMOXIL) 500  MG capsule Take 2,000 mg by mouth once as needed (for dental work). As needed for Hx mitral valve repair. Take one hour before appointment        aspirin (ASPIRIN LOW DOSE) 81 MG EC tablet Take 1 tablet by mouth daily.       atorvastatin (LIPITOR) 40 MG tablet Take 1 tablet (40 mg total) by mouth at bedtime. 90 tablet 3     buPROPion (WELLBUTRIN XL) 150 MG 24 hr tablet Take 1 tablet (150 mg total) by mouth every morning. 90 tablet 3     ferrous sulfate 325 (65 FE) MG tablet Take 1 tablet (325 mg total) by mouth daily with breakfast. 90 tablet 0     folic acid/multivit-min/lutein (CENTRUM SILVER ORAL) Take 1 tablet by mouth daily.       furosemide (LASIX) 40 MG tablet Take 1 tablet (40 mg total) by mouth 2 (two) times a day at 9am and 6pm. (Patient taking differently: Take 40 mg and 80 mg every other day) 60 tablet 0     metoprolol succinate (TOPROL-XL) 50 MG 24 hr tablet TAKE ONE TABLET BY MOUTH DAILY. HOLD FOR SYSTOLIC BP <95 OR HEART RATE <55 90 tablet 3     nitroglycerin (NITROSTAT) 0.4 MG SL tablet PLACE 1 TABLET UNDER THE TONGUE EVERY 5 MINUTES UP TO 3 DOSES AS NEEDED; CALL 911 AFTER TAKING FIRST DOSE 25 tablet 0     senna-docusate (PERICOLACE) 8.6-50 mg tablet Take 2 tablets by mouth at bedtime.  0     sertraline (ZOLOFT) 100 MG tablet TAKE ONE TABLET BY MOUTH ONCE DAILY ALONG WITH 50MG TO EQUAL 150MG 90 tablet 0     sertraline (ZOLOFT) 50 MG tablet TAKE ONE TABLET BY MOUTH ONCE DAILY ALONG WITH 100MG TO EQUAL 150MG 90 tablet 3     tamsulosin (FLOMAX) 0.4 mg cap Take 1 capsule (0.4 mg total) by mouth Daily after breakfast. 90 capsule 1     warfarin ANTICOAGULANT (COUMADIN/JANTOVEN) 5 MG tablet Take 1 tablet (5 mg total) by mouth See Admin Instructions. Take 5 mg daily till follow up INR check. 90 tablet 1     hydrOXYzine HCL (ATARAX) 25 MG tablet Take 1 tablet (25 mg total) by mouth every 6 (six) hours as needed for anxiety. 360 tablet 0     No current facility-administered medications for this visit.       Allergies: Hydrocodone-acetaminophen, Lisinopril, Oxycodone-acetaminophen, and Amiodarone   No LMP for male patient.    HPI:   Patient presents with acute concerns today 1 including new hearing changes and gurgling sensation in his hearing bilaterally he has bilateral cerumen impaction which we removed by nursing with water lavage he like to continue with current dosing on his tamsulosin.  He takes periodic hydroxyzine to help him with sleep or increased anxiety he like a refill sent to the pharmacy.  Patient does not feel the combination of sertraline and Wellbutrin has been working well he does not really think that is made a positive nor negative change we discussed titrating off the medications and monitoring his symptoms-if there is no worsening consider staying off the medications versus if there are worsening symptoms consider starting alternative regimen.    Patient has history of iron deficiency anemia on iron supplement we will check iron levels and hemoglobin to see if iron supplement is continued to be needed.    Patient has decided due to Covid to skip out on deer camp.    Patient recently seen dermatology and had a mass removed on his left side of his neck is waiting pathology.    ROS: negative except as per HPI    OBJECTIVE:   The patient appears well, alert, oriented x 3, in no distress.  /55 (Patient Site: Left Arm, Patient Position: Sitting, Cuff Size: Adult Regular)   Pulse 62   Temp 99.2  F (37.3  C) (Oral)   Resp 20   Wt 190 lb 9.6 oz (86.5 kg)   BMI 28.15 kg/m      HEENT:  BL cerumen impaction- cleared by nursing  Lungs: clear, good air entry, no wheezes, rhonchi or rales.   Cardiac: S1 and S2 normal, no murmurs   Abdomen: normal bowel sounds, soft   Extremities: show no edema, normal peripheral pulses.   Neurological: normal, no focal findings.  Skin: clear, dry, no rashes/lesions  Psych- normal mood and affect      Pt states an understanding and agrees to the above plan.

## 2021-06-12 NOTE — TELEPHONE ENCOUNTER
Test Results  Who is calling?:  Patient  Who ordered the test:  Dr Au  Type of test: Lab  Date of test:  11/4/2020  Where was the test performed:  [facility]  What are your questions/concerns?:  What are the results?  Okay to leave a detailed message?:  Yes

## 2021-06-12 NOTE — TELEPHONE ENCOUNTER
New Appointment Needed  What is the reason for the visit:    Same Date/Next Day Appt Request  What is the reason for your visit?: med check / right ear pain   Provider Preference: PCP only  How soon do you need to be seen?: ASAP  - Not showing any openings until after Janauary and the patient stated he needs to see Dr. Au before he does not have insurance   Waitlist offered?: No  Okay to leave a detailed message:  Yes

## 2021-06-12 NOTE — TELEPHONE ENCOUNTER
Please inform patient that blood level - hemoglobin has increase to over 11 and his iron levels are normal- I would like him to stop his iron pill for a couple of months and then recheck blood levels to make sure they are stable.  So- he can stop iron.  Dr. Au

## 2021-06-12 NOTE — PATIENT INSTRUCTIONS - HE
Continue with sertraline at 150mg daily  Week one- take wellbutrin every other day- then stop.  Week two- decrease sertraline to 100mg daily  Update Dr. Au with how you are doing at this point- call and ask to leave a message for Dr. 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

## 2021-06-13 NOTE — TELEPHONE ENCOUNTER
Who is calling:  Patient   Reason for Call:  Patient stated that he would like to remain on all his current medications. Patient stated that he does not want to taper off any of his medications.  Date of last appointment with primary care: n/a  Okay to leave a detailed message: Yes  [phone]

## 2021-06-13 NOTE — TELEPHONE ENCOUNTER
ANTICOAGULATION  MANAGEMENT    Assessment     Today's INR result of 2.5 is Therapeutic (goal INR of 2.0-3.0)        Warfarin taken as previously instructed    No new diet changes affecting INR    No new medication/supplements affecting INR    Continues to tolerate warfarin with no reported s/s of bleeding or thromboembolism     Previous INR was Therapeutic    Plan:     Left detailed message for Parish regarding INR result and instructed:     Warfarin Dosing Instructions:  Continue current warfarin dose    7.5 mg every Tue, Fri; 5 mg all other days      (0 % change)    Instructed patient to follow up no later than: 4 weeks    Education provided: importance of therapeutic range        Instructed to call the [facility] for any changes, questions or concerns. (#[phone])   ?   Lorena Barnhart RN    Subjective/Objective:      Parish Bills, a 80 y.o. male is on warfarin.     Parish reports:     Home warfarin dose: as updated on anticoagulation calendar per template     Missed doses: No     Medication changes:  No     S/S of bleeding or thromboembolism:  No     New Injury or illness:  No     Changes in diet or alcohol consumption:  No     Upcoming surgery, procedure or cardioversion:  No    Anticoagulation Episode Summary     Current INR goal:  2.0-3.0   TTR:  80.1 % (11.7 mo)   Next INR check:  1/11/2021   INR from last check:  2.50 (12/14/2020)   Weekly max warfarin dose:     Target end date:     INR check location:     Preferred lab:     Send INR reminders to:  [facility]    Indications    A-fib (H) (Resolved) [I48.91]           Comments:           Anticoagulation Care Providers     Provider Role Specialty Phone number    Isidro Au MD Referring Family Medicine [phone]

## 2021-06-13 NOTE — TELEPHONE ENCOUNTER
Refill Approved    Rx renewed per Medication Renewal Policy. Medication was last renewed on 6/9/20.    Amrita Chase, [facility] Triage/Med Refill 12/11/2020     Requested Prescriptions   Pending Prescriptions Disp Refills     sertraline (ZOLOFT) 100 MG tablet [Pharmacy Med Name: SERTRALINE  MG TABS 100 Tablet] 90 tablet 0     Sig: TAKE ONE TABLET BY MOUTH ONCE DAILY ALONG WITH 50MG TO EQUAL 150MG       SSRI Refill Protocol  Passed - 12/10/2020  9:52 AM        Passed - PCP or prescribing provider visit in last year     Last office visit with prescriber/PCP: 11/4/2020 Isidro Au MD OR same dept: 11/4/2020 Isidro Au MD OR same specialty: 11/4/2020 Isidro Au MD  Last physical: 7/12/2018 Last MTM visit: Visit date not found   Next visit within 3 mo: Visit date not found  Next physical within 3 mo: Visit date not found  Prescriber OR PCP: Isidro Au MD  Last diagnosis associated with med order: 1. Anxiety  - sertraline (ZOLOFT) 100 MG tablet [Pharmacy Med Name: SERTRALINE  MG TABS 100 Tablet]; TAKE ONE TABLET BY MOUTH ONCE DAILY ALONG WITH 50MG TO EQUAL 150MG  Dispense: 90 tablet; Refill: 0    If protocol passes may refill for 12 months if within 3 months of last provider visit (or a total of 15 months).

## 2021-06-13 NOTE — TELEPHONE ENCOUNTER
ANTICOAGULATION  MANAGEMENT    Assessment     Today's INR result of 2.2 is Therapeutic (goal INR of 2.0-3.0)        Warfarin taken as previously instructed    No new diet changes affecting INR    No new medication/supplements affecting INR    Continues to tolerate warfarin with no reported s/s of bleeding or thromboembolism     Previous INR was Subtherapeutic    Plan:     Spoke on phone with Parish regarding INR result and instructed:     Warfarin Dosing Instructions:  Continue current warfarin dose    7.5 mg every Tue, Fri; 5 mg all other days      (0 % change)    Instructed patient to follow up no later than: 2 weeks appointment made.    Education provided: importance of therapeutic range and target INR goal and significance of current INR result    Parish verbalizes understanding and agrees to warfarin dosing plan.    Instructed to call the [facility] for any changes, questions or concerns. (#[phone])   ?   Lorena Barnhart RN    Subjective/Objective:      Parish Bills, a 80 y.o. male is on warfarin.     Parish reports:     Home warfarin dose: as updated on anticoagulation calendar per template     Missed doses: No     Medication changes:  No     S/S of bleeding or thromboembolism:  No     New Injury or illness:  No     Changes in diet or alcohol consumption:  No     Upcoming surgery, procedure or cardioversion:  No    Anticoagulation Episode Summary     Current INR goal:  2.0-3.0   TTR:  79.2 % (11.7 mo)   Next INR check:  12/14/2020   INR from last check:  2.20 (11/30/2020)   Weekly max warfarin dose:     Target end date:     INR check location:     Preferred lab:     Send INR reminders to:  [facility]    Indications    A-fib (H) (Resolved) [I48.91]           Comments:           Anticoagulation Care Providers     Provider Role Specialty Phone number    Isidro Au MD Referring Family Medicine [phone]

## 2021-06-13 NOTE — TELEPHONE ENCOUNTER
RN cannot approve Refill Request    RN can NOT refill this medication pt reports is taking differently. Last office visit: 11/4/2020 Isidro Au MD Last Physical: 7/12/2018 Last MTM visit: Visit date not found Last visit same specialty: 11/4/2020 Isidro Au MD.  Next visit within 3 mo: Visit date not found  Next physical within 3 mo: Visit date not found      Amrita Chase, [facility] Connection Triage/Med Refill 12/15/2020    Requested Prescriptions   Pending Prescriptions Disp Refills     furosemide (LASIX) 20 MG tablet [Pharmacy Med Name: FUROSEMIDE 20 MG TABS 20 Tablet] 270 tablet 0     Sig: TAKE 2 TABLETS BY MOUTH IN THE MORNING, AND 1 TABLET AT NOON       Diuretics/Combination Diuretics Refill Protocol  Passed - 12/14/2020 12:28 PM        Passed - Visit with PCP or prescribing provider visit in past 12 months     Last office visit with prescriber/PCP: 11/4/2020 Isidro Au MD OR same dept: 11/4/2020 Isidro Au MD OR same specialty: 11/4/2020 Isidro Au MD  Last physical: 7/12/2018 Last MTM visit: Visit date not found   Next visit within 3 mo: Visit date not found  Next physical within 3 mo: Visit date not found  Prescriber OR PCP: Isidro Au MD  Last diagnosis associated with med order: There are no diagnoses linked to this encounter.  If protocol passes may refill for 12 months if within 3 months of last provider visit (or a total of 15 months).             Passed - Serum Potassium in past 12 months      Lab Results   Component Value Date    Potassium 3.9 08/16/2020             Passed - Serum Sodium in past 12 months      Lab Results   Component Value Date    Sodium 141 08/16/2020             Passed - Blood pressure on file in past 12 months     BP Readings from Last 1 Encounters:   11/04/20 110/55             Passed - Serum Creatinine in past 12 months      Creatinine   Date Value Ref Range Status   09/01/2020 1.06 0.73 - 1.18 mg/dL Final

## 2021-06-14 NOTE — TELEPHONE ENCOUNTER
Reason for Call:  Medication refills    Do you use a [facility]?  Name of the pharmacy and phone number for the current request: [facility] - p) [phone] - f) [phone]    Name of the medication requested: Tamsulosin, warfarin, and atorvastatin    Other request: Patient stated he has new insurance this year so if we can update the pharmacy on his chart to [facility]. Patient is wondering he needs to be seen in order to get his refills. He saw Dr. Au back in November. Patient would like a call back.    Can we leave a detailed message on this number? Yes    Phone number patient can be reached at:   Cell number on file:    Telephone Information:   Mobile [phone]       Best Time: Any time    Call taken on 1/20/2021 at 11:11 AM by Marla Woodward

## 2021-06-14 NOTE — PROGRESS NOTES
ASSESSMENT/PLAN  1. Depression  Pt has felt better in the past when increasing sertraline- he would like to increase again to see if there can be further improvement  Will increase to sertraline 150mg daily- have him f/u in one month- contact me sooner with any concerns        SUBJECTIVE:   Chief Complaint   Patient presents with     Depression     Medication check - discuss medication options      Heart Problem     Possible heart valve surgery in the near future      Parish Bills 77 y.o. male    Current Outpatient Prescriptions   Medication Sig Dispense Refill     albuterol (PROAIR HFA;PROVENTIL HFA;VENTOLIN HFA) 90 mcg/actuation inhaler Inhale 2 puffs 4 (four) times a day. 1 each 0     carvedilol (COREG) 3.125 MG tablet 3.125 mg 2 (two) times a day.       carvedilol (COREG) 6.25 MG tablet 6.25 mg 2 (two) times a day with meals.        FOLIC ACID/MULTIVITS-MIN/LUT (CENTRUM SILVER ORAL) Take 1 tablet by mouth daily.       furosemide (LASIX) 20 MG tablet TAKE 1 TABLET BY MOUTH 2 TIMES A DAY (Patient taking differently: TAKE 1 TABLET BY MOUTH 1 TIME A DAY) 180 tablet 2     nitroglycerin (NITROSTAT) 0.4 MG SL tablet Place 1 tablet (0.4 mg total) under the tongue every 5 (five) minutes as needed for chest pain (up to 3 tabs). Call 911 after 1st dose. 25 tablet 0     sertraline (ZOLOFT) 100 MG tablet TAKE ONE TABLET (100MG) BY MOUTH EVERY DAY 90 tablet 2     simvastatin (ZOCOR) 40 MG tablet TAKE 1 TABLET BY MOUTH AT BEDTIME. 90 tablet 2     warfarin (COUMADIN) 5 MG tablet TAKE 1.5 TABLETS (7.5 MG) BY MOUTH ON TUE, SAT, AND 1 TABLET (5 MG) ON ALL OTHER DAYS OR AS LAST DIRECTED BY CLINIC 103 tablet 0     sertraline (ZOLOFT) 100 MG tablet Take 1.5 tablets (150 mg total) by mouth daily. 135 tablet 1     No current facility-administered medications for this visit.      Allergies: Hydrocodone-acetaminophen; Oxycodone-acetaminophen; and Amiodarone   No LMP for male patient.    HPI:   Pt has been having increasing depressive  symptoms in the last few months.  Pt is not having thoughts of hurting himself but has noted himself to be more withdrawn and lacking motivation.  He had similar symptoms in the past that improved when we started and adjusted sertraline- we discussed increasing his dosing again to 150mg daily.  He would like to try this for a month to see if it can make improvements.  He is planning on another cardiac procedure likely in the upcoming months- we discussed shortly.    ROS: negative except as per HPI    OBJECTIVE:   The patient appears well, alert, oriented x 3, in no distress.  /62 (Patient Site: Right Arm, Patient Position: Sitting, Cuff Size: Adult Regular)  Pulse 60  Temp 98.5  F (36.9  C) (Oral)   Resp 16  Wt 207 lb (93.9 kg)  BMI 30.35 kg/m2    Lungs: clear, good air entry, no wheezes, rhonchi or rales.   Cardiac: S1 and S2 normal,known murmur  Abdomen: normal bowel sounds, soft   Extremities: show no edema, normal peripheral pulses.   Neurological: normal, no focal findings.  Skin: clear, dry, no rashes/lesions  Psych- normal mood and affect      Pt states an understanding and agrees to the above plan.

## 2021-06-14 NOTE — TELEPHONE ENCOUNTER
ANTICOAGULATION  MANAGEMENT    Assessment     Today's INR result of 2.40 is Therapeutic (goal INR of 2.0-3.0)        Warfarin taken as previously instructed    No new diet changes affecting INR    No new medication/supplements affecting INR    Continues to tolerate warfarin with no reported s/s of bleeding or thromboembolism     Previous INR was Therapeutic at 2.50 on 12/14/20.    Plan:     Spoke on phone with Parish regarding INR result and instructed:     Warfarin Dosing Instructions:    - Continue current warfarin dose 7.5 mg daily on Tues/Fri; and 5 mg daily rest of week.    Instructed patient to follow up no later than:  4 wks.   - INR scheduled on 2/8/21 @ [facility].    Education provided: importance of consistent vitamin K intake and target INR goal and significance of current INR result    Parish verbalizes understanding and agrees to warfarin dosing plan.    Instructed to call the [facility] for any changes, questions or concerns. (#[phone])   ?   Kori Ziegler RN    Subjective/Objective:      Parish Bills, a 80 y.o. male is on warfarin.     Parish reports:     Home warfarin dose: as updated on anticoagulation calendar per template     Missed doses: No     Medication changes:  No     S/S of bleeding or thromboembolism:  No     New Injury or illness:  No     Changes in diet or alcohol consumption:  No     Upcoming surgery, procedure or cardioversion:  No    Anticoagulation Episode Summary     Current INR goal:  2.0-3.0   TTR:  80.1 % (11.7 mo)   Next INR check:  2/8/2021   INR from last check:  2.40 (1/11/2021)   Weekly max warfarin dose:     Target end date:     INR check location:     Preferred lab:     Send INR reminders to:  [facility]    Indications    A-fib (H) (Resolved) [I48.91]           Comments:           Anticoagulation Care Providers     Provider Role Specialty Phone number    Isidro Au MD Referring Family Medicine [phone]

## 2021-06-15 PROBLEM — C20 MALIGNANT TUMOR OF RECTUM (H): Status: ACTIVE | Noted: 2017-01-31

## 2021-06-15 NOTE — TELEPHONE ENCOUNTER
ANTICOAGULATION  MANAGEMENT    Assessment     Today's INR result of 2.2 is Therapeutic (goal INR of 2.0-3.0)        Warfarin taken as previously instructed    No new diet changes affecting INR    No new medication/supplements affecting INR    Continues to tolerate warfarin with no reported s/s of bleeding or thromboembolism     Previous INR was Therapeutic    Plan:     Spoke on phone with Parish regarding INR result and instructed:      Warfarin Dosing Instructions:  Continue current warfarin dose    7.5 mg every Tue, Fri; 5 mg all other days      (0 % change)    Instructed patient to follow up no later than: 6 weeks- appointment made.    Education provided: importance of therapeutic range and target INR goal and significance of current INR result    Parish verbalizes understanding and agrees to warfarin dosing plan.    Instructed to call the [facility] for any changes, questions or concerns. (#[phone])   ?   Lorena Barnhart RN    Subjective/Objective:      Parish Bills, a 80 y.o. male is on warfarin. Parish Lugo reports:     Home warfarin dose: as updated on anticoagulation calendar per template     Missed doses: No     Medication changes:  No     S/S of bleeding or thromboembolism:  No     New Injury or illness:  No     Changes in diet or alcohol consumption:  No     Upcoming surgery, procedure or cardioversion:  No    Anticoagulation Episode Summary     Current INR goal:  2.0-3.0   TTR:  85.3 % (11.7 mo)   Next INR check:  3/22/2021   INR from last check:  2.20 (2/8/2021)   Weekly max warfarin dose:     Target end date:     INR check location:     Preferred lab:     Send INR reminders to:  [facility]    Indications    A-fib (H) (Resolved) [I48.91]           Comments:           Anticoagulation Care Providers     Provider Role Specialty Phone number    Isidro Au MD Referring Family Medicine [phone]

## 2021-06-15 NOTE — TELEPHONE ENCOUNTER
Incoming call from patient - patient called back stating he forgot another Rx that needs refill as well. Patient would like furosemide (LASIX) 20 MG tablet send to the pharmacy along with previous request.

## 2021-06-15 NOTE — TELEPHONE ENCOUNTER
Pt's hemoglobin is increasing without it so he has the building blocks needed- no iron supplement need then at this time.

## 2021-06-15 NOTE — TELEPHONE ENCOUNTER
Spoke to Dr Au and he said kidney function looks good and hemoglobin is increasing but pt should still stay on Iron

## 2021-06-15 NOTE — PROGRESS NOTES
ASSESSMENT/PLAN  1. A-fib  Pt is due for INR   F/u based on results  - INR    2. S/P aneurysm repair  Reviewed hospital notes and procedure  Pt is doing well s/p aneurysm repair  Found during workup for heart valve replacement work up  Plan is allow area to heal- then likely open procedure for valve replacement  Discussed with patient- no medication changes made to day    3. Abnormality of heart valve  Pt is in need of heart valve replacement  Due to anuerysm and cardiac hx will need open procedure not candidate for other repair  Will schedule once recovered from recent hospitalization from aneurysm repair        SUBJECTIVE:   Chief Complaint   Patient presents with     Follow Up     Here today for follow up from [facility]; Iliac artery aneurysm, right ([facility]) 1/31/18      INR Check     Hip Pain     Ever since the procedure having right hip pain      Parish Bills 77 y.o. male    Current Outpatient Prescriptions   Medication Sig Dispense Refill     carvedilol (COREG) 3.125 MG tablet 3.125 mg 2 (two) times a day.       carvedilol (COREG) 6.25 MG tablet 6.25 mg 2 (two) times a day with meals.        FOLIC ACID/MULTIVITS-MIN/LUT (CENTRUM SILVER ORAL) Take 1 tablet by mouth daily.       furosemide (LASIX) 20 MG tablet TAKE 1 TABLET BY MOUTH 2 TIMES A DAY (Patient taking differently: TAKE 1 TABLET BY MOUTH 1 TIME A DAY) 180 tablet 2     nitroglycerin (NITROSTAT) 0.4 MG SL tablet Place 1 tablet (0.4 mg total) under the tongue every 5 (five) minutes as needed for chest pain (up to 3 tabs). Call 911 after 1st dose. 25 tablet 0     sertraline (ZOLOFT) 100 MG tablet Take 1.5 tablets (150 mg total) by mouth daily. 135 tablet 1     simvastatin (ZOCOR) 40 MG tablet TAKE 1 TABLET BY MOUTH AT BEDTIME. 90 tablet 2     warfarin (COUMADIN) 5 MG tablet Take one - one and a half tablets ( 5 - 7.5 mg ) daily as directed. Adjust dose per INR results as instructed. 100 tablet 1     albuterol (PROAIR HFA;PROVENTIL HFA;VENTOLIN HFA) 90  mcg/actuation inhaler Inhale 2 puffs 4 (four) times a day. 1 each 0     sertraline (ZOLOFT) 100 MG tablet TAKE ONE TABLET (100MG) BY MOUTH EVERY DAY 90 tablet 2     No current facility-administered medications for this visit.      Allergies: Hydrocodone-acetaminophen; Oxycodone-acetaminophen; and Amiodarone   No LMP for male patient.    HPI:   Pt is here for hospital follow up- during work up for cardiac valve repair found to have iliac artery leak- aneurysm repaired  Doing well with no concerns s/p procedure- reviewed records where available in care everywhere  Pt is due for INR today  Discussed other procedure upcoming which is going to be open procedure- will have a preop prior to surgery  Pt is otherwise doing well at this time- no labs needed today  He has been having some right hip pain s/p repair- slightly improving with time- no radiation- discussed some possible irritation due to positioning during procedure- he would like to monitor as it is improving and I am in agreement    ROS: negative except as per HPI    OBJECTIVE:   The patient appears well, alert, oriented x 3, in no distress.  /62 (Patient Site: Right Arm, Patient Position: Sitting, Cuff Size: Adult Regular)  Pulse 64  Temp 99.1  F (37.3  C) (Oral)   Resp 16  Wt 204 lb (92.5 kg)  BMI 29.91 kg/m2    Lungs: clear, good air entry, no wheezes, rhonchi or rales.   Cardiac: S1 and S2 normal, no murmurs, irregular rhythm.   Abdomen: normal bowel sounds, soft without tenderness, guarding, mass or organomegaly.   Extremities: show no edema, normal peripheral pulses.   Neurological: normal, no focal findings.  Skin: clear, dry, no rashes/lesions  Psych- normal mood and affect      Pt states an understanding and agrees to the above plan.

## 2021-06-15 NOTE — TELEPHONE ENCOUNTER
Reason for Call:  Medication or medication refill:    Do you use a [facility]?  Name of the pharmacy and phone number for the current request: [facility] - [address]    Name of the medication requested: metoprolol succinate 50mg    Other request: see new pharmacy above    Can we leave a detailed message on this number? Yes    Phone number patient can be reached at: Home number on file [phone] (home)    Best Time: na    Call taken on 3/1/2021 at 12:26 PM by Prudence Hodges

## 2021-06-15 NOTE — TELEPHONE ENCOUNTER
Reason for Call:  Medication Refills    Do you use a [facility]?  Name of the pharmacy and phone number for the current request: [facility] - [address]    Name of the medication requested:   - buproprion 150 mg  - sertraline 100 mg  - sertraline 50 mg    Other request: Please update pharmacy in patient's chart as well.    Can we leave a detailed message on this number? Yes    Phone number patient can be reached at:   Cell number on file:    Telephone Information:   Mobile [phone]       Best Time: Anytime    Call taken on 2/11/2021 at 11:36 AM by Marla Woodward

## 2021-06-15 NOTE — TELEPHONE ENCOUNTER
ANTICOAGULATION  MANAGEMENT    Assessment     Today's INR result of 2.30 is Therapeutic (goal INR of 2.0-3.0)        Warfarin taken as previously instructed    No new diet changes affecting INR    No new medication/supplements affecting INR    Continues to tolerate warfarin with no reported s/s of bleeding or thromboembolism     Previous INR was Therapeutic at 2.80 on 2/8/21.    Reported doing well with no new concerns.    Plan:     Spoke on phone with Parish regarding INR result and instructed:      Warfarin Dosing Instructions:    - Continue current warfarin dose 7.5 mg daily on Tues/Fri; and 5 mg daily rest of week.    Instructed patient to follow up no later than:  6 wks.   - INR scheduled on 4/26/21 @ [facility].    Education provided: importance of consistent vitamin K intake and target INR goal and significance of current INR result    Parish verbalizes understanding and agrees to warfarin dosing plan.    Instructed to call the [facility] for any changes, questions or concerns. (#[phone])   ?   Kori Ziegler RN    Subjective/Objective:      Parish SANCHEZ Sanju, a 80 y.o. male is on warfarin. Parish Lugo reports:     Home warfarin dose: as updated on anticoagulation calendar per template     Missed doses: No     Medication changes:  No     S/S of bleeding or thromboembolism:  No     New Injury or illness:  No     Changes in diet or alcohol consumption:  No     Upcoming surgery, procedure or cardioversion:  No    Anticoagulation Episode Summary     Current INR goal:  2.0-3.0   TTR:  85.3 % (11.7 mo)   Next INR check:  4/26/2021   INR from last check:  2.30 (3/15/2021)   Weekly max warfarin dose:     Target end date:     INR check location:     Preferred lab:     Send INR reminders to:  [facility]    Indications    A-fib (H) (Resolved) [I48.91]           Comments:           Anticoagulation Care Providers     Provider Role Specialty Phone number    Isidro Au MD Referring Family Medicine  [phone]

## 2021-06-15 NOTE — TELEPHONE ENCOUNTER
Spoke to pt and relayed MD message. Pt stated you took him off the iron awhile ago so he has not been taking it. Couldn't remember exactly how long he has been off. Do you want him back on the iron?

## 2021-06-16 PROBLEM — T84.50XD INFECTION OF PROSTHETIC JOINT, SUBSEQUENT ENCOUNTER: Status: ACTIVE | Noted: 2020-07-28

## 2021-06-16 PROBLEM — D12.2 BENIGN NEOPLASM OF ASCENDING COLON: Status: ACTIVE | Noted: 2017-01-31

## 2021-06-16 PROBLEM — Z80.0 FAMILY HISTORY OF COLON CANCER: Status: ACTIVE | Noted: 2017-02-02

## 2021-06-16 PROBLEM — F32.0 MILD MAJOR DEPRESSION (H): Status: ACTIVE | Noted: 2020-11-04

## 2021-06-16 PROBLEM — I71.40 ABDOMINAL AORTIC ANEURYSM (AAA) (H): Status: ACTIVE | Noted: 2019-08-26

## 2021-06-16 PROBLEM — M25.551 RIGHT HIP PAIN: Status: ACTIVE | Noted: 2020-07-28

## 2021-06-16 PROBLEM — Z95.2 S/P AVR: Status: ACTIVE | Noted: 2018-07-30

## 2021-06-16 PROBLEM — K63.5 POLYP OF ASCENDING COLON, UNSPECIFIED TYPE: Status: ACTIVE | Noted: 2020-07-28

## 2021-06-16 PROBLEM — D68.9 COAGULOPATHY (H): Status: ACTIVE | Noted: 2018-07-30

## 2021-06-16 PROBLEM — R73.9 STRESS HYPERGLYCEMIA: Status: ACTIVE | Noted: 2018-07-30

## 2021-06-16 PROBLEM — M79.604 RIGHT LEG PAIN: Status: ACTIVE | Noted: 2020-08-14

## 2021-06-16 PROBLEM — Z86.0100 HISTORY OF COLONIC POLYPS: Status: ACTIVE | Noted: 2019-04-11

## 2021-06-16 PROBLEM — L03.115 CELLULITIS OF LEG, RIGHT: Status: ACTIVE | Noted: 2020-08-14

## 2021-06-16 PROBLEM — Z12.11 ENCOUNTER FOR SCREENING FOR MALIGNANT NEOPLASM OF COLON: Status: ACTIVE | Noted: 2017-01-31

## 2021-06-16 PROBLEM — R63.4 WEIGHT LOSS: Status: ACTIVE | Noted: 2018-09-10

## 2021-06-16 PROBLEM — Z80.0 FAMILY HISTORY OF MALIGNANT NEOPLASM OF DIGESTIVE ORGAN: Status: ACTIVE | Noted: 2017-01-31

## 2021-06-16 PROBLEM — K62.1 RECTAL POLYP: Status: ACTIVE | Noted: 2020-07-28

## 2021-06-16 PROBLEM — K57.30 DIVERTICULAR DISEASE OF LARGE INTESTINE: Status: ACTIVE | Noted: 2019-04-11

## 2021-06-16 NOTE — PROGRESS NOTES
Preoperative Exam    Scheduled Procedure: Transcatheter placement of an extension stent graft   Surgery Date:  3/1/18  Surgery Location: [facility] -Fax number [phone]    Surgeon:  Dr PHILIPP Reina     Assessment/Plan:     1. Preop examination  Patient is cleared for surgery at this time with no anticipated complication.  - Electrocardiogram Perform and Read  - HM2(CBC w/o Differential)  - Creatinine     For neck lesion left side of the neck patient will try hydrocortisone topical twice daily over the next 2 weeks to see if this will calm down the region if not consideration for freezing or biopsy    Surgical Procedure Risk: Intermediate (reported cardiac risk generally 1-5%)  Have you had prior anesthesia?: Yes  Have you or any family members had a previous anesthesia reaction:  No  Do you or any family members have a history of a clotting or bleeding disorder?: No  Cardiac Risk Assessment: no increased risk for major cardiac complications    Patient approved for surgery with general or local anesthesia.    Please Note: Patient will be off his warfarin for 4 days prior to the procedure and start back on warfarin after procedure is completed    Functional Status: Independent  Patient plans to recover at home with family.     Subjective:      Parish Bills is a 77 y.o. male who presents for a preoperative consultation.  Patient is here for surgical clearance for stent to be placed for iliac aneurysm.  There are extend beyond a previous stent that is already present.  Previously has gotten through nebulization for a smaller leak and now will be placing larger stent for aneurysm.  Has an upcoming valve replacement scheduled as well.  For atrial fibrillation patient is on warfarin will be holding this for days prior to the procedure.  Patient is tolerated previous surgeries without complication.  Patient is aware of what medications to hold prior to surgery.    All other systems reviewed and are negative,  other than those listed in the HPI.    Pertinent History  Do you have difficulty breathing or chest pain after walking up a flight of stairs: Yes: fatigued after one flight of stairs  History of obstructive sleep apnea: No  Steroid use in the last 6 months: No  Frequent Aspirin/NSAID use: No  Prior Blood Transfusion: No  Prior Blood Transfusion Reaction: No  If for some reason prior to, during or after the procedure, if it is medically indicated, would you be willing to have a blood transfusion?:  There is no transfusion refusal.    Current Outpatient Prescriptions   Medication Sig Dispense Refill     carvedilol (COREG) 3.125 MG tablet 3.125 mg 2 (two) times a day.       carvedilol (COREG) 6.25 MG tablet 6.25 mg 2 (two) times a day with meals.        FOLIC ACID/MULTIVITS-MIN/LUT (CENTRUM SILVER ORAL) Take 1 tablet by mouth daily.       furosemide (LASIX) 20 MG tablet TAKE 1 TABLET BY MOUTH 2 TIMES A DAY (Patient taking differently: TAKE 1 TABLET BY MOUTH 1 TIME A DAY) 180 tablet 2     nitroglycerin (NITROSTAT) 0.4 MG SL tablet Place 1 tablet (0.4 mg total) under the tongue every 5 (five) minutes as needed for chest pain (up to 3 tabs). Call 911 after 1st dose. 25 tablet 0     sertraline (ZOLOFT) 100 MG tablet Take 1.5 tablets (150 mg total) by mouth daily. 135 tablet 1     simvastatin (ZOCOR) 40 MG tablet TAKE 1 TABLET BY MOUTH AT BEDTIME. 90 tablet 2     warfarin (COUMADIN) 5 MG tablet Take one - one and a half tablets ( 5 - 7.5 mg ) daily as directed. Adjust dose per INR results as instructed. 100 tablet 1     albuterol (PROAIR HFA;PROVENTIL HFA;VENTOLIN HFA) 90 mcg/actuation inhaler Inhale 2 puffs 4 (four) times a day. 1 each 0     sertraline (ZOLOFT) 100 MG tablet TAKE ONE TABLET (100MG) BY MOUTH EVERY DAY 90 tablet 2     No current facility-administered medications for this visit.         Allergies   Allergen Reactions     Hydrocodone-Acetaminophen Other (See Comments)     hallucination      Oxycodone-Acetaminophen Other (See Comments)     hallucination     Amiodarone Rash       Patient Active Problem List   Diagnosis     Aortic Stenosis     Bicuspid Aortic Valve     Benign Prostatic Hypertrophy     Abrasion Of The Ear     Hyperlipidemia     Cardiomyopathy     Strained Left Pectoralis Major Muscle     Essential Hypercholesterolemia     Aneurysm Of The Abdominal Aorta     Chest Pain     Neck Strain     Osteoarthritis     Lumbar Strain     Blood In Urine     Atrial Fibrillation     Esophageal Reflux     Subconjunctival Hemorrhage     Post-operative pain     HTN (hypertension)     Hypotension     Acute blood loss anemia     A-fib     Cough     Edema     Lumbar radiculopathy     Malignant neoplasm of skin     Wheezing     Pacemaker     Rectal cancer       Past Medical History:   Diagnosis Date     Abdominal aortic aneurysm      Arthritis      Atrial fibrillation      CHF (congestive heart failure)      Coronary artery disease      FH: mitral valve repair      High blood pressure      Hyperlipidemia        Past Surgical History:   Procedure Laterality Date     CARDIOVERSION      X3     CHOLECYSTECTOMY       JOINT REPLACEMENT Right     RAMÓN     LUMBAR LAMINECTOMY Right 9/15/2014    Procedure: OPEN DECOMPRESSION L2-4 RIGHT ;  Surgeon: Elroy French MD;  Location: [facility];  Service:      MITRAL VALVE REPAIR  2006     NH ARTHRODESIS ANT INTERBODY MIN DISCECTOMY, CERVICAL BELOW C2      Description: Cervical Vertebral Fusion;  Recorded: 04/18/2008;     NH EXCIS RECTAL TUMOR, TRANSANAL, PARTIAL THICKNESS N/A 3/6/2017    Procedure: TRANSANAL EXCISION OF RECTAL TUMOR AND PROCTOSCOPY;  Surgeon: Alexander Mcgrath MD;  Location: [facility];  Service: General     NH REANEURYSM/GRFT INS,ABDOMINAL AORTA      Description: Abdominal Aortic Aneurysm Repair For Dilation Or Occlusion;  Recorded: 04/05/2012;       Social History     Social History     Marital status:      Spouse name: N/A      Number of children: N/A     Years of education: N/A     Occupational History     Not on file.     Social History Main Topics     Smoking status: Former Smoker     Quit date: 9/15/1980     Smokeless tobacco: Never Used     Alcohol use No      Comment: quit 1979     Drug use: No     Sexual activity: Not on file     Other Topics Concern     Not on file     Social History Narrative    Has not smoked for 35 years.       Patient Care Team:  Isidro Au MD as PCP - General          Objective:     Vitals:    02/22/18 0945   BP: 114/62   Pulse: 76   Resp: 16   Temp: 98.5  F (36.9  C)   TempSrc: Oral   Weight: 199 lb (90.3 kg)         Physical Exam:  Physical Examination: General appearance - alert, well appearing, and in no distress  Mental status - alert, oriented to person, place, and time  Eyes - pupils equal and reactive, extraocular eye movements intact  Ears - bilateral TM's and external ear canals normal  Mouth - mucous membranes moist, pharynx normal without lesions  Neck - supple, no significant adenopathy  Lymphatics - no palpable lymphadenopathy  Chest - clear to auscultation, no wheezes, rales or rhonchi, symmetric air entry  Heart - normal rate, regular rhythm, normal S1, S2, no murmurs, rubs, clicks or gallops  Abdomen - soft, nontender, nondistended, no masses or organomegaly  Back exam - full range of motion, no tenderness, palpable spasm or pain on motion  Neurological - alert, oriented, normal speech, no focal findings or movement disorder noted  Musculoskeletal - no joint tenderness, deformity or swelling  Extremities - peripheral pulses normal, no pedal edema, no clubbing or cyanosis  Skin -small 2 mm lesion left side of the neck that is red and with peeling skin no vesicles or papules    There are no Patient Instructions on file for this visit.    EKG: Ventricular paced EKG    Labs:  Labs pending at this time.  Results will be reviewed when available.    Immunization History   Administered  Date(s) Administered     DT (pediatric) 12/14/1998, 12/19/2006     Influenza T4c3-67, 12/18/2009     Influenza high dose, seasonal 10/07/2015, 10/12/2016, 10/12/2017     Influenza, inj, historic,unspecified 11/01/2007     Influenza, seasonal,quad inj 6-35 mos 11/12/2008, 09/23/2010, 09/23/2011, 10/03/2012, 09/23/2014     Influenza,seasonal quad, PF 10/24/2013     Pneumo Conj 13-V (2010&after) 04/21/2016     Pneumo Polysac 23-V 11/16/2005     ZOSTER 12/27/2012           Electronically signed by Isidro Au MD 02/22/18 9:48 AM

## 2021-06-16 NOTE — PROGRESS NOTES
ASSESSMENT/PLAN  1. A-fib  Check INR today and follow-up based on results  - INR    2. Hospital discharge follow-up  Patient is doing well status post stent  Had some blood loss with roughly a 2 point hemoglobin drop  We will check levels again in another month  Energy is still down but likely also secondary to valve stenosis which he has upcoming surgery for in the future  Patient had some fluid overload and weight gain when he went home after hospitalization he has gone back up to his previous dosing of Lasix at 40 mg daily which has improved his symptoms      Reviewed hospital records and laboratory values from his hospitalization through care everywhere  SUBJECTIVE:   Chief Complaint   Patient presents with     Hospital Visit Follow Up     follow up, [facility] 03/01-03/02 angio and stent of artery-  had anxiety for 4 days after procedures     Parish Bills 77 y.o. male    Current Outpatient Prescriptions   Medication Sig Dispense Refill     albuterol (PROAIR HFA;PROVENTIL HFA;VENTOLIN HFA) 90 mcg/actuation inhaler Inhale 2 puffs 4 (four) times a day. 1 each 0     carvedilol (COREG) 3.125 MG tablet 3.125 mg 2 (two) times a day.       carvedilol (COREG) 6.25 MG tablet 6.25 mg 2 (two) times a day with meals.        FOLIC ACID/MULTIVITS-MIN/LUT (CENTRUM SILVER ORAL) Take 1 tablet by mouth daily.       furosemide (LASIX) 20 MG tablet TAKE 1 TABLET BY MOUTH 2 TIMES A DAY (Patient taking differently: TAKE 1 TABLET BY MOUTH 1 TIME A DAY) 180 tablet 2     nitroglycerin (NITROSTAT) 0.4 MG SL tablet Place 1 tablet (0.4 mg total) under the tongue every 5 (five) minutes as needed for chest pain (up to 3 tabs). Call 911 after 1st dose. 25 tablet 0     sertraline (ZOLOFT) 100 MG tablet Take 1.5 tablets (150 mg total) by mouth daily. 135 tablet 1     simvastatin (ZOCOR) 40 MG tablet TAKE 1 TABLET BY MOUTH AT BEDTIME. 90 tablet 2     warfarin (COUMADIN) 5 MG tablet Take one - one and a half tablets ( 5 - 7.5 mg ) daily  as directed. Adjust dose per INR results as instructed. 100 tablet 1     No current facility-administered medications for this visit.      Allergies: Hydrocodone-acetaminophen; Oxycodone-acetaminophen; and Amiodarone   No LMP for male patient.    HPI:   Patient is here for hospital follow-up, he is status post angio and stent over on a previous stent was already there in his lower iliac area-he tolerated the procedure well without any complication.  He did have a small amount of blood loss as his hemoglobin on discharge was down one-point half from his previous value.  He has not really felt any difference since the stent has been placed.  He is awaiting upcoming surgery for a valve and his heart-has noticed still having some cramping lower extremities and some regular fatigue.  Regards to his cramp his lower legs he certainly can be consuming more water intake currently he drinks about a bottle a day  Discussed with him increasing hydration and maintaining his multivitamin.  Patient was having some trouble with sleeping after discharge from hospital especially when he is lying down -he increase his diuretic back to his previous dosing of 40 mg daily and his symptoms have resolved- encourage him to continue on with 40 mg daily.      ROS: negative except as per HPI    OBJECTIVE:   The patient appears well, alert, oriented x 3, in no distress.  /66 (Patient Site: Right Arm, Patient Position: Sitting, Cuff Size: Adult Regular)  Pulse 62  Temp 97.6  F (36.4  C) (Oral)   Resp 16  Wt 200 lb 7 oz (90.9 kg)  BMI 29.39 kg/m2    Lungs: clear, good air entry, no wheezes, rhonchi or rales.   Cardiac: Irregular rhythm normal rate  Abdomen: normal bowel sounds, soft   Extremities: show no edema, normal peripheral pulses.   Neurological: normal, no focal findings.  Skin: clear, dry, no rashes/lesions  Psych- normal mood and affect      Pt states an understanding and agrees to the above plan.

## 2021-06-16 NOTE — TELEPHONE ENCOUNTER
Telephone Encounter by Trina Orr RN at 3/20/2020  9:17 AM     Author: Trina Orr RN Service: -- Author Type: Registered Nurse    Filed: 3/20/2020  9:19 AM Encounter Date: 3/20/2020 Status: Attested    : Trina Orr RN (Registered Nurse) Cosigner: Isidro Au MD at 3/20/2020  9:38 AM    Attestation signed by Isidro Au MD at 3/20/2020  9:38 AM    agree                ANTICOAGULATION  MANAGEMENT    Assessment     Today's INR result of 2.8 is Therapeutic (goal INR of 2.0-3.0)        Warfarin taken as previously instructed    No new diet changes affecting INR    No new medication/supplements affecting INR    Continues to tolerate warfarin with no reported s/s of bleeding or thromboembolism     Previous INR was Therapeutic    Plan:     Spoke with Parish regarding INR result and instructed:     Warfarin Dosing Instructions:  Continue current warfarin dose    5 mg every Tue, Thu, Sat; 7.5 mg all other days       Instructed patient to follow up no later than: 6 weeks    Education provided: importance of therapeutic range and target INR goal and significance of current INR result    Parish verbalizes understanding and agrees to warfarin dosing plan.    Instructed to call the [facility] for any changes, questions or concerns. (#[phone])   ?   Trina Orr RN    Subjective/Objective:      Parish SANCHEZ Sanju, a 79 y.o. male is on warfarin.     Parish reports:     Home warfarin dose: as updated on anticoagulation calendar per template     Missed doses: No     Medication changes:  No     S/S of bleeding or thromboembolism:  No     New Injury or illness:  No     Changes in diet or alcohol consumption:  No     Upcoming surgery, procedure or cardioversion:  No    Anticoagulation Episode Summary     Current INR goal:   2.0-3.0   TTR:   88.1 % (1 y)   Next INR check:   5/1/2020   INR from last check:   2.80 (3/20/2020)   Weekly max warfarin dose:      Target end date:       INR check location:      Preferred lab:      Send INR reminders to:   [facility]    Indications    A-fib (H) [I48.91]           Comments:            Anticoagulation Care Providers     Provider Role Specialty Phone number    Isidro Au MD Referring Family Medicine [phone]

## 2021-06-17 NOTE — TELEPHONE ENCOUNTER
Reason for Call:  Medication refill    Do you use a [facility]?  Name of the pharmacy and phone number for the current request[facility]    Name of the medication requested: nitroglycerin (NITROSTAT) 0.4 MG SL tablet    Other request: Patientnt states he had a few left but he dropped it off somewhere and cant find it. Patient is wondering if Dr. Patel can send a rx to the pharmacy.     Can we leave a detailed message on this number? Yes    Phone number patient can be reached at:   Cell number on file:    Telephone Information:   Mobile [phone]       Best Time: Any time    Call taken on 5/5/2021 at 4:29 PM by Marla Woodward

## 2021-06-17 NOTE — TELEPHONE ENCOUNTER
Telephone Encounter by Alexis Schmid RN at 10/20/2020 10:23 AM     Author: Alexis Schmid RN Service: -- Author Type: RN, Care Manager    Filed: 10/20/2020 10:32 AM Encounter Date: 10/20/2020 Status: Signed    : Alexis Schmid RN (RN, Care Manager)       ANTICOAGULATION  MANAGEMENT    Assessment     Today's INR result of 1.6 is Subtherapeutic (goal INR of 2.0-3.0)        Warfarin taken as previously instructed    No new diet changes affecting INR    No new medication/supplements affecting INR    Continues to tolerate warfarin with no reported s/s of bleeding or thromboembolism     Previous INR was Therapeutic    Plan:     Spoke on phone with Parish regarding INR result and instructed:     Warfarin Dosing Instructions:  Change warfarin dose to 5 mg daily (8 % change)    Instructed patient to follow up no later than: 2 weeks.    Education provided: importance of following up for INR monitoring at instructed interval and importance of taking warfarin as instructed    Parish verbalizes understanding and agrees to warfarin dosing plan.    Instructed to call the [facility] for any changes, questions or concerns. (#[phone])   ?   Alexis Schmid RN    Subjective/Objective:      Parish DANIEL MagallonSanju, a 80 y.o. male is on warfarin.     Parish reports:     Home warfarin dose: verbally confirmed home dose with pt and updated on anticoagulation calendar     Missed doses: No     Medication changes:  No     S/S of bleeding or thromboembolism:  No     New Injury or illness:  No     Changes in diet or alcohol consumption:  No     Upcoming surgery, procedure or cardioversion:  No    Anticoagulation Episode Summary     Current INR goal:  2.0-3.0   TTR:  87.1 % (11.7 mo)   Next INR check:  11/3/2020   INR from last check:  1.60 (10/20/2020)   Weekly max warfarin dose:     Target end date:     INR check location:     Preferred lab:     Send INR reminders to:  [facility]    Indications    A-fib  (H) (Resolved) [I48.91]           Comments:           Anticoagulation Care Providers     Provider Role Specialty Phone number    Isidro Au MD Referring Family Medicine [phone]

## 2021-06-17 NOTE — TELEPHONE ENCOUNTER
Telephone Encounter by Kori Ziegler RN at 11/3/2020  9:40 AM     Author: Kori Ziegler RN Service: -- Author Type: Registered Nurse    Filed: 11/3/2020 11:00 AM Encounter Date: 11/3/2020 Status: Signed    : Kori Ziegler RN (Registered Nurse)       ANTICOAGULATION  MANAGEMENT    Assessment     Today's INR result of 1.70 is Subtherapeutic (goal INR of 2.0-3.0)        Warfarin taken as previously instructed    No new diet changes affecting INR    No new medication/supplements affecting INR    Continues to tolerate warfarin with no reported s/s of bleeding or thromboembolism     Previous INR was Subtherapeutic at 1.60 on 10/20/20.    Plan:     Spoke on phone with Parish regarding INR result and instructed:     Warfarin Dosing Instructions:  (dinner time. has 5mg tabs)   - Change warfarin dose to 7.5 mg daily on Tuesdays; and 5 mg daily rest of week.   - (7.1 % change)    Instructed patient to follow up no later than:  1-2 wks.    Education provided: importance of consistent vitamin K intake and target INR goal and significance of current INR result    Parish verbalizes understanding and agrees to warfarin dosing plan.    Instructed to call the [facility] for any changes, questions or concerns. (#[phone])   ?   Kori Ziegler RN    Subjective/Objective:      Parish DANIEL Bills, a 80 y.o. male is on warfarin.     Parish reports:     Home warfarin dose: as updated on anticoagulation calendar per template     Missed doses: No     Medication changes:  No     S/S of bleeding or thromboembolism:  No     New Injury or illness:  No     Changes in diet or alcohol consumption:  No     Upcoming surgery, procedure or cardioversion:  No    Anticoagulation Episode Summary     Current INR goal:  2.0-3.0   TTR:  83.1 % (11.7 mo)   Next INR check:  11/17/2020   INR from last check:  1.70 (11/3/2020)   Weekly max warfarin dose:     Target end date:     INR check location:     Preferred lab:     Send INR  reminders to:  [facility]    Indications    A-fib (H) (Resolved) [I48.91]           Comments:           Anticoagulation Care Providers     Provider Role Specialty Phone number    Isidro Au MD Referring Family Medicine [phone]

## 2021-06-17 NOTE — TELEPHONE ENCOUNTER
Telephone Encounter by Kori Ziegler RN at 4/27/2021  3:24 PM     Author: Kori Ziegler RN Service: -- Author Type: Registered Nurse    Filed: 4/27/2021  3:29 PM Encounter Date: 4/27/2021 Status: Attested    : Kori Ziegler RN (Registered Nurse) Cosigner: Isidro Au MD at 4/28/2021  7:46 AM    Attestation signed by Isidro Au MD at 4/28/2021  7:46 AM    agree                Anticoagulation Annual Referral Renewal Review    Parish Bills's chart reviewed for annual renewal of referral to anticoagulation monitoring.        Criteria for anticoagulation nurse and/or pharmacist renewal met   Warfarin indication: Atrial Fibrillation, chronid and Atrial Flutter, atypical Yes, per indication   Current with INR monitoring/compliant Yes Yes   Date of last office visit 11/04/2020 Yes, had office visit within last year   Time in Therapeutic Range (TTR) 85.3 % Yes, TTR > 60%       Parish Bills met all criteria for anticoagulation management program initiated renewal.  New INR standing orders and anticoagulation referral renewal placed.      Kori Ziegler RN  3:26 PM

## 2021-06-17 NOTE — TELEPHONE ENCOUNTER
Telephone Encounter by Lorena Barnhart RN at 11/17/2020 10:59 AM     Author: Lorena Barnhart RN Service: -- Author Type: Registered Nurse    Filed: 11/17/2020 11:07 AM Encounter Date: 11/17/2020 Status: Signed    : Lorena Barnhart RN (Registered Nurse)       ANTICOAGULATION  MANAGEMENT    Assessment     Today's INR result of 1.9 is Subtherapeutic (goal INR of 2.0-3.0)        Warfarin taken as previously instructed    No new diet changes affecting INR    No new medication/supplements affecting INR    Continues to tolerate warfarin with no reported s/s of bleeding or thromboembolism     Previous INR was Subtherapeutic    Plan:     Spoke on phone with Parish regarding INR result and instructed:     Warfarin Dosing Instructions:  Change warfarin dose to    7.5 mg every Tue, Fri; 5 mg all other days      (0 % change)    Instructed patient to follow up no later than: 1-2 weeks appointment made.    Education provided: importance of therapeutic range    Parish verbalizes understanding and agrees to warfarin dosing plan.    Instructed to call the [facility] for any changes, questions or concerns. (#[phone])   ?   Lorena Barnhart RN    Subjective/Objective:      Parish Bills, a 80 y.o. male is on warfarin.     Parish reports:     Home warfarin dose: as updated on anticoagulation calendar per template     Missed doses: No     Medication changes:  No     S/S of bleeding or thromboembolism:  No     New Injury or illness:  No     Changes in diet or alcohol consumption:  No     Upcoming surgery, procedure or cardioversion:  No    Anticoagulation Episode Summary     Current INR goal:  2.0-3.0   TTR:  79.2 % (11.7 mo)   Next INR check:  12/1/2020   INR from last check:  1.90 (11/17/2020)   Weekly max warfarin dose:     Target end date:     INR check location:     Preferred lab:     Send INR reminders to:  [facility]    Indications    A-fib (H) (Resolved) [I48.91]           Comments:            Anticoagulation Care Providers     Provider Role Specialty Phone number    Isidro Au MD Referring Family Medicine [phone]

## 2021-06-17 NOTE — TELEPHONE ENCOUNTER
Refill Approved    Rx renewed per Medication Renewal Policy. Medication was last renewed on 3/1/21.    Lalo Cortez, [facility] Triage/Med Refill 5/3/2021     Requested Prescriptions   Pending Prescriptions Disp Refills     furosemide (LASIX) 20 MG tablet [Pharmacy Med Name: FUROSEMIDE  20MG  TAB] 270 tablet 3     Sig: TAKE 2 TABLETS BY MOUTH IN  THE MORNING AND 1 TABLET BY MOUTH AT NOON       Diuretics/Combination Diuretics Refill Protocol  Passed - 5/2/2021  4:59 AM        Passed - Visit with PCP or prescribing provider visit in past 12 months     Last office visit with prescriber/PCP: 11/4/2020 Isidro Au MD OR same dept: 11/4/2020 Isidro Au MD OR same specialty: 11/4/2020 Isidro Au MD  Last physical: 7/12/2018 Last MTM visit: Visit date not found   Next visit within 3 mo: Visit date not found  Next physical within 3 mo: Visit date not found  Prescriber OR PCP: Isidro Au MD  Last diagnosis associated with med order: 1. Edema, unspecified type  - furosemide (LASIX) 20 MG tablet [Pharmacy Med Name: FUROSEMIDE  20MG  TAB]; TAKE 2 TABLETS BY MOUTH IN  THE MORNING AND 1 TABLET BY MOUTH AT NOON  Dispense: 270 tablet; Refill: 3    If protocol passes may refill for 12 months if within 3 months of last provider visit (or a total of 15 months).             Passed - Serum Potassium in past 12 months      Lab Results   Component Value Date    Potassium 4.6 02/02/2021             Passed - Serum Sodium in past 12 months      Lab Results   Component Value Date    Sodium 138 02/02/2021             Passed - Blood pressure on file in past 12 months     BP Readings from Last 1 Encounters:   11/04/20 110/55             Passed - Serum Creatinine in past 12 months      Creatinine   Date Value Ref Range Status   02/02/2021 1.09 0.70 - 1.30 mg/dL Final

## 2021-06-17 NOTE — TELEPHONE ENCOUNTER
ANTICOAGULATION  MANAGEMENT    Assessment     Today's INR result of 2.20 is Therapeutic (goal INR of 2.0-3.0)        Warfarin taken as previously instructed    No new diet changes affecting INR    No new medication/supplements affecting INR    Continues to tolerate warfarin with no reported s/s of bleeding or thromboembolism     Previous INR was Therapeutic at 2.30 on 3/15/21.    Plan:     Spoke on phone with Parish regarding INR result and instructed:      Warfarin Dosing Instructions:    - Continue current warfarin dose 7.5 mg daily on Tues/Fri; and 5 mg daily rest of week.    Instructed patient to follow up no later than:  6 wks.   - INR scheduled on 6/7/21 @ [facility].    Education provided: importance of consistent vitamin K intake, target INR goal and significance of current INR result, importance of notifying clinic for changes in medications and monitoring for bleeding signs and symptoms    Parish verbalizes understanding and agrees to warfarin dosing plan.    Instructed to call the [facility] for any changes, questions or concerns. (#[phone])   ?   Kori Ziegler RN    Subjective/Objective:      Parish DANIEL Bills, a 80 y.o. male is on warfarin. Parish Lugo reports:     Home warfarin dose: as updated on anticoagulation calendar per template     Missed doses: No     Medication changes:  No     S/S of bleeding or thromboembolism:  No     New Injury or illness:  No     Changes in diet or alcohol consumption:  No     Upcoming surgery, procedure or cardioversion:  No    Anticoagulation Episode Summary     Current INR goal:  2.0-3.0   TTR:  85.3 % (11.7 mo)   Next INR check:  6/7/2021   INR from last check:  2.20 (4/26/2021)   Weekly max warfarin dose:     Target end date:     INR check location:     Preferred lab:     Send INR reminders to:  [facility]    Indications    A-fib (H) (Resolved) [I48.91]           Comments:           Anticoagulation Care Providers     Provider Role Specialty Phone  number    Isidro Au MD [facility] [phone]

## 2021-06-18 NOTE — PROGRESS NOTES
Preoperative Exam    Scheduled Procedure: Aortic valve replacement   Surgery Date:  6/29/2018  Surgery Location: [facility]    Surgeon:   Dr Brunilda KEENE    Assessment/Plan:     1. Cough  Suspect bronchits- rhonchi heard on examination, productive cough- no focalized infiltrate on xray but atelecatsis noted- will start patient on zpack and have his f/u when he is feeling better for further evaluation  Would obviously not want patient having surgery not under full strength if this can be avoided- he is in agreement  Will contact surgery center for rescheduling  - HM1(CBC and Differential)  - Creatinine  - XR Chest 2 Views; Future  - HM1 (CBC with Diff)    2. A-fib (H)  Check levels today  - INR    3. Preoperative examination  Pt is not cleared for surgery  Due to current infection- patient will have to delay surgery to an upcoming day when he has improved and have no active infection  Reviewed xray and lab work with him today- he is in agreement would like to await until he is in full strength     Surgical Procedure Risk: Intermediate (reported cardiac risk generally 1-5%)  Have you had prior anesthesia?: Yes  Have you or any family members had a previous anesthesia reaction:  No  Do you or any family members have a history of a clotting or bleeding disorder?: No  Cardiac Risk Assessment: increased risk for major cardiac complications based on  current infection    Patient is not approved for surgery due to infection- will be re-evaluated in the future for rescheduling        Functional Status: Independent  Patient plans to recover at home with family.     Subjective:      Parish Bills is a 77 y.o. male who presents for a preoperative consultation.  Pt is here for pre-op exam- but patient has not been feeling well for over a week- he has had a productive cough and although he feels he might be doing somewhat better- is still coughing quite regularly and it is productive at times.  He has been awaiting this  surgery for some time- as he needed a prior surgery- we discussed evaluating the cough today as I do not want him undergoing surgery not in full strength.  After hearing his lungs- looking at labs and xray discussed with him postponing surgery until he is better- he is in agreement.    All other systems reviewed and are negative, other than those listed in the HPI.    Pertinent History  Do you have difficulty breathing or chest pain after walking up a flight of stairs: Yes: shortness of breath at baseline- worsened recently with chest infection  History of obstructive sleep apnea: No  Steroid use in the last 6 months: No  Frequent Aspirin/NSAID use: No  Prior Blood Transfusion: No  Prior Blood Transfusion Reaction: No  If for some reason prior to, during or after the procedure, if it is medically indicated, would you be willing to have a blood transfusion?:  There is no transfusion refusal.    Current Outpatient Prescriptions   Medication Sig Dispense Refill     carvedilol (COREG) 3.125 MG tablet 3.125 mg 2 (two) times a day.       carvedilol (COREG) 6.25 MG tablet 6.25 mg 2 (two) times a day with meals.        FOLIC ACID/MULTIVITS-MIN/LUT (CENTRUM SILVER ORAL) Take 1 tablet by mouth daily.       furosemide (LASIX) 20 MG tablet TAKE 1 TABLET BY MOUTH 2 TIMES A DAY (Patient taking differently: TAKE 1 TABLET BY MOUTH 1 TIME A DAY) 180 tablet 2     nitroglycerin (NITROSTAT) 0.4 MG SL tablet Place 1 tablet (0.4 mg total) under the tongue every 5 (five) minutes as needed for chest pain (up to 3 tabs). Call 911 after 1st dose. 25 tablet 0     sertraline (ZOLOFT) 100 MG tablet Take 1.5 tablets (150 mg total) by mouth daily. 135 tablet 1     simvastatin (ZOCOR) 40 MG tablet Take 1 tablet (40 mg total) by mouth at bedtime. 90 tablet 3     warfarin (COUMADIN) 5 MG tablet Take one - one and a half tablets ( 5 - 7.5 mg ) daily as directed. Adjust dose per INR results as instructed. 100 tablet 1     albuterol (PROAIR  HFA;PROVENTIL HFA;VENTOLIN HFA) 90 mcg/actuation inhaler Inhale 2 puffs 4 (four) times a day. 1 each 0     No current facility-administered medications for this visit.         Allergies   Allergen Reactions     Hydrocodone-Acetaminophen Other (See Comments)     hallucination     Oxycodone-Acetaminophen Other (See Comments)     hallucination     Amiodarone Rash       Patient Active Problem List   Diagnosis     Aortic Stenosis     Bicuspid Aortic Valve     Benign Prostatic Hypertrophy     Abrasion Of The Ear     Hyperlipidemia     Cardiomyopathy     Strained Left Pectoralis Major Muscle     Essential Hypercholesterolemia     Aneurysm Of The Abdominal Aorta     Chest Pain     Neck Strain     Osteoarthritis     Lumbar Strain     Blood In Urine     Atrial Fibrillation     Esophageal Reflux     Subconjunctival Hemorrhage     Post-operative pain     HTN (hypertension)     Hypotension     Acute blood loss anemia     A-fib (H)     Cough     Edema     Lumbar radiculopathy     Malignant neoplasm of skin     Wheezing     Pacemaker     Rectal cancer (H)       Past Medical History:   Diagnosis Date     Abdominal aortic aneurysm (H)      Arthritis      Atrial fibrillation (H)      CHF (congestive heart failure) (H)      Coronary artery disease      FH: mitral valve repair      High blood pressure      Hyperlipidemia        Past Surgical History:   Procedure Laterality Date     CARDIOVERSION      X3     CHOLECYSTECTOMY       JOINT REPLACEMENT Right     RAMÓN     LUMBAR LAMINECTOMY Right 9/15/2014    Procedure: OPEN DECOMPRESSION L2-4 RIGHT ;  Surgeon: Elroy French MD;  Location: [facility];  Service:      MITRAL VALVE REPAIR  2006     NH ARTHRODESIS ANT INTERBODY MIN DISCECTOMY, CERVICAL BELOW C2      Description: Cervical Vertebral Fusion;  Recorded: 04/18/2008;     NH EXCIS RECTAL TUMOR, TRANSANAL, PARTIAL THICKNESS N/A 3/6/2017    Procedure: TRANSANAL EXCISION OF RECTAL TUMOR AND PROCTOSCOPY;  Surgeon: Alexander OTTO  "MD Alcides;  Location: [facility] OR;  Service: General     LA REANEURYSM/GRFT INS,ABDOMINAL AORTA      Description: Abdominal Aortic Aneurysm Repair For Dilation Or Occlusion;  Recorded: 04/05/2012;       Social History     Social History     Marital status:      Spouse name: N/A     Number of children: N/A     Years of education: N/A     Occupational History     Not on file.     Social History Main Topics     Smoking status: Former Smoker     Quit date: 9/15/1980     Smokeless tobacco: Never Used     Alcohol use No      Comment: quit 1979     Drug use: No     Sexual activity: Not on file     Other Topics Concern     Not on file     Social History Narrative    Has not smoked for 35 years.       Patient Care Team:  Isidro Au MD as PCP - General          Objective:     Vitals:    06/22/18 1537   BP: 128/62   Pulse: 62   Resp: 16   Temp: 98.3  F (36.8  C)   TempSrc: Oral   Weight: 192 lb (87.1 kg)   Height: 5' 9\" (1.753 m)         Physical Exam:  Physical Examination: General appearance - alert, well appearing, and in no distress  Mental status - alert, oriented to person, place, and time  Eyes - pupils equal and reactive, extraocular eye movements intact  Ears - bilateral TM's and external ear canals normal  Lymphatics - no palpable lymphadenopathy  Chest - rhonchi heard in BL LL fields- minimal wheezing at apices  Heart - normal rate, regular rhythm, normal S1, S2, no murmurs, rubs, clicks or gallops  Abdomen - soft, nontender, nondistended, no masses or organomegaly      There are no Patient Instructions on file for this visit.    EKG:  Reviewed recent EKG from prior surgery- pt expresses no new symptoms of chest pain but shortness of breath with this recent infection    Labs:  Labs pending at this time.  Results will be reviewed when available.    Immunization History   Administered Date(s) Administered     DT (pediatric) 12/14/1998, 12/19/2006     Influenza [id number], 12/18/2009     " Influenza high dose, seasonal 10/07/2015, 10/12/2016, 10/12/2017     Influenza, inj, historic,unspecified 11/01/2007     Influenza, seasonal,quad inj 6-35 mos 11/12/2008, 09/23/2010, 09/23/2011, 10/03/2012, 09/23/2014     Influenza,seasonal quad, PF 10/24/2013     Pneumo Conj 13-V (2010&after) 04/21/2016     Pneumo Polysac 23-V 11/16/2005     ZOSTER, LIVE 12/27/2012           Electronically signed by Isidro Au MD 06/22/18 3:39 PM

## 2021-06-19 NOTE — PROGRESS NOTES
[facility]      Facility:    [facility] [[id number]]  Code Status: FULL CODE       Chief Complaint/Reason for Visit:  Chief Complaint   Patient presents with     H & P     New admit to TCU after AVR, cabg.       HPI:   Parish is a 78 y.o. male who was recently hospitalized as per below.     Hospital Course: Mr. Bills is a pleasant 77 year old gentleman who has a history of severe AS, severe MR s/p MV repair (per Dr. Perla, 2006), abdominal aortic aneurysm endoraft surgery (~10 years ago w/ two revisions), chronic atrial fibrillation, SSS s/p PPM and HTN who was electively admitted for AVR, surgical revascularization. On July 30th, 2018, Mr. Bills underwent AVR ([employer]en [employer] 27 mm Magna pericardial tissue valve) and 1V CABG with LIMA-LAD with Dr. Worley without complications. Post-operative course notable for removal of CTs (v-wire unable to be removed, clipped), IV diuresis with subsequent orthostatic symptoms (s/p IVFs and albumin) and participation with cardiac rehabilitation, PT and OT. A TTE was obtained on POD #3 which demonstrated preserved LV functino (previously noted as 45% intraoperative RAMAKRISHNA), with AV MG 13 mmHg. TCU was recommended at discharge in which he was discharged on POD #4 in stable condition.     Overall stabilized and discharged to TCU on 7/20/16 for PT, OT, nursing cares, medical management and monitoring.       Past Medical History:  Past Medical History:   Diagnosis Date     Abdominal aortic aneurysm (H)      Alcohol abuse      Anemia      Aortic stenosis      Arthritis      Atrial fibrillation (H)      BPH (benign prostatic hyperplasia)      CAD (coronary artery disease)      Cardiomyopathy (H)      Cataract     left     Chest pain      CHF (congestive heart failure) (H)      Congenital insufficiency of aortic valve      Coronary artery disease      Disease of pancreas     gallstones related     Dysthymia      Enlarged  prostate      FH: mitral valve repair      H/O aortic valve repair '     High blood pressure      Hypercholesteremia      Hyperlipidemia      Hypertrophy of prostate      Lumbar radiculopathy      Mitral valve prolapse      Neck strain      Nonrheumatic aortic (valve) stenosis      Osteoarthrosis      Pacemaker      Rectal cancer (H)      Reflux esophagitis      Subconjunctival bleed            Surgical History:  Past Surgical History:   Procedure Laterality Date     ABDOMINAL AORTIC ANEURYSM REPAIR  2009     AORTIC VALVE REPLACEMENT       CARDIOVERSION      X3     CARDIOVERSION       CERVICAL FUSION  1972     CHOLECYSTECTOMY       CORONARY ARTERY BYPASS GRAFT      x1 with LIMA to LAD     CT coronary angiogram       EMBOLIZATION  01/31/2018     [facility] AAA REPAIR EXPOSE ILIAC ART ABD INCIS UNILAT   2009     ILIAC ARTERY ANEURYSM REPAIR  03/29/2012     JOINT REPLACEMENT Right     RAMÓN     LAPAROSCOPIC CHOLECYSTECTOMY       LUMBAR LAMINECTOMY Right 9/15/2014    Procedure: OPEN DECOMPRESSION L2-4 RIGHT ;  Surgeon: Elroy French MD;  Location: [facility];  Service:      MITRAL VALVE REPAIR  2006     FL ARTHRODESIS ANT INTERBODY MIN DISCECTOMY, CERVICAL BELOW C2      Description: Cervical Vertebral Fusion;  Recorded: 04/18/2008;     FL EXCIS RECTAL TUMOR, TRANSANAL, PARTIAL THICKNESS N/A 3/6/2017    Procedure: TRANSANAL EXCISION OF RECTAL TUMOR AND PROCTOSCOPY;  Surgeon: Alexander Mcgrath MD;  Location: [facility];  Service: General     FL REANEURYSM/GRFT INS,ABDOMINAL AORTA      Description: Abdominal Aortic Aneurysm Repair For Dilation Or Occlusion;  Recorded: 04/05/2012;     FL SURG ONLY REMOVE CATARACT INTRACAP INSERT LENS   2010     STERNOTOMY      redo     TOTAL HIP ARTHROPLASTY Right 2004     TRANSURETHRAL RESECTION OF PROSTATE         Family History:   Family History   Problem Relation Age of Onset     Family history unknown: Yes       Social History:    Social History     Social History      Marital status:      Spouse name: N/A     Number of children: N/A     Years of education: N/A     Social History Main Topics     Smoking status: Former Smoker     Types: Cigarettes     Quit date: 9/15/1980     Smokeless tobacco: Never Used     Alcohol use No      Comment: quit 1979     Drug use: No     Sexual activity: Not on file     Other Topics Concern     Not on file     Social History Narrative    Has not smoked for 35 years.        Medications:  Current Outpatient Prescriptions   Medication Sig     acetaminophen (TYLENOL) 325 MG tablet Take 325-650 mg by mouth every 4 (four) hours as needed for pain.     amoxicillin (AMOXIL) 500 MG capsule Take 2,000 mg by mouth as needed. As needed for Hx mitral valve repair. Take one hour before appointment     aspirin 325 MG tablet Take 325 mg by mouth daily.     atorvastatin (LIPITOR) 40 MG tablet Take 40 mg by mouth at bedtime.     folic acid/multivit-min/lutein (CENTRUM SILVER ORAL) Take 1 tablet by mouth daily.     furosemide (LASIX) 40 MG tablet Take 40 mg by mouth daily.     hydrOXYzine HCl (ATARAX) 25 MG tablet Take 25 mg by mouth every 6 (six) hours as needed for itching.     melatonin 1 mg Tab tablet Take 2 mg by mouth at bedtime.     metoprolol succinate (TOPROL-XL) 50 MG 24 hr tablet Take 50 mg by mouth daily. Hold for SBP less than 95 or HR less than 55, bronchospasm or heart block     nitroglycerin (NITROSTAT) 0.4 MG SL tablet Place 0.4 mg under the tongue every 5 (five) minutes as needed for chest pain.     potassium chloride (KLOR-CON) 10 MEQ CR tablet Take 20 mEq by mouth daily.     sertraline (ZOLOFT) 50 MG tablet Take 150 mg by mouth at bedtime.     warfarin (COUMADIN) 7.5 MG tablet Take 7.5 mg by mouth daily. Until 8/12/18  INR 2.07 next INR 8/13       Allergies:  Allergies   Allergen Reactions     Hydrocodone-Acetaminophen Other (See Comments)     hallucination     Oxycodone-Acetaminophen Other (See Comments)     hallucination     Amiodarone Rash        Review of Systems:  Pertinent items are noted in HPI.      Physical Exam:   General: Patient is alert male, no distress.  Vitals: /64, Temp 98.3, Pulse 68, RR 20, O2 sat 97% RA.  HEENT: Head is NCAT. Eyes show no injection or icterus. Nares negative. Oropharynx well hydrated.  Neck: Supple. No tenderness or adenopathy. No JVD.  Lungs: Clear bilaterally. No wheezes.  Chest: Sternotomy.   Cardiovascular: Regular rate and rhythm, normal S1. S2.  Back: No spinal or CVA tenderness.  Abdomen: Soft, no tenderness on exam. Bowel sounds present. No guarding rebound or rigidity.  : Deferred.  Extremities: Two plus LE edema is noted.  Musculoskeletal: Age related degen changes.   Skin: Warm and dry.  Psych: Mood appears good.      Labs:  Results for orders placed or performed in visit on 08/04/18   Basic Metabolic Panel   Result Value Ref Range    Sodium 138 136 - 145 mmol/L    Potassium 4.1 3.5 - 5.0 mmol/L    Chloride 101 98 - 107 mmol/L    CO2 28 22 - 31 mmol/L    Anion Gap, Calculation 9 5 - 18 mmol/L    Glucose 144 (H) 70 - 125 mg/dL    Calcium 9.2 8.5 - 10.5 mg/dL    BUN 34 (H) 8 - 28 mg/dL    Creatinine 0.93 0.70 - 1.30 mg/dL    GFR MDRD Af Amer >60 >60 mL/min/1.73m2    GFR MDRD Non Af Amer >60 >60 mL/min/1.73m2       Assessment/Plan:  1. AVR. Surgery on 7/30/18 along with bypass.  2. CABG x 1. Follow up with cardiology.   3. Afib. On warfarin. Monitor and adjust per INR.  4. HTN. BPs acceptable. Cont to monitor.  5. Anemia. ABLA from surgery..   6. Code status is full code.      Total time greater than 60 minutes, greater than 50% counseling and coordination of care, time spent in interview and examination of patient, review of records, discussion with nursing staff.        Electronically signed by: Nilsa Gray MD

## 2021-06-19 NOTE — PROGRESS NOTES
[facility]    Facility:   [facility] [[id number]]   Code Status: FULL CODE  PCP: Isidro Au MD   Phone: [phone]   Fax: [phone]      CHIEF COMPLAINT/REASON FOR VISIT:  Chief Complaint   Patient presents with     Discharge Summary     [facility] TCU 8/3/18-8/17/18.       HISTORY COURSE:    HPI (from TCU H & P ):   Parish is a 78 y.o. male who was recently hospitalized as per below.     Hospital Course: Mr. Bills is a pleasant 77 year old gentleman who has a history of severe AS, severe MR s/p MV repair (per Dr. Perla, 2006), abdominal aortic aneurysm endoraft surgery (~10 years ago w/ two revisions), chronic atrial fibrillation, SSS s/p PPM and HTN who was electively admitted for AVR, surgical revascularization. On July 30th, 2018, Mr. Bills underwent AVR ([employer] 27 mm Magna pericardial tissue valve) and 1V CABG with LIMA-LAD with Dr. Worley without complications. Post-operative course notable for removal of CTs (v-wire unable to be removed, clipped), IV diuresis with subsequent orthostatic symptoms (s/p IVFs and albumin) and participation with cardiac rehabilitation, PT and OT. A TTE was obtained on POD #3 which demonstrated preserved LV functino (previously noted as 45% intraoperative RAMAKRISHNA), with AV MG 13 mmHg. TCU was recommended at discharge in which he was discharged on POD #4 in stable condition.     Overall stabilized and discharged to TCU on 7/20/16 for PT, OT, nursing cares, medical management and monitoring.     TCU:  His TCU course was unremarkable. Medical status and conditions were monitored as warranted while he participated in therapy and received nursing care and assistance. He saw cardiology for follow up, no concerns. No medication changes. He does have LE edema, has been improving. Difficulty sleeping in the hospital and in TCU. No chest pain or dizziness. Appetite is good. No other concerns. He is ready to  discharge home tomorrow and will enroll in outpatient cardiac rehab.      PHYSICAL EXAM:   General: Patient is alert pleasant male, no distress.   Vitals: /70, Temp 97, Pulse 62, RR 18, O2 sat 97% RA.  HEENT: Head is NCAT. Eyes show no injection or icterus. Nares negative. Oropharynx well hydrated.  Neck: Supple. No tenderness or adenopathy. No JVD.  Lungs: Clear bilaterally. No wheezes.  Cardiovascular: Regular rate and rhythm, normal S1, S2.  Back: No spinal or CVA tenderness.  Chest: Sternal incision healing well.   Abdomen: Soft, no tenderness on exam. Bowel sounds present. No guarding rebound or rigidity.  : Deferred.  Extremities: 1-2 plus LE edema is noted.  Musculoskeletal: Degen changes.   Psych: Mood appears good.      MEDICATION LIST:  Current Outpatient Prescriptions   Medication Sig     acetaminophen (TYLENOL) 325 MG tablet Take 325-650 mg by mouth every 4 (four) hours as needed for pain.     amoxicillin (AMOXIL) 500 MG capsule Take 2,000 mg by mouth as needed. As needed for Hx mitral valve repair. Take one hour before appointment     aspirin 325 MG tablet Take 325 mg by mouth daily.     atorvastatin (LIPITOR) 40 MG tablet Take 40 mg by mouth at bedtime.     folic acid/multivit-min/lutein (CENTRUM SILVER ORAL) Take 1 tablet by mouth daily.     furosemide (LASIX) 40 MG tablet Take 40 mg by mouth daily.     hydrOXYzine HCl (ATARAX) 25 MG tablet Take 25 mg by mouth every 6 (six) hours as needed for itching.     melatonin 1 mg Tab tablet Take 2 mg by mouth at bedtime.     metoprolol succinate (TOPROL-XL) 50 MG 24 hr tablet Take 50 mg by mouth daily. Hold for SBP less than 95 or HR less than 55, bronchospasm or heart block     nitroglycerin (NITROSTAT) 0.4 MG SL tablet Place 0.4 mg under the tongue every 5 (five) minutes as needed for chest pain.     potassium chloride (KLOR-CON) 10 MEQ CR tablet Take 20 mEq by mouth daily.     sertraline (ZOLOFT) 50 MG tablet Take 150 mg by mouth at bedtime.      warfarin (COUMADIN) 7.5 MG tablet Take 7.5 mg by mouth daily. Until 8/12/18  INR 2.07 next INR 8/13       DISCHARGE DIAGNOSIS:  1. AVR. Surgery on 7/30/18 along with bypass.  2. CABG x 1.   3. Afib. On warfarin.   4. HTN.   5. Anemia. ABLA from surgery..       Total time greater than 30 minutes discharge coordination.      MEDICAL EQUIPMENT NEEDS:  None.      DISCHARGE PLAN/FACE TO FACE:  I certify that services are/were furnished while this patient was under the care of a physician and that a physician or an allowed non-physician practitioner (NPP), had a face-to-face encounter that meets the physician face-to-face encounter requirements. The encounter was in whole, or in part, related to the primary reason for home health. The patient is confined to his/her home and needs intermittent skilled nursing, physical therapy, speech-language pathology, or the continued need for occupational therapy. A plan of care has been established by a physician and is periodically reviewed by a physician.    Date of Face-to-Face Encounter: 8/16/18.    I certify that, based on my findings, the following services are medically necessary home health services: None. He will do outpatient cardiac therapy.     My clinical findings support the need for the above skilled services because: NA.    This patient is homebound because: NA.    The patient is, or has been, under my care and I have initiated the establishment of the plan of care. This patient will be followed by a physician who will periodically review the plan of care.    Schedule follow up visit with primary care provider within 7 days to reestablish care.    Electronically signed by: Nilsa Gray MD

## 2021-06-19 NOTE — PROGRESS NOTES
Preoperative Exam    Scheduled Procedure: Open Heart surgery  Surgery Date:  07/27/18  Surgery Location: [facility], fax    Surgeon:  Dr. Taylor    Assessment/Plan:     1. Preoperative examination  Patient is cleared for surgery at this time with no anticipated complication.  - Creatinine  - HM2(CBC w/o Differential)    2. History of bronchitis  Patient has improved greatly after antibiotics his lungs are clear today  X-ray showing no acute infiltrate  - XR Chest 2 Views; Future     Surgical Procedure Risk: Vascular/High (reported cardiac risk often > 5%)  Have you had prior anesthesia?: Yes  Have you or any family members had a previous anesthesia reaction:  No  Do you or any family members have a history of a clotting or bleeding disorder?: No  Cardiac Risk Assessment:  atrial fibrillation    Patient approved for surgery with general or local anesthesia.    Functional Status: Independent  Patient plans to recover at home with family.     Subjective:      Parish Bills is a 77 y.o. male who presents for a preoperative consultation.  Patient has tolerated previous surgeries without complication.  Previous valve surgery in the past and led to a prolonged hospitalization due to atrial fibrillation after surgery.  Patient has been doing well recently but unfortunately had bronchitis prior to previous scheduled procedure and had to be delayed-after antibiotics patient is been feeling better and feels back to his baseline today.  With ongoing problems with aortic stenosis patient has elected for surgical correction.  We will be rechecking labs today as well as an x-ray to verify clearance of his previous infection.    All other systems reviewed and are negative, other than those listed in the HPI.    Pertinent History  Do you have difficulty breathing or chest pain after walking up a flight of stairs: Yes: gets short of breath  History of obstructive sleep apnea: No  Steroid use in the last 6 months:  No  Frequent Aspirin/NSAID use: No  Prior Blood Transfusion: No  Prior Blood Transfusion Reaction: No  If for some reason prior to, during or after the procedure, if it is medically indicated, would you be willing to have a blood transfusion?:  There is no transfusion refusal.    Current Outpatient Prescriptions   Medication Sig Dispense Refill     benzonatate (TESSALON PERLES) 100 MG capsule Take 1 capsule (100 mg total) by mouth 3 (three) times a day as needed for cough. 30 capsule 0     carvedilol (COREG) 3.125 MG tablet 3.125 mg 2 (two) times a day.       carvedilol (COREG) 6.25 MG tablet 6.25 mg 2 (two) times a day with meals.        FOLIC ACID/MULTIVITS-MIN/LUT (CENTRUM SILVER ORAL) Take 1 tablet by mouth daily.       furosemide (LASIX) 20 MG tablet TAKE 1 TABLET BY MOUTH 2 TIMES A DAY (Patient taking differently: TAKE 1 TABLET BY MOUTH 1 TIME A DAY) 180 tablet 2     nitroglycerin (NITROSTAT) 0.4 MG SL tablet Place 1 tablet (0.4 mg total) under the tongue every 5 (five) minutes as needed for chest pain (up to 3 tabs). Call 911 after 1st dose. 25 tablet 0     sertraline (ZOLOFT) 100 MG tablet Take 1.5 tablets (150 mg total) by mouth daily. 135 tablet 1     simvastatin (ZOCOR) 40 MG tablet Take 1 tablet (40 mg total) by mouth at bedtime. 90 tablet 3     warfarin (COUMADIN) 5 MG tablet Take one - one and a half tablets ( 5 - 7.5 mg ) daily as directed. Adjust dose per INR results as instructed. 100 tablet 1     No current facility-administered medications for this visit.         Allergies   Allergen Reactions     Hydrocodone-Acetaminophen Other (See Comments)     hallucination     Oxycodone-Acetaminophen Other (See Comments)     hallucination     Amiodarone Rash       Patient Active Problem List   Diagnosis     Aortic Stenosis     Bicuspid Aortic Valve     Benign Prostatic Hypertrophy     Abrasion Of The Ear     Hyperlipidemia     Cardiomyopathy     Strained Left Pectoralis Major Muscle     Essential  Hypercholesterolemia     Aneurysm Of The Abdominal Aorta     Chest Pain     Neck Strain     Osteoarthritis     Lumbar Strain     Blood In Urine     Atrial Fibrillation     Esophageal Reflux     Subconjunctival Hemorrhage     Post-operative pain     HTN (hypertension)     Hypotension     Acute blood loss anemia     A-fib (H)     Cough     Edema     Lumbar radiculopathy     Malignant neoplasm of skin     Wheezing     Pacemaker     Rectal cancer (H)       Past Medical History:   Diagnosis Date     Abdominal aortic aneurysm (H)      Arthritis      Atrial fibrillation (H)      CHF (congestive heart failure) (H)      Coronary artery disease      FH: mitral valve repair      High blood pressure      Hyperlipidemia        Past Surgical History:   Procedure Laterality Date     CARDIOVERSION      X3     CHOLECYSTECTOMY       JOINT REPLACEMENT Right     RAMÓN     LUMBAR LAMINECTOMY Right 9/15/2014    Procedure: OPEN DECOMPRESSION L2-4 RIGHT ;  Surgeon: Elroy French MD;  Location: [facility];  Service:      MITRAL VALVE REPAIR  2006     NE ARTHRODESIS ANT INTERBODY MIN DISCECTOMY, CERVICAL BELOW C2      Description: Cervical Vertebral Fusion;  Recorded: 04/18/2008;     NE EXCIS RECTAL TUMOR, TRANSANAL, PARTIAL THICKNESS N/A 3/6/2017    Procedure: TRANSANAL EXCISION OF RECTAL TUMOR AND PROCTOSCOPY;  Surgeon: Alexander Mcgrath MD;  Location: [facility];  Service: General     NE REANEURYSM/GRFT INS,ABDOMINAL AORTA      Description: Abdominal Aortic Aneurysm Repair For Dilation Or Occlusion;  Recorded: 04/05/2012;       Social History     Social History     Marital status:      Spouse name: N/A     Number of children: N/A     Years of education: N/A     Occupational History     Not on file.     Social History Main Topics     Smoking status: Former Smoker     Quit date: 9/15/1980     Smokeless tobacco: Never Used     Alcohol use No      Comment: quit 1979     Drug use: No     Sexual activity: Not on  file     Other Topics Concern     Not on file     Social History Narrative    Has not smoked for 35 years.       Patient Care Team:  Isidro Au MD as PCP - General          Objective:     There were no vitals filed for this visit.      Physical Exam:  Physical Examination: General appearance - alert, well appearing, and in no distress  Mental status - alert, oriented to person, place, and time  Eyes - pupils equal and reactive, extraocular eye movements intact  Ears - bilateral TM's and external ear canals normal  Nose - normal and patent, no erythema, discharge or polyps  Mouth - mucous membranes moist, pharynx normal without lesions  Neck - supple, no significant adenopathy  Chest - clear to auscultation, no wheezes, rales or rhonchi, symmetric air entry  Heart -4 / 6 systolic murmur noted-rhythm regular  Abdomen - soft, nontender, nondistended, no masses or organomegaly  Back exam - full range of motion, no tenderness, palpable spasm or pain on motion  Neurological - alert, oriented, normal speech, no focal findings or movement disorder noted  Musculoskeletal - no joint tenderness, deformity or swelling  Extremities - peripheral pulses normal, no pedal edema, no clubbing or cyanosis      There are no Patient Instructions on file for this visit.    EKG:  Reviewed ekg from Feb 2018 showing atrial fib with demand pacemaker    Labs:  Labs pending at this time.  Results will be reviewed when available.    Immunization History   Administered Date(s) Administered     DT (pediatric) 12/14/1998, 12/19/2006     Influenza B2o1-81, 12/18/2009     Influenza high dose, seasonal 10/07/2015, 10/12/2016, 10/12/2017     Influenza, inj, historic,unspecified 11/01/2007     Influenza, seasonal,quad inj 6-35 mos 11/12/2008, 09/23/2010, 09/23/2011, 10/03/2012, 09/23/2014     Influenza,seasonal quad, PF 10/24/2013     Pneumo Conj 13-V (2010&after) 04/21/2016     Pneumo Polysac 23-V 11/16/2005     ZOSTER, LIVE 12/27/2012            Electronically signed by Isidro Au MD 07/12/18 9:46 AM

## 2021-06-19 NOTE — PROGRESS NOTES
[facility] Patient Outreach:     Discharge Date::  8/17/18      Reason for TCU stay (discharge diagnosis)::  AVR, CABG x 1, A-fib, post op anemia      Are you feeling better, the same or worse since your discharge?:  Patient is feeling better (having trouble sleeping and some shortness of breath)          As part of your discharge plan, did they discuss home care with you?: No (outpatient cardiac rehab at [facility])            Did you receive any new medications, or was there a change to your medications?: Yes        Are you taking those medications, or do you have any established regiment?:  Some new meds.  Can't recall off-hand what they are.        Do you have any follow up visits scheduled with your PCP or Specialist?:  Yes, with Specialist      Who are you seeing and when is it scheduled?:  Cardiac surgeon appt 8/30      I'm glad to hear you're doing well and we want you to continue to do well. Your PCP would like to see you for a follow-up visit. Can we help set that up for your today?: No (Patient has an appt with PCP on 8/28.)        (RN) Provided patient the PCP's phone number to call if they have any questions or concerns?: Yes

## 2021-06-20 NOTE — LETTER
Letter by Isidro Au MD at      Author: Isidro Au MD Service: -- Author Type: --    Filed:  Encounter Date: 1/27/2020 Status: (Other)         Parish Bills  [address]  [facility] [address]             January 27, 2020         Dear Mr. Bills,    Below are the results from your recent visit:    Resulted Orders   Comprehensive Metabolic Panel   Result Value Ref Range    Sodium 141 136 - 145 mmol/L    Potassium 4.8 3.5 - 5.0 mmol/L    Chloride 106 98 - 107 mmol/L    CO2 26 22 - 31 mmol/L    Anion Gap, Calculation 9 5 - 18 mmol/L    Glucose 115 70 - 125 mg/dL    BUN 27 8 - 28 mg/dL    Creatinine 1.19 0.70 - 1.30 mg/dL    GFR MDRD Af Amer >60 >60 mL/min/1.73m2    GFR MDRD Non Af Amer 59 (L) >60 mL/min/1.73m2    Bilirubin, Total 0.5 0.0 - 1.0 mg/dL    Calcium 8.5 8.5 - 10.5 mg/dL    Protein, Total 6.9 6.0 - 8.0 g/dL    Albumin 3.7 3.5 - 5.0 g/dL    Alkaline Phosphatase 137 (H) 45 - 120 U/L    AST 33 0 - 40 U/L    ALT 31 0 - 45 U/L    Narrative    Fasting Glucose reference range is 70-99 mg/dL per  American Diabetes Association (ADA) guidelines.   Lipid Cascade RANDOM   Result Value Ref Range    Cholesterol 131 <=199 mg/dL    Triglycerides 135 <=149 mg/dL    HDL Cholesterol 36 (L) >=40 mg/dL    LDL Calculated 68 <=129 mg/dL    Patient Fasting > 8hrs? Unknown    HM2(CBC w/o Differential)   Result Value Ref Range    WBC 4.5 4.0 - 11.0 thou/uL    RBC 3.88 (L) 4.40 - 6.20 mill/uL    Hemoglobin 12.2 (L) 14.0 - 18.0 g/dL    Hematocrit 35.7 (L) 40.0 - 54.0 %    MCV 92 80 - 100 fL    MCH 31.4 27.0 - 34.0 pg    MCHC 34.2 32.0 - 36.0 g/dL    RDW 12.7 11.0 - 14.5 %    Platelets 131 (L) 140 - 440 thou/uL    MPV 7.0 7.0 - 10.0 fL       Kidney function is stable.   Liver function is normal.   Cholesterol levels are at goal range.   No concerns on blood work.     Please call with questions or contact us using [facility].    Sincerely,        Electronically signed by Isidro Au,  MD

## 2021-06-20 NOTE — PROGRESS NOTES
ASSESSMENT/PLAN  1. Anemia  Check hemoglobin was today to make sure that he is coming back to baseline  Discussed slow movement back as expected over the next couple months  Low levels of hemoglobin contributing to some of his fatigue  - HM2(CBC w/o Differential)  - Comprehensive Metabolic Panel    2. A-fib (H)  Check an INR today and follow-up based on results  - INR    3. Edema  His Lasix was increased hospitalization discharge and is been tolerating the new dosing  There is no edema in the lower extremity will have him continue with current dosing at this time  Check potassium level and kidney function today  Patient is on potassium supplement at this time    4. Coronary artery disease involving coronary bypass graft of native heart without angina pectoris  Reviewed hospital records and discharge summary with the patient and his wife was present  His discharge from hospital to TCU and is now back at home  Reviewed his laboratory results and hospital stay with him today  Reviewed his medications and medication changes  Has some questions about the reasoning behind medication changes  There are noting that his energy is not back to baseline and he is quite tired  We discussed this being very typical and with the drop in hemoglobin that he had will be a slow transition back to his baseline as he starts to make back some of the blood loss  We will check some labs today to make sure that he is gaining ground hemoglobin levels and electro lites have stayed in balance with the change in diuretic dosing  Blood pressure is tolerating medications at this time    Overall patient is doing very well status post his valve replacement and bypass  He has follow-up with cardiology in 2 days and is planning on starting cardiac rehab the day after    No medication changes made today        SUBJECTIVE:   Chief Complaint   Patient presents with     Follow-up     Follow up after surgery, weight loss     Depression     Medication  Management     Discuss medication, wondering why he is taking Aspirin again      Parish DANIEL MagallonSanju 78 y.o. male    Current Outpatient Prescriptions   Medication Sig Dispense Refill     acetaminophen (TYLENOL) 325 MG tablet Take 325-650 mg by mouth every 4 (four) hours as needed for pain.       amoxicillin (AMOXIL) 500 MG capsule Take 2,000 mg by mouth as needed. As needed for Hx mitral valve repair. Take one hour before appointment       aspirin 325 MG tablet Take 325 mg by mouth daily.       atorvastatin (LIPITOR) 40 MG tablet Take 1 tablet (40 mg total) by mouth at bedtime. 90 tablet 1     folic acid/multivit-min/lutein (CENTRUM SILVER ORAL) Take 1 tablet by mouth daily.       furosemide (LASIX) 40 MG tablet Take 40 mg by mouth daily.       hydrOXYzine HCl (ATARAX) 25 MG tablet Take 25 mg by mouth every 6 (six) hours as needed for itching.       melatonin 1 mg Tab tablet Take 2 mg by mouth at bedtime.       metoprolol succinate (TOPROL-XL) 50 MG 24 hr tablet Take 50 mg by mouth daily. Hold for SBP less than 95 or HR less than 55, bronchospasm or heart block       nitroglycerin (NITROSTAT) 0.4 MG SL tablet Place 0.4 mg under the tongue every 5 (five) minutes as needed for chest pain.       potassium chloride (KLOR-CON) 10 MEQ CR tablet Take 20 mEq by mouth daily.       sertraline (ZOLOFT) 50 MG tablet Take 150 mg by mouth at bedtime.       warfarin (COUMADIN) 7.5 MG tablet Take 7.5 mg by mouth daily. Until 8/12/18  INR 2.07 next INR 8/13       No current facility-administered medications for this visit.      Allergies: Hydrocodone-acetaminophen; Oxycodone-acetaminophen; and Amiodarone   No LMP for male patient.    HPI:   Patient is here for hospital follow-up  Was hospitalized at an outside facility for single bypass for his coronary artery disease and valve replacement  Patient tolerated surgery quite well and was discharged for rehab to TCU afterwards  He has now been discharged from the TCU and back at home  We  reviewed his hospital records and labs with him and his wife present today  Did some questions about the change of medications which we discussed with him in the reasoning behind this  His hemoglobin did drop in the sevens and has been slowly climbing back recheck back his hemoglobin level today as well as potassium and kidney function due to diuretic changes  He is showing no edema in his lower extremity  His incision line is healing quite well on his anterior chest and looking clean and dry  He does note that his energy is down and we discussed the slow progression back to normal baseline  Is having some difficulty with sleep and they added in some hydroxyzine which is been helping him the last few nights  We will continue with these medications today with no changes  We will follow-up based on laboratory results  Present seen cardiology this week concerning cardiac rehab afterwards with their clearance  We will continue his current plan today check a complete metabolic panel and hemogram and follow-up based on results    ROS: negative except as per HPI    OBJECTIVE:   The patient appears well, alert, oriented x 3, in no distress.  /58 (Patient Site: Left Arm, Patient Position: Sitting, Cuff Size: Adult Regular)  Pulse 60  Temp 98.1  F (36.7  C) (Oral)   Resp 16  Wt 183 lb (83 kg)  BMI 26.64 kg/m2    Lungs: clear, good air entry, no wheezes, rhonchi or rales.   Cardiac: S1 and S2 normal, no murmurs, irregular rhythm regular rate, incision line and chest wall healing well with no concerns  Abdomen: normal bowel sounds, soft   Extremities: show no edema, normal peripheral pulses.   Neurological: normal, no focal findings.  Skin: clear, dry, no rashes/lesions  Psych- normal mood and affect      Pt states an understanding and agrees to the above plan.  Greater than 40 minutes was spent today in interview and examination with Parish Bills with more than 50% of that time in counseling and coordination of  care.

## 2021-06-20 NOTE — LETTER
Letter by Isidro Au MD at      Author: Isidro Au MD Service: -- Author Type: --    Filed:  Encounter Date: 8/12/2020 Status: (Other)         Parish Bills  [id number]  [facility] [address]             August 12, 2020         Dear Mr. Bills,    Below are the results from your recent visit:    Resulted Orders   Basic Metabolic Panel   Result Value Ref Range    Sodium 140 136 - 145 mmol/L    Potassium 4.6 3.5 - 5.0 mmol/L    Chloride 103 98 - 107 mmol/L    CO2 27 22 - 31 mmol/L    Anion Gap, Calculation 10 5 - 18 mmol/L    Glucose 113 70 - 125 mg/dL    Calcium 8.5 8.5 - 10.5 mg/dL    BUN 25 8 - 28 mg/dL    Creatinine 1.12 0.70 - 1.30 mg/dL    GFR MDRD Af Amer >60 >60 mL/min/1.73m2    GFR MDRD Non Af Amer >60 >60 mL/min/1.73m2    Narrative    Fasting Glucose reference range is 70-99 mg/dL per  American Diabetes Association (ADA) guidelines.        Your kidney function and electrolytes are normal- no concerns with the water pill.     Please call with questions or contact us using [facility].    Sincerely,        Electronically signed by Isidro Au MD

## 2021-06-20 NOTE — PROGRESS NOTES
ITP ASSESSMENT   Assessment Day: 30 Day  Session Number: 8  Precautions: Sternal Precautions/  Falls Precautions  Diagnosis: CAB;Valve  Risk Stratification: Medium  Referring Provider: Isidro Au, *  EXERCISE  Exercise Assessment: Reassessment     6 Minute Walk Test   Pre   Pre Exercise HR: 60                  Pre Exercise BP: 101/56    Peak  Peak HR: 94                 Peak BP: 135/62  Peak feet: 600  Peak O2 SAT: 96  Peak RPE: 15  Peak MPH: 1.14    Symptoms:  Peak Symptoms: Right hip pain and SOB    5 mins. Post  5 Min Post HR: 60  5 Min Post BP: 128/62                         Exercise Plan  Goals Next 30 days  ADL'S: Light fall yard clean up, without fatigue;  clean out garage  Leisure: Walk 3 days per week; 10-15'  No Data Recorded    Education Goals: All goals in this section met  Education Goals Met: Patient can state cardiac s/s and appropriate emergency response.;Has system for taking medication.;Medication review.                        Goals Met  Initial ADL's goals met: Reports less SOB with climbing steps;  Pt is taking out the trash  Initial Leisure goals met: Pt has not started a home walking program yet  Initial Progression: Making progress; Has not yet started a home walking program    Exercise Prescription  Exercise Mode: Treadmill;Bike;Nustep;Arm Erg.  Frequency: 2 x week  Duration: 40'  Intensity / THR: 20-30 beats above resting heart rate  RPE 11-14  Progression / Met level: 2.5-3  Resistive Training?: Yes (Needs review)    Current Exercise (mins/week): 1    Interventions  Home Exercise:  Mode: Walk  Frequency: 3-5  xweek  Duration: 10-15'    Education Material : Educational videos;Provide written material;Individual education and counseling;Offer educational classes    Education Completed  Exercise Education Completed: Cardiac Anatomy;Signs and Symptoms;Medication review;RPE;Emergency Plan;Home Exercise;Warm up/cool down;FITT Principles;BP/HR Reponse to exercise;Benefits of  "Exercise;End point of exercise            Exercise Follow-up/Discharge  Follow up/Discharge: Encouraged walking 3 days per week         NUTRITION  Nutrition Assessment: Reassessment    Nutrition Risk Factors:  Nutrition Risk Factors: Dyslipidemia  Cholesterol: 147  LDL: 86  HDL: 44  Triglycerides: 79    Nutrition Plan  Interventions  Diet Consult: Completed  Other Nutrition Intervention: Diet Class;Therapist/Pt Discussion;Educational Videos;Provide with Written Material    Education Completed  Nutrition Education Completed: Low Saturated fat diet;Low sodium diet    Goals  Nutrition Goals (Next 30 days): Patient demonstrated understanding of cardiac nutrition, no goals identified for the next 30 days    Goals Met  Nutrition Goals Met: Patient follows a low sodium diet;Patient states following a low saturated fat diet    Height, Weight, and  BMI  Weight: 178 lb (80.7 kg)  Height: 5' 9\" (1.753 m)  BMI: 26.27    Nutrition Follow-up  Follow-up/Discharge: Patient follows a heart healthy diet majority of the time.      Other Risk Factors  Other Risk Factor Assessment: Reassessment    HTN Risk Factor: Hypertension    Pre Exercise BP: 122/60  Post Exercise BP: 122/62    Hypertension Plan  Goals  HTN Goals: Follow low sodium diet;Take medication as prescribed;Exercises regularly    Goals Met  HTN Goals Met: Take medication as prescribed    HTN Interventions  HTN Interventions: Diet consult;Therapist/patient discussion;Provide written material;Offer educational videos;Offer educational classes    HTN Education Completed  HTN Education Completed: Medication review;Risk factor overview    Tobacco Risk Factor: NA      Risk Factor Follow-up   Follow-up/Discharge: Will provide  risk factor education as needed       PSYCHOSOCIAL  Psychosocial Assessment: Reassessment     Fan WILLIAM Q of L Summary Score: 27    FATIMAH-D Score: 24    Psychosocial Risk Factor: Stress    Psychosocial Plan  Interventions  Interventions: Offer Spiritual " Care consult;Offer educational videos and classes;Provide written material;Individual education and counseling    Education Completed  Education Completed: Relaxation/Coping Techniques;S/S of depression;Effects of stress on body    Goals  Goals (Next 30 days): Identify stressors;Improvement in [employer] COOP score;Practicing stress management skills    Goals Met  Goals Met: Identified Support system;Oriented to stress management classes    Psychosocial Follow-up  Follow-up/Discharge: Reports he has a good support system         Patient involved in Goal setting?: Yes    Signature: _____________________________________________________________    Date: __________________    Time: __________________See Doc Flowsheet

## 2021-06-20 NOTE — PROGRESS NOTES
Cardiac Rehab  Phase II Assessment    Assessment Date: 8-31-18    Diagnosis: Severe AS / CAD    Procedure: AVR and CABG x 1  Date of Onset: 7-30-18  ICD/Pacemaker: Yes Parameters: BiV pacer inserted in 2016  Post-op Complications: Anemia, Stress Hyperglycemia   ECG History: Chronic At-fib, At-flutter with PVC's, SSS with PPM--BiV pacer EF%:50-55%  Past Medical History:   Patient Active Problem List   Diagnosis     Aortic Stenosis     Bicuspid Aortic Valve     Benign Prostatic Hypertrophy     Abrasion Of The Ear     Hyperlipidemia     Cardiomyopathy     Strained Left Pectoralis Major Muscle     Essential Hypercholesterolemia     Aneurysm Of The Abdominal Aorta     Chest Pain     Neck Strain     Osteoarthritis     Lumbar Strain     Blood In Urine     Atrial Fibrillation     Esophageal Reflux     Subconjunctival Hemorrhage     Post-operative pain     HTN (hypertension)     Hypotension     Acute blood loss anemia     A-fib (H)     Cough     Edema     Lumbar radiculopathy     Malignant neoplasm of skin     Wheezing     Pacemaker     Rectal cancer (H)     Past Medical History:   Diagnosis Date     Abdominal aortic aneurysm (H)      Alcohol abuse      Anemia      Aortic stenosis      Arthritis      Atrial fibrillation (H)      BPH (benign prostatic hyperplasia)      CAD (coronary artery disease)      Cardiomyopathy (H)      Cataract     left     Chest pain      CHF (congestive heart failure) (H)      Congenital insufficiency of aortic valve      Coronary artery disease      Disease of pancreas     gallstones related     Dysthymia      Enlarged prostate      FH: mitral valve repair      H/O aortic valve repair '     High blood pressure      Hypercholesteremia      Hyperlipidemia      Hypertrophy of prostate      Lumbar radiculopathy      Mitral valve prolapse      Neck strain      Nonrheumatic aortic (valve) stenosis      Osteoarthrosis      Pacemaker      Rectal cancer (H)      Reflux esophagitis      Subconjunctival bleed       Past Surgical History:   Procedure Laterality Date     ABDOMINAL AORTIC ANEURYSM REPAIR  2009     AORTIC VALVE REPLACEMENT       CARDIOVERSION      X3     CARDIOVERSION       CERVICAL FUSION  1972     CHOLECYSTECTOMY       CORONARY ARTERY BYPASS GRAFT      x1 with LIMA to LAD     CT coronary angiogram       EMBOLIZATION  01/31/2018     [facility] AAA REPAIR EXPOSE ILIAC ART ABD INCIS UNILAT   2009     ILIAC ARTERY ANEURYSM REPAIR  03/29/2012     JOINT REPLACEMENT Right     RAMÓN     LAPAROSCOPIC CHOLECYSTECTOMY       LUMBAR LAMINECTOMY Right 9/15/2014    Procedure: OPEN DECOMPRESSION L2-4 RIGHT ;  Surgeon: Elroy French MD;  Location: [facility];  Service:      MITRAL VALVE REPAIR  2006     CO ARTHRODESIS ANT INTERBODY MIN DISCECTOMY, CERVICAL BELOW C2      Description: Cervical Vertebral Fusion;  Recorded: 04/18/2008;     CO EXCIS RECTAL TUMOR, TRANSANAL, PARTIAL THICKNESS N/A 3/6/2017    Procedure: TRANSANAL EXCISION OF RECTAL TUMOR AND PROCTOSCOPY;  Surgeon: Alexander Mcgrath MD;  Location: [facility];  Service: General     CO REANEURYSM/GRFT INS,ABDOMINAL AORTA      Description: Abdominal Aortic Aneurysm Repair For Dilation Or Occlusion;  Recorded: 04/05/2012;     CO SURG ONLY REMOVE CATARACT INTRACAP INSERT LENS   2010     STERNOTOMY      redo     TOTAL HIP ARTHROPLASTY Right 2004     TRANSURETHRAL RESECTION OF PROSTATE         Physical Assessment  Precautions/ Physical Limitations: Sternal  Oxygen: No  O2 Sats: 95-96% Lung Sounds: Diminished left base, reviewed deep breathing Edema: +1 Bilateral lower extremities    Incisions: Healing well, no redness, no drainage  Sleeping Pattern: poor- Discussed his poor sleep, and when to call the MD  Appetite: good   Nutrition Risk Screen: no risk at this time    Pain  Location: none  Intensity: (0-10 scale) 0      Psychosocial/ Emotional Health  1. In the past 12 months, have you been in a relationship where you have been abused physically,  "emotionally, sexually or financially? No  notified: NA  2. Who do you turn to for emotional support?: \"My wife\"  3. Do you have cultural or spiritual needs? No  4. Have there been any major life changes in the past 12 months? Yes - \"This heart surgery\"    Referral Information  Primary Physician: Isidro Au MD  Cardiologist: Cassandra  Surgeon: Boksberger    Home exercise/Equipment: none    Patient's long-term goal(s): Improve strength and endurance     1. Living Accommodations: Home Steps: Yes      Support people at home: Wife   2. Marital Status:   3. Family is able to assist with cares      Jew/Community involvement: yes  4. Recreation/Hobbies: Deer Hunting        See Doc Flowsheet  "

## 2021-06-20 NOTE — PROGRESS NOTES
ITP ASSESSMENT   Assessment Day: Initial  Session Number: 1  Precautions: Sternal/ Falls  Diagnosis: CAB;Valve  Risk Stratification: Medium  Referring Provider: Isidro Au, *  EXERCISE  Exercise Assessment: Initial     6 Minute Walk Test   Pre   Pre Exercise HR: 60                  Pre Exercise BP: 101/56    Peak  Peak HR: 94                 Peak BP: 135/62  Peak feet: 600  Peak O2 SAT: 96  Peak RPE: 15  Peak MPH: 1.14    Symptoms:  Peak Symptoms: Right hip pain and SOB    5 mins. Post  5 Min Post HR: 60  5 Min Post BP: 128/62                         Exercise Plan  Goals Next 30 days  ADL'S: Resume going up and down steps without SOB, and take out the trash  Leisure: Start a home walking program    Education Goals: All goals in this section met  Education Goals Met: Patient can state cardiac s/s and appropriate emergency response.;Has system for taking medication.;Medication review.    Exercise Prescription  Exercise Mode: Treadmill;Bike;Nustep;Arm Erg.;Hallway Walking;Stairs  Frequency: 2 x week  Duration: 25-40 min  Intensity / THR: 20-30 beats above resting heart rate  RPE 11-14  Progression / Met level: 2.5-3  Resistive Training?: No    Current Exercise (mins/week): 1    Interventions  Home Exercise:  Mode: Walking   Frequency: Daily   Duration: 3-5 min--2 or 3 x a day    Education Material : Educational videos;Provide written material;Individual education and counseling;Offer educational classes    Education Completed  Exercise Education Completed: Cardiac Anatomy;Signs and Symptoms;Medication review;RPE;Emergency Plan;Home Exercise;Warm up/cool down;FITT Principles;BP/HR Reponse to exercise;Benefits of Exercise;End point of exercise            Exercise Follow-up/Discharge  Follow up/Discharge: Discussed home walking program NUTRITION  Nutrition Assessment: Initial    Nutrition Risk Factors:  Nutrition Risk Factors: Dyslipidemia  Cholesterol: 147  LDL: 86  HDL: 44  Triglycerides: 79    Nutrition  "Plan  Interventions  Other Nutrition Intervention: Diet Class;Therapist/Pt Discussion;Educational Videos;Provide with Written Material;Referral to DM education    Education Completed  Nutrition Education Completed: Risk factor overview    Goals  Nutrition Goals (Next 30 days): Patient can identify their risk factors for CAD;Patient will follow a low sodium diet;Patient will follow a low saturated fat diet    Goals Met  Nutrition Goals Met: Completed Nutritional Risk Screen;Provided Rate your Plate Survey;Reviewed Dietitian schedule    Height, Weight, and  BMI  Weight: 178 lb (80.7 kg)  Height: 5' 9\" (1.753 m)  BMI: 26.27    Nutrition Follow-up  Follow-up/Discharge: Enc to return diet survey and consult with the dietitian       Other Risk Factors  Other Risk Factor Assessment: Initial    HTN Risk Factor: Hypertension    Pre Exercise BP: 101/56  Post Exercise BP: 128/62    Hypertension Plan  Goals  HTN Goals: Follow low sodium diet;Take medication as prescribed;Exercises regularly    Goals Met  HTN Goals Met: Take medication as prescribed    HTN Interventions  HTN Interventions: Diet consult;Therapist/patient discussion;Provide written material;Offer educational videos;Offer educational classes    HTN Education Completed  HTN Education Completed: Medication review;Risk factor overview    Tobacco Risk Factor: NA        Risk Factor Follow-up   Follow-up/Discharge: Offer support and education for risk factor management   PSYCHOSOCIAL  Psychosocial Assessment: Initial     [facility] Q of L Summary Score: 27    FATIMAH-D Score: 24    Psychosocial Risk Factor: Stress    Psychosocial Plan  Interventions  If FATIMAH-D > 15 send letter to MD- sent to Dr Au on 8-31-18  Interventions: Offer Spiritual Care consult;Offer educational videos and classes;Provide written material;Individual education and counseling    Education Completed  Education Completed: Relaxation/Coping Techniques;S/S of depression;Effects of stress on " body    Goals  Goals (Next 30 days): Identify stressors;Improvement in [employer] COOP score;Practicing stress management skills    Goals Met  Goals Met: Identified Support system;Oriented to stress management classes    Psychosocial Follow-up  Follow-up/Discharge: Reviewed stress as a risk factor, enc pt to attend educational classes     Patient involved in Goal setting?: Yes    Signature: _____________________________________________________________    Date: __________________    Time: __________________See Doc Flowsheet

## 2021-06-21 NOTE — PROGRESS NOTES
ITP ASSESSMENT   Assessment Day: 90 Day    Session Number: 22  Precautions: Standard    Diagnosis: CAB;Valve    Risk Stratification: Medium    Referring Provider: Isidro Au, *   ITP:Dr. Cristobal  EXERCISE  Exercise Assessment: Reassessment                           Exercise Plan  Goals Next 30 days  ADL'S: Pt to tolerate stair climbing with less SOB    Leisure: Pt tolerating all home activities    Work: Goal met      Education Goals: All goals in this section met    Education Goals Met: Patient can state cardiac s/s and appropriate emergency response.;Has system for taking medication.;Medication review.                          Goals Met  60 day ADL'S goals met: Pt tolerated bagging leaves without difficulty    60 day Leisure goals met: Pt tolerated cleaning garage    60 day Work goals met: Pt tolerated deer hunting without symptoms    60 Day Progression: Has progressed to 3.4 METS      30 day ADL'S goals met: Pt has been doing yard clean up - goal met    30 day Leisure goals met: Pt is walking average of 2x/week - goal in progress      30 Day Progression: Progressing well, encouraged to increase home exercise      Initial ADL's goals met: Reports less SOB with climbing steps;  Pt is taking out the trash    Initial Leisure goals met: Pt has not started a home walking program yet    Initial Progression: Making progress; Has not yet started a home walking program      Exercise Prescription  Exercise Mode: Treadmill;Bike;Nustep;Arm Erg.    Frequency: 2x/week    Duration: 40'    Intensity / THR: 20-30 beats above resting heart rate    RPE 11-14  Progression / Met level: 3.4-3.6    Resistive Training?: Yes      Current Exercise (mins/week): 140      Interventions  Home Exercise:  Mode: walk    Frequency: 4-6x/week    Duration: 30      Education Material : Educational videos;Provide written material;Individual education and counseling;Offer educational classes      Education Completed  Exercise Education  "Completed: Cardiac Anatomy;Signs and Symptoms;Medication review;RPE;Emergency Plan;Home Exercise;Warm up/cool down;FITT Principles;BP/HR Reponse to exercise;Benefits of Exercise;End point of exercise              Exercise Follow-up/Discharge  Follow up/Discharge: Pt trying to walk most days of the week   NUTRITION  Nutrition Assessment: Reassessment      Nutrition Risk Factors:  Nutrition Risk Factors: Dyslipidemia  Cholesterol: 147  LDL: 86  HDL: 44  Triglycerides: 79      Nutrition Plan  Interventions  Diet Consult: Completed    Other Nutrition Intervention: Diet Class;Therapist/Pt Discussion;Provide with Written Material    Education Completed  Nutrition Education Completed: Low Saturated fat diet;Risk factor overview;Low sodium diet;Weight management      Goals  Nutrition Goals (Next 30 days): Patient demonstrated understanding of cardiac nutrition, no goals identified for the next 30 days      Goals Met  Nutrition Goals Met: Patient can identify their risk factors for CAD;Completed Nutritional Risk Screen;Provided Rate your Plate Survey;Reviewed Dietitian schedule      Height, Weight, and  BMI  Weight: 182 lb (82.6 kg)  Height: 5' 9\" (1.753 m)  BMI: 26.86      Nutrition Follow-up  Follow-up/Discharge: Patient follows a heart healthy diet majority of the time.          Other Risk Factors  Other Risk Factor Assessment: Reassessment      HTN Risk Factor: Hypertension      Pre Exercise BP: 124/58  Post Exercise BP: 112/54      Hypertension Plan  Goals  HTN Goals: Patient demonstrates understanding of HTN, no goals identified for the next 30 days      Goals Met  HTN Goals Met: Take medication as prescribed;Follow low sodium diet;Exercises regularly      HTN Interventions  HTN Interventions: Diet consult;Therapist/patient discussion;Provide written material;Offer educational videos;Offer educational classes      HTN Education Completed  HTN Education Completed: Low sodium diet;Medication review;Risk factor " overview      Tobacco Risk Factor: NA         PSYCHOSOCIAL  Psychosocial Assessment: Reassessment           Psychosocial Risk Factor: Stress      Psychosocial Plan  Interventions  Interventions: Offer Spiritual Care consult;Offer educational videos and classes;Provide written material;Individual education and counseling      Education Completed  Education Completed: Relaxation/Coping Techniques;Effects of stress on body      Goals  Goals (Next 30 days): Improvement in [employer] COOP score      Goals Met  Goals Met: Identified Support system;Oriented to stress management classes;Identify stressors;Practicing stress management skills                 Patient involved in Goal setting?: Yes      Signature: _____________________________________________________________    Date: __________________    Time: __________________

## 2021-06-21 NOTE — PROGRESS NOTES
ITP ASSESSMENT   Assessment Day: 60 Day  Session Number: 15  Precautions: Standard  Diagnosis: CAB;Valve  Risk Stratification: Medium  Referring Provider: Isidro Au, *  EXERCISE  Exercise Assessment: Reassessment     Tolerating peak of 3 METs in rehab without CV sx. See doc flowsheets. States home walking is limited by hip discomfort                         Exercise Plan  Goals Next 30 days  ADL'S: Finish bagging leaves  Leisure: Clean out garage  Work: Deer hunting    Education Goals: All goals in this section met  Education Goals Met: Patient can state cardiac s/s and appropriate emergency response.;Has system for taking medication.;Medication review.                        Goals Met  30 day ADL'S goals met: Pt has been doing yard clean up - goal met  30 day Leisure goals met: Pt is walking average of 2x/week - goal in progress  30 Day Progression: Progressing well, encouraged to increase home exercise    Initial ADL's goals met: Reports less SOB with climbing steps;  Pt is taking out the trash  Initial Leisure goals met: Pt has not started a home walking program yet  Initial Progression: Making progress; Has not yet started a home walking program    Exercise Prescription  Exercise Mode: Treadmill;Bike;Nustep;Arm Erg.  Frequency: 2x/week  Duration: 40'  Intensity / THR: 20-30 beats above resting heart rate  RPE 11-14  Progression / Met level: 3-3.5  Resistive Training?: Yes    Current Exercise (mins/week): 140    Interventions  Home Exercise:  Mode: Walk  Frequency: 4-6x/week  Duration: 20-30'    Education Material : Educational videos;Provide written material;Individual education and counseling;Offer educational classes    Education Completed  Exercise Education Completed: Cardiac Anatomy;Signs and Symptoms;Medication review;RPE;Emergency Plan;Home Exercise;Warm up/cool down;FITT Principles;BP/HR Reponse to exercise;Benefits of Exercise;End point of exercise            Exercise  "Follow-up/Discharge  Follow up/Discharge: Pt averages 2x/week walking on his own for 30'. Encouraged to increase this to 3-4x/week or more NUTRITION  Nutrition Assessment: Reassessment    Nutrition Risk Factors:  Nutrition Risk Factors: Dyslipidemia  Cholesterol: 147  LDL: 86  HDL: 44  Triglycerides: 79    Nutrition Plan  Interventions  Diet Consult: Completed  Other Nutrition Intervention: Diet Class;Therapist/Pt Discussion;Educational Videos;Provide with Written Material    Education Completed  Nutrition Education Completed: Low Saturated fat diet;Risk factor overview;Low sodium diet;Weight management    Goals  Nutrition Goals (Next 30 days): Patient demonstrated understanding of cardiac nutrition, no goals identified for the next 30 days    Goals Met  Nutrition Goals Met: Patient can identify their risk factors for CAD;Completed Nutritional Risk Screen;Provided Rate your Plate Survey;Reviewed Dietitian schedule    Height, Weight, and  BMI  Weight: 179 lb (81.2 kg)  Height: 5' 9\" (1.753 m)  BMI: 26.42    Nutrition Follow-up  Follow-up/Discharge: Patient follows a heart healthy diet majority of the time.        Other Risk Factors  Other Risk Factor Assessment: Reassessment    HTN Risk Factor: Hypertension    Pre Exercise BP: 140/66  Post Exercise BP: 102/54    Hypertension Plan  Goals  HTN Goals: Exercises regularly    Goals Met  HTN Goals Met: Take medication as prescribed;Follow low sodium diet    HTN Interventions  HTN Interventions: Diet consult;Therapist/patient discussion;Provide written material;Offer educational videos;Offer educational classes    HTN Education Completed  HTN Education Completed: Low sodium diet;Medication review;Risk factor overview    Tobacco Risk Factor: NA    Risk Factor Follow-up   Follow-up/Discharge: Reviewed all risk factors   PSYCHOSOCIAL  Psychosocial Assessment: Reassessment     Fan BENEDICT of L Summary Score: 27    FATIMAH-D Score: 24    Psychosocial Risk Factor: " "Stress    Psychosocial Plan  Interventions  Interventions: Offer Spiritual Care consult;Offer educational videos and classes;Provide written material;Individual education and counseling    Education Completed  Education Completed: Relaxation/Coping Techniques;Effects of stress on body    Goals  Goals (Next 30 days): Identify stressors;Improvement in [employer] COOP score;Practicing stress management skills    Goals Met  Goals Met: Identified Support system;Oriented to stress management classes    Psychosocial Follow-up  Follow-up/Discharge: States \"i'm doing so-so with stress\" Encouraged to attend stress mgmt class on Oct 31st.     Patient involved in Goal setting?: Yes    Signature: _____________________________________________________________    Date: __________________    Time: __________________  "

## 2021-06-22 NOTE — PROGRESS NOTES
ITP ASSESSMENT   Assessment Day: 120 Day/ Discharge Note:    Session Number: 24  Precautions: Standard    Diagnosis: CAB;Valve    Risk Stratification: Medium    Referring Provider: Isidro Au, *  EXERCISE  Exercise Assessment: Discharge       6 Minute Walk Test   Pre   Pre Exercise HR: 60                    Pre Exercise BP: 134/56      Peak  Peak HR: 89                   Peak BP: 124/61    Peak feet: 1200    Peak O2 SAT: 97    Peak RPE: 13    Peak MPH: 2.27      Symptoms:  Peak Symptoms: Hip pain      5 mins. Post  5 Min Post HR: 60    5 Min Post BP: 98/54                           Exercise Plan  Goals Next 30 days  ADL'S: Consistent Home Exercise     Leisure: Pt tolerating all home activities    Work: Goal met      Education Goals: All goals in this section met    Education Goals Met: Patient can state cardiac s/s and appropriate emergency response.;Has system for taking medication.;Medication review.             60 day ADL'S goals met: Pt tolerated bagging leaves without difficulty    60 day Leisure goals met: Pt tolerated cleaning garage    60 day Work goals met: Pt tolerated deer hunting without symptoms    60 Day Progression: Has progressed to 3.4 METS      30 day ADL'S goals met: Pt has been doing yard clean up - goal met    30 day Leisure goals met: Pt is walking average of 2x/week - goal in progress    No Data Recorded  30 Day Progression: Progressing well, encouraged to increase home exercise      Initial ADL's goals met: Reports less SOB with climbing steps;  Pt is taking out the trash    Initial Leisure goals met: Pt has not started a home walking program yet  Initial Progression: Making progress; Has not yet started a home walking program      Exercise Prescription  Exercise Mode: Treadmill;Bike;Nustep;Arm Erg.    Frequency: 2x/week    Duration: 40'    Intensity / THR: 20-30 beats above resting heart rate    RPE 11-14  Progression / Met level: 3.4-3.6    Resistive Training?: Yes      Current  "Exercise (mins/week): 140      Interventions  Home Exercise:  Mode: Walk    Frequency: 5-7 Days per week    Duration: 30'      Education Material : Educational videos;Provide written material;Individual education and counseling;Offer educational classes      Education Completed  Exercise Education Completed: Cardiac Anatomy;Signs and Symptoms;Medication review;RPE;Emergency Plan;Home Exercise;Warm up/cool down;FITT Principles;BP/HR Reponse to exercise;Benefits of Exercise;End point of exercise              Exercise Follow-up/Discharge  Follow up/Discharge: Reviewed HP, SX of Intolerance; Emergency plan   NUTRITION  Nutrition Assessment: Discharge      Nutrition Risk Factors:  Nutrition Risk Factors: Dyslipidemia      Nutrition Plan  Interventions  Diet Consult: Completed    Other Nutrition Intervention: Diet Class;Educational Videos;Therapist/Pt Discussion;Provide with Written Material      Education Completed  Nutrition Education Completed: Low Saturated fat diet;Risk factor overview;Low sodium diet;Weight management      Goals  Nutrition Goals (Next 30 days): Patient demonstrated understanding of cardiac nutrition, no goals identified for the next 30 days      Goals Met  Nutrition Goals Met: Patient can identify their risk factors for CAD;Completed Nutritional Risk Screen;Provided Rate your Plate Survey;Reviewed Dietitian schedule      Height, Weight, and  BMI  Weight: 179 lb (81.2 kg)  Height: 5' 9\" (1.753 m)  BMI: 26.42      Nutrition Follow-up  Follow-up/Discharge: Reports he follows  a heart healthy diet         Other Risk Factors  Other Risk Factor Assessment: Discharge      HTN Risk Factor: Hypertension      Pre Exercise BP: 122/60  Post Exercise BP: 98/54      Hypertension Plan  Goals  HTN Goals: Patient demonstrates understanding of HTN, no goals identified for the next 30 days      Goals Met  HTN Goals Met: Take medication as prescribed;Follow low sodium diet;Exercises regularly      HTN " Interventions  HTN Interventions: Diet consult;Therapist/patient discussion;Provide written material;Offer educational videos;Offer educational classes      HTN Education Completed  HTN Education Completed: Low sodium diet;Medication review;Risk factor overview      Tobacco Risk Factor: NA          Risk Factor Follow-up   Follow-up/Discharge: Reviewed all Risk Factors     PSYCHOSOCIAL  Psychosocial Assessment: Discharge       [facility]: 20      FATIMAH-D Score: 23      Psychosocial Risk Factor: Stress      Psychosocial Plan  Interventions  Interventions: Offer Spiritual Care consult;Offer educational videos and classes;Provide written material;Individual education and counseling      Education Completed  Education Completed: Relaxation/Coping Techniques;Effects of stress on body      Goals  Goals (Next 30 days): Improvement in [employer] COOP score      Goals Met  Goals Met: Identified Support system;Oriented to stress management classes;Identify stressors;Practicing stress management skills      Psychosocial Follow-up  Follow-up/Discharge: Reports no issues with stress             Pt completed 24 Phase 2 sessions.  Reports resuming all tasks at home as to prior to Surgery.  Reviewed HP, SX of Intolerance and Emergency plan.  Pt has met with the Dietician.  Goals Met.    Signature: _____________________________________________________________    Date: __________________    Time: __________________See Doc Flowsheet

## 2021-06-23 NOTE — TELEPHONE ENCOUNTER
RN cannot approve Refill Request    RN can NOT refill this medication historical medication requested.       Amrita Chase, [facility] Triage/Med Refill 1/27/2019    Requested Prescriptions   Pending Prescriptions Disp Refills     sertraline (ZOLOFT) 100 MG tablet [Pharmacy Med Name: SERTRALINE  MG TABLET] 135 tablet 0     Sig: TAKE 1&1/2 TABLETS (150 MG TOTAL) BY MOUTH DAILY    [facility] Refill Protocol  Passed - 1/24/2019 10:58 AM       Passed - PCP or prescribing provider visit in last year    Last office visit with prescriber/PCP: 8/28/2018 Isidro Au MD OR same dept: 8/28/2018 Isidro Au MD OR same specialty: 8/28/2018 Isidro Au MD  Last physical: 7/12/2018 Last MTM visit: Visit date not found   Next visit within 3 mo: Visit date not found  Next physical within 3 mo: Visit date not found  Prescriber OR PCP: Isidro Au MD  Last diagnosis associated with med order: There are no diagnoses linked to this encounter.  If protocol passes may refill for 12 months if within 3 months of last provider visit (or a total of 15 months).

## 2021-06-23 NOTE — TELEPHONE ENCOUNTER
ANTICOAGULATION  MANAGEMENT    Assessment     Today's INR result of 1.9 is Subtherapeutic (goal INR of 2.0-3.0)        Warfarin taken as previously instructed    Increased greens/vitamin K intake may be affecting INR- eating pistachio nuts - new routine    No new medication/supplements affecting INR    Continues to tolerate warfarin with no reported s/s of bleeding or thromboembolism     Previous INR was Subtherapeutic    Plan:     Spoke with Lexis regarding INR result and instructed:     Warfarin Dosing Instructions:  Change warfarin dose to    7.5 mg on Mon, Wed, Fri; 5 mg all other days     (6 % change)    Instructed patient to follow up no later than: 2 weeks, appointment made    Education provided: importance of consistent vitamin K intake, impact of vitamin K foods on INR, vitamin K content of foods, importance of therapeutic range and target INR goal and significance of current INR result    Lexis verbalizes understanding and agrees to warfarin dosing plan.    Instructed to call the [facility] for any changes, questions or concerns. (#[phone])   ?   Lorena Barnhart RN    Subjective/Objective:      Parish Bills, a 78 y.o. male is on warfarin.     Parish reports:     Home warfarin dose: as updated on anticoagulation calendar per template     Missed doses: No     Medication changes:  No     S/S of bleeding or thromboembolism:  No     New Injury or illness:  No     Changes in diet or alcohol consumption:  Yes: eating pistachio nuts regularly and increased vit c ( orange juice ) intake. Discussed that no interactions with warfarin and vit c and pistachio nuts has low vitamin per serving.     Upcoming surgery, procedure or cardioversion:  No    Anticoagulation Episode Summary     Current INR goal:   2.0-3.0   TTR:   83.5 % (4.1 y)   Next INR check:   2/8/2019   INR from last check:   1.90! (1/25/2019)   Weekly max warfarin dose:      Target end date:      INR check location:      Preferred  lab:      Send INR reminders to:   ANTICOAGULATION POOL C (DTN,VAD,CGR,GAV)    Indications    A-fib (H) [I48.91]           Comments:            Anticoagulation Care Providers     Provider Role Specialty Phone number    Isidro Au MD Referring [facility] Medicine [phone]

## 2021-06-23 NOTE — TELEPHONE ENCOUNTER
ANTICOAGULATION  MANAGEMENT    Assessment     Today's INR result of 1.9 is Subtherapeutic (goal INR of 2.0-3.0)        Warfarin taken as previously instructed    No new diet changes affecting INR    No new medication/supplements affecting INR    Continues to tolerate warfarin with no reported s/s of bleeding or thromboembolism     Previous INR was Subtherapeutic    Plan:     Spoke with Lexis regarding INR result and instructed:     Warfarin Dosing Instructions:  take one time booster dose of 10 mg today then change warfarin dose to    5 mg on Sun, Tue, Thu; 7.5 mg all other days      (6 % change)    Instructed patient to follow up no later than: 1-2 weeks Appointment made.    Education provided: importance of therapeutic range and target INR goal and significance of current INR result    Lexis verbalizes understanding and agrees to warfarin dosing plan.    Instructed to call the [facility] for any changes, questions or concerns. (#[phone])   ?   Lorena Barnhart RN    Subjective/Objective:      Parish SANCHEZ Sanju, a 78 y.o. male is on warfarin.     Parish reports:     Home warfarin dose: as updated on anticoagulation calendar per template     Missed doses: No     Medication changes:  No     S/S of bleeding or thromboembolism:  No     New Injury or illness:  No     Changes in diet or alcohol consumption:  No     Upcoming surgery, procedure or cardioversion:  No    Anticoagulation Episode Summary     Current INR goal:   2.0-3.0   TTR:   82.8 % (4.2 y)   Next INR check:   2/20/2019   INR from last check:   1.90! (2/6/2019)   Weekly max warfarin dose:      Target end date:      INR check location:      Preferred lab:      Send INR reminders to:   ANTICOAGULATION POOL C (DTN,VAD,CGR,GAV)    Indications    A-fib (H) [I48.91]           Comments:            Anticoagulation Care Providers     Provider Role Specialty Phone number    Isidro Au MD Referring Family Medicine [phone]

## 2021-06-23 NOTE — TELEPHONE ENCOUNTER
ANTICOAGULATION  MANAGEMENT    Assessment     Today's INR result of 1.6 is Subtherapeutic (goal INR of 2.0-3.0)        Warfarin taken as previously instructed    No new diet changes affecting INR    No new medication/supplements affecting INR    Continues to tolerate warfarin with no reported s/s of bleeding or thromboembolism     Previous INR's were Therapeutic on this same dose.    Plan:     Spoke with Lexis regarding INR result and instructed:     Warfarin Dosing Instructions:  take one time booster dose of 7.5 mg today then continue current warfarin dose    7.5 mg on Tue, Fri; 5 mg all other days       (0 % change)    Instructed patient to follow up no later than: 1-2 weeks, appointment made for 1/25/19    Education provided: importance of therapeutic range, target INR goal and significance of current INR result, monitoring for clotting signs and symptoms and when to seek medical attention/emergency care    Lexis verbalizes understanding and agrees to warfarin dosing plan.    Instructed to call the [facility] for any changes, questions or concerns. (#[phone])   ?   Lorena Barnhart RN    Subjective/Objective:      Parish Bills, a 78 y.o. male is on warfarin.     Parish reports:     Home warfarin dose: as updated on anticoagulation calendar per template     Missed doses: No     Medication changes:  No     S/S of bleeding or thromboembolism:  No     New Injury or illness:  No     Changes in diet or alcohol consumption:  No     Upcoming surgery, procedure or cardioversion:  No    Anticoagulation Episode Summary     Current INR goal:   2.0-3.0   TTR:   83.9 % (4.1 y)   Next INR check:   1/31/2019   INR from last check:   1.60! (1/17/2019)   Weekly max warfarin dose:      Target end date:      INR check location:      Preferred lab:      Send INR reminders to:   ANTICOAGULATION POOL C (DTN,VAD,CGR,GAV)    Indications    A-fib (H) [I48.91]           Comments:            Anticoagulation Care  Providers     Provider Role Specialty Phone number    Isidro Au MD Referring Family Medicine [phone]

## 2021-06-24 NOTE — TELEPHONE ENCOUNTER
FYI - Status Update  Who is Calling: Patient  Update: Patient would like some clarification on the dosing instructions they received. Patient is planning on leaving soon and will not be home. Patient would like a return call later in the day today, thank you.  Okay to leave a detailed message?:  No

## 2021-06-24 NOTE — TELEPHONE ENCOUNTER
ANTICOAGULATION  MANAGEMENT    Assessment     Today's INR result of 1.9 is Subtherapeutic (goal INR of 2.0-3.0)        Warfarin taken as previously instructed    No new diet changes affecting INR    No new medication/supplements affecting INR    Continues to tolerate warfarin with no reported s/s of bleeding or thromboembolism     Previous INR was Therapeutic    Plan:     Spoke with Natalying INR result and instructed:     Warfarin Dosing Instructions:  Change warfarin dose to    5 mg every Mon, Thu; 7.5 mg all other days     (6 % change)    Instructed patient to follow up no later than:  2 weeks, appointment made.    Education provided: importance of therapeutic range and target INR goal and significance of current INR result    Lexis verbalizes understanding and agrees to warfarin dosing plan.    Instructed to call the [facility] for any changes, questions or concerns. (#[phone])   ?   Lorena Barnhart RN    Subjective/Objective:      Parish Bills, a 78 y.o. male is on warfarin.     Parish reports:     Home warfarin dose: as updated on anticoagulation calendar per template     Missed doses: No     Medication changes:  No     S/S of bleeding or thromboembolism:  No     New Injury or illness:  No     Changes in diet or alcohol consumption:  Yes: drinking more orange juice.     Upcoming surgery, procedure or cardioversion:  No    Anticoagulation Episode Summary     Current INR goal:   2.0-3.0   TTR:   82.9 % (4.2 y)   Next INR check:   3/22/2019   INR from last check:   1.90! (3/8/2019)   Weekly max warfarin dose:      Target end date:      INR check location:      Preferred lab:      Send INR reminders to:   ANTICOAGULATION POOL C (DTN,VAD,CGR,GAV)    Indications    A-fib (H) [I48.91]           Comments:            Anticoagulation Care Providers     Provider Role Specialty Phone number    Isidro Au MD Referring Family Medicine [phone]

## 2021-06-24 NOTE — TELEPHONE ENCOUNTER
ANTICOAGULATION  MANAGEMENT    Assessment     Today's INR result of 2.6 is Therapeutic (goal INR of 2.0-3.0)        Warfarin taken as previously instructed    No new diet changes affecting INR    No new medication/supplements affecting INR    Continues to tolerate warfarin with no reported s/s of bleeding or thromboembolism     Previous INR was Subtherapeutic    Plan:     Spoke with Parish regarding INR result and instructed:     Warfarin Dosing Instructions:  Continue current warfarin dose    5 mg on Sun, Tue, Thu; 7.5 mg all other days     (0 % change)    Instructed patient to follow up no later than: 2 weeks.Appointment made.    Education provided: importance of therapeutic range and target INR goal and significance of current INR result    Parish verbalizes understanding and agrees to warfarin dosing plan.    Instructed to call the [facility] for any changes, questions or concerns. (#[phone])   ?   Lorena Barnhart RN    Subjective/Objective:      Parish Bills, a 78 y.o. male is on warfarin.     Parish reports:     Home warfarin dose: as updated on anticoagulation calendar per template     Missed doses: No     Medication changes:  No     S/S of bleeding or thromboembolism:  No     New Injury or illness:  No     Changes in diet or alcohol consumption:  No     Upcoming surgery, procedure or cardioversion:  No    Anticoagulation Episode Summary     Current INR goal:   2.0-3.0   TTR:   82.8 % (4.2 y)   Next INR check:   3/7/2019   INR from last check:   2.60 (2/21/2019)   Weekly max warfarin dose:      Target end date:      INR check location:      Preferred lab:      Send INR reminders to:   ANTICOAGULATION POOL C (DTN,VAD,CGR,GAV)    Indications    A-fib (H) [I48.91]           Comments:            Anticoagulation Care Providers     Provider Role Specialty Phone number    Isidro Au MD Referring Family Medicine [phone]

## 2021-06-24 NOTE — TELEPHONE ENCOUNTER
Need to call [facility] for pathology report from colonoscopy 1/2017. Need to know when he needs to repeat colonoscopy.   Patient would like a call back with this information also.

## 2021-06-24 NOTE — TELEPHONE ENCOUNTER
Who is calling:  [facility]   Reason for Call:   Needs hold orders for warfarin due to upcoming procedure- [facility] protocol states patient should hold warfarin for 4 days, or 2 days if on every other day schedule, and to check INR before procedure , if Dr. Au agrees. Please follow up with clinic with hold orders. Thanks   Date of last appointment with primary care: none  Okay to leave a detailed message: Yes

## 2021-06-24 NOTE — TELEPHONE ENCOUNTER
[facility] called and spoke with Ileana and confirmed that new warfarin doses are.   5 mg every Mon, Thu; 7.5 mg all other days     She does not have any other concerns at this time.    Lorena Barnhart RN

## 2021-06-24 NOTE — TELEPHONE ENCOUNTER
Refills Approved x 2    Rx renewed per Medication Renewal Policy. Medication was last renewed on   Atorvastatin= 8/28/2018 with 1 refill  Metoprolol Succinate = 9/11/2018 with 1 refill  Last office visit: 8/28/2018 with PCP Dr FIORDALIZA Arreola, [facility] Triage/Med Refill 2/20/2019     Requested Prescriptions   Pending Prescriptions Disp Refills     metoprolol succinate (TOPROL-XL) 50 MG 24 hr tablet [Pharmacy Med Name: METOPROLOL SUCC ER 50 MG TAB] 90 tablet 0     Sig: TAKE ONE TABLET BY MOUTH DAILY. HOLD FOR SYSTOLIC BP <95 OR HEART RATE <55    Beta-Blockers Refill Protocol Passed - 2/18/2019  2:40 PM       Passed - PCP or prescribing provider visit in past 12 months or next 3 months    Last office visit with prescriber/PCP: 8/28/2018 Isidro Au MD OR same dept: 8/28/2018 Isidro Au MD OR same specialty: 8/28/2018 Isidro Au MD  Last physical: 7/12/2018 Last MTM visit: Visit date not found   Next visit within 3 mo: Visit date not found  Next physical within 3 mo: Visit date not found  Prescriber OR PCP: Isidro Au MD  Last diagnosis associated with med order: There are no diagnoses linked to this encounter.  If protocol passes may refill for 12 months if within 3 months of last provider visit (or a total of 15 months).            Passed - Blood pressure filed in past 12 months    BP Readings from Last 1 Encounters:   08/28/18 108/58             atorvastatin (LIPITOR) 40 MG tablet [Pharmacy Med Name: ATORVASTATIN 40 MG TABLET] 90 tablet 0     Sig: TAKE 1 TABLET BY MOUTH AT BEDTIME.    Statins Refill Protocol (Hmg CoA Reductase Inhibitors) Passed - 2/18/2019  2:40 PM       Passed - PCP or prescribing provider visit in past 12 months     Last office visit with prescriber/PCP: 8/28/2018 Isidro Au MD OR same dept: 8/28/2018 Isidro Au MD OR same specialty: 8/28/2018 Isidro Au MD  Last physical: 7/12/2018 Last MTM  visit: Visit date not found   Next visit within 3 mo: Visit date not found  Next physical within 3 mo: Visit date not found  Prescriber OR PCP: Isidro Au MD  Last diagnosis associated with med order: There are no diagnoses linked to this encounter.  If protocol passes may refill for 12 months if within 3 months of last provider visit (or a total of 15 months).             furosemide (LASIX) 20 MG tablet [Pharmacy Med Name: FUROSEMIDE 20 MG TABLET] 180 tablet 0     Sig: TAKE 1 TABLET BY MOUTH 2 TIMES A DAY    Diuretics/Combination Diuretics Refill Protocol  Passed - 2/18/2019  2:40 PM       Passed - Visit with PCP or prescribing provider visit in past 12 months    Last office visit with prescriber/PCP: 8/28/2018 Isidro Au MD OR same dept: 8/28/2018 Isidro Au MD OR same specialty: 8/28/2018 Isidro Au MD  Last physical: 7/12/2018 Last MTM visit: Visit date not found   Next visit within 3 mo: Visit date not found  Next physical within 3 mo: Visit date not found  Prescriber OR PCP: Isidro Au MD  Last diagnosis associated with med order: There are no diagnoses linked to this encounter.  If protocol passes may refill for 12 months if within 3 months of last provider visit (or a total of 15 months).            Passed - Serum Potassium in past 12 months     Lab Results   Component Value Date    Potassium 4.1 08/28/2018            Passed - Serum Sodium in past 12 months     Lab Results   Component Value Date    Sodium 137 08/28/2018            Passed - Blood pressure on file in past 12 months    BP Readings from Last 1 Encounters:   08/28/18 108/58            Passed - Serum Creatinine in past 12 months     Creatinine   Date Value Ref Range Status   08/28/2018 0.96 0.70 - 1.30 mg/dL Final

## 2021-06-24 NOTE — TELEPHONE ENCOUNTER
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

## 2021-06-24 NOTE — TELEPHONE ENCOUNTER
Called [facility], they are sending over the reports.     Called [facility]   Patient is due for colonoscopy

## 2021-06-24 NOTE — TELEPHONE ENCOUNTER
Notified patient he is due for a colonoscopy. Patient is going to call [facility] and set the appointment up.

## 2021-06-25 NOTE — TELEPHONE ENCOUNTER
Refill Approved - tamsulosin    Rx renewed per Medication Renewal Policy. Medication was last renewed on 1/20/2021.    Anna Marie Belcher, [facility] Triage/Med Refill 6/12/2021     Requested Prescriptions   Pending Prescriptions Disp Refills     tamsulosin (FLOMAX) 0.4 mg cap [Pharmacy Med Name: TAMSULOSIN  0.4MG  CAP] 90 capsule 3     Sig: TAKE 1 CAPSULE BY MOUTH  DAILY AFTER BREAKFAST       Alfuzosin/Tamsulosin/Silodosin Refill Protocol  Passed - 6/11/2021 12:07 PM        Passed - PCP or prescribing provider visit in past 12 months       Last office visit with prescriber/PCP: 11/4/2020 Isidro Au MD OR same dept: 11/4/2020 Isidro Au MD OR same specialty: 11/4/2020 Isidro Au MD  Last physical: 7/12/2018 Last MTM visit: Visit date not found   Next visit within 3 mo: Visit date not found  Next physical within 3 mo: Visit date not found  Prescriber OR PCP: Isidro Au MD  Last diagnosis associated with med order: 1. Chronic atrial fibrillation (H)  - warfarin ANTICOAGULANT (COUMADIN/JANTOVEN) 5 MG tablet; Take 1 tablet (5mg) to 1 &1/2 tablets (7.5mg) by mouth daily, as directed.  Adjust dose based on INR results.  Dispense: 110 tablet; Refill: 3    2. Benign prostatic hyperplasia with urinary frequency  - tamsulosin (FLOMAX) 0.4 mg cap [Pharmacy Med Name: TAMSULOSIN  0.4MG  CAP]; TAKE 1 CAPSULE BY MOUTH  DAILY AFTER BREAKFAST  Dispense: 90 capsule; Refill: 3    3. Long term (current) use of anticoagulants  - warfarin ANTICOAGULANT (COUMADIN/JANTOVEN) 5 MG tablet; Take 1 tablet (5mg) to 1 &1/2 tablets (7.5mg) by mouth daily, as directed.  Adjust dose based on INR results.  Dispense: 110 tablet; Refill: 3    If protocol passes may refill for 12 months if within 3 months of last provider visit (or a total of 15 months).              Signed Prescriptions Disp Refills    warfarin ANTICOAGULANT (COUMADIN/JANTOVEN) 5 MG tablet 110 tablet 3     Sig: Take 1 tablet (5mg) to 1  &1/2 tablets (7.5mg) by mouth daily, as directed.  Adjust dose based on INR results.       Warfarin Refill Protocol  Failed - 6/11/2021 12:07 PM        Failed -  Route to appropriate pool/provider     Last Anticoagulation Summary:   Anticoagulation Episode Summary     Current INR goal:  2.0-3.0   TTR:  85.1 % (11.6 mo)   Next INR check:  7/19/2021   INR from last check:  2.10 (6/7/2021)   Weekly max warfarin dose:     Target end date:     INR check location:     Preferred lab:     Send INR reminders to:  [facility]    Indications    A-fib (H) (Resolved) [I48.91]           Comments:           Anticoagulation Care Providers     Provider Role Specialty Phone number    Isidro Au MD Referring Family Medicine [phone]                Passed - Provider visit in last year     Last office visit with prescriber/PCP: 11/4/2020 Isidro Au MD OR same dept: 11/4/2020 Isidro Au MD OR same specialty: 11/4/2020 Isidro Au MD  Last physical: 7/12/2018 Last MTM visit: Visit date not found    Next appt within 3 mo: Visit date not found Next physical within 3 mo: Visit date not found  Prescriber OR PCP: Isidro Au MD  Last diagnosis associated with med order: 1. Chronic atrial fibrillation (H)  - warfarin ANTICOAGULANT (COUMADIN/JANTOVEN) 5 MG tablet; Take 1 tablet (5mg) to 1 &1/2 tablets (7.5mg) by mouth daily, as directed.  Adjust dose based on INR results.  Dispense: 110 tablet; Refill: 3    2. Benign prostatic hyperplasia with urinary frequency  - tamsulosin (FLOMAX) 0.4 mg cap [Pharmacy Med Name: TAMSULOSIN  0.4MG  CAP]; TAKE 1 CAPSULE BY MOUTH  DAILY AFTER BREAKFAST  Dispense: 90 capsule; Refill: 3    3. Long term (current) use of anticoagulants  - warfarin ANTICOAGULANT (COUMADIN/JANTOVEN) 5 MG tablet; Take 1 tablet (5mg) to 1 &1/2 tablets (7.5mg) by mouth daily, as directed.  Adjust dose based on INR results.  Dispense: 110 tablet; Refill: 3    If  protocol passes may refill for 6 months if within 3 months of last provider visit (or a total of 9 months).

## 2021-06-25 NOTE — TELEPHONE ENCOUNTER
"Pt reports he slipped on ice yesterday and \"twisted\" R hip. Pain with movement \"8-9. Unable to walk normally per pt.     Advised pt to be seen in Urgent Care ([place]) today per protocol.    Pt verbalizes understanding and agrees to plan.     Reason for Disposition    [1] SEVERE pain AND [2] not improved 2 hours after pain medicine/ice packs    Protocols used: HIP INJURY-A-AH      "

## 2021-06-25 NOTE — TELEPHONE ENCOUNTER
RN cannot approve Refill Request    RN can NOT refill this medication med is not covered by policy/route to provider. Last office visit: 11/4/2020 Isidro Au MD Last Physical: 7/12/2018 Last MTM visit: Visit date not found Last visit same specialty: 11/4/2020 Isidro Au MD.  Next visit within 3 mo: Visit date not found  Next physical within 3 mo: Visit date not found      Layla Cheng, [facility] Triage/Med Refill 6/11/2021    Requested Prescriptions   Pending Prescriptions Disp Refills     warfarin ANTICOAGULANT (COUMADIN/JANTOVEN) 5 MG tablet [Pharmacy Med Name: WARFARIN  5MG  TAB] 90 tablet 3     Sig: TAKE 1 TABLET BY MOUTH  DAILY       Warfarin Refill Protocol  Failed - 6/10/2021  4:35 AM        Failed -  Route to appropriate pool/provider     Last Anticoagulation Summary:   Anticoagulation Episode Summary     Current INR goal:  2.0-3.0   TTR:  85.1 % (11.6 mo)   Next INR check:  7/19/2021   INR from last check:  2.10 (6/7/2021)   Weekly max warfarin dose:     Target end date:     INR check location:     Preferred lab:     Send INR reminders to:  [facility]    Indications    A-fib (H) (Resolved) [I48.91]           Comments:           Anticoagulation Care Providers     Provider Role Specialty Phone number    Isidro Au MD Referring Family Medicine [phone]                Passed - Provider visit in last year     Last office visit with prescriber/PCP: 11/4/2020 Isidro Au MD OR same dept: 11/4/2020 Isidro Au MD OR same specialty: 11/4/2020 Isidro Au MD  Last physical: 7/12/2018 Last MTM visit: Visit date not found    Next appt within 3 mo: Visit date not found Next physical within 3 mo: Visit date not found  Prescriber OR PCP: Isidro Au MD  Last diagnosis associated with med order: 1. Chronic atrial fibrillation (H)  - warfarin ANTICOAGULANT (COUMADIN/JANTOVEN) 5 MG tablet [Pharmacy Med Name: WARFARIN   5MG  TAB]; TAKE 1 TABLET BY MOUTH  DAILY  Dispense: 90 tablet; Refill: 3    2. Benign prostatic hyperplasia with urinary frequency  - tamsulosin (FLOMAX) 0.4 mg cap [Pharmacy Med Name: TAMSULOSIN  0.4MG  CAP]; TAKE 1 CAPSULE BY MOUTH  DAILY AFTER BREAKFAST  Dispense: 90 capsule; Refill: 3    If protocol passes may refill for 6 months if within 3 months of last provider visit (or a total of 9 months).             tamsulosin (FLOMAX) 0.4 mg cap [Pharmacy Med Name: TAMSULOSIN  0.4MG  CAP] 90 capsule 3     Sig: TAKE 1 CAPSULE BY MOUTH  DAILY AFTER BREAKFAST       Alfuzosin/Tamsulosin/Silodosin Refill Protocol  Passed - 6/10/2021  4:35 AM        Passed - PCP or prescribing provider visit in past 12 months       Last office visit with prescriber/PCP: 11/4/2020 Isidro Au MD OR same dept: 11/4/2020 Isidro Au MD OR same specialty: 11/4/2020 Isidro Au MD  Last physical: 7/12/2018 Last MTM visit: Visit date not found   Next visit within 3 mo: Visit date not found  Next physical within 3 mo: Visit date not found  Prescriber OR PCP: Isidro Au MD  Last diagnosis associated with med order: 1. Chronic atrial fibrillation (H)  - warfarin ANTICOAGULANT (COUMADIN/JANTOVEN) 5 MG tablet [Pharmacy Med Name: WARFARIN  5MG  TAB]; TAKE 1 TABLET BY MOUTH  DAILY  Dispense: 90 tablet; Refill: 3    2. Benign prostatic hyperplasia with urinary frequency  - tamsulosin (FLOMAX) 0.4 mg cap [Pharmacy Med Name: TAMSULOSIN  0.4MG  CAP]; TAKE 1 CAPSULE BY MOUTH  DAILY AFTER BREAKFAST  Dispense: 90 capsule; Refill: 3    If protocol passes may refill for 12 months if within 3 months of last provider visit (or a total of 15 months).

## 2021-06-25 NOTE — TELEPHONE ENCOUNTER
RN cannot approve Refill Request    RN can NOT refill this medication med is not covered by policy/route to provider. Last office visit: 11/4/2020 Isidro Au MD Last Physical: 7/12/2018 Last MTM visit: Visit date not found Last visit same specialty: 11/4/2020 Isidro Au MD.  Next visit within 3 mo: Visit date not found  Next physical within 3 mo: Visit date not found      Heather Arreola, [facility] Triage/Med Refill 5/25/2021    Requested Prescriptions   Pending Prescriptions Disp Refills     nitroglycerin (NITROSTAT) 0.4 MG SL tablet [Pharmacy Med Name: NITROGLYCERIN 0.4 MG TABLET SL] 25 tablet 0     Sig: PLACE 1 TAB UNDER THE TONGUE EVERY 5 MINS UP TO 3 DOSES AS NEEDED CALL 911 AFTER TAKING FIRST DOSE       There is no refill protocol information for this order

## 2021-06-25 NOTE — TELEPHONE ENCOUNTER
ANTICOAGULATION  MANAGEMENT    Assessment     Today's INR result of 2.10 is Therapeutic (goal INR of 2.0-3.0)        Warfarin taken as previously instructed    No new diet changes affecting INR    No new medication/supplements affecting INR    Continues to tolerate warfarin with no reported s/s of bleeding or thromboembolism     Previous INR was Therapeutic at 2.20 on 4/26/21.  (last 7 INR's therapeutic).    Plan:     Spoke on phone with Parish regarding INR result and instructed:      Warfarin Dosing Instructions:  (5mg tabs)   - Continue current warfarin dose 7.5 mg daily on Tues/Fri; and 5 mg daily rest of week.    Instructed patient to follow up no later than:  5 wks   - INR scheduled on 7/12/21 @ [facility].    Education provided: importance of consistent vitamin K intake and target INR goal and significance of current INR result    Hang verbalizes understanding and agrees to warfarin dosing plan.    Instructed to call the [facility] for any changes, questions or concerns. (#[phone])   ?   Kori Ziegler RN    Subjective/Objective:      Parish Bills, a 80 y.o. male is on warfarin. Parish Lugo reports:     Home warfarin dose: as updated on anticoagulation calendar per template     Missed doses: No     Medication changes:  No     S/S of bleeding or thromboembolism:  No     New Injury or illness:  No     Changes in diet or alcohol consumption:  No     Upcoming surgery, procedure or cardioversion:  No    Anticoagulation Episode Summary     Current INR goal:  2.0-3.0   TTR:  85.3 % (11.7 mo)   Next INR check:  6/7/2021   INR from last check:  2.20 (4/26/2021)   Most recent INR:   2.10 (6/7/2021)   Weekly max warfarin dose:     Target end date:     INR check location:     Preferred lab:     Send INR reminders to:  [facility]    Indications    A-fib (H) (Resolved) [I48.91]           Comments:           Anticoagulation Care Providers     Provider Role Specialty Phone number    Isidro Au  MD [facility] [phone]

## 2021-06-29 NOTE — PROGRESS NOTES
Progress Notes by Tanya Curry, Pharmacy Intern at 2020  1:46 PM     Author: Tanya Curry, Pharmacy Intern Service: Pharmacy Author Type: Pharmacy Intern    Filed: 2020  1:54 PM Date of Service: 2020  1:46 PM Status: Attested    : Tanya Curry, Pharmacy Intern (Pharmacy Intern) Cosigner: Yahaira Hdz PharmD at 2020  2:22 PM    Attestation signed by Yahaira Hdz PharmD at 2020  2:22 PM    I agree with the interns assessment. Continue home dosing schedule.                Pharmacy Consult: Warfarin Management    Pharmacy consulted to dose warfarin for Parish Bills, a 80 y.o. male    Ordering provider: Dr. Jerez     Reason for warfarin therapy: Atrial Fibrillation  Goal INR Range: 2-3 (changed from 1.8 - 2.5 for perioperative pharmacoprophylaxis to 2-3 for afib by Dr. CHAUHAN)    Subjective  Medication lists (home and hospital) were reviewed.    New medications that may increase bleeding risk/INR: Rocephin    Restarted home medications that may increase bleeding risk/INR: aspirin, sertraline    Home Warfarin Dosin mg , ,  and 7.5 mg ROW    Other Anticoagulants: none  Patient being bridged: No    Patient Active Problem List   Diagnosis   ? Aortic Stenosis   ? Bicuspid Aortic Valve   ? Benign Prostatic Hypertrophy   ? Abrasion Of The Ear   ? Hyperlipidemia   ? Cardiomyopathy   ? Strained Left Pectoralis Major Muscle   ? Essential Hypercholesterolemia   ? Aneurysm Of The Abdominal Aorta   ? Chest Pain   ? Neck Strain   ? Osteoarthritis   ? Lumbar Strain   ? Blood In Urine   ? Atrial Fibrillation   ? Esophageal Reflux   ? Subconjunctival Hemorrhage   ? Post-operative pain   ? Hypertension   ? Hypotension   ? Postoperative anemia due to acute blood loss   ? A-fib (H)   ? Cough   ? Edema   ? Lumbar radiculopathy   ? Malignant neoplasm of skin   ? Wheezing   ? Biventricular cardiac pacemaker in situ   ? Malignant tumor of rectum (H)   ? Abdominal  aortic aneurysm (AAA) (H)   ? Atrial flutter, paroxysmal (H)   ? Benign neoplasm of ascending colon   ? Coagulopathy (H)   ? Diverticular disease of large intestine   ? Dysthymia   ? Encounter for screening for malignant neoplasm of colon   ? Family history of colon cancer   ? Family history of malignant neoplasm of digestive organ   ? H/O mitral valve repair   ? History of colonic polyps   ? Iliac artery aneurysm (H)   ? Nonrheumatic aortic valve stenosis   ? Polyp of ascending colon, unspecified type   ? PVC (premature ventricular contraction)   ? Rectal polyp   ? S/P AVR   ? Stress hyperglycemia   ? Weight loss   ? Right hip pain   ? Infection of prosthetic total hip joint, subsequent encounter    Past Medical History:   Diagnosis Date   ? Abdominal aortic aneurysm (H)    ? Alcohol abuse    ? Anemia    ? Aortic stenosis    ? Arthritis    ? Atrial fibrillation (H)    ? BPH (benign prostatic hyperplasia)    ? CAD (coronary artery disease)    ? Cardiomyopathy (H)    ? Cataract     left   ? Chest pain    ? CHF (congestive heart failure) (H)    ? Congenital insufficiency of aortic valve    ? Coronary artery disease    ? Disease of pancreas     gallstones related   ? Dysthymia    ? Enlarged prostate    ? FH: mitral valve repair    ? H/O aortic valve repair '   ? High blood pressure    ? Hypercholesteremia    ? Hyperlipidemia    ? Hypertrophy of prostate    ? Lumbar radiculopathy    ? Mitral valve prolapse    ? Neck strain    ? Nonrheumatic aortic (valve) stenosis    ? Osteoarthrosis    ? Pacemaker    ? Rectal cancer (H)    ? Reflux esophagitis    ? Subconjunctival bleed         Social History     Tobacco Use   ? Smoking status: Former Smoker     Types: Cigarettes     Last attempt to quit: 9/15/1980     Years since quittin.9   ? Smokeless tobacco: Never Used   Substance Use Topics   ? Alcohol use: No     Comment: quit    ? Drug use: No       Objective   Labs:  Last 3 days:    Recent Labs     20  0514  08/01/20  0609 08/02/20  0616   CREATININE  --  0.89  --    HGB 10.2* 9.3* 9.1*   HCT 30.3*  --   --    *  --   --      Last 7 days:   Recent labs: (last 7 days)     07/28/20  0950 07/29/20  0630 07/29/20  1301 07/30/20  0554 07/31/20  0514 08/01/20  0609 08/02/20  0616   INR 2.77* 2.03* 1.72* 1.49* 1.38* 1.56* 1.74*       Warfarin Dosing History:    Date INR Warfarin Dose Comment   7/31 1.38 7.5 mg    8/1 1.56 7.5 mg    8/2 1.74 7.5 mg            Assessment  The patient is resuming warfarin for the indication of Atrial Fibrillation with a goal INR of 2-3. INR today is subtherapeutic. Resume home dosing      Plan  1. Administer warfarin 7.5 mg PO today.  2. Check INR daily or as appropriate.  3. Continue to follow the patient's INR, PLT, and HGB as available.  4. Monitor for potential drug/disease interactions.    Thank you for the consult,  Tanya Manjarrez, Pharmacy Intern 8/2/2020 1:46 PM

## 2021-07-02 NOTE — DISCHARGE SUMMARY
Discharge Summary by Butch AKBAR MD at 8/3/2020  2:13 PM     Author: Butch AKBAR MD Service: Hospitalist Author Type: Physician    Filed: 8/3/2020  5:08 PM Date of Service: 8/3/2020  2:13 PM Status: Signed    : Butch AKBAR MD (Physician)         [facility] MEDICINE  DISCHARGE SUMMARY     Primary Care Physician: Isidro Au MD  Admission Date: 7/28/2020   Discharge Provider: Butch AKBAR Discharge Date: 8/3/2020   Diet: cardiac diet   Code Status: Full Code   Activity: As recommended by orthopedic team        Condition at Discharge: Good      REASON FOR PRESENTATION(See Admission Note for Details)   Right hip pain    PRINCIPAL & ACTIVE DISCHARGE DIAGNOSES     Principal Problem:    Right hip pain  Active Problems:    Infection of prosthetic total hip joint, subsequent encounter      SIGNIFICANT FINDINGS (Imaging, labs):     Xr Hip Right 2 Or More Vws Portable    Result Date: 7/31/2020  EXAM: XR HIP RIGHT 2 OR MORE VWS PORTABLE LOCATION: [facility] DATE/TIME: 7/31/2020 10:53 AM INDICATION: eval prosthesis COMPARISON: CT 7/29/2020     Stable appearance of the right hip arthroplasty without evidence of complication. Postsurgical changes to the right hip with subcutaneous emphysema and soft tissue swelling. Skin staples. Francis catheter in the bladder. Vascular stents. Degenerative changes of the spine and left hip.     Xr Chest 1 View For Picc Placement Portable    Result Date: 8/1/2020  EXAM: XR CHEST 1 VIEW FOR PICC LINE PLACEMENT PORTABLE LOCATION: [facility] DATE/TIME: 8/1/2020 4:57 PM INDICATION: verify catheter placement COMPARISON: Chest x-ray 08/01/2018     Interval placement of a right PICC line with the tip located in satisfactory position in the lower SVC. Stable multilead left-sided cardiac conduction device. Post open-heart surgery with valve repair/replacement. No acute findings. The lungs  are clear and there are no pleural effusions. Stable cardiomegaly.    Ct  Pelvis Without Oral Without Iv Contrast  Result Date: 7/28/2020  1.  Postoperative changes related to prior right total hip replacement. There is no evidence of a periprosthetic fracture. No dislocation.    Ct Abdomen Pelvis Without Oral With Without Iv Contrast  Result Date: 7/29/2020  1.  Benign 2 cm and 1 cm simple cysts require no follow-up. 2.  Kidneys, intrarenal collecting systems and ureters otherwise normal. 3.  Mild muscular hypertrophy and cellule formation urinary bladder. 4.  Bladder otherwise negative although not optimally visualized due to decompression and partial obscuration by metallic streak artifact from right total hip arthroplasty. 5.  Prostatic TURP defect with CT appearance of the prostate otherwise unremarkable. 6.  Diameter of the excluded infrarenal abdominal aortic aneurysm sac treated with endovascular aortobiiliac stent graft stable at 4.6 cm. 7.      Xr Joint Injection Major Right  Result Date: 7/29/2020  1.  Successful image-guided right hip joint aspiration.    Xr Pelvis W 2 Vw Hip Right  Result Date: 7/28/2020  Postoperative changes of right total hip arthroplasty. Components appear well seated. There is moderate severity degenerative change at the left hip joint. No evidence for fracture. Pelvis otherwise negative. Iliac artery stents.      PENDING LABS     Pending Labs     Order Current Status    Medical Cytology In process          PROCEDURES ( this hospitalization only)      REVISION, ARTHROPLASTY, HIP, WITH FEMORAL HEAD AND  ACETABULAR LINER REPLACEMENT, WITH IRRIGATION + DEBRIDEMENT    RECOMMENDATION FOR F/U VISIT       Follow-up with PCP in a week    Follow-up with orthopedic surgeon as recommended    Follow-up with IDDr. Pickard in 3 weeks    DISPOSITION     Home with home care    SUMMARY OF HOSPITAL COURSE:      80 y.o. 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 fib, AV node ablation s/p BiV pacemaker, hypertension, right hip  replacement 16 years ago who presented to ED for evaluation of atraumatic right hip pain for 1 day duration and admitted for further management     Acute right hip pain, atraumatic; infection of prosthetic RAMÓN  History of right hip replacement 16 years ago;  -No reported trauma.  No reported febrile illness, urinary changes or leg weakness  -CT pelvis reported no evidence of periprosthetic fracture, no dislocation.  -Normal WBC count.  Elevated CRP at 4.1  -Patient had right hip arthrocentesis after reversing INR on 7/29 and culture grew Streptococcus mutans  -Dr. Jerez did right hip I&D and a right hip arthroplasty revision, head and liner exchange on 7/31.  Postoperative care and pain management as recommended by orthopedic team.  Follow-up with them in 2 weeks.  -IV vancomycin changed to IV Rocephin after culture results by ID on 8/2, recommended to continue total of 28 days.  PICC line placed by ID.  Home care with home infusion arranged on discharge.  Appreciated /care manager input.  -Monitor CBC with differential, CMP, ESR, CRP every week while on antibiotics.  Fax results to Dr. Pickard from ID.  Follow-up with him in 2 to 3 weeks.     History of Tissue AVR with LIMA to LAD in 2018;  Chronic atrial fibrillation;  AV node ablation s/p Bi V pacemaker;  -No chest pain, dyspnea on exertion, dizziness.    -Echocardiogram on 10/2018 showed EF 55%  -Continue PTA medications-Lipitor, Toprol-XL, aspirin, Lasix  -Given patient complex cardiac history, I did discussed with patient's primary cardiologist Dr. Trujillo about anticoagulation/Coumadin which is currently on hold refer above and reported okay to hold and when we resume Coumadin no need to bridge.  -INR subtherapeutic due to holding Coumadin for surgical procedure, restarted on Coumadin on 7/31, monitor INR closely.  Discussed with pharmacist, recommended to resume home Coumadin dose.  Home care nurse to check INR as instructed and communicate with  PCP with results and future INR check and Coumadin dosing.     Acute kidney injury, mild;  -Creatinine improved with gentle hydration.  Off IV fluid     Gross hematuria; resolved  Acute urinary retention due to BPH;  Proteus mirabilis UTI on 7/29;  -Patient reported passing drops of blood when urinating.  Denied suprapubic pain, dysuria, no fever or chills  -UA showed RBC but negative nitrite, leukocyte and WBC but UC positive for Proteus mirabilis on 7/29  -Appreciated urology input.  Patient had bedside cystoscopy with Francis catheter placement and procedure note reported a s/p TURP, bladder neck was slightly tight otherwise unremarkable, no tumors.  -Started on Flomax, continue.   -Voiding trial on the day of discharge, patient voided.  Post void scan reported 0.  -Patient received antibiotics for UTI.     Chronic normocytic anemia;  Chronic intermittent thrombocytopenia;  -Slight drop in hemoglobin likely due to hemodilution and postop.  Platelet count fluctuating. Monitor labs as an outpatient     Hyperglycemia, likely due to stress, improved  Patient denied history of diabetes.  A1c 5.6.  Patient reported history of high blood sugar after surgery in the past.       Discharge Medications with Med changes:        Medication List      START taking these medications    acetaminophen 500 MG tablet  Quantity:     Dose:  1,000 mg  Commonly known as:  TYLENOL  1,000 mg, Oral, 3 times daily     cefTRIAXone 2 g in NaCl 0.9 % 0.9 % 50 mL IVPB  Dose:  2 g  Start taking on:  August 4, 2020  2 g, Intravenous, Every 24 hours     polyethylene glycol 17 gram packet  Quantity:     Dose:  17 g  Start taking on:  August 4, 2020  Commonly known as:  MIRALAX  17 g, Oral, DAILY     tamsulosin 0.4 mg Cap  Quantity:  30 capsule  Dose:  0.4 mg  Start taking on:  August 4, 2020  Commonly known as:  FLOMAX  0.4 mg, Oral, Daily after breakfast        CHANGE how you take these medications    furosemide 20 MG tablet  Quantity:  180  tablet  Commonly known as:  LASIX  TAKE 1 TABLET BY MOUTH 2 TIMES A DAY  What changed:  when to take this        CONTINUE taking these medications    amoxicillin 500 MG capsule  Dose:  2,000 mg  Commonly known as:  AMOXIL  2,000 mg, Oral, As needed, As needed for Hx mitral valve repair. Take one hour before appointment      Aspirin Low Dose 81 MG EC tablet  Dose:  1 tablet  Generic drug:  aspirin  1 tablet, Oral, QDaily     atorvastatin 40 MG tablet  Quantity:  90 tablet  Commonly known as:  LIPITOR  TAKE 1 TABLET BY MOUTH AT BEDTIME.     buPROPion 150 MG 24 hr tablet  Quantity:  90 tablet  Commonly known as:  WELLBUTRIN XL  TAKE 1 TABLET BY MOUTH EVERY MORNING     CENTRUM SILVER ORAL  Dose:  1 tablet  1 tablet, Oral, DAILY     hydrOXYzine HCL 25 MG tablet  Quantity:  360 tablet  Dose:  25 mg  Commonly known as:  ATARAX  25 mg, Oral, Every 6 hours PRN     metoprolol succinate 50 MG 24 hr tablet  Quantity:  90 tablet  Commonly known as:  TOPROL-XL  TAKE ONE TABLET BY MOUTH DAILY. HOLD FOR SYSTOLIC BP <95 OR HEART RATE <55     nitroglycerin 0.4 MG SL tablet  Quantity:  25 tablet  Commonly known as:  NITROSTAT  PLACE 1 TABLET UNDER THE TONGUE EVERY 5 MINUTES UP TO 3 DOSES AS NEEDED; CALL 911 AFTER TAKING FIRST DOSE     * sertraline 100 MG tablet  Quantity:  90 tablet  Commonly known as:  ZOLOFT  TAKE ONE TABLET BY MOUTH ONCE DAILY ALONG WITH 50MG TO EQUAL 150MG     * sertraline 50 MG tablet  Quantity:  90 tablet  Commonly known as:  ZOLOFT  TAKE ONE TABLET BY MOUTH ONCE DAILY ALONG WITH 100MG TO EQUAL 150MG     warfarin ANTICOAGULANT 5 MG tablet  Dose:  5-7.5 mg  Commonly known as:  COUMADIN/JANTOVEN  5-7.5 mg, Oral, Warfarin - See Admin Instructions, Takes 5 mg on Tuesday, Thursday, and Saturday and 7.5 mg all other days          * This list has 2 medication(s) that are the same as other medications prescribed for you. Read the directions carefully, and ask your doctor or other care provider to review them with you.                     Rationale for medication changes:      No change in his home medication regimen        Consults   ID and orthopedic surgery      Immunizations given this encounter           Discharge Orders   Referral to Non-[facility] Home Health Agency   Referral Priority: Routine Referral Type: Home Health Care   Referral Reason: Evaluation and Treatment   Requested Specialty: Home Health Services   Number of Visits Requested: 1     Nursing communication   Order Comments: [facility]  Post Discharge orders    Admit to home care.    Fax laboratory test results to: [phone] to the [facility] infectious disease clinic, Attention: Matt Pickard MD         Diagnosis: [unfilled]    Please perform the following tests, weekly:      CBC, diff, platelets      CMP      Erythrocyte sedimentation rate      C-reactive protein              Call [phone] to schedule follow-up appointment with Dr. Pickard, in 4 weeks at [facility] from [facility]   [unfilled]     Activity as tolerated   Order Comments: Rest when tired     Discharge diet - Regular     Call MD:  if symptoms get worse     Call MD for:  redness or swelling     Call MD:  if pain or burning when you urinate     Call MD for:  difficulty breathing, headache or visual disturbances     Call MD for:  temperature greater than 101     Call MD for:  severe uncontrolled pain     Call MD for:  constipation (difficulty having a bowel movement)     Call MD for:  dizziness, persistent nausea or vomiting     Call MD for:  weight gain - more than two pounds in a day or five pounds in a week     Discharge Follow Up - Primary Care Clinic     Order Specific Question Answer Comments   Follow-up in: Within 7 days      Discharge Follow-Up:  Surgeon Clinic   Order Comments: Follow-up with orthopedic surgeon as recommended.     Discharge Follow-Up (specify in comments)   Order Comments: Follow-up with ID provider, Dr. Pickard  in 2 to 3 weeks.     Examination   Patient seen and examined.  Notes, labs, imaging report personally reviewed.  No acute issues reported overnight.  Patient denies new concerns or complaints.  Reported hip pain is improving.  Denied short of breath or chest pain or dizziness.  Denied fever or chills.  Denied abdomen pain, nausea, vomiting.  Discussed with physical therapist, reported patient doing very good from therapy perspective.  Discussed with nursing staffs.    Vital Signs in last 24 hours:   Temp:  [97.6  F (36.4  C)-98.8  F (37.1  C)] 97.6  F (36.4  C)  Heart Rate:  [60-61] 60  Resp:  [16-18] 18  BP: (117-121)/(58-60) 117/58  SpO2:  [96 %-98 %] 98 %    General: Not in obvious distress.  HEENT: Normocephalic, supple neck  Chest: Clear to auscultation bilateral anteriorly, no wheezing  Heart: S1S2 normal, irregular  Abdomen: Soft. NT, ND. Bowel sounds- active.  Extremities: No legs swelling  Neuro: alert and awake, moves all extremities  Musculoskeletal; right hip dressing in place, noticed mild swelling around but no bruises      Please see EMR for more detailed significant labs, imaging, consultant notes etc.    Detailed plan of care after discharge discussed with patient.  Discussed with nursing staff, care manager/social work.    Total time spent on discharge: 35 minutes      Note created using dragon voice recognition software.  Errors in spelling or words which seems out of context are unintentional.  Sounds alike errors may have escaped editing.    8/3/2020  BRIGID AKBAR MD  HOSPITALIST, [facility]  PAGER NO. [phone]    CC:Isidro Au MD

## 2021-07-10 DIAGNOSIS — I48.91 ATRIAL FIBRILLATION (H): Primary | ICD-10-CM

## 2021-07-12 ENCOUNTER — TELEPHONE (OUTPATIENT)
Dept: NURSING | Facility: CLINIC | Age: 81
End: 2021-07-12

## 2021-07-12 ENCOUNTER — ANTICOAGULATION THERAPY VISIT (OUTPATIENT)
Dept: NURSING | Facility: CLINIC | Age: 81
End: 2021-07-12

## 2021-07-12 ENCOUNTER — ALLIED HEALTH/NURSE VISIT (OUTPATIENT)
Dept: LAB | Facility: CLINIC | Age: 81
End: 2021-07-12
Payer: COMMERCIAL

## 2021-07-12 DIAGNOSIS — I48.91 ATRIAL FIBRILLATION (H): ICD-10-CM

## 2021-07-12 DIAGNOSIS — Z53.9 ERRONEOUS ENCOUNTER--DISREGARD: Primary | ICD-10-CM

## 2021-07-12 LAB — INR BLD: 1.9 (ref 0.9–1.1)

## 2021-07-12 PROCEDURE — 85610 PROTHROMBIN TIME: CPT

## 2021-07-12 PROCEDURE — 36415 COLL VENOUS BLD VENIPUNCTURE: CPT

## 2021-07-12 NOTE — PROGRESS NOTES
ANTICOAGULATION MANAGEMENT     Parish Bills 80 year old male is on warfarin with subtherapeutic INR result. (Goal INR 2.0-3.0)    Recent labs: (last 7 days)     07/12/21  0911   INR 1.9*       ASSESSMENT     Source(s): Patient/Caregiver Call and Template       Warfarin doses taken: Warfarin taken as instructed    Diet: No new diet changes identified    New illness, injury, or hospitalization: No    Medication/supplement changes: reported he ran out of his OTC vitamins for one wk.  Now has been back to taking MVI in the last one wk.    Signs or symptoms of bleeding or clotting: No    Previous INR: Therapeutic last 2(+) visits    Additional findings: None     PLAN     Recommended plan for no diet, medication or health factor changes affecting INR     Dosing Instructions:    - Booster dose of 7.5mg today,    - then continue your current warfarin dose with next INR in 2 weeks       Summary  As of 7/12/2021    Full warfarin instructions:  7/12: 7.5 mg; Otherwise 7.5 mg every Tue, Fri; 5 mg all other days   Next INR check:  7/26/2021             Telephone call with Parish who verbalizes understanding and agrees to plan    Lab visit scheduled - 8/3/21 @ [facility].    Education provided: Importance of consistent vitamin K intake, Target INR goal and significance of current INR result and Potential interaction between warfarin and ran out of OTC MVI for one wk.    Plan made per [facility] anticoagulation protocol    Kori Ziegler RN  Anticoagulation Clinic  7/12/2021    _______________________________________________________________________     Anticoagulation Episode Summary     Current INR goal:  2.0-3.0   TTR:  77.2 % (1 y)   Target end date:     Send INR reminders to:  SHANICE ODONNELL       Comments:           Anticoagulation Care Providers     Provider Role Specialty Phone number    Isidro Au MD Referring Family Medicine [phone]

## 2021-08-03 ENCOUNTER — ANTICOAGULATION THERAPY VISIT (OUTPATIENT)
Dept: ANTICOAGULATION | Facility: CLINIC | Age: 81
End: 2021-08-03

## 2021-08-03 ENCOUNTER — LAB (OUTPATIENT)
Dept: LAB | Facility: CLINIC | Age: 81
End: 2021-08-03
Payer: COMMERCIAL

## 2021-08-03 DIAGNOSIS — I48.91 ATRIAL FIBRILLATION (H): ICD-10-CM

## 2021-08-03 LAB — INR BLD: 2.5 (ref 0.9–1.1)

## 2021-08-03 PROCEDURE — 36416 COLLJ CAPILLARY BLOOD SPEC: CPT

## 2021-08-03 PROCEDURE — 85610 PROTHROMBIN TIME: CPT

## 2021-08-03 NOTE — PROGRESS NOTES
ANTICOAGULATION MANAGEMENT     Parish Bills 81 year old male is on warfarin with therapeutic INR result. (Goal INR 2.0-3.0)    Recent labs: (last 7 days)     08/03/21  0856   INR 2.5*       ASSESSMENT     Source(s): Chart Review, Patient/Caregiver Call and Template       Warfarin doses taken: Warfarin taken as instructed    Diet: No new diet changes identified    New illness, injury, or hospitalization: No    Medication/supplement changes: None noted    Signs or symptoms of bleeding or clotting: Yes:    - reported no injuries incurred, just bruisings noted on both arms int he last couple weeks;  (one has tiny red spots and the other arms has larger bruisings0.    Previous INR: Subtherapeutic at 1.90 on 7/12/21.    Additional findings: None     PLAN     Recommended plan for no diet, medication or health factor changes affecting INR     Dosing Instructions: Continue your current warfarin dose with next INR in 2-3 weeks       Summary  As of 8/3/2021    Full warfarin instructions:  7.5 mg every Tue, Fri; 5 mg all other days   Next INR check:  8/17/2021             Telephone call with Parish who verbalizes understanding and agrees to plan   - advised f/u with Dr. Au     Lab visit scheduled    Education provided: Importance of consistent vitamin K intake, Target INR goal and significance of current INR result and Monitoring for bleeding signs and symptoms    Plan made per [facility] anticoagulation protocol    Kori Ziegler, RN  Anticoagulation Clinic  8/3/2021    _______________________________________________________________________     Anticoagulation Episode Summary     Current INR goal:  2.0-3.0   TTR:  79.7 % (1 y)   Target end date:     Send INR reminders to:  SHANICE ODONNELL       Comments:           Anticoagulation Care Providers     Provider Role Specialty Phone number    Isidro Au MD Referring Family Medicine [phone]

## 2021-08-16 ENCOUNTER — NURSE TRIAGE (OUTPATIENT)
Dept: NURSING | Facility: CLINIC | Age: 81
End: 2021-08-16

## 2021-08-16 NOTE — TELEPHONE ENCOUNTER
Wants appt to review meds.  Not doing better on medications, dizzy, tired, forgetful.  Wants to talk.  Bruising easier.  Needs to talk to pcp.  anticouagualtion patient.  Last seen 5/2021??    Needs med check appt..  Scheduled for tomorrow.    Porsche Crowell RN  [facility] Nurse Advisor    Reason for Disposition    Caller has urgent medication question about med that PCP prescribed and triager unable to answer question    Additional Information    Negative: Drug overdose and triager unable to answer question    Negative: Caller requesting information unrelated to medicine    Negative: Caller requesting a prescription for Strep throat and has a positive culture result    Negative: Rash while taking a medication or within 3 days of stopping it    Negative: Immunization reaction suspected    Negative: Asthma and having symptoms of asthma (cough, wheezing, etc.)    Negative: Breastfeeding questions about mother's medicines and diet    Negative: MORE THAN A DOUBLE DOSE of a prescription or over-the-counter (OTC) drug    Negative: DOUBLE DOSE (an extra dose or lesser amount) of over-the-counter (OTC) drug and any symptoms (e.g., dizziness, nausea, pain, sleepiness)    Negative: DOUBLE DOSE (an extra dose or lesser amount) of prescription drug and any symptoms (e.g., dizziness, nausea, pain, sleepiness)    Negative: Took another person's prescription drug    Negative: DOUBLE DOSE (an extra dose or lesser amount) of prescription drug and NO symptoms (Exception: a double dose of antibiotics)    Negative: Diabetes drug error or overdose (e.g., took wrong type of insulin or took extra dose)    Negative: Caller has medication question about med not prescribed by PCP and triager unable to answer question (e.g., compatibility with other med, storage)    Negative: Request for URGENT new prescription or refill of 'essential' medication (i.e., likelihood of harm to patient if not taken) and triager unable to fill per  department policy    Negative: Prescription not at pharmacy and was prescribed today by PCP    Negative: Pharmacy calling with prescription questions and triager unable to answer question    Protocols used: MEDICATION QUESTION CALL-A-OH

## 2021-08-17 ENCOUNTER — LAB (OUTPATIENT)
Dept: LAB | Facility: CLINIC | Age: 81
End: 2021-08-17
Payer: COMMERCIAL

## 2021-08-17 ENCOUNTER — ANTICOAGULATION THERAPY VISIT (OUTPATIENT)
Dept: ANTICOAGULATION | Facility: CLINIC | Age: 81
End: 2021-08-17

## 2021-08-17 DIAGNOSIS — I48.91 ATRIAL FIBRILLATION (H): ICD-10-CM

## 2021-08-17 LAB — INR BLD: 2.8 (ref 0.9–1.1)

## 2021-08-17 PROCEDURE — 85610 PROTHROMBIN TIME: CPT

## 2021-08-17 PROCEDURE — 36416 COLLJ CAPILLARY BLOOD SPEC: CPT

## 2021-08-17 NOTE — PROGRESS NOTES
ANTICOAGULATION MANAGEMENT     Parish Bills 81 year old male is on warfarin with therapeutic INR result. (Goal INR 2.0-3.0)    Recent labs: (last 7 days)     08/17/21  1248   INR 2.8*       ASSESSMENT     Source(s): Chart Review, Patient/Caregiver Call and Template       Warfarin doses taken: Warfarin taken as instructed    Diet: No new diet changes identified    New illness, injury, or hospitalization: No    Medication/supplement changes: None noted    Signs or symptoms of bleeding or clotting: No.   - however, he reports easy bruising on his arms.    Previous INR: Therapeutic last visit; previously outside of goal range    Additional findings: None     PLAN     Recommended plan for no diet, medication or health factor changes affecting INR     Dosing Instructions: Continue your current warfarin dose with next INR in 2 weeks       Summary  As of 8/17/2021    Full warfarin instructions:  7.5 mg every Tue, Fri; 5 mg all other days   Next INR check:               Telephone call with Parish who verbalizes understanding and agrees to plan    Lab visit scheduled - INR on 9/8/21 @ [facility]    Education provided: Importance of consistent vitamin K intake, Impact of vitamin K foods on INR, Target INR goal and significance of current INR result, Monitoring for bleeding signs and symptoms and When to seek medical attention/emergency care    Plan made per [facility] anticoagulation protocol    Kori Ziegler RN  Anticoagulation Clinic  8/17/2021    _______________________________________________________________________     Anticoagulation Episode Summary     Current INR goal:  2.0-3.0   TTR:  82.4 % (1 y)   Target end date:     Send INR reminders to:  SHAN[facility] ODONNELL       Comments:           Anticoagulation Care Providers     Provider Role Specialty Phone number    Isidro Au MD Referring Family Medicine [phone]

## 2021-08-23 ENCOUNTER — OFFICE VISIT (OUTPATIENT)
Dept: FAMILY MEDICINE | Facility: CLINIC | Age: 81
End: 2021-08-23
Payer: COMMERCIAL

## 2021-08-23 VITALS
DIASTOLIC BLOOD PRESSURE: 54 MMHG | BODY MASS INDEX: 27.99 KG/M2 | OXYGEN SATURATION: 98 % | RESPIRATION RATE: 20 BRPM | WEIGHT: 189 LBS | HEART RATE: 61 BPM | HEIGHT: 69 IN | SYSTOLIC BLOOD PRESSURE: 111 MMHG

## 2021-08-23 DIAGNOSIS — R23.3 EASY BRUISING: ICD-10-CM

## 2021-08-23 DIAGNOSIS — R27.0 ATAXIA: ICD-10-CM

## 2021-08-23 DIAGNOSIS — L98.9 SKIN LESION: ICD-10-CM

## 2021-08-23 DIAGNOSIS — R53.83 OTHER FATIGUE: Primary | ICD-10-CM

## 2021-08-23 LAB
ERYTHROCYTE [DISTWIDTH] IN BLOOD BY AUTOMATED COUNT: 14 % (ref 10–15)
HCT VFR BLD AUTO: 33.4 % (ref 40–53)
HGB BLD-MCNC: 10.8 G/DL (ref 13.3–17.7)
MCH RBC QN AUTO: 29.3 PG (ref 26.5–33)
MCHC RBC AUTO-ENTMCNC: 32.3 G/DL (ref 31.5–36.5)
MCV RBC AUTO: 91 FL (ref 78–100)
PLATELET # BLD AUTO: 129 10E3/UL (ref 150–450)
RBC # BLD AUTO: 3.68 10E6/UL (ref 4.4–5.9)
TSH SERPL DL<=0.005 MIU/L-ACNC: 1.44 UIU/ML (ref 0.3–5)
WBC # BLD AUTO: 5.2 10E3/UL (ref 4–11)

## 2021-08-23 PROCEDURE — 85027 COMPLETE CBC AUTOMATED: CPT | Performed by: FAMILY MEDICINE

## 2021-08-23 PROCEDURE — 99213 OFFICE O/P EST LOW 20 MIN: CPT | Performed by: FAMILY MEDICINE

## 2021-08-23 PROCEDURE — 84443 ASSAY THYROID STIM HORMONE: CPT | Performed by: FAMILY MEDICINE

## 2021-08-23 PROCEDURE — 36415 COLL VENOUS BLD VENIPUNCTURE: CPT | Performed by: FAMILY MEDICINE

## 2021-08-23 ASSESSMENT — MIFFLIN-ST. JEOR: SCORE: 1552.68

## 2021-08-23 NOTE — PROGRESS NOTES
"    Assessment & Plan     Other fatigue  Fatigue of unknown etiology discussed hemoglobin and thyroid levels today  Suspect possible physical deconditioning and lack of activity discussed with patient maintaining a schedule  And avoiding napping when capable  We will follow-up based on results  - CBC with platelets; Future  - TSH with free T4 reflex; Future  - CBC with platelets  - TSH with free T4 reflex    Easy bruising  Patient has some bruising in his upper extremities which is slow to heal-with his heart history and rhythm he is on warfarin and aspirin  Discussed with him possibly discontinuing aspirin  Discussed checking platelet levels today    Ataxia  Patient in the last years had a couple falls typically walks with some decrease in his balance  Discussed the advantage of using a walking stick or cane to aid in his stability  Patient will consider using this    Skin lesion  Patient is a seborrheic keratosis on his left scalp will monitor at this time         BMI:   Estimated body mass index is 27.91 kg/m  as calculated from the following:    Height as of this encounter: 1.753 m (5' 9\").    Weight as of this encounter: 85.7 kg (189 lb).   Weight management plan: Discussed healthy diet and exercise guidelines      No follow-ups on file.    Isidro Au MD  [facility]    Mateo Lugo is a 81 year old who presents for the following health issues    HPI   81-year-old male presenting with multiple concerns today.  1 is a skin lesion on the scalp which is consistent with seborrheic keratosis we discussed monitoring.  Second is is easy bruising that has been noticing he has a history of some thrombocytopenia years ago which had resolved we discussed checking platelet levels today.  With his advancing age and thinning skin it is not abnormal with his anticoagulants and aspirin but he has some bruising  Discussed with him the possibility of discontinuing aspirin but I also " "think it is fine for him to continue with it at this time.  Has had some balance issues in the last year with a couple falls-overall feels is not stable when walking-discussed advantages of using an ambulatory device to help gait instability.    Patient is been noting increasing frequency of the need of taking a nap or going to sleep in the mid morning or late afternoon.  We discussed his typical activities during the day and he can force himself to try to stay awake at times and be more active but if not he is usually falling asleep.  He does not always sleep well at night he states and then needs a nap.  I discussed with him again today checking thyroid levels and hemoglobin levels but this would also not be of huge abnormality with his advancing age and activity so we discussed physical deconditioning and continue to have normal activity.  His hip does bother him we discussed that immobility will worsen the hip and activity will help hopefully maintain current function.      Review of Systems   Constitutional, HEENT, cardiovascular, pulmonary, gi and gu systems are negative, except as otherwise noted.      Objective    /54 (BP Location: Left arm, Patient Position: Sitting, Cuff Size: Adult Large)   Pulse 61   Resp 20   Ht 1.753 m (5' 9\")   Wt 85.7 kg (189 lb)   SpO2 98%   BMI 27.91 kg/m    Body mass index is 27.91 kg/m .  Physical Exam   GENERAL: healthy, alert and no distress  EYES: Eyes grossly normal to inspection, PERRL and conjunctivae and sclerae normal  RESP: lungs clear to auscultation - no rales, rhonchi or wheezes  CV: regular rates and rhythm and no peripheral edema  MS: no gross musculoskeletal defects noted, no edema  SKIN: mild bruising noted in BL UE- nothing significant- not common in other areas unless injury            "

## 2021-08-24 ENCOUNTER — TELEPHONE (OUTPATIENT)
Dept: FAMILY MEDICINE | Facility: CLINIC | Age: 81
End: 2021-08-24

## 2021-08-24 DIAGNOSIS — D64.9 ANEMIA, UNSPECIFIED TYPE: Primary | ICD-10-CM

## 2021-08-24 NOTE — TELEPHONE ENCOUNTER
----- Message from Isidro Au MD sent at 8/24/2021 11:00 AM CDT -----  Please contact patient and let him know that the thyroid levels are normal.  His blood is showing platelets that are a little low but not low enough to need to stop the aspirin.  Hemoglobin is down a little from 6 months ago- I would recommend rechecking this in a month to make sure the hemoglobin is not lowering further.

## 2021-08-24 NOTE — TELEPHONE ENCOUNTER
Pt called back, relayed message  Pt is scheduled for lab only appt on 9/20/21- please enter orders for Hgb

## 2021-09-08 ENCOUNTER — LAB (OUTPATIENT)
Dept: LAB | Facility: CLINIC | Age: 81
End: 2021-09-08
Payer: COMMERCIAL

## 2021-09-08 ENCOUNTER — ANTICOAGULATION THERAPY VISIT (OUTPATIENT)
Dept: ANTICOAGULATION | Facility: CLINIC | Age: 81
End: 2021-09-08

## 2021-09-08 DIAGNOSIS — I48.91 ATRIAL FIBRILLATION (H): ICD-10-CM

## 2021-09-08 LAB — INR BLD: 2.2 (ref 0.9–1.1)

## 2021-09-08 PROCEDURE — 36416 COLLJ CAPILLARY BLOOD SPEC: CPT

## 2021-09-08 PROCEDURE — 85610 PROTHROMBIN TIME: CPT

## 2021-09-08 NOTE — PROGRESS NOTES
ANTICOAGULATION MANAGEMENT     Parish Bills 81 year old male is on warfarin with therapeutic INR result. (Goal INR 2.0-3.0)    Recent labs: (last 7 days)     09/08/21  0838   INR 2.2*       ASSESSMENT     Source(s): Chart Review, Patient/Caregiver Call and Template       Warfarin doses taken: Warfarin taken as instructed    Diet: No new diet changes identified    New illness, injury, or hospitalization: No    Medication/supplement changes: None noted    Signs or symptoms of bleeding or clotting: Yes.   Still reported easy bruising.   Scheduled for labs - CBC w/ platelets on 9/20    Previous INR: Therapeutic last 2(+) visits    Additional findings: None     PLAN     Recommended plan for no diet, medication or health factor changes affecting INR     Dosing Instructions: Continue your current warfarin dose with next INR in 4 weeks       Summary  As of 9/8/2021    Full warfarin instructions:  7.5 mg every Tue, Fri; 5 mg all other days   Next INR check:  10/6/2021             Telephone call with Parish ([phone]) who verbalizes understanding and agrees to romain  Lab visit scheduled - INR on 10/6/21 @ [facility]    Education provided: Importance of consistent vitamin K intake, Goal range and significance of current result, Monitoring for bleeding signs and symptoms and When to seek medical attention/emergency care    Plan made per [facility] anticoagulation protocol    Kori Ziegler RN  Anticoagulation Clinic  9/8/2021    _______________________________________________________________________     Anticoagulation Episode Summary     Current INR goal:  2.0-3.0   TTR:  82.4 % (1 y)   Target end date:     Send INR reminders to:  [facility]       Comments:           Anticoagulation Care Providers     Provider Role Specialty Phone number    Isidro Au MD Referring Family Medicine [phone]

## 2021-09-20 ENCOUNTER — LAB (OUTPATIENT)
Dept: LAB | Facility: CLINIC | Age: 81
End: 2021-09-20
Payer: COMMERCIAL

## 2021-09-20 DIAGNOSIS — D64.9 ANEMIA, UNSPECIFIED TYPE: ICD-10-CM

## 2021-09-20 LAB
ERYTHROCYTE [DISTWIDTH] IN BLOOD BY AUTOMATED COUNT: 13.9 % (ref 10–15)
HCT VFR BLD AUTO: 35.3 % (ref 40–53)
HGB BLD-MCNC: 11.6 G/DL (ref 13.3–17.7)
MCH RBC QN AUTO: 30 PG (ref 26.5–33)
MCHC RBC AUTO-ENTMCNC: 32.9 G/DL (ref 31.5–36.5)
MCV RBC AUTO: 91 FL (ref 78–100)
PLATELET # BLD AUTO: 134 10E3/UL (ref 150–450)
RBC # BLD AUTO: 3.87 10E6/UL (ref 4.4–5.9)
WBC # BLD AUTO: 4.7 10E3/UL (ref 4–11)

## 2021-09-20 PROCEDURE — 85027 COMPLETE CBC AUTOMATED: CPT

## 2021-09-20 PROCEDURE — 36415 COLL VENOUS BLD VENIPUNCTURE: CPT

## 2021-09-21 ENCOUNTER — TELEPHONE (OUTPATIENT)
Dept: FAMILY MEDICINE | Facility: CLINIC | Age: 81
End: 2021-09-21

## 2021-09-21 NOTE — TELEPHONE ENCOUNTER
----- Message from Isidro Au MD sent at 9/21/2021 10:31 AM CDT -----  Please inform patient that hemoglobin and platelet levels are trending upward.  I would like him to recheck in 2 months.

## 2021-09-21 NOTE — TELEPHONE ENCOUNTER
Called and spoke to patient. Informed patient of note from provider.   Scheduled him for a lab recheck in [place].

## 2021-09-30 ENCOUNTER — IMMUNIZATION (OUTPATIENT)
Dept: FAMILY MEDICINE | Facility: CLINIC | Age: 81
End: 2021-09-30
Payer: COMMERCIAL

## 2021-09-30 PROCEDURE — G0008 ADMIN INFLUENZA VIRUS VAC: HCPCS

## 2021-09-30 PROCEDURE — 90662 IIV NO PRSV INCREASED AG IM: CPT

## 2021-10-06 ENCOUNTER — LAB (OUTPATIENT)
Dept: LAB | Facility: CLINIC | Age: 81
End: 2021-10-06
Payer: COMMERCIAL

## 2021-10-06 ENCOUNTER — ANTICOAGULATION THERAPY VISIT (OUTPATIENT)
Dept: ANTICOAGULATION | Facility: CLINIC | Age: 81
End: 2021-10-06

## 2021-10-06 DIAGNOSIS — I48.91 ATRIAL FIBRILLATION (H): ICD-10-CM

## 2021-10-06 DIAGNOSIS — D64.9 ANEMIA, UNSPECIFIED TYPE: Primary | ICD-10-CM

## 2021-10-06 LAB — INR BLD: 2.6 (ref 0.9–1.1)

## 2021-10-06 PROCEDURE — 85610 PROTHROMBIN TIME: CPT

## 2021-10-06 PROCEDURE — 36416 COLLJ CAPILLARY BLOOD SPEC: CPT

## 2021-10-06 NOTE — PROGRESS NOTES
ANTICOAGULATION MANAGEMENT     Parish Bills 81 year old male is on warfarin with therapeutic INR result. (Goal INR 2.0-3.0)    Recent labs: (last 7 days)     10/06/21  0849   INR 2.6*       ASSESSMENT     Source(s): Chart Review, Patient/Caregiver Call and Template       Warfarin doses taken: Warfarin taken as instructed    Diet: No new diet changes identified    New illness, injury, or hospitalization: No    Medication/supplement changes: None noted    Signs or symptoms of bleeding or clotting: No    Previous INR: Therapeutic last 3 INR visits    Additional findings: None     PLAN     Recommended plan for no diet, medication or health factor changes affecting INR     Dosing Instructions:  (5mg tabs)   - Continue your current warfarin dose with next INR in 4-5 weeks       Summary  As of 10/6/2021    Full warfarin instructions:  7.5 mg every Tue, Fri; 5 mg all other days   Next INR check:  11/10/2021             Telephone call with Parish ([phone]) who verbalizes understanding and agrees to plan    Lab visit scheduled - INR on 11/18/21 @ [facility].    Education provided: Importance of consistent vitamin K intake and Goal range and significance of current result    Plan made per [facility] anticoagulation protocol    Kori Ziegler, RN  Anticoagulation Clinic  10/6/2021    _______________________________________________________________________     Anticoagulation Episode Summary     Current INR goal:  2.0-3.0   TTR:  82.4 % (1 y)   Target end date:     Send INR reminders to:  [facility]       Comments:           Anticoagulation Care Providers     Provider Role Specialty Phone number    Isidro Au MD Referring Family Medicine [phone]

## 2021-10-06 NOTE — PROGRESS NOTES
Hi Dr. Au,     - I just spoke with Parish Bills re:  INR results, and he stated he has an appt on 11/18/21 to repeat labs (CBC w/ platelets).     - However, I did not see an order in place.  Please enter a future order for the test you want repeated.       - thank you.

## 2021-10-19 PROBLEM — F32.9 MAJOR DEPRESSION: Status: ACTIVE | Noted: 2020-11-04

## 2021-11-11 ENCOUNTER — IMMUNIZATION (OUTPATIENT)
Dept: NURSING | Facility: CLINIC | Age: 81
End: 2021-11-11
Payer: COMMERCIAL

## 2021-11-11 PROCEDURE — 0064A COVID-19,PF,MODERNA (18+ YRS BOOSTER .25ML): CPT

## 2021-11-11 PROCEDURE — 91306 COVID-19,PF,MODERNA (18+ YRS BOOSTER .25ML): CPT

## 2021-11-18 ENCOUNTER — LAB (OUTPATIENT)
Dept: LAB | Facility: CLINIC | Age: 81
End: 2021-11-18
Payer: COMMERCIAL

## 2021-11-18 ENCOUNTER — ANTICOAGULATION THERAPY VISIT (OUTPATIENT)
Dept: ANTICOAGULATION | Facility: CLINIC | Age: 81
End: 2021-11-18

## 2021-11-18 DIAGNOSIS — D64.9 ANEMIA, UNSPECIFIED TYPE: ICD-10-CM

## 2021-11-18 DIAGNOSIS — I48.91 ATRIAL FIBRILLATION (H): ICD-10-CM

## 2021-11-18 LAB
ERYTHROCYTE [DISTWIDTH] IN BLOOD BY AUTOMATED COUNT: 14 % (ref 10–15)
HCT VFR BLD AUTO: 35.3 % (ref 40–53)
HGB BLD-MCNC: 11.4 G/DL (ref 13.3–17.7)
INR BLD: 2.7 (ref 0.9–1.1)
MCH RBC QN AUTO: 29.5 PG (ref 26.5–33)
MCHC RBC AUTO-ENTMCNC: 32.3 G/DL (ref 31.5–36.5)
MCV RBC AUTO: 92 FL (ref 78–100)
PLATELET # BLD AUTO: 130 10E3/UL (ref 150–450)
RBC # BLD AUTO: 3.86 10E6/UL (ref 4.4–5.9)
WBC # BLD AUTO: 6 10E3/UL (ref 4–11)

## 2021-11-18 PROCEDURE — 85027 COMPLETE CBC AUTOMATED: CPT

## 2021-11-18 PROCEDURE — 36416 COLLJ CAPILLARY BLOOD SPEC: CPT

## 2021-11-18 PROCEDURE — 85610 PROTHROMBIN TIME: CPT

## 2021-11-18 PROCEDURE — 36415 COLL VENOUS BLD VENIPUNCTURE: CPT

## 2021-11-18 NOTE — PROGRESS NOTES
ANTICOAGULATION MANAGEMENT     Parish Bills 81 year old male is on warfarin with therapeutic INR result. (Goal INR 2.0-3.0)    Recent labs: (last 7 days)     11/18/21  0922   INR 2.7*       ASSESSMENT     Source(s): Chart Review, Patient/Caregiver Call and Template       Warfarin doses taken: Warfarin taken as instructed    Diet: No new diet changes identified    New illness, injury, or hospitalization: No    Medication/supplement changes: None noted    - vaccinated with Moderna Covid-19 booster on 11/11/21.    Signs or symptoms of bleeding or clotting: No    Previous INR: Therapeutic last 4 INR visits    Additional findings: None     PLAN     Recommended plan for no diet, medication or health factor changes affecting INR     Dosing Instructions:    Continue your current warfarin dose with next INR in 4-6 weeks       Summary  As of 11/18/2021    Full warfarin instructions:  7.5 mg every Tue, Fri; 5 mg all other days   Next INR check:  12/30/2021             Detailed voice message left for  Parish ([phone]) with dosing instructions and follow up date.     Contact [phone] to schedule and with any changes, questions or concerns.     Education provided: Please call back if any changes to your diet, medications or how you've been taking warfarin, Importance of consistent vitamin K intake and Goal range and significance of current result    Plan made per [facility] anticoagulation protocol    Kori Ziegler RN  Anticoagulation Clinic  11/18/2021    _______________________________________________________________________     Anticoagulation Episode Summary     Current INR goal:  2.0-3.0   TTR:  93.2 % (1 y)   Target end date:     Send INR reminders to:  [facility]       Comments:           Anticoagulation Care Providers     Provider Role Specialty Phone number    Isidro Au MD Referring Family Medicine [phone]

## 2021-12-14 ENCOUNTER — DOCUMENTATION ONLY (OUTPATIENT)
Dept: LAB | Facility: CLINIC | Age: 81
End: 2021-12-14
Payer: COMMERCIAL

## 2021-12-14 NOTE — PROGRESS NOTES
Patient is coming in for INR poct appointment at this lab. Please review and place future orders, if patient needs additional labs, per appointment notes. Thanks.

## 2021-12-15 DIAGNOSIS — D64.9 ANEMIA, UNSPECIFIED TYPE: Primary | ICD-10-CM

## 2021-12-22 ENCOUNTER — ANTICOAGULATION THERAPY VISIT (OUTPATIENT)
Dept: ANTICOAGULATION | Facility: CLINIC | Age: 81
End: 2021-12-22

## 2021-12-22 ENCOUNTER — LAB (OUTPATIENT)
Dept: LAB | Facility: CLINIC | Age: 81
End: 2021-12-22
Payer: COMMERCIAL

## 2021-12-22 DIAGNOSIS — I48.91 ATRIAL FIBRILLATION (H): ICD-10-CM

## 2021-12-22 DIAGNOSIS — D64.9 ANEMIA, UNSPECIFIED TYPE: ICD-10-CM

## 2021-12-22 LAB — INR BLD: 3.1 (ref 0.9–1.1)

## 2021-12-22 PROCEDURE — 85610 PROTHROMBIN TIME: CPT

## 2021-12-22 PROCEDURE — 36415 COLL VENOUS BLD VENIPUNCTURE: CPT

## 2021-12-22 NOTE — PROGRESS NOTES
ANTICOAGULATION MANAGEMENT     Parish Bills 81 year old male is on warfarin with supratherapeutic INR result. (Goal INR 2.0-3.0)    Recent labs: (last 7 days)     12/22/21  0847   INR 3.1*       ASSESSMENT     Source(s): Chart Review, Patient/Caregiver Call and Template       Warfarin doses taken: Warfarin taken as instructed    Diet: No new diet changes identified     New illness, injury, or hospitalization: No    Medication/supplement changes: None noted    Signs or symptoms of bleeding or clotting: No    Previous INR: Therapeutic last 5 INR visits.    Additional findings: None     PLAN     Recommended plan for no diet, medication or health factor changes affecting INR     Dosing Instructions:    Continue your current warfarin dose with next INR in 4-5 weeks       Summary  As of 12/22/2021    Full warfarin instructions:  7.5 mg every Tue, Fri; 5 mg all other days   Next INR check:  1/26/2022             Telephone call with  Parish ([phone]) who verbalizes understanding and agrees to plan.   - advised extra spoonful of Vitamin-K foods he eats.   - and staying consistent with intake.    Patient offered & declined to schedule next visit    Education provided: Importance of consistent vitamin K intake, Impact of vitamin K foods on INR, Goal range and significance of current result, Monitoring for bleeding signs and symptoms and When to seek medical attention/emergency care    Plan made per [facility] anticoagulation protocol    Kori Ziegler RN  Anticoagulation Clinic  12/22/2021    _______________________________________________________________________     Anticoagulation Episode Summary     Current INR goal:  2.0-3.0   TTR:  91.9 % (1 y)   Target end date:     Send INR reminders to:  [facility]       Comments:           Anticoagulation Care Providers     Provider Role Specialty Phone number    Isidro Au MD Referring Family Medicine [phone]

## 2021-12-26 DIAGNOSIS — N40.1 BENIGN PROSTATIC HYPERPLASIA WITH URINARY FREQUENCY: ICD-10-CM

## 2021-12-26 DIAGNOSIS — R35.0 BENIGN PROSTATIC HYPERPLASIA WITH URINARY FREQUENCY: ICD-10-CM

## 2021-12-26 DIAGNOSIS — F32.0 CURRENT MILD EPISODE OF MAJOR DEPRESSIVE DISORDER, UNSPECIFIED WHETHER RECURRENT (H): ICD-10-CM

## 2021-12-26 DIAGNOSIS — I10 HTN (HYPERTENSION): ICD-10-CM

## 2021-12-26 DIAGNOSIS — E78.5 HYPERLIPIDEMIA LDL GOAL <100: ICD-10-CM

## 2021-12-27 RX ORDER — BUPROPION HYDROCHLORIDE 150 MG/1
TABLET ORAL
Qty: 90 TABLET | Refills: 3 | Status: SHIPPED | OUTPATIENT
Start: 2021-12-27 | End: 2022-10-15

## 2021-12-27 RX ORDER — TAMSULOSIN HYDROCHLORIDE 0.4 MG/1
CAPSULE ORAL
Qty: 90 CAPSULE | Refills: 3 | Status: SHIPPED | OUTPATIENT
Start: 2021-12-27 | End: 2022-10-11

## 2021-12-27 RX ORDER — ATORVASTATIN CALCIUM 40 MG/1
TABLET, FILM COATED ORAL
Qty: 90 TABLET | Refills: 3 | Status: SHIPPED | OUTPATIENT
Start: 2021-12-27 | End: 2022-10-11

## 2021-12-27 RX ORDER — METOPROLOL SUCCINATE 50 MG/1
TABLET, EXTENDED RELEASE ORAL
Qty: 90 TABLET | Refills: 3 | Status: SHIPPED | OUTPATIENT
Start: 2021-12-27 | End: 2022-10-11

## 2022-01-24 ENCOUNTER — ANTICOAGULATION THERAPY VISIT (OUTPATIENT)
Dept: ANTICOAGULATION | Facility: CLINIC | Age: 82
End: 2022-01-24

## 2022-01-24 ENCOUNTER — LAB (OUTPATIENT)
Dept: LAB | Facility: CLINIC | Age: 82
End: 2022-01-24
Payer: COMMERCIAL

## 2022-01-24 DIAGNOSIS — I48.91 ATRIAL FIBRILLATION (H): ICD-10-CM

## 2022-01-24 LAB — INR BLD: 3.1 (ref 0.9–1.1)

## 2022-01-24 PROCEDURE — 36416 COLLJ CAPILLARY BLOOD SPEC: CPT

## 2022-01-24 PROCEDURE — 85610 PROTHROMBIN TIME: CPT

## 2022-01-24 NOTE — PROGRESS NOTES
ANTICOAGULATION MANAGEMENT     Parish Bills 81 year old male is on warfarin with supratherapeutic INR result. (Goal INR 2.0-3.0)    Recent labs: (last 7 days)     01/24/22  1128   INR 3.1*       ASSESSMENT     Source(s): Chart Review, Patient/Caregiver Call and Template       Warfarin doses taken: Warfarin taken as instructed    Diet: No new diet changes identified    New illness, injury, or hospitalization: No    Medication/supplement changes: None noted    Signs or symptoms of bleeding or clotting: No    Previous INR: Supratherapeutic    Additional findings: None     PLAN     Recommended plan for no diet, medication or health factor changes affecting INR     Dosing Instructions:  Decrease your warfarin dose (6% change) with next INR in 2 weeks       Summary  As of 1/24/2022    Full warfarin instructions:  7.5 mg every Fri; 5 mg all other days   Next INR check:  2/7/2022             Telephone call with Parish who verbalizes understanding and agrees to plan    Lab visit scheduled    Education provided: Goal range and significance of current result    Plan made per [facility] anticoagulation protocol    Corina Gillespie RN  Anticoagulation Clinic  1/24/2022    _______________________________________________________________________     Anticoagulation Episode Summary     Current INR goal:  2.0-3.0   TTR:  82.8 % (1 y)   Target end date:     Send INR reminders to:  [facility]    Indications    A-fib (H) (Resolved) [I48.91]           Comments:           Anticoagulation Care Providers     Provider Role Specialty Phone number    Isidro Au MD Referring Family Medicine [phone]

## 2022-02-08 ENCOUNTER — LAB (OUTPATIENT)
Dept: LAB | Facility: CLINIC | Age: 82
End: 2022-02-08
Payer: COMMERCIAL

## 2022-02-08 ENCOUNTER — ANTICOAGULATION THERAPY VISIT (OUTPATIENT)
Dept: ANTICOAGULATION | Facility: CLINIC | Age: 82
End: 2022-02-08

## 2022-02-08 DIAGNOSIS — I48.91 ATRIAL FIBRILLATION (H): ICD-10-CM

## 2022-02-08 LAB — INR BLD: 2.3 (ref 0.9–1.1)

## 2022-02-08 PROCEDURE — 36416 COLLJ CAPILLARY BLOOD SPEC: CPT

## 2022-02-08 PROCEDURE — 85610 PROTHROMBIN TIME: CPT

## 2022-02-08 NOTE — PROGRESS NOTES
ANTICOAGULATION MANAGEMENT     Parish Bills 81 year old male is on warfarin with therapeutic INR result. (Goal INR 2.0-3.0)    Recent labs: (last 7 days)     02/08/22  0847   INR 2.3*       ASSESSMENT     Source(s): Chart Review, Patient/Caregiver Call and Template       Warfarin doses taken: Warfarin taken as instructed    Diet: No new diet changes identified    New illness, injury, or hospitalization: No    Medication/supplement changes: None noted    Signs or symptoms of bleeding or clotting: No    Previous INR: Supratherapeutic last 2 INR results.    Additional findings: None     PLAN     Recommended plan for no diet, medication or health factor changes affecting INR     Dosing Instructions:   (5mg tabs)    Continue your current warfarin dose with next INR in 2-3 weeks       Summary  As of 2/8/2022    Full warfarin instructions:  7.5 mg every Fri; 5 mg all other days   Next INR check:  3/1/2022             Telephone call with  Parish ([phone]) who verbalizes understanding and agrees to plan    Lab visit scheduled - INR on 3/1/22 @ [facility]    Education provided: Importance of consistent vitamin K intake and Goal range and significance of current result    Plan made per [facility] anticoagulation protocol    Kori Ziegler RN  Anticoagulation Clinic  2/8/2022    _______________________________________________________________________     Anticoagulation Episode Summary     Current INR goal:  2.0-3.0   TTR:  82.3 % (1 y)   Target end date:     Send INR reminders to:  [facility]    Indications    A-fib (H) (Resolved) [I48.91]           Comments:           Anticoagulation Care Providers     Provider Role Specialty Phone number    Isidro Au MD Referring Family Medicine [phone]

## 2022-02-09 DIAGNOSIS — R60.9 EDEMA, UNSPECIFIED TYPE: Primary | ICD-10-CM

## 2022-02-11 RX ORDER — FUROSEMIDE 20 MG
TABLET ORAL
Qty: 270 TABLET | Refills: 3 | Status: SHIPPED | OUTPATIENT
Start: 2022-02-11 | End: 2022-04-11

## 2022-02-11 NOTE — TELEPHONE ENCOUNTER
Outpatient Medication Detail     Disp Refills Start End MANJIT   furosemide (LASIX) 20 MG tablet 270 tablet 2 5/3/2021  No   Sig: TAKE 2 TABLETS BY MOUTH IN  THE MORNING AND 1 TABLET BY MOUTH AT NOON   Sent to pharmacy as: furosemide 20 mg tablet (LASIX)   E-Prescribing Status: Receipt confirmed by pharmacy (5/3/2021  8:43 AM CDT)       furosemide (LASIX) 20 MG tablet [[id number]]    Electronically signed by: Lalo Cortez RN on 05/03/21 0842 Status: Active   Ordering user: Lalo Cortez RN 05/03/21 0842 Ordering provider: Isidro Au MD   Authorized by: Isidro Au MD   Frequency:  05/03/21 - Until Discontinued Released by: Lalo Cortez RN 05/03/21 0842   Diagnoses  Edema, unspecified type [R60.9]     Routing refill request to provider for review/approval because:  Labs not current:  Multiple    Last office visit provider:  8/23/21     Requested Prescriptions   Pending Prescriptions Disp Refills     furosemide (LASIX) 20 MG tablet [Pharmacy Med Name: Furosemide 20 MG Oral Tablet] 270 tablet 3     Sig: TAKE 2 TABLETS BY MOUTH IN  THE MORNING AND 1 TABLET BY MOUTH AT NOON       Diuretics (Including Combos) Protocol Failed - 2/11/2022  7:47 AM        Failed - Normal serum creatinine on file in past 12 months     Recent Labs   Lab Test 02/02/21  0821   CR 1.09              Failed - Normal serum potassium on file in past 12 months     Recent Labs   Lab Test 02/02/21  0821   POTASSIUM 4.6                    Failed - Normal serum sodium on file in past 12 months     Recent Labs   Lab Test 02/02/21  0821                 Passed - Blood pressure under 140/90 in past 12 months     BP Readings from Last 3 Encounters:   08/23/21 111/54   11/04/20 110/55   08/11/20 114/54                 Passed - Recent (12 mo) or future (30 days) visit within the authorizing provider's specialty     Patient has had an office visit with the authorizing provider or a provider within the authorizing providers department  "within the previous 12 mos or has a future within next 30 days. See \"Patient Info\" tab in inbasket, or \"Choose Columns\" in Meds & Orders section of the refill encounter.              Passed - Medication is active on med list        Passed - Patient is age 18 or older             Lalo Cortez RN 02/11/22 7:48 AM  "

## 2022-03-01 ENCOUNTER — ANTICOAGULATION THERAPY VISIT (OUTPATIENT)
Dept: ANTICOAGULATION | Facility: CLINIC | Age: 82
End: 2022-03-01

## 2022-03-01 ENCOUNTER — LAB (OUTPATIENT)
Dept: LAB | Facility: CLINIC | Age: 82
End: 2022-03-01
Payer: COMMERCIAL

## 2022-03-01 DIAGNOSIS — I48.91 ATRIAL FIBRILLATION (H): ICD-10-CM

## 2022-03-01 LAB — INR BLD: 2.3 (ref 0.9–1.1)

## 2022-03-01 PROCEDURE — 85610 PROTHROMBIN TIME: CPT

## 2022-03-01 PROCEDURE — 36416 COLLJ CAPILLARY BLOOD SPEC: CPT

## 2022-03-01 NOTE — PROGRESS NOTES
ANTICOAGULATION MANAGEMENT     Parish Bills 81 year old male is on warfarin with therapeutic INR result. (Goal INR 2.0-3.0)    Recent labs: (last 7 days)     03/01/22  0852   INR 2.3*       ASSESSMENT       Source(s): Chart Review, Patient/Caregiver Call and Template       Warfarin doses taken: Warfarin taken as instructed    Diet: No new diet changes identified    New illness, injury, or hospitalization: No    Medication/supplement changes: None noted    Signs or symptoms of bleeding or clotting: No    Previous INR: Therapeutic last visit at 2.3; previously outside of goal range at 3.1    Additional findings: None       PLAN     Recommended plan for no diet, medication or health factor changes affecting INR     Dosing Instructions:   (5mg tabs)    Continue your current warfarin dose with next INR in 2-3 weeks       Summary  As of 3/1/2022    Full warfarin instructions:  7.5 mg every Fri; 5 mg all other days   Next INR check:  3/22/2022             Telephone call with  Parish ([phone]) who verbalizes understanding and agrees to plan    Lab visit scheduled - INR on 3/23/22 @ [facility].    Education provided: Importance of consistent vitamin K intake and Goal range and significance of current result    Plan made per [facility] anticoagulation protocol    Kori Ziegler RN  Anticoagulation Clinic  3/1/2022    _______________________________________________________________________     Anticoagulation Episode Summary     Current INR goal:  2.0-3.0   TTR:  82.3 % (1 y)   Target end date:     Send INR reminders to:  [facility] [[email or web address]]    Indications    A-fib (H) (Resolved) [I48.91]           Comments:           Anticoagulation Care Providers     Provider Role Specialty Phone number    Isidro Au MD Referring Family Medicine [phone]

## 2022-03-21 DIAGNOSIS — F41.9 ANXIETY: ICD-10-CM

## 2022-03-21 RX ORDER — SERTRALINE HYDROCHLORIDE 100 MG/1
TABLET, FILM COATED ORAL
Qty: 90 TABLET | Refills: 1 | Status: SHIPPED | OUTPATIENT
Start: 2022-03-21 | End: 2022-03-25

## 2022-03-21 NOTE — TELEPHONE ENCOUNTER
Reason for Call:  Medication or medication refill:    Do you use a [facility]?  Name of the pharmacy and phone number for the current request:  [facility] - [place], [place] - [address] [place] [address], [address]    Name of the medication requested:   - sertraline (ZOLOFT) 100 MG tablet  - sertraline (ZOLOFT) 50 MG tablet    Other request: Pt called stating he forgot to reach out to let provider know to send in rx. Pt has 1 tablet each for tmrw only. Pt is hoping if Dr. Au can send these rx's today to [facility].     Can we leave a detailed message on this number? YES    Phone number patient can be reached at: Cell number on file:    Telephone Information:   Mobile [phone]       Best Time: Any time    Call taken on 3/21/2022 at 10:32 AM by Marla Woodward

## 2022-03-23 ENCOUNTER — OFFICE VISIT (OUTPATIENT)
Dept: FAMILY MEDICINE | Facility: CLINIC | Age: 82
End: 2022-03-23
Payer: COMMERCIAL

## 2022-03-23 ENCOUNTER — ANCILLARY PROCEDURE (OUTPATIENT)
Dept: GENERAL RADIOLOGY | Facility: CLINIC | Age: 82
End: 2022-03-23
Attending: FAMILY MEDICINE
Payer: COMMERCIAL

## 2022-03-23 ENCOUNTER — DOCUMENTATION ONLY (OUTPATIENT)
Dept: ANTICOAGULATION | Facility: CLINIC | Age: 82
End: 2022-03-23

## 2022-03-23 ENCOUNTER — ANTICOAGULATION THERAPY VISIT (OUTPATIENT)
Dept: ANTICOAGULATION | Facility: CLINIC | Age: 82
End: 2022-03-23

## 2022-03-23 VITALS
HEIGHT: 69 IN | OXYGEN SATURATION: 96 % | BODY MASS INDEX: 27.74 KG/M2 | SYSTOLIC BLOOD PRESSURE: 122 MMHG | WEIGHT: 187.3 LBS | HEART RATE: 98 BPM | DIASTOLIC BLOOD PRESSURE: 70 MMHG

## 2022-03-23 DIAGNOSIS — R05.9 COUGH: ICD-10-CM

## 2022-03-23 DIAGNOSIS — J40 BRONCHITIS: Primary | ICD-10-CM

## 2022-03-23 DIAGNOSIS — I48.20 CHRONIC ATRIAL FIBRILLATION (H): ICD-10-CM

## 2022-03-23 DIAGNOSIS — I71.40 ABDOMINAL AORTIC ANEURYSM (AAA) WITHOUT RUPTURE (H): ICD-10-CM

## 2022-03-23 DIAGNOSIS — I48.20 CHRONIC ATRIAL FIBRILLATION (H): Primary | ICD-10-CM

## 2022-03-23 DIAGNOSIS — I50.23 ACUTE ON CHRONIC SYSTOLIC HEART FAILURE (H): ICD-10-CM

## 2022-03-23 PROBLEM — R63.4 WEIGHT LOSS: Status: RESOLVED | Noted: 2018-09-10 | Resolved: 2022-03-23

## 2022-03-23 LAB
ANION GAP SERPL CALCULATED.3IONS-SCNC: 11 MMOL/L (ref 5–18)
BNP SERPL-MCNC: 291 PG/ML (ref 0–88)
BUN SERPL-MCNC: 20 MG/DL (ref 8–28)
CALCIUM SERPL-MCNC: 9.1 MG/DL (ref 8.5–10.5)
CHLORIDE BLD-SCNC: 104 MMOL/L (ref 98–107)
CO2 SERPL-SCNC: 26 MMOL/L (ref 22–31)
CREAT SERPL-MCNC: 1.04 MG/DL (ref 0.7–1.3)
ERYTHROCYTE [DISTWIDTH] IN BLOOD BY AUTOMATED COUNT: 14.1 % (ref 10–15)
GFR SERPL CREATININE-BSD FRML MDRD: 72 ML/MIN/1.73M2
GLUCOSE BLD-MCNC: 87 MG/DL (ref 70–125)
HCT VFR BLD AUTO: 35.6 % (ref 40–53)
HGB BLD-MCNC: 11.5 G/DL (ref 13.3–17.7)
INR BLD: 3 (ref 0.9–1.1)
MCH RBC QN AUTO: 29.1 PG (ref 26.5–33)
MCHC RBC AUTO-ENTMCNC: 32.3 G/DL (ref 31.5–36.5)
MCV RBC AUTO: 90 FL (ref 78–100)
PLATELET # BLD AUTO: 160 10E3/UL (ref 150–450)
POTASSIUM BLD-SCNC: 4.5 MMOL/L (ref 3.5–5)
RBC # BLD AUTO: 3.95 10E6/UL (ref 4.4–5.9)
SODIUM SERPL-SCNC: 141 MMOL/L (ref 136–145)
WBC # BLD AUTO: 5.8 10E3/UL (ref 4–11)

## 2022-03-23 PROCEDURE — 36415 COLL VENOUS BLD VENIPUNCTURE: CPT | Performed by: FAMILY MEDICINE

## 2022-03-23 PROCEDURE — 71046 X-RAY EXAM CHEST 2 VIEWS: CPT | Mod: TC | Performed by: RADIOLOGY

## 2022-03-23 PROCEDURE — 83880 ASSAY OF NATRIURETIC PEPTIDE: CPT | Performed by: FAMILY MEDICINE

## 2022-03-23 PROCEDURE — 80048 BASIC METABOLIC PNL TOTAL CA: CPT | Performed by: FAMILY MEDICINE

## 2022-03-23 PROCEDURE — 85610 PROTHROMBIN TIME: CPT | Performed by: FAMILY MEDICINE

## 2022-03-23 PROCEDURE — 85027 COMPLETE CBC AUTOMATED: CPT | Performed by: FAMILY MEDICINE

## 2022-03-23 PROCEDURE — 99214 OFFICE O/P EST MOD 30 MIN: CPT | Performed by: FAMILY MEDICINE

## 2022-03-23 RX ORDER — AZITHROMYCIN 250 MG/1
TABLET, FILM COATED ORAL
Qty: 6 TABLET | Refills: 0 | Status: SHIPPED | OUTPATIENT
Start: 2022-03-23 | End: 2022-03-28

## 2022-03-23 NOTE — ASSESSMENT & PLAN NOTE
This individual appears euvolemic on physical exam.  No abnormalities with chest x-ray.  BNP is in process.  His cough seems to be of infectious etiology.

## 2022-03-23 NOTE — ASSESSMENT & PLAN NOTE
New complaint.  Complicated health history including need for revision to AAA graft.  History of congestive heart failure.  Appears euvolemic on physical exam.  Recent upper respiratory infection (wife with similar symptoms) with persistent cough.  I believe his symptoms are consistent with bronchitis.  Distant history of smoking although he has not been told that he has emphysema.  Given persistence of cough as well as need for upcoming surgery we will treat for respiratory organisms.  The patient understands that there is a decent chance that this is either a reactive process or still a viral process.  If he does not improve with the antibiotics we should not automatically conclude that he needs additional antibiotics or broader coverage.  Some of his laboratory testing is pending at the time of this dictation including BNP.  Given his physical exam and the course of symptoms I doubt this is congestive heart failure.

## 2022-03-23 NOTE — PROGRESS NOTES
ANTICOAGULATION MANAGEMENT      Parish Bills due for annual renewal of referral to anticoagulation monitoring. Order pended for your review and signature.      ANTICOAGULATION SUMMARY      Warfarin indication(s)     Atrial fibrillation, chronic  Atypical atrial flutter.    Heart valve present?  Bioprosthetic AVR 7/30/18       Current goal range   INR: 2.0-3.0     Goal appropriate for indication? Yes, INR 2-3 appropriate for hx of DVT, PE, hypercoagulable state, Afib, LVAD, or bileaflet AVR without risk factors     Current duration of therapy Indefinite/long term therapy   Time in Therapeutic Range (TTR)  (Goal > 60%) 82.3 %       Office visit with referring provider's group within last year Yes on 3/23/22.       Kori Ziegler RN

## 2022-03-23 NOTE — PROGRESS NOTES
"Assessment/Plan:    Acute on chronic systolic heart failure (H)  This individual appears euvolemic on physical exam.  No abnormalities with chest x-ray.  BNP is in process.  His cough seems to be of infectious etiology.    Abdominal aortic aneurysm (AAA) (H)  The patient recently saw his vascular surgeon who wants to revise the graft.  The patient describes this as being urgent which is part of what prompted today's visit.    Bronchitis  New complaint.  Complicated health history including need for revision to AAA graft.  History of congestive heart failure.  Appears euvolemic on physical exam.  Recent upper respiratory infection (wife with similar symptoms) with persistent cough.  I believe his symptoms are consistent with bronchitis.  Distant history of smoking although he has not been told that he has emphysema.  Given persistence of cough as well as need for upcoming surgery we will treat for respiratory organisms.  The patient understands that there is a decent chance that this is either a reactive process or still a viral process.  If he does not improve with the antibiotics we should not automatically conclude that he needs additional antibiotics or broader coverage.  Some of his laboratory testing is pending at the time of this dictation including BNP.  Given his physical exam and the course of symptoms I doubt this is congestive heart failure.    Return in about 1 week (around 3/30/2022) for Recheck if not improving.    Marques Whitehead MD  _______________________________    Chief Complaint   Patient presents with     Cough     Cough and wheezing for 3 weeks     Subjective: Parish Bills is a 81 year old year old male who returns to clinic for the following chronic complaints/concerns:     Coughing and wheezing:    - planned vascular surgery   - wife and patient \"had something\" a few weeks.  A fever for 24-36 hours.  Wheezing in the morning.  He says that he feels pretty normal.  Coughing throughout the " "day.  Wheezing will change with product phlegm.     -weight has been stable.  Not much of a change.     - he has subconjunctival hemorrhage current.       Review of Systems   Constitutional:        Review of systems negative except as noted in the HPI.   All other systems reviewed and are negative.       Reviewed history:                 Objective:    height is 1.753 m (5' 9\") and weight is 85 kg (187 lb 4.8 oz). His blood pressure is 122/70 and his pulse is 98. His oxygen saturation is 96%.   Physical Exam  Vitals and nursing note reviewed.   Constitutional:       General: He is not in acute distress.     Appearance: Normal appearance. He is not ill-appearing.   HENT:      Head: Normocephalic and atraumatic.      Right Ear: Tympanic membrane, ear canal and external ear normal.      Left Ear: Tympanic membrane, ear canal and external ear normal.      Nose: Nose normal.      Mouth/Throat:      Mouth: Mucous membranes are moist.      Pharynx: No oropharyngeal exudate or posterior oropharyngeal erythema.   Eyes:      General: No scleral icterus.        Right eye: No discharge.         Left eye: No discharge.      Extraocular Movements: Extraocular movements intact.      Conjunctiva/sclera: Conjunctivae normal.      Comments: Left eye with some conjunctival hemorrhage.   Cardiovascular:      Rate and Rhythm: Normal rate and regular rhythm.      Heart sounds: Normal heart sounds. No murmur heard.    No friction rub. No gallop.   Pulmonary:      Effort: Pulmonary effort is normal. No respiratory distress.      Breath sounds: Normal breath sounds. No wheezing or rales.      Comments: His lung exam was initially with diffuse rhonchi.  After coughing, his lung exam cleared and was normal.  Musculoskeletal:      Cervical back: Neck supple.   Lymphadenopathy:      Cervical: No cervical adenopathy.   Skin:     Findings: No rash.   Neurological:      General: No focal deficit present.      Mental Status: He is alert and oriented " to person, place, and time.   Psychiatric:         Attention and Perception: Attention normal.         Mood and Affect: Mood normal.         Speech: Speech normal.         Thought Content: Thought content normal.       Xray - Reviewed and interpreted by me.  No infiltrate.  No pulmonary edema.  No acute changes.    PHQ 11/4/2020   PHQ-9 Total Score 12   Q9: Thoughts of better off dead/self-harm past 2 weeks Several days     NADIA-7 SCORE 1/24/2020   Total Score 5     No flowsheet data found.  No flowsheet data found.  Recent Results (from the past 48 hour(s))   INR point of care    Collection Time: 03/23/22 11:46 AM   Result Value Ref Range    INR 3.0 (H) 0.9 - 1.1   CBC with platelets    Collection Time: 03/23/22 11:46 AM   Result Value Ref Range    WBC Count 5.8 4.0 - 11.0 10e3/uL    RBC Count 3.95 (L) 4.40 - 5.90 10e6/uL    Hemoglobin 11.5 (L) 13.3 - 17.7 g/dL    Hematocrit 35.6 (L) 40.0 - 53.0 %    MCV 90 78 - 100 fL    MCH 29.1 26.5 - 33.0 pg    MCHC 32.3 31.5 - 36.5 g/dL    RDW 14.1 10.0 - 15.0 %    Platelet Count 160 150 - 450 10e3/uL     No results found for this visit on 03/23/22.    This note has been dictated using voice recognition software. Any grammatical or context distortions are unintentional and inherent to the software

## 2022-03-23 NOTE — ASSESSMENT & PLAN NOTE
The patient recently saw his vascular surgeon who wants to revise the graft.  The patient describes this as being urgent which is part of what prompted today's visit.

## 2022-03-23 NOTE — PROGRESS NOTES
ANTICOAGULATION MANAGEMENT     Parish Bills 81 year old male is on warfarin with therapeutic INR result. (Goal INR 2.0-3.0)    Recent labs: (last 7 days)     03/23/22  1146   INR 3.0*       ASSESSMENT       Source(s): Chart Review, Patient/Caregiver Call and Template       Warfarin doses taken: Warfarin taken as instructed    Diet: No new diet changes identified    New illness, injury, or hospitalization: Yes:    OV today with Dr. Whitehead for cough and wheezing at times with productive cough for 3 weeks.    Medication/supplement changes: Yes.    Azithromax 250mg from 3/23-28.  (can increase risk for bleeding)    Signs or symptoms of bleeding or clotting: Yes:    Currently has subconjunctival hemorrhage.    Previous INR: Therapeutic last 2(+) visits    Additional findings:  Yes.  scheduled surgery on 4/4/22 for Revision of endovascular aortic aneurysm repair.    - vascular surgeon (Dr. Akira Reina and his nurse) already provided warfarin hold and bridge instructions.  (see outlined instructions given to patient).   - take last warfarin dose on 3/30   - begin warfarin hold 3/31 - 4/3.   - start Lovenox injections every 12hrs on 4/1-2.   - give last Lovenox injection the morning of 4/3.  (no evening injection)   - day of surgery 4/4 - no warfarin or lovenox injection.       PLAN     Recommended plan for temporary change(s) affecting INR     Dosing Instructions:   (evenings. 5mg tabs)    Continue your current warfarin dose with next INR in 3 days       Summary  As of 3/23/2022    Full warfarin instructions:  3/31: Hold; 4/1: Hold; 4/2: Hold; 4/3: Hold; Otherwise 7.5 mg every Fri; 5 mg all other days   Next INR check:  3/25/2022             Telephone call with  Parish ([phone]) who verbalizes understanding and agrees to plan    Lab visit scheduled - INR on 3/25/22 @ [facility]    Education provided: Importance of consistent vitamin K intake, Goal range and significance of current result, Potential interaction between  warfarin and Azithromycin and Monitoring for bleeding signs and symptoms    Plan made per [facility] anticoagulation protocol    Kori Ziegler RN  Anticoagulation Clinic  3/23/2022    _______________________________________________________________________     Anticoagulation Episode Summary     Current INR goal:  2.0-3.0   TTR:  82.3 % (1 y)   Target end date:     Send INR reminders to:  [facility]    Indications    A-fib (H) (Resolved) [I48.91]           Comments:           Anticoagulation Care Providers     Provider Role Specialty Phone number    Isidro Au MD Referring Family Medicine [phone]

## 2022-03-24 ENCOUNTER — TELEPHONE (OUTPATIENT)
Dept: FAMILY MEDICINE | Facility: CLINIC | Age: 82
End: 2022-03-24
Payer: COMMERCIAL

## 2022-03-24 NOTE — TELEPHONE ENCOUNTER
----- Message from Marques Whitehead MD sent at 3/24/2022  6:30 AM CDT -----  Team - please call patient with results.  Let them know that testing is stable.

## 2022-03-24 NOTE — TELEPHONE ENCOUNTER
Left message to call back for: Parish  Information to relay to patient: Please notify patient of results and Dt.  message. Thank you.

## 2022-03-25 ENCOUNTER — ANTICOAGULATION THERAPY VISIT (OUTPATIENT)
Dept: ANTICOAGULATION | Facility: CLINIC | Age: 82
End: 2022-03-25

## 2022-03-25 ENCOUNTER — LAB (OUTPATIENT)
Dept: LAB | Facility: CLINIC | Age: 82
End: 2022-03-25
Payer: COMMERCIAL

## 2022-03-25 DIAGNOSIS — I48.20 CHRONIC ATRIAL FIBRILLATION (H): ICD-10-CM

## 2022-03-25 DIAGNOSIS — I48.20 CHRONIC ATRIAL FIBRILLATION (H): Primary | ICD-10-CM

## 2022-03-25 DIAGNOSIS — F41.9 ANXIETY: ICD-10-CM

## 2022-03-25 LAB — INR BLD: 3 (ref 0.9–1.1)

## 2022-03-25 PROCEDURE — 85610 PROTHROMBIN TIME: CPT

## 2022-03-25 PROCEDURE — 36416 COLLJ CAPILLARY BLOOD SPEC: CPT

## 2022-03-25 RX ORDER — SERTRALINE HYDROCHLORIDE 100 MG/1
TABLET, FILM COATED ORAL
Qty: 30 TABLET | Refills: 0 | Status: SHIPPED | OUTPATIENT
Start: 2022-03-25 | End: 2022-03-29

## 2022-03-25 NOTE — TELEPHONE ENCOUNTER
Reason for Call:  Patient is calling to request a refill of    sertraline (ZOLOFT) 100 MG tablet  sertraline (ZOLOFT) 50 MG tablet     Would like a 2 week supply sent to    SEND TO [facility], [place], [employer] .    Detailed comments: original RX was sent to [facility], patient has not received it, but pharmacy says it was sent. They are trying to track the rx and see what happened to it. Patient is now out of medication for 3 days. Would appreciate a small amount be sent to this pharmacy.    Phone Number Patient can be reached at: Home number on file [phone] (home)    Best Time: any    Can we leave a detailed message on this number? YES    Call taken on 3/25/2022 at 4:17 PM by Jossy Samuels

## 2022-03-25 NOTE — PROGRESS NOTES
ANTICOAGULATION MANAGEMENT     Parish Bills 81 year old male is on warfarin with therapeutic INR result. (Goal INR 2.0-3.0)    Recent labs: (last 7 days)     03/25/22  0840   INR 3.0*       ASSESSMENT       Source(s): Chart Review, Patient/Caregiver Call and Template       Warfarin doses taken: Warfarin taken as instructed    Diet: No new diet changes identified    New illness, injury, or hospitalization: Yes:   Reported feeling much better   Bronchitis   Iliac artery aneurysm.    Medication/supplement changes: Yes.    Prescribed a 5 day Azithromycin from 3/23-28.    Signs or symptoms of bleeding or clotting: No    Previous INR: Therapeutic last 2(+) visits    Additional findings: Yes.    - Scheduled surgery on 4/4/22 for Revision of endovascular aortic aneurysm repair with vascular surgeon (Dr. Akira Reina), and his nurse already provided warfarin hold and bridge instructions.  (see outlined instructions given to patient on 3/17/22.)  1. - take last warfarin dose on 3/30  2. - begin warfarin hold 3/31 - 4/3.  3. - start Lovenox injections every 12hrs on 4/1-2.  4. - give last Lovenox injection the morning of 4/3.  (no evening injection)  5. - day of surgery 4/4 - no warfarin or lovenox injection.       PLAN     Recommended plan for temporary change(s) affecting INR     Dosing Instructions:   (evenings. 5mg tabs)    Continue your current warfarin dose with next INR in 4-5 days after resuming warfarin.    Summary  As of 3/25/2022    Full warfarin instructions:  3/31: Hold; 4/1: Hold; 4/2: Hold; 4/3: Hold; Otherwise 7.5 mg every Fri; 5 mg all other days   Next INR check:  4/8/2022             Telephone call with  Parish ([phone]) who verbalizes understanding and agrees to plan    - Parish had no additional questions in regards to upcoming surgery on 4/4.    Patient elected to schedule next visit once he is discharge from hospital.  (reported one day hospital stay)    Education provided: Importance of consistent  vitamin K intake, Goal range and significance of current result, Monitoring for bleeding signs and symptoms and When to seek medical attention/emergency care    Plan made per [facility] anticoagulation protocol    Kori Ziegler RN  Anticoagulation Clinic  3/25/2022    _______________________________________________________________________     Anticoagulation Episode Summary     Current INR goal:  2.0-3.0   TTR:  82.3 % (1 y)   Target end date:  Indefinite   Send INR reminders to:  [facility]    Indications    A-fib (H) (Resolved) [I48.91]  Chronic atrial fibrillation (H) [I48.20]           Comments:           Anticoagulation Care Providers     Provider Role Specialty Phone number    Isidro Au MD Referring Family Medicine [phone]

## 2022-03-29 RX ORDER — SERTRALINE HYDROCHLORIDE 100 MG/1
TABLET, FILM COATED ORAL
Qty: 90 TABLET | Refills: 1 | Status: SHIPPED | OUTPATIENT
Start: 2022-03-29 | End: 2023-08-30

## 2022-03-30 ENCOUNTER — OFFICE VISIT (OUTPATIENT)
Dept: FAMILY MEDICINE | Facility: CLINIC | Age: 82
End: 2022-03-30
Payer: COMMERCIAL

## 2022-03-30 VITALS
HEART RATE: 66 BPM | OXYGEN SATURATION: 95 % | RESPIRATION RATE: 16 BRPM | BODY MASS INDEX: 27.99 KG/M2 | DIASTOLIC BLOOD PRESSURE: 78 MMHG | WEIGHT: 189 LBS | SYSTOLIC BLOOD PRESSURE: 136 MMHG | TEMPERATURE: 97.8 F | HEIGHT: 69 IN

## 2022-03-30 DIAGNOSIS — Z01.818 PREOP GENERAL PHYSICAL EXAM: Primary | ICD-10-CM

## 2022-03-30 DIAGNOSIS — I71.40 ABDOMINAL AORTIC ANEURYSM (AAA) WITHOUT RUPTURE (H): ICD-10-CM

## 2022-03-30 LAB
ATRIAL RATE - MUSE: 33 BPM
DIASTOLIC BLOOD PRESSURE - MUSE: NORMAL MMHG
INTERPRETATION ECG - MUSE: NORMAL
P AXIS - MUSE: NORMAL DEGREES
PR INTERVAL - MUSE: NORMAL MS
QRS DURATION - MUSE: 160 MS
QT - MUSE: 492 MS
QTC - MUSE: 487 MS
R AXIS - MUSE: 130 DEGREES
SYSTOLIC BLOOD PRESSURE - MUSE: NORMAL MMHG
T AXIS - MUSE: 82 DEGREES
VENTRICULAR RATE- MUSE: 59 BPM

## 2022-03-30 PROCEDURE — 93010 ELECTROCARDIOGRAM REPORT: CPT | Performed by: INTERNAL MEDICINE

## 2022-03-30 PROCEDURE — 93005 ELECTROCARDIOGRAM TRACING: CPT | Performed by: FAMILY MEDICINE

## 2022-03-30 PROCEDURE — U0005 INFEC AGEN DETEC AMPLI PROBE: HCPCS | Performed by: FAMILY MEDICINE

## 2022-03-30 PROCEDURE — 99214 OFFICE O/P EST MOD 30 MIN: CPT | Performed by: FAMILY MEDICINE

## 2022-03-30 PROCEDURE — U0003 INFECTIOUS AGENT DETECTION BY NUCLEIC ACID (DNA OR RNA); SEVERE ACUTE RESPIRATORY SYNDROME CORONAVIRUS 2 (SARS-COV-2) (CORONAVIRUS DISEASE [COVID-19]), AMPLIFIED PROBE TECHNIQUE, MAKING USE OF HIGH THROUGHPUT TECHNOLOGIES AS DESCRIBED BY CMS-2020-01-R: HCPCS | Performed by: FAMILY MEDICINE

## 2022-03-30 ASSESSMENT — PATIENT HEALTH QUESTIONNAIRE - PHQ9
SUM OF ALL RESPONSES TO PHQ QUESTIONS 1-9: 6
10. IF YOU CHECKED OFF ANY PROBLEMS, HOW DIFFICULT HAVE THESE PROBLEMS MADE IT FOR YOU TO DO YOUR WORK, TAKE CARE OF THINGS AT HOME, OR GET ALONG WITH OTHER PEOPLE: NOT DIFFICULT AT ALL
SUM OF ALL RESPONSES TO PHQ QUESTIONS 1-9: 6

## 2022-03-30 ASSESSMENT — ANXIETY QUESTIONNAIRES
7. FEELING AFRAID AS IF SOMETHING AWFUL MIGHT HAPPEN: NOT AT ALL
5. BEING SO RESTLESS THAT IT IS HARD TO SIT STILL: SEVERAL DAYS
GAD7 TOTAL SCORE: 3
3. WORRYING TOO MUCH ABOUT DIFFERENT THINGS: NOT AT ALL
6. BECOMING EASILY ANNOYED OR IRRITABLE: NOT AT ALL
1. FEELING NERVOUS, ANXIOUS, OR ON EDGE: SEVERAL DAYS
GAD7 TOTAL SCORE: 3
4. TROUBLE RELAXING: SEVERAL DAYS
2. NOT BEING ABLE TO STOP OR CONTROL WORRYING: NOT AT ALL
7. FEELING AFRAID AS IF SOMETHING AWFUL MIGHT HAPPEN: NOT AT ALL
GAD7 TOTAL SCORE: 3

## 2022-03-30 ASSESSMENT — ENCOUNTER SYMPTOMS
SEIZURES: 0
DYSURIA: 0
ABDOMINAL PAIN: 0
EYE PAIN: 0
FEVER: 0
BACK PAIN: 0
PALPITATIONS: 0
HEMATURIA: 0
SORE THROAT: 0
CHILLS: 0
VOMITING: 0
COLOR CHANGE: 0
COUGH: 0
ARTHRALGIAS: 0
SHORTNESS OF BREATH: 0

## 2022-03-30 NOTE — PATIENT INSTRUCTIONS
Hold warfarin starting tomorrow.    Continue aspirin per Dr. Reina's instruction.   Take all other medications with a sip of water.       Preparing for Your Surgery  Getting started  A nurse will call you to review your health history and instructions. They will give you an arrival time based on your scheduled surgery time. Please be ready to share:    Your doctor's clinic name and phone number    Your medical, surgical and anesthesia history    A list of allergies and sensitivities    A list of medicines, including herbal treatments and over-the-counter drugs    Whether the patient has a legal guardian (ask how to send us the papers in advance)  Please tell us if you're pregnant--or if there's any chance you might be pregnant. Some surgeries may injure a fetus (unborn baby), so they require a pregnancy test. Surgeries that are safe for a fetus don't always need a test, and you can choose whether to have one.   If you have a child who's having surgery, please ask for a copy of Preparing for Your Child's Surgery.    Preparing for surgery    Within 30 days of surgery: Have a pre-op exam (sometimes called an H&P, or History and Physical). This can be done at a clinic or pre-operative center.  ? If you're having a , you may not need this exam. Talk to your care team.    At your pre-op exam, talk to your care team about all medicines you take. If you need to stop any medicines before surgery, ask when to start taking them again.  ? We do this for your safety. Many medicines can make you bleed too much during surgery. Some change how well surgery (anesthesia) drugs work.    Call your insurance company to let them know you're having surgery. (If you don't have insurance, call [phone].)    Call your clinic if there's any change in your health. This includes signs of a cold or flu (sore throat, runny nose, cough, rash, fever). It also includes a scrape or scratch near the surgery site.    If you have questions  on the day of surgery, call your hospital or surgery center.  COVID testing  You may need to be tested for COVID-19 before having surgery. If so, your surgical team will give you instructions for scheduling this test, separate from your preoperative history and physical.  Eating and drinking guidelines  For your safety: Unless your surgeon tells you otherwise, follow the guidelines below.    Eat and drink as usual until 8 hours before surgery. After that, no food or milk.    Drink clear liquids until 2 hours before surgery. These are liquids you can see through, like water, Gatorade and Propel Water. You may also have black coffee and tea (no cream or milk).    Nothing by mouth within 2 hours of surgery. This includes gum, candy and breath mints.    If you drink alcohol: Stop drinking it the night before surgery.    If your care team tells you to take medicine on the morning of surgery, it's okay to take it with a sip of water.  Preventing infection    Shower or bathe the night before and morning of your surgery. Follow the instructions your clinic gave you. (If no instructions, use regular soap.)    Don't shave or clip hair near your surgery site. We'll remove the hair if needed.    Don't smoke or vape the morning of surgery. You may chew nicotine gum up to 2 hours before surgery. A nicotine patch is okay.  ? Note: Some surgeries require you to completely quit smoking and nicotine. Check with your surgeon.    Your care team will make every effort to keep you safe from infection. We will:  ? Clean our hands often with soap and water (or an alcohol-based hand rub).  ? Clean the skin at your surgery site with a special soap that kills germs.  ? Give you a special gown to keep you warm. (Cold raises the risk of infection.)  ? Wear special hair covers, masks, gowns and gloves during surgery.  ? Give antibiotic medicine, if prescribed. Not all surgeries need antibiotics.  What to bring on the day of surgery    Photo ID  and insurance card    Copy of your health care directive, if you have one    Glasses and hearing aides (bring cases)  ? You can't wear contacts during surgery    Inhaler and eye drops, if you use them (tell us about these when you arrive)    CPAP machine or breathing device, if you use them    A few personal items, if spending the night    If you have . . .  ? A pacemaker, ICD (cardiac defibrillator) or other implant: Bring the ID card.  ? An implanted stimulator: Bring the remote control.  ? A legal guardian: Bring a copy of the certified (court-stamped) guardianship papers.  Please remove any jewelry, including body piercings. Leave jewelry and other valuables at home.  If you're going home the day of surgery    You must have a responsible adult drive you home. They should stay with you overnight as well.    If you don't have someone to stay with you, and you aren't safe to go home alone, we may keep you overnight. Insurance often won't pay for this.  After surgery  If it's hard to control your pain or you need more pain medicine, please call your surgeon's office.  Questions?   If you have any questions for your care team, list them here: _________________________________________________________________________________________________________________________________________________________________________ ____________________________________ ____________________________________ ____________________________________  For informational purposes only. Not to replace the advice of your health care provider. Copyright   2003, 2019 [facility]. All rights reserved. Clinically reviewed by Latricia Aguilar MD. [employer] [id number] - REV 07/21.

## 2022-03-30 NOTE — PROGRESS NOTES
[address]  [facility] [address]  Phone: [phone]  Fax: [phone]  Primary Provider: Isidro Au  Pre-op Performing Provider: JAC PAIGE    PREOPERATIVE EVALUATION:  Today's date: 3/30/2022    Parish Bills is a 81 year old male who presents for a preoperative evaluation.    Surgical Information:  Surgery/Procedure:  REVISION OF ENDOVASCULAR AORTIC ANEURYSM REPAIR  Surgery Location: [facility] (Vascular)  Surgeon: Dr. Guerra  Surgery Date: 4/4/22  Time of Surgery: 8:30 am  Where patient plans to recover: At home with family  Fax number for surgical facility: [phone]    Type of Anesthesia Anticipated: General    Abdominal aortic aneurysm (AAA) (H)  Preoperative assessment.  Doing quite a bit better with respect to coughing and congestion in comparison to last week.  It seems that his symptoms have resolved.  Notes reviewed from vascular clinic.  Concern for mild leak.  Given this leak, the surgery is necessary.  No contraindication to the processed surgery.  Cleared for surgery without additional diagnostic testing or evaluation.    Subjective     HPI related to upcoming procedure: The patient recently saw his vascular surgeon.  He had follow-up imaging which showed a type Ia endoleak.  His surgeon is concerned that this will progress and develop into a large 1 a endoleak which would negate any protection of his abdominal aortic aneurysm.  He has had this procedure before. He is currently asymptomatic with this condition.  He recently had an upper respiratory infection which is now cleared.  Exercise capacity is stable.  He feels as though he is doing well and is ready for this procedure.    Preop Questions 3/30/2022   1. Have you ever had a heart attack or stroke? No   2. Have you ever had surgery on your heart or blood vessels, such as a stent placement, a coronary artery bypass, or surgery on an artery in your head, neck,  heart, or legs? YES - stable.  Paced heart.   3. Do you have chest pain with activity? No   4. Do you have a history of  heart failure? No   5. Do you currently have a cold, bronchitis or symptoms of other infection? No   6. Do you have a cough, shortness of breath, or wheezing? No   7. Do you or anyone in your family have previous history of blood clots? No   8. Do you or does anyone in your family have a serious bleeding problem such as prolonged bleeding following surgeries or cuts? No   9. Have you ever had problems with anemia or been told to take iron pills? YES -  History of post surgical blood loss    10. Have you had any abnormal blood loss such as black, tarry or bloody stools? No   11. Have you ever had a blood transfusion? UNKNOWN   12. Are you willing to have a blood transfusion if it is medically needed before, during, or after your surgery? Yes   13. Have you or any of your relatives ever had problems with anesthesia? No   14. Do you have sleep apnea, excessive snoring or daytime drowsiness? No   15. Do you have any artifical heart valves or other implanted medical devices like a pacemaker, defibrillator, or continuous glucose monitor? YES - pacemaker   15a. What type of device do you have? Pacemaker   15b. Name of the clinic that manages your device:  [facility]   16. Do you have artificial joints? YES - right hip.   17. Are you allergic to latex? No     Health Care Directive:  Patient does not have a Health Care Directive or Living Will: Patient states has Advance Directive and will bring in a copy to clinic.    Review of Systems   Constitutional: Negative for chills and fever.   HENT: Negative for ear pain and sore throat.    Eyes: Negative for pain and visual disturbance.   Respiratory: Negative for cough and shortness of breath.    Cardiovascular: Negative for chest pain and palpitations.   Gastrointestinal: Negative for abdominal pain and vomiting.   Genitourinary: Negative for dysuria and  hematuria.   Musculoskeletal: Negative for arthralgias and back pain.   Skin: Negative for color change and rash.   Neurological: Negative for seizures and syncope.   All other systems reviewed and are negative.      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     Priority: Medium     Acute on chronic systolic heart failure (H)      Priority: Medium     Right leg pain 08/14/2020     Priority: Medium     Cellulitis of leg, right 08/14/2020     Priority: Medium     Chronic atrial fibrillation (H)      Priority: Medium     Created by Conversion         Polyp of ascending colon, unspecified type 07/28/2020     Priority: Medium     Rectal polyp 07/28/2020     Priority: Medium     Right hip pain 07/28/2020     Priority: Medium     Infection of prosthetic joint, subsequent encounter 07/28/2020     Priority: Medium     Added automatically from request for surgery [id number]         Abdominal aortic aneurysm (AAA) (H) 08/26/2019     Priority: Medium     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     Priority: Medium     History of colonic polyps 04/11/2019     Priority: Medium     Coagulopathy (H) 07/30/2018     Priority: Medium     S/P AVR 07/30/2018     Priority: Medium     Stress hyperglycemia 07/30/2018     Priority: Medium     Family history of colon cancer 02/02/2017     Priority: Medium     Malignant tumor of rectum (H) 01/31/2017     Priority: Medium     Benign neoplasm of ascending colon 01/31/2017     Priority: Medium     Encounter for screening for malignant neoplasm of colon 01/31/2017     Priority: Medium     Family history of malignant neoplasm of digestive organ 01/31/2017     Priority: Medium     Dysthymia 11/03/2016     Priority: Medium     Nonrheumatic aortic valve stenosis 11/03/2016     Priority: Medium     Aortic Stenosis      Priority: Medium     Created by Conversion  Replacement Utility updated for  latest IMO load         Aneurysm Of The Abdominal Aorta      Priority: Medium     Created by Conversion  Replacement Utility updated for latest IMO load         Osteoarthritis      Priority: Medium     Created by Conversion  Replacement Utility updated for latest IMO load         Biventricular cardiac pacemaker in situ 02/17/2015     Priority: Medium     Cough 02/09/2015     Priority: Medium     Edema 02/09/2015     Priority: Medium     Lumbar radiculopathy 02/09/2015     Priority: Medium     Malignant neoplasm of skin 02/09/2015     Priority: Medium     H/O mitral valve repair 12/03/2014     Priority: Medium     PVC (premature ventricular contraction) 12/03/2014     Priority: Medium     Postoperative anemia due to acute blood loss 09/16/2014     Priority: Medium     Post-operative pain 09/15/2014     Priority: Medium     Subconjunctival Hemorrhage      Priority: Medium     Created by Conversion         Bicuspid Aortic Valve      Priority: Medium     Created by Conversion         Benign Prostatic Hypertrophy      Priority: Medium     Created by Conversion  [facility] Annotation: Apr 18 2008  5:34PM - Lorena Diop: bx (-) in   2005;   **;         Abrasion Of The Ear      Priority: Medium     Created by Conversion         Hyperlipidemia      Priority: Medium     Created by Conversion         Cardiomyopathy      Priority: Medium     Created by Conversion         Strained Left Pectoralis Major Muscle      Priority: Medium     Created by Conversion         Essential Hypercholesterolemia      Priority: Medium     Created by Conversion         Chest Pain      Priority: Medium     Created by Conversion         Neck Strain      Priority: Medium     Created by Conversion         Lumbar Strain      Priority: Medium     Created by Conversion         Blood In Urine      Priority: Medium     Created by Conversion         Esophageal Reflux      Priority: Medium     Created by Conversion         Hypertension 03/31/2012      Priority: Medium     Iliac artery aneurysm (H) 03/31/2012     Priority: Medium      Past Medical History:   Diagnosis Date     Abdominal aortic aneurysm (H)      Alcohol abuse      Anemia      Aortic stenosis      Arthritis      Atrial fibrillation (H)      BPH (benign prostatic hyperplasia)      CAD (coronary artery disease)      Cardiomyopathy (H)      Cataract     left     Chest pain      CHF (congestive heart failure) (H)      Congenital insufficiency of aortic valve      Coronary artery disease      Disease of pancreas     gallstones related     Dysthymia      Enlarged prostate      FH: mitral valve repair      H/O aortic valve repair '     High blood pressure      Hypercholesteremia      Hyperlipidemia      Hypertrophy of prostate      Lumbar radiculopathy      Mitral valve prolapse      Neck strain      Nonrheumatic aortic (valve) stenosis      Osteoarthrosis      Pacemaker      Rectal cancer (H)      Reflux esophagitis      Subconjunctival bleed      Past Surgical History:   Procedure Laterality Date     ABDOMINAL AORTIC ANEURYSM REPAIR  2009     AORTIC VALVE REPLACEMENT       BYPASS GRAFT ARTERY CORONARY      x1 with OLIVAS to LAD     CARDIOVERSION      X3     CARDIOVERSION       CERVICAL FUSION  1972     CHOLECYSTECTOMY       EMBOLIZATION  01/31/2018     ILIAC ARTERY ANEURYSM REPAIR  03/29/2012     JOINT REPLACEMENT Right     RAMÓN     LAPAROSCOPIC CHOLECYSTECTOMY       LUMBAR LAMINECTOMY Right 9/15/2014    Procedure: OPEN DECOMPRESSION L2-4 RIGHT ;  Surgeon: Elroy French MD;  Location: [facility];  Service:      MITRAL VALVE REPAIR  2006     OTHER SURGICAL HISTORY      CT coronary angiogram     OTHER SURGICAL HISTORY  2009    [facility] AAA REPAIR EXPOSE ILIAC ART ABD INCIS UNILAT      OTHER SURGICAL HISTORY  2010    CO SURG ONLY REMOVE CATARACT INTRACAP INSERT LENS      CO EXCIS RECTAL TUMOR, TRANSANAL, PARTIAL THICKNESS N/A 3/6/2017    Procedure: TRANSANAL EXCISION OF RECTAL TUMOR AND  PROCTOSCOPY;  Surgeon: Alexander Mcgrath MD;  Location: [facility];  Service: General     STERNOTOMY      redo     TOTAL HIP ARTHROPLASTY Right 2004     TRANSRECTAL ULTRASONIC, TRANSURETHRAL RESECTION (TUR) OF PROSTATE CYST       XR MAJOR JOINT OR BURSA INJ/ASP UNILATERAL  7/29/2020     [facility] CERV SPINE FUSN,ANTER,BELOW C2      Description: Cervical Vertebral Fusion;  Recorded: 04/18/2008;     [facility] REPR ANEURYSM/GRFT INS,ABDOMINAL AORTA      Description: Abdominal Aortic Aneurysm Repair For Dilation Or Occlusion;  Recorded: 04/05/2012;     [facility] REVISE FEM PART OF TOTAL HIP Right 7/31/2020    Procedure: REVISION, ARTHROPLASTY, HIP, WITH FEMORAL HEAD AND  ACETABULAR LINER REPLACEMENT, WITH IRRIGATION + DEBRIDEMENT;  Surgeon: Jason Jerez MD;  Location: [facility];  Service: Orthopedics     Current Outpatient Medications   Medication Sig Dispense Refill     amoxicillin (AMOXIL) 500 MG capsule [AMOXICILLIN (AMOXIL) 500 MG CAPSULE] Take 2,000 mg by mouth once as needed (for dental work). As needed for Hx mitral valve repair. Take one hour before appointment        aspirin (ASPIRIN LOW DOSE) 81 MG EC tablet [ASPIRIN (ASPIRIN LOW DOSE) 81 MG EC TABLET] Take 1 tablet by mouth daily.       atorvastatin (LIPITOR) 40 MG tablet TAKE 1 TABLET BY MOUTH AT  BEDTIME 90 tablet 3     buPROPion (WELLBUTRIN XL) 150 MG 24 hr tablet TAKE 1 TABLET BY MOUTH IN  THE MORNING 90 tablet 3     folic acid/multivit-min/lutein (CENTRUM SILVER ORAL) [FOLIC ACID/MULTIVIT-MIN/LUTEIN (CENTRUM SILVER ORAL)] Take 1 tablet by mouth daily.       furosemide (LASIX) 20 MG tablet TAKE 2 TABLETS BY MOUTH IN  THE MORNING AND 1 TABLET BY MOUTH AT NOON 270 tablet 3     furosemide (LASIX) 40 MG tablet [FUROSEMIDE (LASIX) 40 MG TABLET] Take 1 tablet (40 mg total) by mouth 2 (two) times a day at 9am and 6pm. 60 tablet 0     metoprolol succinate ER (TOPROL-XL) 50 MG 24 hr tablet TAKE ONE TABLET BY MOUTH  DAILY. HOLD FOR SYSTOLIC BP <95 OR HEART RATE  "<55 90 tablet 3     nitroglycerin (NITROSTAT) 0.4 MG SL tablet [NITROGLYCERIN (NITROSTAT) 0.4 MG SL TABLET] PLACE 1 TABLET UNDER THE TONGUE EVERY 5 MINUTES UP TO 3 DOSES AS NEEDED; CALL 911 AFTER TAKING FIRST DOSE 25 tablet 0     sertraline (ZOLOFT) 100 MG tablet [SERTRALINE (ZOLOFT) 100 MG TABLET] TAKE ONE TABLET BY MOUTH ONCE DAILY ALONG WITH 50MG TO EQUAL 150MG 90 tablet 1     sertraline (ZOLOFT) 50 MG tablet [SERTRALINE (ZOLOFT) 50 MG TABLET] TAKE ONE TABLET BY MOUTH ONCE DAILY ALONG WITH 100MG TO EQUAL 150MG 90 tablet 1     tamsulosin (FLOMAX) 0.4 MG capsule TAKE 1 CAPSULE BY MOUTH  DAILY AFTER BREAKFAST 90 capsule 3     warfarin ANTICOAGULANT (COUMADIN) 5 MG tablet Take 1 tablet (5mg) to 1 and 1/2 tablets (7.5mg) by mouth daily, as directed.  Adjust dose based on INR results. 110 tablet 3     hydrOXYzine HCL (ATARAX) 25 MG tablet [HYDROXYZINE HCL (ATARAX) 25 MG TABLET] Take 1 tablet (25 mg total) by mouth every 6 (six) hours as needed for anxiety. (Patient not taking: Reported on 3/30/2022) 360 tablet 0     Allergies   Allergen Reactions     Hydrocodone-Acetaminophen Other (See Comments)     hallucination     Lisinopril Cough     Oxycodone-Acetaminophen Other (See Comments)     hallucination     Amiodarone Rash      Social History     Tobacco Use     Smoking status: Former Smoker     Types: Cigarettes, Cigarettes     Quit date: 9/15/1980     Years since quittin.5     Smokeless tobacco: Never Used   Substance Use Topics     Alcohol use: No     Comment: Alcoholic Drinks/day: quit        History   Drug Use No         Objective     /78 (BP Location: Left arm, Patient Position: Sitting, Cuff Size: Adult Regular)   Pulse 66   Temp 97.8  F (36.6  C) (Oral)   Resp 16   Ht 1.753 m (5' 9\")   Wt 85.7 kg (189 lb)   SpO2 95%   BMI 27.91 kg/m      Physical Exam  Vitals reviewed.   Constitutional:       General: He is not in acute distress.     Appearance: Normal appearance.   HENT:      Head: " Normocephalic and atraumatic.      Right Ear: External ear normal.      Left Ear: External ear normal.      Nose: Nose normal.      Mouth/Throat:      Pharynx: No oropharyngeal exudate or posterior oropharyngeal erythema.   Eyes:      General: No scleral icterus.  Cardiovascular:      Rate and Rhythm: Normal rate and regular rhythm.      Heart sounds: Normal heart sounds. No murmur heard.    No friction rub. No gallop.   Pulmonary:      Effort: Pulmonary effort is normal. No respiratory distress.      Breath sounds: No wheezing.   Abdominal:      General: Bowel sounds are normal. There is no distension.      Palpations: Abdomen is soft. There is no mass.      Tenderness: There is no abdominal tenderness.   Musculoskeletal:         General: No swelling. Normal range of motion.      Cervical back: Normal range of motion.   Skin:     Findings: No rash.   Neurological:      General: No focal deficit present.      Mental Status: He is alert and oriented to person, place, and time.      Cranial Nerves: No cranial nerve deficit.      Deep Tendon Reflexes: Reflexes normal.   Psychiatric:         Mood and Affect: Mood normal.         Behavior: Behavior normal.         Thought Content: Thought content normal.         Judgment: Judgment normal.         Recent Labs   Lab Test 03/25/22  0840 03/23/22  1146 11/18/21  0922 11/18/21  0921 02/08/21  0840 02/02/21  0821 08/02/20  0616 08/01/20  0609   HGB  --  11.5*  --  11.4*   < > 12.0*   < > 9.3*   PLT  --  160  --  130*   < > 147   < >  --    INR 3.0* 3.0*   < >  --    < >  --    < > 1.56*   NA  --  141  --   --   --  138   < >  --    POTASSIUM  --  4.5  --   --   --  4.6   < > 4.5   CR  --  1.04  --   --   --  1.09   < > 0.89   A1C  --   --   --   --   --   --   --  5.6    < > = values in this interval not displayed.      Diagnostics:  Recent Results (from the past 240 hour(s))   INR point of care    Collection Time: 03/23/22 11:46 AM   Result Value Ref Range    INR 3.0 (H) 0.9  - 1.1   CBC with platelets    Collection Time: 03/23/22 11:46 AM   Result Value Ref Range    WBC Count 5.8 4.0 - 11.0 10e3/uL    RBC Count 3.95 (L) 4.40 - 5.90 10e6/uL    Hemoglobin 11.5 (L) 13.3 - 17.7 g/dL    Hematocrit 35.6 (L) 40.0 - 53.0 %    MCV 90 78 - 100 fL    MCH 29.1 26.5 - 33.0 pg    MCHC 32.3 31.5 - 36.5 g/dL    RDW 14.1 10.0 - 15.0 %    Platelet Count 160 150 - 450 10e3/uL   B-Type Natriuretic Peptide ([facility] Only)    Collection Time: 03/23/22 11:46 AM   Result Value Ref Range     (H) 0 - 88 pg/mL   Basic metabolic panel    Collection Time: 03/23/22 11:46 AM   Result Value Ref Range    Sodium 141 136 - 145 mmol/L    Potassium 4.5 3.5 - 5.0 mmol/L    Chloride 104 98 - 107 mmol/L    Carbon Dioxide (CO2) 26 22 - 31 mmol/L    Anion Gap 11 5 - 18 mmol/L    Urea Nitrogen 20 8 - 28 mg/dL    Creatinine 1.04 0.70 - 1.30 mg/dL    Calcium 9.1 8.5 - 10.5 mg/dL    Glucose 87 70 - 125 mg/dL    GFR Estimate 72 >60 mL/min/1.73m2   INR point of care    Collection Time: 03/25/22  8:40 AM   Result Value Ref Range    INR 3.0 (H) 0.9 - 1.1   EKG 12-lead, tracing only    Collection Time: 03/30/22  8:48 AM   Result Value Ref Range    Systolic Blood Pressure  mmHg    Diastolic Blood Pressure  mmHg    Ventricular Rate 59 BPM    Atrial Rate 33 BPM    SD Interval  ms    QRS Duration 160 ms     ms    QTc 487 ms    P Axis  degrees    R AXIS 130 degrees    T Axis 82 degrees    Interpretation ECG       Ventricular-paced rhythm  Atrial fibrillation  Abnormal ECG  When compared with ECG of 31-JUL-2020 06:46,  No significant change was found  Confirmed by RASHAD PATEL MD LOC:JN ([id number]) on 3/30/2022 9:44:42 AM     Asymptomatic COVID-19 Virus (Coronavirus) by PCR Nose    Collection Time: 03/30/22  9:26 AM    Specimen: Nose; Swab   Result Value Ref Range    SARS CoV2 PCR Negative Negative, Testing sent to reference lab. Results will be returned via unsolicited result      ECG: my interpretation: ventricular pacing.       Revised Cardiac Risk Index (RCRI):  The patient has the following serious cardiovascular risks for perioperative complications:   - No serious cardiac risks = 0 points     RCRI Interpretation: 0 points: Class I (very low risk - 0.4% complication rate)         Signed Electronically by: Marques Whitehead MD  Copy of this evaluation report is provided to requesting physician.

## 2022-03-30 NOTE — ASSESSMENT & PLAN NOTE
Preoperative assessment.  Doing quite a bit better with respect to coughing and congestion in comparison to last week.  It seems that his symptoms have resolved.  Notes reviewed from vascular clinic.  Concern for mild leak.  Given this leak, the surgery is necessary.  No contraindication to the processed surgery.  Cleared for surgery without additional diagnostic testing or evaluation.

## 2022-03-31 LAB — SARS-COV-2 RNA RESP QL NAA+PROBE: NEGATIVE

## 2022-03-31 ASSESSMENT — ANXIETY QUESTIONNAIRES: GAD7 TOTAL SCORE: 3

## 2022-04-04 LAB — INR (EXTERNAL): 1.5 (ref 0.9–1.1)

## 2022-04-05 LAB — INR (EXTERNAL): 1.5 (ref 0.9–1.1)

## 2022-04-06 LAB — INR (EXTERNAL): 1.4 (ref 0.9–1.1)

## 2022-04-08 ENCOUNTER — ANTICOAGULATION THERAPY VISIT (OUTPATIENT)
Dept: ANTICOAGULATION | Facility: CLINIC | Age: 82
End: 2022-04-08

## 2022-04-08 ENCOUNTER — LAB (OUTPATIENT)
Dept: LAB | Facility: CLINIC | Age: 82
End: 2022-04-08
Payer: COMMERCIAL

## 2022-04-08 DIAGNOSIS — Z95.2 S/P AVR: ICD-10-CM

## 2022-04-08 DIAGNOSIS — I48.20 CHRONIC ATRIAL FIBRILLATION (H): Primary | ICD-10-CM

## 2022-04-08 DIAGNOSIS — Z98.890 H/O MITRAL VALVE REPAIR: ICD-10-CM

## 2022-04-08 DIAGNOSIS — I48.20 CHRONIC ATRIAL FIBRILLATION (H): ICD-10-CM

## 2022-04-08 LAB — INR BLD: 1.4 (ref 0.9–1.1)

## 2022-04-08 PROCEDURE — 85610 PROTHROMBIN TIME: CPT

## 2022-04-08 PROCEDURE — 36416 COLLJ CAPILLARY BLOOD SPEC: CPT

## 2022-04-08 NOTE — PROGRESS NOTES
ANTICOAGULATION MANAGEMENT     Parish SANCHEZ Sanju 81 year old male is on warfarin with subtherapeutic INR result. (Goal INR 2.0-3.0)    Recent labs: (last 7 days)     04/08/22  0804   INR 1.4*       ASSESSMENT       Source(s): Chart Review, Patient/Caregiver Call and Template       Warfarin doses taken: Warfarin recently held for surgery which may be affecting INR and While hospitalized on 4/4-4/6: home regimen resumed post procedure on 4/5.  Discharged on: home regimen continued and lovenox bridge initiated    Diet: No new diet changes identified    New illness, injury, or hospitalization: Yes: AAA repair on 4/4    Medication/supplement changes: None noted    Signs or symptoms of bleeding or clotting: No    Previous INR: Subtherapeutic    Additional findings: Bridging with Enoxaparin until INR >= 2.3 or INR >= 2.0 x 2 ([facility] protocol goal INR 2-3)       PLAN     Recommended plan for no diet, medication or health factor changes affecting INR     Dosing Instructions: booster dose then continue your current warfarin dose Continue bridging with Enoxaparin with next INR in 3 days       Summary  As of 4/8/2022    Full warfarin instructions:  4/8: 10 mg; Otherwise 7.5 mg every Fri; 5 mg all other days   Next INR check:  4/11/2022             Telephone call with Parish who verbalizes understanding and agrees to plan    Check at provider office visit    Education provided: Goal range and significance of current result, Lovenox/Heparin education provided: role of enoxaparin/heparin in bridge therapy, monitoring for signs and symptoms of bruising and bleeding and monitoring for signs and symptoms of clotting  and Contact [phone] with any changes, questions or concerns.     Plan made per [facility] anticoagulation protocol    Lin Saxena, RN  Anticoagulation Clinic  4/8/2022    _______________________________________________________________________     Anticoagulation Episode Summary     Current INR goal:  2.0-3.0   TTR:  80.2 %  (1 y)   Target end date:  Indefinite   [facility]    Indications    Chronic atrial fibrillation (H) [I48.20]  H/O mitral valve repair [Z98.890]  S/P AVR [Z95.2]  A-fib (H) (Resolved) [I48.91]           Comments:           Anticoagulation Care Providers     Provider Role Specialty Phone number    Isidro Au MD Referring Family Medicine [phone]

## 2022-04-11 ENCOUNTER — ANTICOAGULATION THERAPY VISIT (OUTPATIENT)
Dept: ANTICOAGULATION | Facility: CLINIC | Age: 82
End: 2022-04-11

## 2022-04-11 ENCOUNTER — OFFICE VISIT (OUTPATIENT)
Dept: FAMILY MEDICINE | Facility: CLINIC | Age: 82
End: 2022-04-11
Payer: COMMERCIAL

## 2022-04-11 VITALS
SYSTOLIC BLOOD PRESSURE: 110 MMHG | WEIGHT: 184.25 LBS | BODY MASS INDEX: 26.38 KG/M2 | HEIGHT: 70 IN | DIASTOLIC BLOOD PRESSURE: 64 MMHG | TEMPERATURE: 98.4 F | HEART RATE: 64 BPM | RESPIRATION RATE: 20 BRPM

## 2022-04-11 DIAGNOSIS — I48.20 CHRONIC ATRIAL FIBRILLATION (H): ICD-10-CM

## 2022-04-11 DIAGNOSIS — Z98.890 H/O MITRAL VALVE REPAIR: ICD-10-CM

## 2022-04-11 DIAGNOSIS — I71.40 ABDOMINAL AORTIC ANEURYSM (AAA) WITHOUT RUPTURE (H): ICD-10-CM

## 2022-04-11 DIAGNOSIS — I48.20 CHRONIC ATRIAL FIBRILLATION (H): Primary | ICD-10-CM

## 2022-04-11 DIAGNOSIS — Z09 HOSPITAL DISCHARGE FOLLOW-UP: Primary | ICD-10-CM

## 2022-04-11 DIAGNOSIS — Z95.2 S/P AVR: ICD-10-CM

## 2022-04-11 LAB — INR BLD: 1.7 (ref 0.9–1.1)

## 2022-04-11 PROCEDURE — 85610 PROTHROMBIN TIME: CPT | Performed by: FAMILY MEDICINE

## 2022-04-11 PROCEDURE — 36416 COLLJ CAPILLARY BLOOD SPEC: CPT | Performed by: FAMILY MEDICINE

## 2022-04-11 PROCEDURE — 99213 OFFICE O/P EST LOW 20 MIN: CPT | Performed by: FAMILY MEDICINE

## 2022-04-11 NOTE — PROGRESS NOTES
"Assessment/Plan:    Abdominal aortic aneurysm (AAA) (H)  Doing well post-procedure.  Hematoma?  Patient will follow with surgeon and/or PCP if not getting better. INR subtherapeutic but rising.  Left wrist bleeding improved today.  Patient will check-in with surgeon/PCP if worsening.      - follow-up with PCP if hematoma persists/worsens (left groin)/     Return in about 1 week (around 4/18/2022) for Recheck if not improving.    Marques Whitehead MD  _______________________________    Chief Complaint   Patient presents with     Follow Up     [facility] 04/04/22 and Dc'd 04/06/22 Surgery     Subjective: Parish Bills is a 81 year old year old male who returns to clinic for the following chronic complaints/concerns:     [facility]     Hospital Follow-up Visit:    Hospital/Nursing Home/IP Rehab Facility: [facility]  Date of Admission: 04/04/22  Date of Discharge: 04/06/22  Reason(s) for Admission: Surgery AAA      Was your hospitalization related to COVID-19? No   Problems taking medications regularly:  None  Medication changes since discharge: None  Problems adhering to non-medication therapy:  None    Interval history:  -reports that his procedure went well.  No concerns today.  Anxious to get affect of lovenox (DCed lovenox yesterday). INR 1.7 today.   -Patient was seen yesterday in the emergency department because he noted bleeding from the location of the arterial puncture.  This note was reviewed as part of today's visit.  The emergency department physician advised the patient to discontinue Lovenox but continue with warfarin.  They placed the Dermabond and asked the patient to follow-up with me today.  The emergency department physician noted that the hospitalization was unremarkable and that he did well post procedure.  He was discharged to home.  The patient had noticed a lot of oozing from his wrist including a \"puddle of blood\" which prompted him to place pressure and present for the emergency department.   - NP " "post-op note reviewed with note of right groin hematoma.     Summary of hospitalization:  CareEverywhere information obtained and reviewed  Diagnostic Tests/Treatments reviewed.  Follow up needed: INR measurements  Other Healthcare Providers Involved in Patient s Care:         Dr. Reina  Update since discharge: improved.     Post Discharge Medication Reconciliation: discharge medications reconciled and changed, per note/orders.  Plan of care communicated with patient            Review of Systems   Constitutional:        Review of systems negative except as noted in the HPI.   All other systems reviewed and are negative.       Reviewed history:  Tobacco  Allergies  Meds  Problems  Med Hx  Surg Hx  Fam Hx            Objective:    height is 1.765 m (5' 9.5\") and weight is 83.6 kg (184 lb 4 oz). His oral temperature is 98.4  F (36.9  C). His blood pressure is 110/64 and his pulse is 64. His respiration is 20.   Physical Exam  Nursing note reviewed.   Constitutional:       General: He is not in acute distress.     Appearance: Normal appearance. He is not ill-appearing.   HENT:      Head: Normocephalic and atraumatic.      Right Ear: External ear normal.      Left Ear: External ear normal.   Eyes:      General: No scleral icterus.     Extraocular Movements: Extraocular movements intact.      Conjunctiva/sclera: Conjunctivae normal.   Cardiovascular:      Rate and Rhythm: Normal rate and regular rhythm.      Heart sounds: Normal heart sounds. No murmur heard.    No friction rub. No gallop.   Pulmonary:      Effort: Pulmonary effort is normal. No respiratory distress.      Breath sounds: Normal breath sounds. No wheezing or rales.   Abdominal:      Comments: Soft, nt.  No erythema in the groin.  Right sided palpable mass ~1+ cm in diameter superior to the incision    Musculoskeletal:         General: No swelling. Normal range of motion.   Skin:     General: Skin is warm.      Coloration: Skin is not jaundiced.      " Findings: No rash.      Comments: Wrist: scab.  Dermabond. No bleeding.  Non-tender.    Neurological:      General: No focal deficit present.      Mental Status: He is alert and oriented to person, place, and time. Mental status is at baseline.   Psychiatric:         Attention and Perception: Attention normal.         Mood and Affect: Mood normal.         Speech: Speech normal.         Thought Content: Thought content normal.       ED visit with labs and INR reviewed.     PHQ 3/30/2022   PHQ-9 Total Score 6   Q9: Thoughts of better off dead/self-harm past 2 weeks Not at all     NADIA-7 SCORE 3/30/2022   Total Score 3 (minimal anxiety)   Total Score 3     No flowsheet data found.  No flowsheet data found.  Recent Results (from the past 48 hour(s))   INR point of care    Collection Time: 04/11/22 10:53 AM   Result Value Ref Range    INR 1.7 (H) 0.9 - 1.1     No results found for this visit on 04/11/22.    This note has been dictated using voice recognition software. Any grammatical or context distortions are unintentional and inherent to the software

## 2022-04-11 NOTE — PROGRESS NOTES
ANTICOAGULATION MANAGEMENT     Parish Bills 81 year old male is on warfarin with subtherapeutic INR result. (Goal INR 2.0-3.0)    Recent labs: (last 7 days)     04/11/22  1053   INR 1.7*       ASSESSMENT       Source(s): Chart Review, Patient/Caregiver Call and Template       Warfarin doses taken: Warfarin taken as instructed    Diet: No new diet changes identified    New illness, injury, or hospitalization: Yes: was in ED yesterday with bleeding from right groin area. They placed derma bond and discontinued his Lovenox. He says has been okay since. He had follow up today and MD a little concerned with hematoma in that area.    Medication/supplement changes: None noted    Signs or symptoms of bleeding or clotting: Yes: see above    Previous INR: Subtherapeutic    Additional findings: stopped Lovenox as instructed per ED.       PLAN     Recommended plan for temporary change(s) affecting INR     Dosing Instructions: continue your current warfarin dose with next INR in 1 week       Summary  As of 4/11/2022    Full warfarin instructions:  7.5 mg every Fri; 5 mg all other days   Next INR check:  4/18/2022             Telephone call with Parish who verbalizes understanding and agrees to plan    Lab visit scheduled    Education provided: Please call back if any changes to your diet, medications or how you've been taking warfarin, Importance of therapeutic range and Monitoring for bleeding signs and symptoms    Plan made per [facility] anticoagulation protocol    Laura Hauser RN  Anticoagulation Clinic  4/11/2022    _______________________________________________________________________     Anticoagulation Episode Summary     Current INR goal:  2.0-3.0   TTR:  79.4 % (1 y)   Target end date:  Indefinite   Send INR reminders to:  [facility]    Indications    Chronic atrial fibrillation (H) [I48.20]  H/O mitral valve repair [Z98.890]  S/P AVR [Z95.2]  A-fib (H) (Resolved) [I48.91]           Comments:            Anticoagulation Care Providers     Provider Role Specialty Phone number    Isidro Au MD Referring Family Medicine [phone]

## 2022-04-11 NOTE — ASSESSMENT & PLAN NOTE
Doing well post-procedure.  Hematoma?  Patient will follow with surgeon and/or PCP if not getting better. INR subtherapeutic but rising.  Left wrist bleeding improved today.  Patient will check-in with surgeon/PCP if worsening.      - follow-up with PCP if hematoma persists/worsens (left groin)/

## 2022-04-18 ENCOUNTER — LAB (OUTPATIENT)
Dept: LAB | Facility: CLINIC | Age: 82
End: 2022-04-18
Payer: COMMERCIAL

## 2022-04-18 ENCOUNTER — ANTICOAGULATION THERAPY VISIT (OUTPATIENT)
Dept: ANTICOAGULATION | Facility: CLINIC | Age: 82
End: 2022-04-18

## 2022-04-18 DIAGNOSIS — I48.20 CHRONIC ATRIAL FIBRILLATION (H): Primary | ICD-10-CM

## 2022-04-18 DIAGNOSIS — I48.20 CHRONIC ATRIAL FIBRILLATION (H): ICD-10-CM

## 2022-04-18 DIAGNOSIS — Z98.890 H/O MITRAL VALVE REPAIR: ICD-10-CM

## 2022-04-18 DIAGNOSIS — Z95.2 S/P AVR: ICD-10-CM

## 2022-04-18 LAB — INR BLD: 1.8 (ref 0.9–1.1)

## 2022-04-18 PROCEDURE — 85610 PROTHROMBIN TIME: CPT

## 2022-04-18 PROCEDURE — 36416 COLLJ CAPILLARY BLOOD SPEC: CPT

## 2022-04-18 NOTE — PROGRESS NOTES
ANTICOAGULATION MANAGEMENT     Parish SANCHEZ Sanju 81 year old male is on warfarin with subtherapeutic INR result. (Goal INR 2.0-3.0)    Recent labs: (last 7 days)     04/18/22  0848   INR 1.8*       ASSESSMENT       Source(s): Chart Review, Patient/Caregiver Call and Template       Warfarin doses taken: Warfarin taken as instructed    Diet: No new diet changes identified    New illness, injury, or hospitalization: Yes:   Reported bleeding has stopped 3 days ago and has not had to use a bandaid.   presented in ED on 4/10/22 d/t bleeding from arterial puncture; (right groin).   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    Medication/supplement changes:  Yes.    - Lovenox injections stopped on 4/10/22.    Signs or symptoms of bleeding or clotting: No    Previous INR: Subtherapeutic    Additional findings: None       PLAN     Recommended plan for no diet, medication or health factor changes affecting INR     Dosing Instructions:   (evenings. 5mg tabs)    booster dose then continue your current warfarin dose with next INR in 1-2 weeks       Summary  As of 4/18/2022    Full warfarin instructions:  4/18: 7.5 mg; Otherwise 7.5 mg every Fri; 5 mg all other days   Next INR check:  5/2/2022             Telephone call with  Parish ([phone]) who verbalizes understanding and agrees to plan    Lab visit scheduled - INR on 4/27/22 @ [facility].    Education provided: Importance of consistent vitamin K intake, Goal range and significance of current result and Monitoring for bleeding signs and symptoms    Plan made per [facility] anticoagulation protocol    Kori Ziegler RN  Anticoagulation Clinic  4/18/2022    _______________________________________________________________________     Anticoagulation Episode Summary     Current INR goal:  2.0-3.0   TTR:  77.5 % (1 y)   Target end date:  Indefinite   Send INR reminders to:  [facility]    Indications    Chronic atrial fibrillation (H) [I48.20]  H/O  mitral valve repair [Z98.890]  S/P AVR [Z95.2]  A-fib (H) (Resolved) [I48.91]           Comments:           Anticoagulation Care Providers     Provider Role Specialty Phone number    Isidro Au MD [facility] [phone]

## 2022-04-27 ENCOUNTER — ANTICOAGULATION THERAPY VISIT (OUTPATIENT)
Dept: ANTICOAGULATION | Facility: CLINIC | Age: 82
End: 2022-04-27

## 2022-04-27 ENCOUNTER — LAB (OUTPATIENT)
Dept: LAB | Facility: CLINIC | Age: 82
End: 2022-04-27
Payer: COMMERCIAL

## 2022-04-27 DIAGNOSIS — I48.20 CHRONIC ATRIAL FIBRILLATION (H): ICD-10-CM

## 2022-04-27 DIAGNOSIS — Z98.890 H/O MITRAL VALVE REPAIR: ICD-10-CM

## 2022-04-27 DIAGNOSIS — I48.20 CHRONIC ATRIAL FIBRILLATION (H): Primary | ICD-10-CM

## 2022-04-27 DIAGNOSIS — Z95.2 S/P AVR: ICD-10-CM

## 2022-04-27 LAB — INR BLD: 1.9 (ref 0.9–1.1)

## 2022-04-27 PROCEDURE — 36416 COLLJ CAPILLARY BLOOD SPEC: CPT

## 2022-04-27 PROCEDURE — 85610 PROTHROMBIN TIME: CPT

## 2022-04-27 NOTE — PROGRESS NOTES
ANTICOAGULATION MANAGEMENT     Parish Bills 81 year old male is on warfarin with subtherapeutic INR result. (Goal INR 2.0-3.0)    Recent labs: (last 7 days)     04/27/22  0832   INR 1.9*       ASSESSMENT       Source(s): Chart Review, Patient/Caregiver Call and Template       Warfarin doses taken: Warfarin taken as instructed    Diet: No new diet changes identified    New illness, injury, or hospitalization: No.   S/p revision of aortic aneurysm repair on 4/4/22.    Medication/supplement changes: None noted    Signs or symptoms of bleeding or clotting: No   Reported bleeding for groin have completely resolved since 4/15/22 from right groin.    Previous INR: Subtherapeutic last 7 INR results.    Additional findings: None       PLAN     Recommended plan for no diet, medication or health factor changes affecting INR     Dosing Instructions:   (evenings. 5mg tabs)    Increase your warfarin dose (6.7% change) with next INR in 1-2 weeks       Summary  As of 4/27/2022    Full warfarin instructions:  7.5 mg every Wed, Fri; 5 mg all other days   Next INR check:  5/11/2022             Telephone call with  Parish ([phone]) who verbalizes understanding and agrees to plan    - increased wkly warfarin dose d/t continued subtherapeutic INR.  Bleeding from right groin have completely resolved.   - and to ensure INR stability 2-3.    Lab visit scheduled - INR on 5/11/22 @ [facility].    Education provided: Importance of consistent vitamin K intake and Goal range and significance of current result    Plan made per [facility] anticoagulation protocol    Kori Ziegler, RN  Anticoagulation Clinic  4/27/2022    _______________________________________________________________________     Anticoagulation Episode Summary     Current INR goal:  2.0-3.0   TTR:  75.0 % (1 y)   Target end date:  Indefinite   Send INR reminders to:  [facility]    Indications    Chronic atrial fibrillation (H) [I48.20]  H/O mitral valve repair  [Z98.890]  S/P AVR [Z95.2]  A-fib (H) (Resolved) [I48.91]           Comments:           Anticoagulation Care Providers     Provider Role Specialty Phone number    Isidro Au MD Referring [facility] Medicine [phone]

## 2022-05-06 ENCOUNTER — IMMUNIZATION (OUTPATIENT)
Dept: NURSING | Facility: CLINIC | Age: 82
End: 2022-05-06
Payer: COMMERCIAL

## 2022-05-06 PROCEDURE — 91306 COVID-19,PF,MODERNA (18+ YRS BOOSTER .25ML): CPT

## 2022-05-06 PROCEDURE — 0064A COVID-19,PF,MODERNA (18+ YRS BOOSTER .25ML): CPT

## 2022-05-11 ENCOUNTER — LAB (OUTPATIENT)
Dept: LAB | Facility: CLINIC | Age: 82
End: 2022-05-11
Payer: COMMERCIAL

## 2022-05-11 ENCOUNTER — ANTICOAGULATION THERAPY VISIT (OUTPATIENT)
Dept: ANTICOAGULATION | Facility: CLINIC | Age: 82
End: 2022-05-11

## 2022-05-11 DIAGNOSIS — I48.20 CHRONIC ATRIAL FIBRILLATION (H): Primary | ICD-10-CM

## 2022-05-11 DIAGNOSIS — Z98.890 H/O MITRAL VALVE REPAIR: ICD-10-CM

## 2022-05-11 DIAGNOSIS — Z95.2 S/P AVR: ICD-10-CM

## 2022-05-11 DIAGNOSIS — I48.20 CHRONIC ATRIAL FIBRILLATION (H): ICD-10-CM

## 2022-05-11 LAB — INR BLD: 2.2 (ref 0.9–1.1)

## 2022-05-11 PROCEDURE — 36416 COLLJ CAPILLARY BLOOD SPEC: CPT

## 2022-05-11 PROCEDURE — 85610 PROTHROMBIN TIME: CPT

## 2022-05-11 NOTE — PROGRESS NOTES
ANTICOAGULATION MANAGEMENT     Parish Bills 81 year old male is on warfarin with therapeutic INR result. (Goal INR 2.0-3.0)    Recent labs: (last 7 days)     05/11/22  0823   INR 2.2*       ASSESSMENT       Source(s): Chart Review, Patient/Caregiver Call and Template       Warfarin doses taken: Warfarin taken as instructed    Diet: No new diet changes identified    New illness, injury, or hospitalization: No    Medication/supplement changes: None noted    Signs or symptoms of bleeding or clotting: No    Previous INR: Subtherapeutic last 7 INRresults.    Additional findings: None       PLAN     Recommended plan for no diet, medication or health factor changes affecting INR     Dosing Instructions:   (evenings. 5mg tabs)    continue your current warfarin dose with next INR in 1-2 weeks       Summary  As of 5/11/2022    Full warfarin instructions:  7.5 mg every Tue, Fri; 5 mg all other days   Next INR check:  5/25/2022             Telephone call with  Parish ([phone]) who verbalizes understanding and agrees to plan    - going forward, will start to take 7.5mg on Tues/Fri and 5mg ROW.    Lab visit scheduled - INR on 5/25/22 @ [facility].    Education provided: Importance of consistent vitamin K intake and Goal range and significance of current result    Plan made per [facility] anticoagulation protocol    Kori Ziegler RN  Anticoagulation Clinic  5/11/2022    _______________________________________________________________________     Anticoagulation Episode Summary     Current INR goal:  2.0-3.0   TTR:  73.7 % (1 y)   Target end date:  Indefinite   Send INR reminders to:  [facility]    Indications    Chronic atrial fibrillation (H) [I48.20]  H/O mitral valve repair [Z98.890]  S/P AVR [Z95.2]  A-fib (H) (Resolved) [I48.91]           Comments:           Anticoagulation Care Providers     Provider Role Specialty Phone number    Isidro Au MD Referring Family Medicine [phone]

## 2022-05-25 ENCOUNTER — ANTICOAGULATION THERAPY VISIT (OUTPATIENT)
Dept: ANTICOAGULATION | Facility: CLINIC | Age: 82
End: 2022-05-25

## 2022-05-25 ENCOUNTER — LAB (OUTPATIENT)
Dept: LAB | Facility: CLINIC | Age: 82
End: 2022-05-25
Payer: COMMERCIAL

## 2022-05-25 DIAGNOSIS — Z98.890 H/O MITRAL VALVE REPAIR: ICD-10-CM

## 2022-05-25 DIAGNOSIS — I48.20 CHRONIC ATRIAL FIBRILLATION (H): ICD-10-CM

## 2022-05-25 DIAGNOSIS — Z95.2 S/P AVR: ICD-10-CM

## 2022-05-25 DIAGNOSIS — I48.20 CHRONIC ATRIAL FIBRILLATION (H): Primary | ICD-10-CM

## 2022-05-25 LAB — INR BLD: 2.2 (ref 0.9–1.1)

## 2022-05-25 PROCEDURE — 36416 COLLJ CAPILLARY BLOOD SPEC: CPT

## 2022-05-25 PROCEDURE — 85610 PROTHROMBIN TIME: CPT

## 2022-05-25 NOTE — PROGRESS NOTES
ANTICOAGULATION MANAGEMENT     Parish Bills 81 year old male is on warfarin with therapeutic INR result. (Goal INR 2.0-3.0)    Recent labs: (last 7 days)     05/25/22  0815   INR 2.2*       ASSESSMENT       Source(s): Chart Review, Patient/Caregiver Call and Template       Warfarin doses taken: Warfarin taken as instructed    Diet: No new diet changes identified    New illness, injury, or hospitalization: No    Medication/supplement changes: None noted    Signs or symptoms of bleeding or clotting: No    Previous INR: Therapeutic last visit at 2.2; previously outside of goal range at 1.9    Additional findings: None       PLAN     Recommended plan for no diet, medication or health factor changes affecting INR     Dosing Instructions:   (evenings. 5mg tabs)    continue your current warfarin dose with next INR in 3 weeks       Summary  As of 5/25/2022    Full warfarin instructions:  7.5 mg every Tue, Fri; 5 mg all other days   Next INR check:  6/15/2022             Telephone call with  Parish ([phone]) who verbalizes understanding and agrees to plan    Lab visit scheduled- INR on 6/15/22 @ [facility].    Education provided: Goal range and significance of current result    Plan made per [facility] anticoagulation protocol    Kori Ziegler, RN  Anticoagulation Clinic  5/25/2022    _______________________________________________________________________     Anticoagulation Episode Summary     Current INR goal:  2.0-3.0   TTR:  73.7 % (1 y)   Target end date:  Indefinite   Send INR reminders to:  [facility]    Indications    Chronic atrial fibrillation (H) [I48.20]  H/O mitral valve repair [Z98.890]  S/P AVR [Z95.2]  A-fib (H) (Resolved) [I48.91]           Comments:           Anticoagulation Care Providers     Provider Role Specialty Phone number    Isidro Au MD Referring Family Medicine [phone]

## 2022-06-15 ENCOUNTER — ANTICOAGULATION THERAPY VISIT (OUTPATIENT)
Dept: ANTICOAGULATION | Facility: CLINIC | Age: 82
End: 2022-06-15

## 2022-06-15 ENCOUNTER — LAB (OUTPATIENT)
Dept: LAB | Facility: CLINIC | Age: 82
End: 2022-06-15
Payer: COMMERCIAL

## 2022-06-15 DIAGNOSIS — Z98.890 H/O MITRAL VALVE REPAIR: ICD-10-CM

## 2022-06-15 DIAGNOSIS — Z95.2 S/P AVR: ICD-10-CM

## 2022-06-15 DIAGNOSIS — I48.20 CHRONIC ATRIAL FIBRILLATION (H): ICD-10-CM

## 2022-06-15 DIAGNOSIS — I48.20 CHRONIC ATRIAL FIBRILLATION (H): Primary | ICD-10-CM

## 2022-06-15 LAB — INR BLD: 1.8 (ref 0.9–1.1)

## 2022-06-15 PROCEDURE — 85610 PROTHROMBIN TIME: CPT

## 2022-06-15 PROCEDURE — 36416 COLLJ CAPILLARY BLOOD SPEC: CPT

## 2022-06-15 NOTE — PROGRESS NOTES
ANTICOAGULATION MANAGEMENT     Parish Bills 81 year old male is on warfarin with subtherapeutic INR result. (Goal INR 2.0-3.0)    Recent labs: (last 7 days)     06/15/22  0813   INR 1.8*       ASSESSMENT       Source(s): Chart Review, Patient/Caregiver Call and Template       Warfarin doses taken: Warfarin taken as instructed    Diet: Increased greens/vitamin K in diet; plans to resume previous intake    Reported eating more broccoli than usual    New illness, injury, or hospitalization: No    Medication/supplement changes: None noted    Signs or symptoms of bleeding or clotting: No    Previous INR: Therapeutic last 2 visits    Additional findings: None       PLAN     Recommended plan for temporary change(s) affecting INR     Dosing Instructions:   (evenings. 5mg tabs)    booster dose then continue your current warfarin dose with next INR in 1-2 weeks       Summary  As of 6/15/2022    Full warfarin instructions:  6/15: 7.5 mg; Otherwise 7.5 mg every Tue, Fri; 5 mg all other days   Next INR check:  6/29/2022             Telephone call with  Parish ([phone]) who verbalizes understanding and agrees to plan    Lab visit scheduled - INR on 6/29/22 @ [facility].    Education provided: Importance of consistent vitamin K intake, Impact of vitamin K foods on INR and Goal range and significance of current result    Plan made per [facility] anticoagulation protocol    Kori Ziegler RN  Anticoagulation Clinic  6/15/2022    _______________________________________________________________________     Anticoagulation Episode Summary     Current INR goal:  2.0-3.0   TTR:  70.7 % (1 y)   Target end date:  Indefinite   Send INR reminders to:  [facility]    Indications    Chronic atrial fibrillation (H) [I48.20]  H/O mitral valve repair [Z98.890]  S/P AVR [Z95.2]  A-fib (H) (Resolved) [I48.91]           Comments:           Anticoagulation Care Providers     Provider Role Specialty Phone number    Isidro Au MD  [facility] Family Medicine [phone]

## 2022-06-29 ENCOUNTER — LAB (OUTPATIENT)
Dept: LAB | Facility: CLINIC | Age: 82
End: 2022-06-29
Payer: COMMERCIAL

## 2022-06-29 ENCOUNTER — ANTICOAGULATION THERAPY VISIT (OUTPATIENT)
Dept: ANTICOAGULATION | Facility: CLINIC | Age: 82
End: 2022-06-29

## 2022-06-29 DIAGNOSIS — T14.8XXA BRUISING: ICD-10-CM

## 2022-06-29 DIAGNOSIS — I48.20 CHRONIC ATRIAL FIBRILLATION (H): ICD-10-CM

## 2022-06-29 DIAGNOSIS — I48.20 CHRONIC ATRIAL FIBRILLATION (H): Primary | ICD-10-CM

## 2022-06-29 DIAGNOSIS — Z95.2 S/P AVR: ICD-10-CM

## 2022-06-29 DIAGNOSIS — Z98.890 H/O MITRAL VALVE REPAIR: ICD-10-CM

## 2022-06-29 LAB — INR BLD: 2.4 (ref 0.9–1.1)

## 2022-06-29 PROCEDURE — 36416 COLLJ CAPILLARY BLOOD SPEC: CPT

## 2022-06-29 PROCEDURE — 85610 PROTHROMBIN TIME: CPT

## 2022-06-29 NOTE — PROGRESS NOTES
I have placed future order for CBC- please get with next INR  If bruising worsening please suggest patient to be seen in person

## 2022-06-29 NOTE — PROGRESS NOTES
"Dr. Au,     - Parish had INR taken today which was therapeutic at 2.4.     - however, he reported \" excess bruising on both arms \" without any known injuries.     - a review of last platelet count on 4/6/22 was low at 132.      -  He is scheduled for another INR in 2 wks, would like to include labs or platelet count, or would you prefer to see patient.     - please advise  "

## 2022-06-29 NOTE — PROGRESS NOTES
ANTICOAGULATION MANAGEMENT     Parish Bills 81 year old male is on warfarin with therapeutic INR result. (Goal INR 2.0-3.0)    Recent labs: (last 7 days)     06/29/22  0808   INR 2.4*       ASSESSMENT       Source(s): Chart Review, Patient/Caregiver Call and Template       Warfarin doses taken: Warfarin taken as instructed    Reported taking booster dose 7.5mg on 6/15/22 as instructed.    Diet: No new diet changes identified    New illness, injury, or hospitalization: No    Medication/supplement changes: None noted    Signs or symptoms of bleeding or clotting: Yes:    Excess bruising on both  Arms without injuries.    Previous INR: Subtherapeutic at 1.8 on 6/15/22.    Additional findings:  Reviewed recent labs - last Platelet count from 4/6/22 was low at 132.       PLAN     Recommended plan for ongoing change(s) affecting INR     Dosing Instructions:    continue your current warfarin dose with next INR in 1-2 weeks       Summary  As of 6/29/2022    Full warfarin instructions:  7.5 mg every Tue, Fri; 5 mg all other days   Next INR check:  7/13/2022             Telephone call with  Parish ([phone]) who verbalizes understanding and agrees to plan    Will inform Dr. Au - patient has noted increased bruising on both arm with no known injuries.   A review of patients past labs, did show Hx of thrombocytopenia.    Lab visit scheduled - INR on 7/13/22 @ [facility].    Education provided: Importance of consistent vitamin K intake and Goal range and significance of current result    Plan made per [facility] anticoagulation protocol    Kori Ziegler RN  Anticoagulation Clinic  6/29/2022    _______________________________________________________________________     Anticoagulation Episode Summary     Current INR goal:  2.0-3.0   TTR:  70.7 % (1 y)   Target end date:  Indefinite   Send INR reminders to:  [facility]    Indications    Chronic atrial fibrillation (H) [I48.20]  H/O mitral valve repair  [Z98.890]  S/P AVR [Z95.2]  A-fib (H) (Resolved) [I48.91]           Comments:           Anticoagulation Care Providers     Provider Role Specialty Phone number    Isidro Au MD Referring [facility] Medicine [phone]

## 2022-06-30 NOTE — PROGRESS NOTES
Called and left detailed message with Parish.     - informed him Dr. Au put in future orders for CBC w/ platelets on 7/13/22 during his INR appt.     - also, relayed message - if noted increased bruisings to make appt.

## 2022-07-13 ENCOUNTER — ANTICOAGULATION THERAPY VISIT (OUTPATIENT)
Dept: ANTICOAGULATION | Facility: CLINIC | Age: 82
End: 2022-07-13

## 2022-07-13 ENCOUNTER — LAB (OUTPATIENT)
Dept: LAB | Facility: CLINIC | Age: 82
End: 2022-07-13
Payer: COMMERCIAL

## 2022-07-13 DIAGNOSIS — I48.20 CHRONIC ATRIAL FIBRILLATION (H): ICD-10-CM

## 2022-07-13 DIAGNOSIS — I48.20 CHRONIC ATRIAL FIBRILLATION (H): Primary | ICD-10-CM

## 2022-07-13 DIAGNOSIS — T14.8XXA BRUISING: ICD-10-CM

## 2022-07-13 DIAGNOSIS — Z98.890 H/O MITRAL VALVE REPAIR: ICD-10-CM

## 2022-07-13 DIAGNOSIS — Z95.2 S/P AVR: ICD-10-CM

## 2022-07-13 LAB
ERYTHROCYTE [DISTWIDTH] IN BLOOD BY AUTOMATED COUNT: 14.2 % (ref 10–15)
HCT VFR BLD AUTO: 34.3 % (ref 40–53)
HGB BLD-MCNC: 11.3 G/DL (ref 13.3–17.7)
INR BLD: 2.6 (ref 0.9–1.1)
MCH RBC QN AUTO: 29.2 PG (ref 26.5–33)
MCHC RBC AUTO-ENTMCNC: 32.9 G/DL (ref 31.5–36.5)
MCV RBC AUTO: 89 FL (ref 78–100)
PLATELET # BLD AUTO: 157 10E3/UL (ref 150–450)
RBC # BLD AUTO: 3.87 10E6/UL (ref 4.4–5.9)
WBC # BLD AUTO: 5.7 10E3/UL (ref 4–11)

## 2022-07-13 PROCEDURE — 36415 COLL VENOUS BLD VENIPUNCTURE: CPT

## 2022-07-13 PROCEDURE — 36416 COLLJ CAPILLARY BLOOD SPEC: CPT

## 2022-07-13 PROCEDURE — 85610 PROTHROMBIN TIME: CPT

## 2022-07-13 PROCEDURE — 85027 COMPLETE CBC AUTOMATED: CPT

## 2022-07-13 NOTE — PROGRESS NOTES
ANTICOAGULATION MANAGEMENT     Parish Bills 81 year old male is on warfarin with therapeutic INR result. (Goal INR 2.0-3.0)    Recent labs: (last 7 days)     07/13/22  0805   INR 2.6*       ASSESSMENT       Source(s): Chart Review, Patient/Caregiver Call and Template       Warfarin doses taken: Warfarin taken as instructed    Diet: No new diet changes identified    New illness, injury, or hospitalization: No    Medication/supplement changes: None noted    Signs or symptoms of bleeding or clotting: No    Previous INR: Therapeutic last visit at 2.4; previously outside of goal range at 1.8    Additional findings:  Platelet count - 157    (referance range 150-450)   - last platelet count was 160 on 3/23/22.   - OV appt with Dr. Au on 7/15/22.       PLAN     Recommended plan for no diet, medication or health factor changes affecting INR     Dosing Instructions:    continue your current warfarin dose with next INR in 3 weeks       Summary  As of 7/13/2022    Full warfarin instructions:  7.5 mg every Tue, Fri; 5 mg all other days   Next INR check:  8/3/2022             Telephone call with  wife, Ileana ([phone]) who verbalizes understanding and agrees to plan    Lab visit scheduled - INR on 8/4/22 @ [facility].    Education provided: Goal range and significance of current result    Plan made per [facility] anticoagulation protocol    Kori Ziegler RN  Anticoagulation Clinic  7/13/2022    _______________________________________________________________________     Anticoagulation Episode Summary     Current INR goal:  2.0-3.0   TTR:  74.6 % (1 y)   Target end date:  Indefinite   Send INR reminders to:  [facility]    Indications    Chronic atrial fibrillation (H) [I48.20]  H/O mitral valve repair [Z98.890]  S/P AVR [Z95.2]  A-fib (H) (Resolved) [I48.91]           Comments:           Anticoagulation Care Providers     Provider Role Specialty Phone number    Isidro Au MD Referring Family Medicine  [phone]

## 2022-07-14 ENCOUNTER — TELEPHONE (OUTPATIENT)
Dept: FAMILY MEDICINE | Facility: CLINIC | Age: 82
End: 2022-07-14

## 2022-07-15 ENCOUNTER — OFFICE VISIT (OUTPATIENT)
Dept: FAMILY MEDICINE | Facility: CLINIC | Age: 82
End: 2022-07-15
Payer: COMMERCIAL

## 2022-07-15 VITALS
RESPIRATION RATE: 20 BRPM | SYSTOLIC BLOOD PRESSURE: 152 MMHG | BODY MASS INDEX: 27.36 KG/M2 | WEIGHT: 188 LBS | OXYGEN SATURATION: 98 % | DIASTOLIC BLOOD PRESSURE: 65 MMHG | HEART RATE: 53 BPM

## 2022-07-15 DIAGNOSIS — T14.8XXA BRUISING: ICD-10-CM

## 2022-07-15 DIAGNOSIS — F33.41 RECURRENT MAJOR DEPRESSIVE DISORDER, IN PARTIAL REMISSION (H): ICD-10-CM

## 2022-07-15 DIAGNOSIS — L98.9 SKIN LESION: Primary | ICD-10-CM

## 2022-07-15 PROCEDURE — 99214 OFFICE O/P EST MOD 30 MIN: CPT | Mod: 25 | Performed by: FAMILY MEDICINE

## 2022-07-15 PROCEDURE — 90715 TDAP VACCINE 7 YRS/> IM: CPT | Performed by: FAMILY MEDICINE

## 2022-07-15 PROCEDURE — 90471 IMMUNIZATION ADMIN: CPT | Performed by: FAMILY MEDICINE

## 2022-07-15 ASSESSMENT — ANXIETY QUESTIONNAIRES
7. FEELING AFRAID AS IF SOMETHING AWFUL MIGHT HAPPEN: NOT AT ALL
2. NOT BEING ABLE TO STOP OR CONTROL WORRYING: SEVERAL DAYS
7. FEELING AFRAID AS IF SOMETHING AWFUL MIGHT HAPPEN: NOT AT ALL
4. TROUBLE RELAXING: SEVERAL DAYS
8. IF YOU CHECKED OFF ANY PROBLEMS, HOW DIFFICULT HAVE THESE MADE IT FOR YOU TO DO YOUR WORK, TAKE CARE OF THINGS AT HOME, OR GET ALONG WITH OTHER PEOPLE?: NOT DIFFICULT AT ALL
6. BECOMING EASILY ANNOYED OR IRRITABLE: NOT AT ALL
3. WORRYING TOO MUCH ABOUT DIFFERENT THINGS: NOT AT ALL
GAD7 TOTAL SCORE: 4
GAD7 TOTAL SCORE: 4
1. FEELING NERVOUS, ANXIOUS, OR ON EDGE: SEVERAL DAYS
5. BEING SO RESTLESS THAT IT IS HARD TO SIT STILL: SEVERAL DAYS
GAD7 TOTAL SCORE: 4

## 2022-07-15 ASSESSMENT — PATIENT HEALTH QUESTIONNAIRE - PHQ9
SUM OF ALL RESPONSES TO PHQ QUESTIONS 1-9: 8
SUM OF ALL RESPONSES TO PHQ QUESTIONS 1-9: 8
10. IF YOU CHECKED OFF ANY PROBLEMS, HOW DIFFICULT HAVE THESE PROBLEMS MADE IT FOR YOU TO DO YOUR WORK, TAKE CARE OF THINGS AT HOME, OR GET ALONG WITH OTHER PEOPLE: NOT DIFFICULT AT ALL

## 2022-07-15 NOTE — PROGRESS NOTES
"  Assessment & Plan     Skin lesion  Enlarging seborrheic keratosis on patient's scalp wishes to have it removed  Discussed referral to dermatology for removal and possible biopsy  - Adult Dermatology Referral; Future    Bruising  Patient has been noticing experiencing bruising for some time  Recent CBC and platelets checked which are stable  Bruising comes and goes he is anticoagulated we discussed with advanced age skin and blood vessel walls are thinning in combination with anticoagulation he will bruise very easily he does note resolution of the bruising quickly  Has not been having problems with increased falls at this time we will continue to monitor  Benefit outweighs risk with anticoagulation for him at this time    Recurrent major depressive disorder, in partial remission (H)  Patient feels he is having some mental cloudiness with his medications for depression  We discussed a plan to titrate down and off some of them at this time        BMI:   Estimated body mass index is 27.36 kg/m  as calculated from the following:    Height as of 4/11/22: 1.765 m (5' 9.5\").    Weight as of this encounter: 85.3 kg (188 lb).       No follow-ups on file.    Isidro Au MD  [facility]    Mateo Lugo is a 81 year old accompanied by his spouse, presenting for the following health issues:  Bleeding/Bruising (Bruising on arms )  81-year-old male here for a few different concerns 1 is an enlarging scalp lesion which looks consistent with a seborrheic keratosis but due to the fast growth we discussed removal with dermatology and he is in agreement.  Referral placed.  Patient has been noticing bruising which comes and goes on his mainly his bilateral upper extremities on a regular basis this is been going on for some time his hemoglobin is stable platelets are in normal range-with his anticoagulation and advancing age discussed that this is not atypical-I do think he should continue with " anticoagulation at this time and we can monitor.    Patient's larger concern is mental cloudiness which he feels is coming from his depression medications.  We discussed a trial on titrating down and off sertraline to see if this makes an improvement.  We discussed close follow-up in a month sooner if any worsening symptoms and he and his wife who are present are in agreement.    History of Present Illness       Mental Health Follow-up:  Patient presents to follow-up on Depression & Anxiety.Patient's depression since last visit has been:  Medium  The patient is having other symptoms associated with depression.  Patient's anxiety since last visit has been:  Medium  The patient is having other symptoms associated with anxiety.  Any significant life events: No  Patient is feeling anxious or having panic attacks.  Patient has no concerns about alcohol or drug use.    Hypertension: He presents for follow up of hypertension.  He does check blood pressure  regularly outside of the clinic. Outpatient blood pressures have not been over 140/90. He follows a low salt diet.     Vascular Disease:  He presents for follow up of vascular disease.  He takes daily aspirin.     Today's PHQ-9         PHQ-9 Total Score: 8    PHQ-9 Q9 Thoughts of better off dead/self-harm past 2 weeks :   Not at all    How difficult have these problems made it for you to do your work, take care of things at home, or get along with other people: Not difficult at all  Today's NADIA-7 Score: 4       Skin Lesion  Onset/Duration:  years  Description  Location:  Top of head   Color: brown and skin colored  Border description: irregular border, raised  Character: round, blotchy  Itching: no  Bleeding:  No  Intensity:  mild  Progression of Symptoms:  worsening  Accompanying signs and symptoms:   Bleeding: No  Scaling: No  Excessive sun exposure/tanning: No  Sunscreen used: No  History:           Any previous history of skin cancer: YES  Any family history of  melanoma: YES  Previous episodes of similar lesion: No  Precipitating or alleviating factors:  NA  Therapies tried and outcome: antibiotic ointment         Review of Systems   Constitutional, HEENT, cardiovascular, pulmonary, gi and gu systems are negative, except as otherwise noted.      Objective    BP (!) 152/65 (BP Location: Left arm, Patient Position: Sitting, Cuff Size: Adult Large)   Pulse 53   Resp 20   Wt 85.3 kg (188 lb)   BMI 27.36 kg/m    Body mass index is 27.36 kg/m .  Physical Exam   GENERAL: healthy, alert and no distress  EYES: Eyes grossly normal to inspection, PERRL and conjunctivae and sclerae normal  HENT: normal cephalic/atraumatic and scalp with seb keratosis- brown in color, raised  RESP: lungs clear to auscultation - no rales, rhonchi or wheezes  CV: regular rate and rhythm, normal S1 S2, no S3 or S4, no murmur, click or rub, no peripheral edema and peripheral pulses strong  MS: no gross musculoskeletal defects noted, no edema  SKIN: enlarging scalp lesion- discussed dermatology  NEURO: Normal strength and tone, mentation intact and speech normal  PSYCH: mentation appears normal, affect normal/bright                    .  ..  Answers for HPI/ROS submitted by the patient on 7/15/2022  If you checked off any problems, how difficult have these problems made it for you to do your work, take care of things at home, or get along with other people?: Not difficult at all  PHQ9 TOTAL SCORE: 8

## 2022-07-15 NOTE — PATIENT INSTRUCTIONS
Decrease Sertraline to 100mg for 7 day  Then decrease to 50 mg for 4 days  Then take 1/2 tablet (25mg) for 4 days  Then take 1/2 tablet every other day for 3 doses    Continue with bubproprion  Update Dr. Au in 4 weeks    Call Dr. Lopez to get scalp checked

## 2022-07-21 ASSESSMENT — PATIENT HEALTH QUESTIONNAIRE - PHQ9
10. IF YOU CHECKED OFF ANY PROBLEMS, HOW DIFFICULT HAVE THESE PROBLEMS MADE IT FOR YOU TO DO YOUR WORK, TAKE CARE OF THINGS AT HOME, OR GET ALONG WITH OTHER PEOPLE: NOT DIFFICULT AT ALL
SUM OF ALL RESPONSES TO PHQ QUESTIONS 1-9: 8

## 2022-07-21 ASSESSMENT — ANXIETY QUESTIONNAIRES: GAD7 TOTAL SCORE: 4

## 2022-08-04 ENCOUNTER — LAB (OUTPATIENT)
Dept: LAB | Facility: CLINIC | Age: 82
End: 2022-08-04
Payer: COMMERCIAL

## 2022-08-04 ENCOUNTER — ANTICOAGULATION THERAPY VISIT (OUTPATIENT)
Dept: ANTICOAGULATION | Facility: CLINIC | Age: 82
End: 2022-08-04

## 2022-08-04 DIAGNOSIS — I48.20 CHRONIC ATRIAL FIBRILLATION (H): Primary | ICD-10-CM

## 2022-08-04 DIAGNOSIS — Z98.890 H/O MITRAL VALVE REPAIR: ICD-10-CM

## 2022-08-04 DIAGNOSIS — Z95.2 S/P AVR: ICD-10-CM

## 2022-08-04 DIAGNOSIS — I48.20 CHRONIC ATRIAL FIBRILLATION (H): ICD-10-CM

## 2022-08-04 LAB — INR BLD: 2.1 (ref 0.9–1.1)

## 2022-08-04 PROCEDURE — 36416 COLLJ CAPILLARY BLOOD SPEC: CPT

## 2022-08-04 PROCEDURE — 85610 PROTHROMBIN TIME: CPT

## 2022-08-04 NOTE — PROGRESS NOTES
ANTICOAGULATION MANAGEMENT     Parish Bills 82 year old male is on warfarin with therapeutic INR result. (Goal INR 2.0-3.0)    Recent labs: (last 7 days)     08/04/22  0800   INR 2.1*       ASSESSMENT       Source(s): Chart Review, Patient/Caregiver Call and Template       Warfarin doses taken: Warfarin taken as instructed    Diet: No new diet changes identified    New illness, injury, or hospitalization: Yes:    Did f/u with Dr. Au on 7/13/22 d/t increased bruising.  He felt reassured after his OV.    Medication/supplement changes: Yes.    Since 7/13/22, reported getting off taking Sertraline 150mg and titrating down his dose to come off drug, d/t mental cloudiness.      Decrease Sertraline to 100mg for 7 day      Then decrease to 50 mg for 4 days      Then take 1/2 tablet (25mg) for 4 days      Then take 1/2 tablet every other day for 3 doses - has one more day and last dose of Sertraline will on 8/5.    Signs or symptoms of bleeding or clotting: No    Previous INR: Therapeutic last 2 visits    Additional findings: Referral to Dermatology - to remove / biopsy enlarging seborrhic keratosis; scalp.       PLAN     Recommended plan for ongoing change(s) affecting INR     Dosing Instructions:   (evenings. 5mg tabs)    Continue your current warfarin dose with next INR in 1 week       Summary  As of 8/4/2022    Full warfarin instructions:  7.5 mg every Tue, Fri; 5 mg all other days   Next INR check:  8/12/2022             Telephone call with  Parish ([phone]) who verbalizes understanding and agrees to plan    - Patient getting off Sertraline, will need to closely monitor INR and adjust dose based on INR results.    Lab visit scheduled  INR on 8/15/22 @ [facility].    Education provided: Importance of consistent vitamin K intake, Goal range and significance of current result and Potential interaction between warfarin and Sertraline    Plan made per [facility] anticoagulation protocol    Kori Ziegler,  RN  Anticoagulation Clinic  8/4/2022    _______________________________________________________________________     Anticoagulation Episode Summary     Current INR goal:  2.0-3.0   TTR:  75.4 % (1 y)   Target end date:  Indefinite   Send INR reminders to:  [facility]    Indications    Chronic atrial fibrillation (H) [I48.20]  H/O mitral valve repair [Z98.890]  S/P AVR [Z95.2]  A-fib (H) (Resolved) [I48.91]           Comments:           Anticoagulation Care Providers     Provider Role Specialty Phone number    Isidro Au MD Referring Family Medicine [phone]

## 2022-08-15 ENCOUNTER — ANTICOAGULATION THERAPY VISIT (OUTPATIENT)
Dept: ANTICOAGULATION | Facility: CLINIC | Age: 82
End: 2022-08-15

## 2022-08-15 ENCOUNTER — LAB (OUTPATIENT)
Dept: LAB | Facility: CLINIC | Age: 82
End: 2022-08-15
Payer: COMMERCIAL

## 2022-08-15 DIAGNOSIS — I48.20 CHRONIC ATRIAL FIBRILLATION (H): Primary | ICD-10-CM

## 2022-08-15 DIAGNOSIS — Z95.2 S/P AVR: ICD-10-CM

## 2022-08-15 DIAGNOSIS — I48.20 CHRONIC ATRIAL FIBRILLATION (H): ICD-10-CM

## 2022-08-15 DIAGNOSIS — Z98.890 H/O MITRAL VALVE REPAIR: ICD-10-CM

## 2022-08-15 LAB — INR BLD: 2.1 (ref 0.9–1.1)

## 2022-08-15 PROCEDURE — 36416 COLLJ CAPILLARY BLOOD SPEC: CPT

## 2022-08-15 PROCEDURE — 85610 PROTHROMBIN TIME: CPT

## 2022-08-15 NOTE — PROGRESS NOTES
ANTICOAGULATION MANAGEMENT     Parish Bills 82 year old male is on warfarin with therapeutic INR result. (Goal INR 2.0-3.0)    Recent labs: (last 7 days)     08/15/22  0914   INR 2.1*       ASSESSMENT       Source(s): Chart Review, Patient/Caregiver Call and Template       Warfarin doses taken: Warfarin taken as instructed    Diet: No new diet changes identified    New illness, injury, or hospitalization: No    Medication/supplement changes: tapered off sertraline past 4-5 weeks.    Signs or symptoms of bleeding or clotting: No    Previous INR: Therapeutic last 2(+) visits    Additional findings: None       PLAN     Recommended plan for no diet, medication or health factor changes affecting INR     Dosing Instructions: Continue your current warfarin dose with next INR in 3-4 weeks       Summary  As of 8/15/2022    Full warfarin instructions:  7.5 mg every Tue, Fri; 5 mg all other days   Next INR check:  9/12/2022             Telephone call with Parish who verbalizes understanding and agrees to plan    Lab visit scheduled    Education provided: Please call back if any changes to your diet, medications or how you've been taking warfarin, Goal range and significance of current result and Importance of therapeutic range    Plan made per [facility] anticoagulation protocol    Laura Hauser RN  Anticoagulation Clinic  8/15/2022    _______________________________________________________________________     Anticoagulation Episode Summary     Current INR goal:  2.0-3.0   TTR:  75.5 % (1 y)   Target end date:  Indefinite   Send INR reminders to:  [facility]    Indications    Chronic atrial fibrillation (H) [I48.20]  H/O mitral valve repair [Z98.890]  S/P AVR [Z95.2]  A-fib (H) (Resolved) [I48.91]           Comments:           Anticoagulation Care Providers     Provider Role Specialty Phone number    Isidro Au MD Referring Family Medicine [phone]

## 2022-09-08 ENCOUNTER — TELEPHONE (OUTPATIENT)
Dept: FAMILY MEDICINE | Facility: CLINIC | Age: 82
End: 2022-09-08

## 2022-09-08 DIAGNOSIS — F41.9 ANXIETY: Primary | ICD-10-CM

## 2022-09-08 RX ORDER — HYDROXYZINE PAMOATE 25 MG/1
25 CAPSULE ORAL DAILY PRN
Qty: 90 CAPSULE | Refills: 0 | Status: SHIPPED | OUTPATIENT
Start: 2022-09-08 | End: 2022-12-05

## 2022-09-08 NOTE — TELEPHONE ENCOUNTER
Patient states that he is calling to update provider on sertraline. Patient states that it was going well with not taking it anymore until last week. He noticed that he has anxiety at night.  He states that he used to be on some kind of med in the past that he took for anxiety that he only took at bed time every once in a while.   I looked at him previous meds and he used to be on hydroxyzine. He said that sounds familiar.    He states that he would be willing try that again if  thinks its a good idea.     Please advise on what patient would do next.     OK to leave detailed message.

## 2022-09-08 NOTE — TELEPHONE ENCOUNTER
Please let him know that I will send that to the [facility] in [place] for him.  I think this would be a good one to try again.

## 2022-09-12 ENCOUNTER — ANTICOAGULATION THERAPY VISIT (OUTPATIENT)
Dept: ANTICOAGULATION | Facility: CLINIC | Age: 82
End: 2022-09-12

## 2022-09-12 ENCOUNTER — LAB (OUTPATIENT)
Dept: LAB | Facility: CLINIC | Age: 82
End: 2022-09-12
Payer: COMMERCIAL

## 2022-09-12 DIAGNOSIS — I48.20 CHRONIC ATRIAL FIBRILLATION (H): Primary | ICD-10-CM

## 2022-09-12 DIAGNOSIS — Z95.2 S/P AVR: ICD-10-CM

## 2022-09-12 DIAGNOSIS — I48.20 CHRONIC ATRIAL FIBRILLATION (H): ICD-10-CM

## 2022-09-12 DIAGNOSIS — Z98.890 H/O MITRAL VALVE REPAIR: ICD-10-CM

## 2022-09-12 LAB — INR BLD: 2.6 (ref 0.9–1.1)

## 2022-09-12 PROCEDURE — 85610 PROTHROMBIN TIME: CPT

## 2022-09-12 PROCEDURE — 36416 COLLJ CAPILLARY BLOOD SPEC: CPT

## 2022-09-12 NOTE — PROGRESS NOTES
ANTICOAGULATION MANAGEMENT     Parish Bills 82 year old male is on warfarin with therapeutic INR result. (Goal INR 2.0-3.0)    Recent labs: (last 7 days)     09/12/22  0823   INR 2.6*       ASSESSMENT       Source(s): Chart Review, Patient/Caregiver Call and Template       Warfarin doses taken: Warfarin taken as instructed    Diet: No new diet changes identified    New illness, injury, or hospitalization: No   Reported has a CT scan scheduled to follow-up on his Abdominal aortic aneurysm on 10/17/22.    Medication/supplement changes:  Yes. started on  8/11/22 Hydroxyzine 25mg 1 caps PRN for anxiety. No interaction anticipated    Signs or symptoms of bleeding or clotting: No    Previous INR: Therapeutic last 4 visits    Additional findings:  Scheduled on 9/22/22 to get his Flu Shot.       PLAN     Recommended plan for ongoing change(s) affecting INR     Dosing Instructions: Continue your current warfarin dose with next INR in 5 weeks       Summary  As of 9/12/2022    Full warfarin instructions:  7.5 mg every Tue, Fri; 5 mg all other days   Next INR check:  10/17/2022             Telephone call with  Parish ([phone]) who verbalizes understanding and agrees to plan    Lab visit scheduled - INR o 10/13/22 @ [facility].    Education provided: Importance of consistent vitamin K intake and Goal range and significance of current result    Plan made per [facility] anticoagulation protocol    Kori Ziegler RN  Anticoagulation Clinic  9/12/2022    _______________________________________________________________________     Anticoagulation Episode Summary     Current INR goal:  2.0-3.0   TTR:  75.5 % (1 y)   Target end date:  Indefinite   Send INR reminders to:  [facility]    Indications    Chronic atrial fibrillation (H) [I48.20]  H/O mitral valve repair [Z98.890]  S/P AVR [Z95.2]  A-fib (H) (Resolved) [I48.91]           Comments:           Anticoagulation Care Providers     Provider Role Specialty Phone number     Isidro Au MD [facility] [phone]

## 2022-09-16 ENCOUNTER — TELEPHONE (OUTPATIENT)
Dept: FAMILY MEDICINE | Facility: CLINIC | Age: 82
End: 2022-09-16

## 2022-09-16 DIAGNOSIS — F33.41 RECURRENT MAJOR DEPRESSIVE DISORDER, IN PARTIAL REMISSION (H): Primary | ICD-10-CM

## 2022-09-16 RX ORDER — PAROXETINE 10 MG/1
20 TABLET, FILM COATED ORAL EVERY MORNING
Qty: 90 TABLET | Refills: 1 | Status: SHIPPED | OUTPATIENT
Start: 2022-09-16 | End: 2022-10-25

## 2022-09-16 NOTE — TELEPHONE ENCOUNTER
Pt has tapered off sertraline, has been off completely since 8/5. Reports just not feeling right. Had about one good week and now not doing well. Reports hydroxyzine wasn't helpful. Wondering if he should go back to sertraline 100mg. Please advise.

## 2022-09-16 NOTE — TELEPHONE ENCOUNTER
Please inform patient that I like him to try a different medication and I sent a low-dose of a medication called paroxetine to the pharmacy.  I like him to start this.  Please give me an update in 2 to 3 weeks.

## 2022-09-21 ENCOUNTER — TELEPHONE (OUTPATIENT)
Dept: FAMILY MEDICINE | Facility: CLINIC | Age: 82
End: 2022-09-21

## 2022-09-21 NOTE — TELEPHONE ENCOUNTER
Patient calling in to update PCP that he has not started to take the paroxetine that he was prescribed on 9/16/22. He researched the side effects and is concerned with the increased risks of glaucoma, low sodium, and heart problems. He also reports increased anxiety since discontinuing the sertraline that he was on (d/c'd due to mental cloudiness). Patient looking for provider advice on medication. Is agreeable to restarting sertraline over the paroxetine if provider thinks that would be a good idea. Please advise.    Zara Osorio RN

## 2022-09-22 NOTE — TELEPHONE ENCOUNTER
Patient called back, I relayed the message below, patient voiced understanding and said he will start taking the paroxetine.

## 2022-09-22 NOTE — TELEPHONE ENCOUNTER
Attempted to call patient. Left message for patient to return call to clinic. Please relay provider's message below when patient calls back. Thanks!    Roman Wong RN, BSN  [facility]

## 2022-09-22 NOTE — TELEPHONE ENCOUNTER
Please inform patient that I think the side effect chances are quite low and I think he would be fine to try paroxetine- if he doesn't want to do that I would suggest another- the sertraline wasn't working for him so we should try a different one.

## 2022-10-11 DIAGNOSIS — N40.1 BENIGN PROSTATIC HYPERPLASIA WITH URINARY FREQUENCY: ICD-10-CM

## 2022-10-11 DIAGNOSIS — R35.0 BENIGN PROSTATIC HYPERPLASIA WITH URINARY FREQUENCY: ICD-10-CM

## 2022-10-11 DIAGNOSIS — I10 HTN (HYPERTENSION): ICD-10-CM

## 2022-10-11 DIAGNOSIS — E78.5 HYPERLIPIDEMIA LDL GOAL <100: ICD-10-CM

## 2022-10-11 RX ORDER — ATORVASTATIN CALCIUM 40 MG/1
40 TABLET, FILM COATED ORAL AT BEDTIME
Qty: 90 TABLET | Refills: 3 | Status: SHIPPED | OUTPATIENT
Start: 2022-10-11 | End: 2023-10-04

## 2022-10-11 RX ORDER — METOPROLOL SUCCINATE 50 MG/1
50 TABLET, EXTENDED RELEASE ORAL DAILY
Qty: 90 TABLET | Refills: 3 | Status: SHIPPED | OUTPATIENT
Start: 2022-10-11 | End: 2023-09-06

## 2022-10-11 RX ORDER — TAMSULOSIN HYDROCHLORIDE 0.4 MG/1
0.4 CAPSULE ORAL
Qty: 90 CAPSULE | Refills: 3 | Status: SHIPPED | OUTPATIENT
Start: 2022-10-11 | End: 2023-10-30

## 2022-10-11 NOTE — TELEPHONE ENCOUNTER
"Routing refill request to provider for review/approval because:  BP not in range.  Early refill requested.  Due to medication information not transferring due to [facility] please review the medication information prior to signing to ensure accuracy.    Last Written Prescription Date:  12/27/21  Last Fill Quantity: 90,  # refills: 3   Last office visit provider:  7/15/22     Requested Prescriptions   Pending Prescriptions Disp Refills     metoprolol succinate ER (TOPROL XL) 50 MG 24 hr tablet [Pharmacy Med Name: Metoprolol Succinate ER 50 MG Oral Tablet Extended Release 24 Hour] 100 tablet 2     Sig: TAKE ONE TABLET BY MOUTH  DAILY. HOLD FOR SYSTOLIC BP &lt;95 OR HEART RATE &lt;55       Beta-Blockers Protocol Failed - 10/11/2022  9:03 AM        Failed - Blood pressure under 140/90 in past 12 months     BP Readings from Last 3 Encounters:   07/15/22 (!) 152/65   04/11/22 110/64   03/30/22 136/78                 Passed - Patient is age 6 or older        Passed - Recent (12 mo) or future (30 days) visit within the authorizing provider's specialty     Patient has had an office visit with the authorizing provider or a provider within the authorizing providers department within the previous 12 mos or has a future within next 30 days. See \"Patient Info\" tab in inbasket, or \"Choose Columns\" in Meds & Orders section of the refill encounter.              Passed - Medication is active on med list           atorvastatin (LIPITOR) 40 MG tablet [Pharmacy Med Name: Atorvastatin Calcium 40 MG Oral Tablet] 100 tablet 2     Sig: TAKE 1 TABLET BY MOUTH AT  BEDTIME       Statins Protocol Failed - 10/11/2022  8:53 AM        Failed - LDL on file in past 12 months     Recent Labs   Lab Test 01/24/20  1608   LDL 68             Passed - No abnormal creatine kinase in past 12 months     No lab results found.             Passed - Recent (12 mo) or future (30 days) visit within the authorizing provider's specialty     Patient has had an office visit " "with the authorizing provider or a provider within the authorizing providers department within the previous 12 mos or has a future within next 30 days. See \"Patient Info\" tab in inbasket, or \"Choose Columns\" in Meds & Orders section of the refill encounter.              Passed - Medication is active on med list        Passed - Patient is age 18 or older           tamsulosin (FLOMAX) 0.4 MG capsule [Pharmacy Med Name: Tamsulosin HCl 0.4 MG Oral Capsule] 100 capsule 2     Sig: TAKE 1 CAPSULE BY MOUTH  DAILY AFTER BREAKFAST       Alpha Blockers Failed - 10/11/2022  8:53 AM        Failed - Blood pressure under 140/90 in past 12 months     BP Readings from Last 3 Encounters:   07/15/22 (!) 152/65   04/11/22 110/64   03/30/22 136/78                 Passed - Recent (12 mo) or future (30 days) visit within the authorizing provider's specialty     Patient has had an office visit with the authorizing provider or a provider within the authorizing providers department within the previous 12 mos or has a future within next 30 days. See \"Patient Info\" tab in inbasket, or \"Choose Columns\" in Meds & Orders section of the refill encounter.              Passed - Patient does not have Tadalafil, Vardenafil, or Sildenafil on their medication list        Passed - Medication is active on med list        Passed - Patient is 18 years of age or older             Lalo Cortez RN 10/11/22 9:03 AM  "

## 2022-10-13 ENCOUNTER — LAB (OUTPATIENT)
Dept: LAB | Facility: CLINIC | Age: 82
End: 2022-10-13
Payer: COMMERCIAL

## 2022-10-13 ENCOUNTER — ANTICOAGULATION THERAPY VISIT (OUTPATIENT)
Dept: ANTICOAGULATION | Facility: CLINIC | Age: 82
End: 2022-10-13

## 2022-10-13 ENCOUNTER — IMMUNIZATION (OUTPATIENT)
Dept: FAMILY MEDICINE | Facility: CLINIC | Age: 82
End: 2022-10-13
Payer: COMMERCIAL

## 2022-10-13 DIAGNOSIS — I48.20 CHRONIC ATRIAL FIBRILLATION (H): Primary | ICD-10-CM

## 2022-10-13 DIAGNOSIS — Z98.890 H/O MITRAL VALVE REPAIR: ICD-10-CM

## 2022-10-13 DIAGNOSIS — Z95.2 S/P AVR: ICD-10-CM

## 2022-10-13 DIAGNOSIS — I48.20 CHRONIC ATRIAL FIBRILLATION (H): ICD-10-CM

## 2022-10-13 LAB — INR BLD: 3.5 (ref 0.9–1.1)

## 2022-10-13 PROCEDURE — 36416 COLLJ CAPILLARY BLOOD SPEC: CPT

## 2022-10-13 PROCEDURE — 85610 PROTHROMBIN TIME: CPT

## 2022-10-13 PROCEDURE — 90662 IIV NO PRSV INCREASED AG IM: CPT

## 2022-10-13 PROCEDURE — G0008 ADMIN INFLUENZA VIRUS VAC: HCPCS

## 2022-10-13 NOTE — PROGRESS NOTES
ANTICOAGULATION MANAGEMENT     Parish Bills 82 year old male is on warfarin with supratherapeutic INR result. (Goal INR 2.0-3.0)    Recent labs: (last 7 days)     10/13/22  0824   INR 3.5*       ASSESSMENT       Source(s): Chart Review and Patient/Caregiver Call       Warfarin doses taken: Warfarin taken as instructed    Diet: No new diet changes identified    New illness, injury, or hospitalization: No    Medication/supplement changes: None noted    Signs or symptoms of bleeding or clotting: No    Previous INR: Therapeutic last 2(+) visits    Additional findings: None       PLAN     Recommended plan for no diet, medication or health factor changes affecting INR     Dosing Instructions: hold dose then continue your current warfarin dose with next INR in 1 week       Summary  As of 10/13/2022    Full warfarin instructions:  10/13: Hold; Otherwise 7.5 mg every Tue, Fri; 5 mg all other days   Next INR check:  10/27/2022             Telephone call with Parish who verbalizes understanding and agrees to plan    Lab visit scheduled with flu shot    Education provided: None required    Plan made per [facility] anticoagulation protocol    Zahra Gonzalez RN  Anticoagulation Clinic  10/13/2022    _______________________________________________________________________     Anticoagulation Episode Summary     Current INR goal:  2.0-3.0   TTR:  70.7 % (1 y)   Target end date:  Indefinite   Send INR reminders to:  [facility]    Indications    Chronic atrial fibrillation (H) [I48.20]  H/O mitral valve repair [Z98.890]  S/P AVR [Z95.2]  A-fib (H) (Resolved) [I48.91]           Comments:           Anticoagulation Care Providers     Provider Role Specialty Phone number    Isidro Au MD Referring Family Medicine [phone]

## 2022-10-14 DIAGNOSIS — F32.0 CURRENT MILD EPISODE OF MAJOR DEPRESSIVE DISORDER, UNSPECIFIED WHETHER RECURRENT (H): ICD-10-CM

## 2022-10-15 RX ORDER — BUPROPION HYDROCHLORIDE 150 MG/1
TABLET ORAL
Qty: 90 TABLET | Refills: 3 | Status: SHIPPED | OUTPATIENT
Start: 2022-10-15 | End: 2023-10-18

## 2022-10-15 NOTE — TELEPHONE ENCOUNTER
"Routing refill request to provider for review/approval because:  phq 9    Last Written Prescription Date:  12/27/21  Last Fill Quantity: 90,  # refills: 3   Last office visit provider:  7/15/22     Requested Prescriptions   Pending Prescriptions Disp Refills     buPROPion (WELLBUTRIN XL) 150 MG 24 hr tablet [Pharmacy Med Name: buPROPion HCl ER (XL) 150 MG Oral Tablet Extended Release 24 Hour] 90 tablet 3     Sig: TAKE 1 TABLET BY MOUTH IN  THE MORNING       SSRIs Protocol Failed - 10/14/2022  9:15 PM        Failed - PHQ-9 score less than 5 in past 6 months     Please review last PHQ-9 score.           Passed - Medication is Bupropion     If the medication is Bupropion (Wellbutrin), and the patient is taking for smoking cessation; OK to refill.          Passed - Medication is active on med list        Passed - Patient is age 18 or older        Passed - Recent (6 mo) or future (30 days) visit within the authorizing provider's specialty     Patient had office visit in the last 6 months or has a visit in the next 30 days with authorizing provider or within the authorizing provider's specialty.  See \"Patient Info\" tab in inbasket, or \"Choose Columns\" in Meds & Orders section of the refill encounter.                 Amrita Chase RN 10/15/22 12:34 PM  "

## 2022-10-17 ENCOUNTER — TELEPHONE (OUTPATIENT)
Dept: FAMILY MEDICINE | Facility: CLINIC | Age: 82
End: 2022-10-17

## 2022-10-17 NOTE — TELEPHONE ENCOUNTER
Reason for call:  Other   Patient called regarding (reason for call): Needs Dr OK  Additional comments: Pt needs to have a tooth pulled, Dentist will not pull tooth without an OK from pt's Dr because pt is on blood thinners and last INR was high. Pt having tooth pulled at [facility] [phone]    Phone number to reach patient:  Home number on file [phone] (home)    Best Time: any    Can we leave a detailed message on this number?  YES    Travel screening: Not Applicable

## 2022-10-17 NOTE — TELEPHONE ENCOUNTER
Appointment scheduled for INR. They will contact the dentist office to see what the INR needs to be.

## 2022-10-17 NOTE — TELEPHONE ENCOUNTER
Would suggest patient recheck INR the next day or two to see where INR is this week to see if he can have the procedure done.  Will dentist pull the tooth if INR under 3.0? What is there threshold?

## 2022-10-18 ENCOUNTER — LAB (OUTPATIENT)
Dept: LAB | Facility: CLINIC | Age: 82
End: 2022-10-18
Payer: COMMERCIAL

## 2022-10-18 ENCOUNTER — ANTICOAGULATION THERAPY VISIT (OUTPATIENT)
Dept: ANTICOAGULATION | Facility: CLINIC | Age: 82
End: 2022-10-18

## 2022-10-18 DIAGNOSIS — Z98.890 H/O MITRAL VALVE REPAIR: ICD-10-CM

## 2022-10-18 DIAGNOSIS — I48.20 CHRONIC ATRIAL FIBRILLATION (H): ICD-10-CM

## 2022-10-18 DIAGNOSIS — Z95.2 S/P AVR: ICD-10-CM

## 2022-10-18 DIAGNOSIS — I48.20 CHRONIC ATRIAL FIBRILLATION (H): Primary | ICD-10-CM

## 2022-10-18 LAB — INR BLD: 2.4 (ref 0.9–1.1)

## 2022-10-18 PROCEDURE — 36416 COLLJ CAPILLARY BLOOD SPEC: CPT

## 2022-10-18 PROCEDURE — 85610 PROTHROMBIN TIME: CPT

## 2022-10-18 NOTE — PROGRESS NOTES
ANTICOAGULATION MANAGEMENT     Parish Bills 82 year old male is on warfarin with therapeutic INR result. (Goal INR 2.0-3.0)    Recent labs: (last 7 days)     10/18/22  1336   INR 2.4*       ASSESSMENT       Source(s): Chart Review and Patient/Caregiver Call       Warfarin doses taken: Warfarin taken as instructed    Diet: No new diet changes identified    New illness, injury, or hospitalization: No    Medication/supplement changes: None noted    Signs or symptoms of bleeding or clotting: No    Previous INR: Supratherapeutic    Additional findings: None       PLAN     Recommended plan for no diet, medication or health factor changes affecting INR     Dosing Instructions: Continue your current warfarin dose with next INR in 2 weeks       Summary  As of 10/18/2022    Full warfarin instructions:  7.5 mg every Tue, Fri; 5 mg all other days   Next INR check:  11/1/2022             Telephone call with Parish who verbalizes understanding and agrees to plan    Lab visit scheduled    Education provided: None required    Plan made per [facility] anticoagulation protocol    Zahra Gonzalez RN  Anticoagulation Clinic  10/18/2022    _______________________________________________________________________     Anticoagulation Episode Summary     Current INR goal:  2.0-3.0   TTR:  70.0 % (1 y)   Target end date:  Indefinite   Send INR reminders to:  [facility]    Indications    Chronic atrial fibrillation (H) [I48.20]  H/O mitral valve repair [Z98.890]  S/P AVR [Z95.2]  A-fib (H) (Resolved) [I48.91]           Comments:           Anticoagulation Care Providers     Provider Role Specialty Phone number    Isidro Au MD Referring Family Medicine [phone]

## 2022-10-18 NOTE — TELEPHONE ENCOUNTER
Spoke with  at [facility] and they said since he had an INR under 3.0 they could do the procedure. But that it completley up to the doctor as to when he stops and restarts his blood thinners, they do not tell him what and when to take these medications.   Extraction is for this Thursday.   PLEASE ADVISE SOON

## 2022-10-19 NOTE — TELEPHONE ENCOUNTER
Spoke to [facility]. They states that they just care that patient's INR is below 3.0. It is up to the patient's PCP if they want patient to bridge for the procedure or if he is ok with just staying on the Warfarin the whole time.

## 2022-10-19 NOTE — TELEPHONE ENCOUNTER
INR in goal range  Ok to do procedure  If patient needs to be off blood thinners per the dentist he needs to be bridged with lovenox due to history of valve replacement

## 2022-10-24 DIAGNOSIS — F33.41 RECURRENT MAJOR DEPRESSIVE DISORDER, IN PARTIAL REMISSION (H): ICD-10-CM

## 2022-10-25 RX ORDER — PAROXETINE 10 MG/1
20 TABLET, FILM COATED ORAL EVERY MORNING
Qty: 180 TABLET | Refills: 1 | Status: SHIPPED | OUTPATIENT
Start: 2022-10-25 | End: 2022-12-07

## 2022-10-25 NOTE — TELEPHONE ENCOUNTER
"Routing refill request to provider for review/approval because:  phq 9    Last Written Prescription Date:  9/16/22  Last Fill Quantity: 90,  # refills: 1   Last office visit provider:  7/15/22     Requested Prescriptions   Pending Prescriptions Disp Refills     PARoxetine (PAXIL) 10 MG tablet [Pharmacy Med Name: PAROXETINE HCL 10 MG TABLET] 90 tablet 1     Sig: TAKE 2 TABLETS (20 MG) BY MOUTH EVERY MORNING       SSRIs Protocol Failed - 10/24/2022  1:30 PM        Failed - PHQ-9 score less than 5 in past 6 months     Please review last PHQ-9 score.           Passed - Medication is active on med list        Passed - Patient is age 18 or older        Passed - Recent (6 mo) or future (30 days) visit within the authorizing provider's specialty     Patient had office visit in the last 6 months or has a visit in the next 30 days with authorizing provider or within the authorizing provider's specialty.  See \"Patient Info\" tab in inbasket, or \"Choose Columns\" in Meds & Orders section of the refill encounter.                 Amrita Chase RN 10/25/22 3:59 PM  "

## 2022-10-28 ENCOUNTER — IMMUNIZATION (OUTPATIENT)
Dept: FAMILY MEDICINE | Facility: CLINIC | Age: 82
End: 2022-10-28
Payer: COMMERCIAL

## 2022-10-28 PROCEDURE — 0134A COVID-19,PF,MODERNA BIVALENT: CPT

## 2022-10-28 PROCEDURE — 91313 COVID-19,PF,MODERNA BIVALENT: CPT

## 2022-11-01 ENCOUNTER — ANTICOAGULATION THERAPY VISIT (OUTPATIENT)
Dept: ANTICOAGULATION | Facility: CLINIC | Age: 82
End: 2022-11-01

## 2022-11-01 ENCOUNTER — LAB (OUTPATIENT)
Dept: LAB | Facility: CLINIC | Age: 82
End: 2022-11-01
Payer: COMMERCIAL

## 2022-11-01 ENCOUNTER — TELEPHONE (OUTPATIENT)
Dept: FAMILY MEDICINE | Facility: CLINIC | Age: 82
End: 2022-11-01

## 2022-11-01 DIAGNOSIS — Z98.890 H/O MITRAL VALVE REPAIR: ICD-10-CM

## 2022-11-01 DIAGNOSIS — I48.20 CHRONIC ATRIAL FIBRILLATION (H): ICD-10-CM

## 2022-11-01 DIAGNOSIS — I48.20 CHRONIC ATRIAL FIBRILLATION (H): Primary | ICD-10-CM

## 2022-11-01 DIAGNOSIS — Z95.2 S/P AVR: ICD-10-CM

## 2022-11-01 DIAGNOSIS — Z00.00 HEALTH CARE MAINTENANCE: Primary | ICD-10-CM

## 2022-11-01 LAB — INR BLD: 2.5 (ref 0.9–1.1)

## 2022-11-01 PROCEDURE — 36416 COLLJ CAPILLARY BLOOD SPEC: CPT

## 2022-11-01 PROCEDURE — 85610 PROTHROMBIN TIME: CPT

## 2022-11-01 NOTE — PROGRESS NOTES
ANTICOAGULATION MANAGEMENT     Parish Bills 82 year old male is on warfarin with therapeutic INR result. (Goal INR 2.0-3.0)    Recent labs: (last 7 days)     11/01/22  0823   INR 2.5*       ASSESSMENT       Source(s): Chart Review, Patient/Caregiver Call and Template       Warfarin doses taken: Missed dose(s) may be affecting INR    Reported skipped warfarin dose on 10/20, d/t dental appointment.    Diet: No new diet changes identified    New illness, injury, or hospitalization: No    Medication/supplement changes: None noted    Signs or symptoms of bleeding or clotting: No    Previous INR: Therapeutic last visit at 2.4; previously outside of goal range at 3.5    Additional findings: None       PLAN     Recommended plan for temporary change(s) affecting INR     Dosing Instructions: Continue your current warfarin dose with next INR in 3-4 weeks       Summary  As of 11/1/2022    Full warfarin instructions:  7.5 mg every Tue, Fri; 5 mg all other days; Starting 11/1/2022   Next INR check:  11/29/2022             Telephone call with  Parish ([phone]) who verbalizes understanding and agrees to plan    Lab visit scheduled - INR on 11/29/22 @ [facility].    Education provided:     Taking warfarin: Importance of taking warfarin as instructed    Goal range and lab monitoring: goal range and significance of current result    Dietary considerations: importance of consistent vitamin K intake    Plan made per [facility] anticoagulation protocol    Kori Ziegler RN  Anticoagulation Clinic  11/1/2022    _______________________________________________________________________     Anticoagulation Episode Summary     Current INR goal:  2.0-3.0   TTR:  70.0 % (1 y)   Target end date:  Indefinite   Send INR reminders to:  [facility]    Indications    Chronic atrial fibrillation (H) [I48.20]  H/O mitral valve repair [Z98.890]  S/P AVR [Z95.2]  A-fib (H) (Resolved) [I48.91]           Comments:           Anticoagulation Care  Providers     Provider Role Specialty Phone number    Isidro Au MD Referring Family Medicine [phone]

## 2022-11-01 NOTE — TELEPHONE ENCOUNTER
Neonatology Daily Progress Note    Mayank Duffy is a 2 week old male infant  MRN: [id number]  Gestational Age: 34w1d  Birth weight: 2780 g   DOL: 15 days    PMA: 36w2d    HOSPITAL PROBLEMS:  Active Problems:     , gestational age 34 completed weeks    Failure to tolerate infant car seat challenge  Resolved Problems:    RDS (respiratory distress syndrome in the )    Respiratory distress       INTERVAL EVENTS SINCE PREVIOUS NOTE:  Did well overnight, no events  Infant had a desaturation episode  requiring stimulation.       PHYSICAL EXAM    Vital signs:     Vital Last Value 24 Hour Range   Temperature 98.6 °F (37 °C) (22) Temp  Min: 97.5 °F (36.4 °C)  Max: 99.1 °F (37.3 °C)   Pulse 165 (22) Pulse  Min: 130  Max: 189   Respiratory 35 (22) Resp  Min: 27  Max: 78   Non-Invasive  Blood Pressure 80/49 (22) BP  Min: 73/34  Max: 85/64   Pulse Oximetry 91 % (22) SpO2  Min: 91 %  Max: 100 %   Arterial   Blood Pressure   No data recorded     General: Well appearing, no acute distress, responds to stimuli  HENT: AFSOF, normocephalic, ears normally set, no cleft, palate intact  Neck: supple, full range of motion  CV: RRR, no murmurs, 2+ pulses, good perfusion  Lungs: clear and equal bilaterally, no retractions, no nasal flaring  Abdomen: soft, non-tender, non-distended, no organomegaly  Extremities: FROM x 4  Neuro: good tone  Skin: no rash  : normal for GA; anus patent  Back: no ok, no dimples    Labs (Last 24 hours)  Recent Results (from the past 24 hour(s))   GLUCOSE, BEDSIDE - POINT OF CARE    Collection Time: 22  4:28 AM   Result Value Ref Range    GLUCOSE, BEDSIDE - POINT OF CARE 80 54 - 117 mg/dL        ASSESSMENT AND PLAN BY SYSTEM       FLUID ELECTROLYTES NUTRITION     Weight Length Head circumference      Wt Readings from Last 2 Encounters:   21 2820 g (55 %, Z= 0.14)*     * Growth percentiles are based on Mayank  Patient wants a referral for a podiatrist in the [facility]. Stated he knows Dr. Au is from this area and is wondering if he could recommend anyone good because he is on blood thinners right now and needs some foot care.   Please let patient know with a phone call if something is called in for a referral and where to.    (Boys, 22-50 Weeks) data.      current - 18.9\" (48 cm) current - 34 cm (13.39\")   Weight change: 8 g          Dosing Weight: 2800 g          No results found    POC GLUCOSE:     Recent Labs   Lab 22  0428   GLUCOSE BEDSIDE 80       Alkaline Phosphatase:  No results found     INPUT:    IVF:     IVF discontinued    Feeding:     MBM and 2 feeds of NS22 BID              Ad natanael  Q3h          GIR:         % PO: 100     Dosing Weight: 2800 g      OUTPUT:    Intake/Output  Report       0700   0659  0700   0659    P.O. (mL/kg) 517 (183.33) 65 (23.05)    Total Intake(mL/kg) 517 (183.33) 65 (23.05)    Net +517 +65          Urine Occurrence 4 x 1 x    Stool Occurrence 8 x 1 x    Unmeasured Breast Milk Occurrence 1 x          Assessment:    infant, tolerating advancing feeds  Blood sugars stable off IVF    Plan:  - Continue feeds MBM/NS22 for 2 feeds PO ad natanael  - PVS with Fe 1 ml daily    CENTRAL LINE AND CATHETER DISCUSSION     Central Line  Type of Central Line:   Peripheral IV 21 Right Hand 24 (Active)     Line Functioning appropriately: n/a  Necessity/Indication: No Central line in place  Continued need for Central Line discussed: n/a 2022    Urinary Catheter  Necessity/Indication: N/A  Continued need for Urinary Catheter discussed: N/A    PAIN/SEDATION     NPASS Pain Score  Min: 0  Max: 2    Pain/Sedation Medications: None in use    Plan:   - continue to monitor N-PASS scores    BILIRUBIN     Assessment:Hyperbilirubinemia    BBT: O+, antibody negative  MBT: O+    Last Bilirubin:     No results found     Plan:   - Bili prn    APNEA/BRADYCARDIA/DESATURATION     Assessment: N/A    24H Events: none      Last Significant Event: : Desaturation episode while held by Mom requiring stimulation       -Medications: - No medications    Plan:   - continue to monitor clinically  - will need to be 5 days event free prior to discharge     RESPIRATORY     Most recent blood  gas:     CAPILLARY BLOOD GAS:  Lab Results   Component Value Date/Time    RCPH 2021 05:49 AM    RCPCO2021 05:49 AM    RCPO2 54 (H) 2021 05:49 AM    RCHCO2021 05:49 AM       Mode: Room Air       Ventilator days:   Days on CPAP/HFNC: -   Days on Oxygen: -      Assessment: No active respiratory issues  - s/p curosurf    Intubation  Necessity/Indication: Not Intubated  Readiness for Extubation discussed: N/A 2022    Plan:   - continue SpO2 monitoring    CARDIOVASCULAR     Assessment: No active cardiac issues     No valid procedures specified.     Plan:  - continue to monitor clinically    HEMATOLOGY     Assessment: No active hematologic issues    Last CBC:    WBC (K/mcL)   Date Value   2021     RBC (mil/mcL)   Date Value   2021     HCT (%)   Date Value   2021     HGB (g/dL)   Date Value   2021     PLT (K/mcL)   Date Value   2021 226     Transfusions:   PRBC: None   Platelet: None   FFP: None    Plan:  - monitor clinically    INFECTIOUS DISEASE     Assessment: Observation/Evaluation of  for possible sepsis    No results found     - Blood culture and CBC on admission: Yes   - Initial antibiotic course: s/p Ampicillin/Gentamicin x 36 hours       Plan:  - continue to monitor clinically     NEUROLOGY     Assessment: No active neurologic concerns    Head Ultrasound Day of LIfe 7: Not indicated  Head Ultrasound Day of Life 28: Not indicated    Plan:   - continue to monitor clinically      MEDICATIONS     Current Facility-Administered Medications   Medication   • pediatric multivitamin with iron (POLY-VI-SOL WITH IRON) oral solution 1 mL        DISCHARGE TASKS         HEALTH MAINTENANCE AND DISCHARGE PLANNING     Immunizations:   Most Recent Immunizations   Administered Date(s) Administered   • Hep B, adolescent or pediatric 2021      Hearing Test: Pass R, Pass L (21 1300)      ROP Eye Exam Needed?:   No    Car Seat Screen: Fail;MD notified;Car Bed recommended (21 0912)    CCHD Screening:   Screening complete: Done (21 9053)  Right hand reading %: 94 %  Foot reading %: 98 %  CHD:  (echo)    Circumcision: Done (21 1429)     State Screen- date drawn (most recent results):    Last 3 results:      Synagis Candidate:      Pediatrician: Dr Saul     PARENTAL COMMUNICATION     Family present during rounds: No    Updated on rounds     Awaiting car bed and watching for episodes until 2022

## 2022-11-02 ENCOUNTER — TRANSFERRED RECORDS (OUTPATIENT)
Dept: HEALTH INFORMATION MANAGEMENT | Facility: CLINIC | Age: 82
End: 2022-11-02

## 2022-11-02 NOTE — TELEPHONE ENCOUNTER
Please inform him either of the podiatrists are good at [place]- he will be contacted to schedule- let them know you are interested in the [facility]

## 2022-11-02 NOTE — TELEPHONE ENCOUNTER
Please inform patient that I am not familiar with any podiatrist in [facility] unfortunately.  If he would like to be referred to a podiatrist in the [facility] I can place that referral.

## 2022-11-02 NOTE — TELEPHONE ENCOUNTER
Patient states that he is fine with [facility]. Do you have anyone that you recommend for patient to see?

## 2022-11-08 DIAGNOSIS — Z00.00 HEALTH CARE MAINTENANCE: ICD-10-CM

## 2022-11-10 NOTE — TELEPHONE ENCOUNTER
"Routing refill request to provider for review/approval because:  Needs review    Last Written Prescription Date:  5/6/21  Last Fill Quantity: 25,  # refills: 0   Last office visit provider:  7/15/22     Requested Prescriptions   Pending Prescriptions Disp Refills     nitroGLYcerin (NITROSTAT) 0.4 MG sublingual tablet [Pharmacy Med Name: NITROGLYCERIN 0.4 MG TABLET SL] 25 tablet 0     Sig: PLACE 1 TAB UNDER THE TONGUE EVERY 5 MINS UP TO 3 DOSES AS NEEDED CALL 911 AFTER TAKING FIRST DOSE       Nitrates Failed - 11/8/2022  5:05 PM        Failed - Blood pressure under 140/90 in past 12 months     BP Readings from Last 3 Encounters:   07/15/22 (!) 152/65   04/11/22 110/64   03/30/22 136/78                 Failed - Sublingual nitro order needs review     If refill exceeds 1 bottle per month, please forward request to provider.           Passed - Pt is not on erectile dysfunction medications        Passed - Recent (12 mo) or future (30 days) visit within the authorizing provider's specialty     Patient has had an office visit with the authorizing provider or a provider within the authorizing providers department within the previous 12 mos or has a future within next 30 days. See \"Patient Info\" tab in inbasket, or \"Choose Columns\" in Meds & Orders section of the refill encounter.              Passed - Medication is active on med list        Passed - Patient is age 18 or older             Amrita Chase RN 11/10/22 3:51 PM  "

## 2022-11-11 RX ORDER — NITROGLYCERIN 0.4 MG/1
TABLET SUBLINGUAL
Qty: 25 TABLET | Refills: 0 | Status: SHIPPED | OUTPATIENT
Start: 2022-11-11 | End: 2022-12-14

## 2022-11-15 DIAGNOSIS — F33.41 RECURRENT MAJOR DEPRESSIVE DISORDER, IN PARTIAL REMISSION (H): ICD-10-CM

## 2022-11-15 NOTE — TELEPHONE ENCOUNTER
Pending Prescriptions:                       Disp   Refills    PARoxetine (PAXIL) 10 MG tablet           180 ta*1            Sig: Take 2 tablets (20 mg) by mouth every morning

## 2022-11-16 RX ORDER — PAROXETINE 10 MG/1
20 TABLET, FILM COATED ORAL EVERY MORNING
Qty: 180 TABLET | Refills: 1 | OUTPATIENT
Start: 2022-11-16

## 2022-11-30 ENCOUNTER — LAB (OUTPATIENT)
Dept: LAB | Facility: CLINIC | Age: 82
End: 2022-11-30
Payer: COMMERCIAL

## 2022-11-30 ENCOUNTER — ANTICOAGULATION THERAPY VISIT (OUTPATIENT)
Dept: ANTICOAGULATION | Facility: CLINIC | Age: 82
End: 2022-11-30

## 2022-11-30 DIAGNOSIS — Z98.890 H/O MITRAL VALVE REPAIR: ICD-10-CM

## 2022-11-30 DIAGNOSIS — I48.20 CHRONIC ATRIAL FIBRILLATION (H): Primary | ICD-10-CM

## 2022-11-30 DIAGNOSIS — I48.20 CHRONIC ATRIAL FIBRILLATION (H): ICD-10-CM

## 2022-11-30 DIAGNOSIS — Z95.2 S/P AVR: ICD-10-CM

## 2022-11-30 LAB — INR BLD: 3.2 (ref 0.9–1.1)

## 2022-11-30 PROCEDURE — 85610 PROTHROMBIN TIME: CPT

## 2022-11-30 PROCEDURE — 36416 COLLJ CAPILLARY BLOOD SPEC: CPT

## 2022-11-30 NOTE — PROGRESS NOTES
ANTICOAGULATION MANAGEMENT     Parish Bills 82 year old male is on warfarin with supratherapeutic INR result. (Goal INR 2.0-3.0)    Recent labs: (last 7 days)     11/30/22  1131   INR 3.2*       ASSESSMENT       Source(s): Chart Review, Patient/Caregiver Call and Template       Warfarin doses taken: Warfarin taken as instructed    Diet: Ate everything at Thanksgiving may be affecting diet and INR    New illness, injury, or hospitalization: No    Medication/supplement changes: None noted    Signs or symptoms of bleeding or clotting: No    Previous INR: Therapeutic last 2 visits    Additional findings: None       PLAN     Recommended plan for temporary change(s) affecting INR     Dosing Instructions: partial hold then continue your current warfarin dose with next INR in 1-2 weeks       Summary  As of 11/30/2022    Full warfarin instructions:  11/30: 2.5 mg; Otherwise 7.5 mg every Tue, Fri; 5 mg all other days; Starting 11/30/2022   Next INR check:  12/14/2022             Telephone call with  Parish ([phone]) who verbalizes understanding and agrees to plan    Lab visit scheduled - INR on 12/14/22 @ [facility].    Education provided:     Taking warfarin: Importance of taking warfarin as instructed    Goal range and lab monitoring: goal range and significance of current result    Dietary considerations: importance of consistent vitamin K intake    Symptom monitoring: monitoring for bleeding signs and symptoms    Plan made per [facility] anticoagulation protocol    Kori Ziegler RN  Anticoagulation Clinic  11/30/2022    _______________________________________________________________________     Anticoagulation Episode Summary     Current INR goal:  2.0-3.0   TTR:  67.8 % (1 y)   Target end date:  Indefinite   Send INR reminders to:  [facility]    Indications    Chronic atrial fibrillation (H) [I48.20]  H/O mitral valve repair [Z98.890]  S/P AVR [Z95.2]  A-fib (H) (Resolved) [I48.91]           Comments:            Anticoagulation Care Providers     Provider Role Specialty Phone number    Isidro Au MD Referring Family Medicine [phone]

## 2022-12-04 DIAGNOSIS — F41.9 ANXIETY: ICD-10-CM

## 2022-12-05 RX ORDER — HYDROXYZINE PAMOATE 25 MG/1
25 CAPSULE ORAL DAILY PRN
Qty: 90 CAPSULE | Refills: 0 | Status: SHIPPED | OUTPATIENT
Start: 2022-12-05 | End: 2023-08-30

## 2022-12-05 NOTE — TELEPHONE ENCOUNTER
" Routing refill request to provider for review/approval because:  Patient asking for refill, and looks to be taking on a daily basis for PRN reason. Failing and letting provider look further for long term use.    Last Written Prescription Date:  9/8/22  Last Fill Quantity: 90,  # refills: 0   Last office visit provider:  7/15/22     Requested Prescriptions   Pending Prescriptions Disp Refills     hydrOXYzine (VISTARIL) 25 MG capsule [Pharmacy Med Name: HYDROXYZINE TAMARA 25 MG CAP] 90 capsule 0     Sig: TAKE 1 CAPSULE (25 MG) BY MOUTH DAILY AS NEEDED FOR ANXIETY OR OTHER (INSOMNIA)       Antihistamines Protocol Passed - 12/4/2022 12:23 AM        Passed - Recent (12 mo) or future (30 days) visit within the authorizing provider's specialty     Patient has had an office visit with the authorizing provider or a provider within the authorizing providers department within the previous 12 mos or has a future within next 30 days. See \"Patient Info\" tab in inbasket, or \"Choose Columns\" in Meds & Orders section of the refill encounter.              Passed - Patient is age 3 or older     Apply age and/or weight-based dosing for peds patients age 3 and older.    Forward request to provider for patients under the age of 3.          Passed - Medication is active on med list             KATINA TURPIN RN 12/05/22 4:09 PM    "

## 2022-12-07 DIAGNOSIS — F33.41 RECURRENT MAJOR DEPRESSIVE DISORDER, IN PARTIAL REMISSION (H): ICD-10-CM

## 2022-12-07 RX ORDER — PAROXETINE 10 MG/1
20 TABLET, FILM COATED ORAL EVERY MORNING
Qty: 180 TABLET | Refills: 1 | Status: SHIPPED | OUTPATIENT
Start: 2022-12-07 | End: 2023-05-01

## 2022-12-07 NOTE — TELEPHONE ENCOUNTER
"Routing refill request to provider for review/approval because:  Labs out of range:  PHQ-9        Requested Prescriptions   Pending Prescriptions Disp Refills     PARoxetine (PAXIL) 10 MG tablet 180 tablet 1     Sig: Take 2 tablets (20 mg) by mouth every morning       SSRIs Protocol Failed - 12/7/2022  1:42 PM        Failed - PHQ-9 score less than 5 in past 6 months     Please review last PHQ-9 score.           Passed - Medication is active on med list        Passed - Patient is age 18 or older        Passed - Recent (6 mo) or future (30 days) visit within the authorizing provider's specialty     Patient had office visit in the last 6 months or has a visit in the next 30 days with authorizing provider or within the authorizing provider's specialty.  See \"Patient Info\" tab in inbasket, or \"Choose Columns\" in Meds & Orders section of the refill encounter.                     Dara Butler RN 12/07/22 2:03 PM  "

## 2022-12-13 DIAGNOSIS — Z00.00 HEALTH CARE MAINTENANCE: ICD-10-CM

## 2022-12-14 ENCOUNTER — ANTICOAGULATION THERAPY VISIT (OUTPATIENT)
Dept: ANTICOAGULATION | Facility: CLINIC | Age: 82
End: 2022-12-14

## 2022-12-14 ENCOUNTER — LAB (OUTPATIENT)
Dept: LAB | Facility: CLINIC | Age: 82
End: 2022-12-14
Payer: COMMERCIAL

## 2022-12-14 DIAGNOSIS — I48.20 CHRONIC ATRIAL FIBRILLATION (H): ICD-10-CM

## 2022-12-14 DIAGNOSIS — Z98.890 H/O MITRAL VALVE REPAIR: ICD-10-CM

## 2022-12-14 DIAGNOSIS — I48.20 CHRONIC ATRIAL FIBRILLATION (H): Primary | ICD-10-CM

## 2022-12-14 DIAGNOSIS — Z95.2 S/P AVR: ICD-10-CM

## 2022-12-14 LAB — INR BLD: 2.8 (ref 0.9–1.1)

## 2022-12-14 PROCEDURE — 85610 PROTHROMBIN TIME: CPT

## 2022-12-14 PROCEDURE — 36416 COLLJ CAPILLARY BLOOD SPEC: CPT

## 2022-12-14 RX ORDER — NITROGLYCERIN 0.4 MG/1
0.4 TABLET SUBLINGUAL EVERY 5 MIN PRN
Qty: 25 TABLET | Refills: 0 | Status: SHIPPED | OUTPATIENT
Start: 2022-12-14 | End: 2023-07-05

## 2022-12-14 NOTE — TELEPHONE ENCOUNTER
"Routing refill request to provider for review/approval because:  BP not in range.  Early refill requested.    Last Written Prescription Date:  11/11/22  Last Fill Quantity: 25,  # refills: 0   Last office visit provider:  7/15/22     Requested Prescriptions   Pending Prescriptions Disp Refills     nitroGLYcerin (NITROSTAT) 0.4 MG sublingual tablet 25 tablet 0     Sig: For chest pain place 1 tablet under the tongue every 5 minutes for 3 doses. If symptoms persist 5 minutes after 1st dose call 911.       Nitrates Failed - 12/14/2022  1:36 PM        Failed - Blood pressure under 140/90 in past 12 months     BP Readings from Last 3 Encounters:   07/15/22 (!) 152/65   04/11/22 110/64   03/30/22 136/78                 Failed - Sublingual nitro order needs review     If refill exceeds 1 bottle per month, please forward request to provider.           Passed - Pt is not on erectile dysfunction medications        Passed - Recent (12 mo) or future (30 days) visit within the authorizing provider's specialty     Patient has had an office visit with the authorizing provider or a provider within the authorizing providers department within the previous 12 mos or has a future within next 30 days. See \"Patient Info\" tab in inbasket, or \"Choose Columns\" in Meds & Orders section of the refill encounter.              Passed - Medication is active on med list        Passed - Patient is age 18 or older             Lalo Cortez RN 12/14/22 1:36 PM  "

## 2022-12-14 NOTE — PROGRESS NOTES
ANTICOAGULATION MANAGEMENT     Parish Bills 82 year old male is on warfarin with therapeutic INR result. (Goal INR 2.0-3.0)    Recent labs: (last 7 days)     12/14/22  0806   INR 2.8*       ASSESSMENT       Source(s): Chart Review, Patient/Caregiver Call and Template       Warfarin doses taken: Warfarin taken as instructed    Diet: No new diet changes identified    New illness, injury, or hospitalization: No    Medication/supplement changes: None noted    Signs or symptoms of bleeding or clotting: No    Previous INR: Supratherapeutic at 3.2 on 11/30/22.    Additional findings: None       PLAN     Recommended plan for no diet, medication or health factor changes affecting INR     Dosing Instructions: Continue your current warfarin dose with next INR in 2 weeks       Summary  As of 12/14/2022    Full warfarin instructions:  7.5 mg every Tue, Fri; 5 mg all other days; Starting 12/14/2022   Next INR check:  12/28/2022             Telephone call with  Parish ([phone]) who verbalizes understanding and agrees to plan    Lab visit scheduled - INR on 1/5/23 @ [facility]    Education provided:     Taking warfarin: Importance of taking warfarin as instructed    Goal range and lab monitoring: goal range and significance of current result    Dietary considerations: importance of consistent vitamin K intake    Plan made per [facility] anticoagulation protocol    Kori Ziegler RN  Anticoagulation Clinic  12/14/2022    _______________________________________________________________________     Anticoagulation Episode Summary     Current INR goal:  2.0-3.0   TTR:  65.9 % (1 y)   Target end date:  Indefinite   Send INR reminders to:  [facility]    Indications    Chronic atrial fibrillation (H) [I48.20]  H/O mitral valve repair [Z98.890]  S/P AVR [Z95.2]  A-fib (H) (Resolved) [I48.91]           Comments:           Anticoagulation Care Providers     Provider Role Specialty Phone number    Isidro Au MD Referring  Family Medicine [phone]

## 2023-01-04 ENCOUNTER — LAB (OUTPATIENT)
Dept: LAB | Facility: CLINIC | Age: 83
End: 2023-01-04
Payer: COMMERCIAL

## 2023-01-04 ENCOUNTER — ANTICOAGULATION THERAPY VISIT (OUTPATIENT)
Dept: ANTICOAGULATION | Facility: CLINIC | Age: 83
End: 2023-01-04

## 2023-01-04 DIAGNOSIS — I48.20 CHRONIC ATRIAL FIBRILLATION (H): Primary | ICD-10-CM

## 2023-01-04 DIAGNOSIS — Z95.2 S/P AVR: ICD-10-CM

## 2023-01-04 DIAGNOSIS — Z98.890 H/O MITRAL VALVE REPAIR: ICD-10-CM

## 2023-01-04 DIAGNOSIS — I48.20 CHRONIC ATRIAL FIBRILLATION (H): ICD-10-CM

## 2023-01-04 LAB — INR BLD: 2.3 (ref 0.9–1.1)

## 2023-01-04 PROCEDURE — 85610 PROTHROMBIN TIME: CPT

## 2023-01-04 PROCEDURE — 36416 COLLJ CAPILLARY BLOOD SPEC: CPT

## 2023-01-17 DIAGNOSIS — I50.21: Primary | ICD-10-CM

## 2023-01-17 DIAGNOSIS — I10 ESSENTIAL HYPERTENSION: ICD-10-CM

## 2023-01-18 ENCOUNTER — LAB (OUTPATIENT)
Dept: LAB | Facility: CLINIC | Age: 83
End: 2023-01-18
Payer: COMMERCIAL

## 2023-01-18 DIAGNOSIS — I50.21: ICD-10-CM

## 2023-01-18 DIAGNOSIS — I10 ESSENTIAL HYPERTENSION: ICD-10-CM

## 2023-01-18 LAB
ANION GAP SERPL CALCULATED.3IONS-SCNC: 11 MMOL/L (ref 7–15)
BUN SERPL-MCNC: 19.4 MG/DL (ref 8–23)
CALCIUM SERPL-MCNC: 9.1 MG/DL (ref 8.8–10.2)
CHLORIDE SERPL-SCNC: 105 MMOL/L (ref 98–107)
CREAT SERPL-MCNC: 1.04 MG/DL (ref 0.67–1.17)
DEPRECATED HCO3 PLAS-SCNC: 24 MMOL/L (ref 22–29)
GFR SERPL CREATININE-BSD FRML MDRD: 72 ML/MIN/1.73M2
GLUCOSE SERPL-MCNC: 119 MG/DL (ref 70–99)
MAGNESIUM SERPL-MCNC: 2 MG/DL (ref 1.7–2.3)
NT-PROBNP SERPL-MCNC: 1345 PG/ML (ref 0–1800)
POTASSIUM SERPL-SCNC: 4.7 MMOL/L (ref 3.4–5.3)
SODIUM SERPL-SCNC: 140 MMOL/L (ref 136–145)

## 2023-01-18 PROCEDURE — 80048 BASIC METABOLIC PNL TOTAL CA: CPT

## 2023-01-18 PROCEDURE — 36415 COLL VENOUS BLD VENIPUNCTURE: CPT

## 2023-01-18 PROCEDURE — 83880 ASSAY OF NATRIURETIC PEPTIDE: CPT

## 2023-01-18 PROCEDURE — 83735 ASSAY OF MAGNESIUM: CPT

## 2023-01-20 ENCOUNTER — TRANSFERRED RECORDS (OUTPATIENT)
Dept: HEALTH INFORMATION MANAGEMENT | Facility: CLINIC | Age: 83
End: 2023-01-20

## 2023-01-27 ENCOUNTER — ALLIED HEALTH/NURSE VISIT (OUTPATIENT)
Dept: FAMILY MEDICINE | Facility: CLINIC | Age: 83
End: 2023-01-27
Payer: COMMERCIAL

## 2023-01-27 ENCOUNTER — TELEPHONE (OUTPATIENT)
Dept: FAMILY MEDICINE | Facility: CLINIC | Age: 83
End: 2023-01-27

## 2023-01-27 VITALS — DIASTOLIC BLOOD PRESSURE: 60 MMHG | SYSTOLIC BLOOD PRESSURE: 126 MMHG

## 2023-01-27 DIAGNOSIS — I10 HTN (HYPERTENSION): Primary | ICD-10-CM

## 2023-01-27 PROCEDURE — 99207 PR NO CHARGE NURSE ONLY: CPT

## 2023-01-27 NOTE — TELEPHONE ENCOUNTER
Unable to leave vm, please see message below      Isidro Au MD at 1/27/2023 11:10 AM    Status: Signed   Blood pressure at goal range- no changes needed

## 2023-01-27 NOTE — PROGRESS NOTES
I met with Parish Bills at the request of himslef to recheck his blood pressure.  Blood pressure medications on the med list were reviewed with patient.    Patient has taken all medications as per usual regimen: Yes  Patient reports tolerating them without any issues or concerns: Yes    Vitals:    01/27/23 1016   BP: 126/60   BP Location: Left arm   Patient Position: Sitting   Cuff Size: Adult Regular       Blood pressure was taken, previous encounter was reviewed, recorded blood pressure below 140/90.  Patient was discharged and the note will be sent to the provider for final review.

## 2023-02-01 ENCOUNTER — LAB (OUTPATIENT)
Dept: LAB | Facility: CLINIC | Age: 83
End: 2023-02-01
Payer: COMMERCIAL

## 2023-02-01 ENCOUNTER — ANTICOAGULATION THERAPY VISIT (OUTPATIENT)
Dept: ANTICOAGULATION | Facility: CLINIC | Age: 83
End: 2023-02-01

## 2023-02-01 DIAGNOSIS — I48.20 CHRONIC ATRIAL FIBRILLATION (H): Primary | ICD-10-CM

## 2023-02-01 DIAGNOSIS — Z98.890 H/O MITRAL VALVE REPAIR: ICD-10-CM

## 2023-02-01 DIAGNOSIS — Z95.2 S/P AVR: ICD-10-CM

## 2023-02-01 DIAGNOSIS — I48.20 CHRONIC ATRIAL FIBRILLATION (H): ICD-10-CM

## 2023-02-01 LAB — INR BLD: 3.1 (ref 0.9–1.1)

## 2023-02-01 PROCEDURE — 85610 PROTHROMBIN TIME: CPT

## 2023-02-01 PROCEDURE — 36416 COLLJ CAPILLARY BLOOD SPEC: CPT

## 2023-02-01 NOTE — PROGRESS NOTES
ANTICOAGULATION MANAGEMENT     Parish Bills 82 year old male is on warfarin with supratherapeutic INR result. (Goal INR 2.0-3.0)    Recent labs: (last 7 days)     02/01/23  0814   INR 3.1*       ASSESSMENT       Source(s): Chart Review, Patient/Caregiver Call and Template       Warfarin doses taken: Warfarin taken as instructed    Diet: No new diet changes identified    New illness, injury, or hospitalization: No.   Cardiology f/u on 1/20/23 @ [facility].    Medication/supplement changes:  Yes    Furosemide decreased from 40mg to 20mg in the morning on 1/20/23.     Signs or symptoms of bleeding or clotting: No    Previous INR: Therapeutic last 2 visits    Additional findings: None       PLAN     Recommended plan for ongoing change(s) affecting INR     Dosing Instructions: partial hold then continue your current warfarin dose with next INR in 2 weeks       Summary  As of 2/1/2023    Full warfarin instructions:  2/1: 2.5 mg; Otherwise 7.5 mg every Tue, Fri; 5 mg all other days   Next INR check:  2/15/2023             Telephone call with  Parish ([phone]) who verbalizes understanding and agrees to plan    Lab visit scheduled - INR on 2/15/23 @ [facility].    Education provided:     Taking warfarin: Importance of taking warfarin as instructed    Goal range and lab monitoring: goal range and significance of current result    Interaction IS NOT anticipated between warfarin and Furosemide    Plan made per [facility] anticoagulation protocol    Kori Ziegler RN  Anticoagulation Clinic  2/1/2023    _______________________________________________________________________     Anticoagulation Episode Summary     Current INR goal:  2.0-3.0   TTR:  76.8 % (1 y)   Target end date:  Indefinite   Send INR reminders to:  [facility]    Indications    Chronic atrial fibrillation (H) [I48.20]  H/O mitral valve repair [Z98.890]  S/P AVR [Z95.2]  A-fib (H) (Resolved) [I48.91]           Comments:           Anticoagulation Care  Providers     Provider Role Specialty Phone number    Isidro Au MD Referring Family Medicine [phone]

## 2023-02-15 ENCOUNTER — LAB (OUTPATIENT)
Dept: LAB | Facility: CLINIC | Age: 83
End: 2023-02-15
Payer: COMMERCIAL

## 2023-02-15 ENCOUNTER — DOCUMENTATION ONLY (OUTPATIENT)
Dept: ANTICOAGULATION | Facility: CLINIC | Age: 83
End: 2023-02-15

## 2023-02-15 ENCOUNTER — ANTICOAGULATION THERAPY VISIT (OUTPATIENT)
Dept: ANTICOAGULATION | Facility: CLINIC | Age: 83
End: 2023-02-15

## 2023-02-15 DIAGNOSIS — I48.92 ATRIAL FLUTTER, PAROXYSMAL (H): ICD-10-CM

## 2023-02-15 DIAGNOSIS — Z79.01 LONG TERM (CURRENT) USE OF ANTICOAGULANTS: ICD-10-CM

## 2023-02-15 DIAGNOSIS — I48.20 CHRONIC ATRIAL FIBRILLATION (H): Primary | ICD-10-CM

## 2023-02-15 DIAGNOSIS — Z98.890 H/O MITRAL VALVE REPAIR: ICD-10-CM

## 2023-02-15 DIAGNOSIS — I48.20 CHRONIC ATRIAL FIBRILLATION (H): ICD-10-CM

## 2023-02-15 DIAGNOSIS — Z95.2 S/P AVR: ICD-10-CM

## 2023-02-15 LAB — INR BLD: 3.1 (ref 0.9–1.1)

## 2023-02-15 PROCEDURE — 36416 COLLJ CAPILLARY BLOOD SPEC: CPT

## 2023-02-15 PROCEDURE — 85610 PROTHROMBIN TIME: CPT

## 2023-02-15 NOTE — PROGRESS NOTES
ANTICOAGULATION MANAGEMENT     Parish Bills 82 year old male is on warfarin with supratherapeutic INR result. (Goal INR 2.0-3.0)    Recent labs: (last 7 days)     02/15/23  0808   INR 3.1*       ASSESSMENT       Source(s): Chart Review, Patient/Caregiver Call and Template       Warfarin doses taken: Warfarin taken as instructed    Diet: No new diet changes identified    New illness, injury, or hospitalization: No    Medication/supplement changes: None noted    Signs or symptoms of bleeding or clotting: No    Previous INR: Supratherapeutic at 3.1 on 2/1/23    Additional findings: None       PLAN     Recommended plan for no diet, medication or health factor changes affecting INR     Dosing Instructions: decrease your warfarin dose (6.2% change) with next INR in 2 weeks       Summary  As of 2/15/2023    Full warfarin instructions:  7.5 mg every Tue; 5 mg all other days   Next INR check:  3/1/2023             Telephone call with  Parish ([phone]) who verbalizes understanding and agrees to plan    Lab visit scheduled - INR on 3/3/23 @ [facility]    Education provided:     Taking warfarin: Importance of taking warfarin as instructed    Goal range and lab monitoring: goal range and significance of current result    Dietary considerations: importance of consistent vitamin K intake    Symptom monitoring: monitoring for bleeding signs and symptoms    Plan made per [facility] anticoagulation protocol    Kori Ziegler RN  Anticoagulation Clinic  2/15/2023    _______________________________________________________________________     Anticoagulation Episode Summary     Current INR goal:  2.0-3.0   TTR:  73.0 % (1 y)   Target end date:  Indefinite   Send INR reminders to:  [facility]    Indications    Chronic atrial fibrillation (H) [I48.20]  H/O mitral valve repair [Z98.890]  S/P AVR [Z95.2]  A-fib (H) (Resolved) [I48.91]           Comments:           Anticoagulation Care Providers     Provider Role Specialty Phone  number    Isidro Au MD [facility] [phone]

## 2023-02-15 NOTE — PROGRESS NOTES
ANTICOAGULATION CLINIC REFERRAL RENEWAL REQUEST       An annual renewal order is required for all patients referred to [facility].?  Please review and sign the pended referral order for Parish Bills.       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    Mechanical heart valve present?  NO       Current goal range   INR: 2.0-3.0     Goal appropriate for indication? Goal INR 2-3, standard for indication(s) above     Time in Therapeutic Range (TTR)  (Goal > 60%) 73.0 %       Office visit with referring provider's group within last year Yes on 7/15/2022       Kori Ziegler RN  [facility]

## 2023-02-23 ENCOUNTER — TELEPHONE (OUTPATIENT)
Dept: FAMILY MEDICINE | Facility: CLINIC | Age: 83
End: 2023-02-23

## 2023-02-24 ENCOUNTER — OFFICE VISIT (OUTPATIENT)
Dept: FAMILY MEDICINE | Facility: CLINIC | Age: 83
End: 2023-02-24
Payer: COMMERCIAL

## 2023-02-24 ENCOUNTER — ANCILLARY PROCEDURE (OUTPATIENT)
Dept: GENERAL RADIOLOGY | Facility: CLINIC | Age: 83
End: 2023-02-24
Payer: COMMERCIAL

## 2023-02-24 VITALS
HEART RATE: 62 BPM | OXYGEN SATURATION: 98 % | BODY MASS INDEX: 27.51 KG/M2 | DIASTOLIC BLOOD PRESSURE: 66 MMHG | SYSTOLIC BLOOD PRESSURE: 130 MMHG | WEIGHT: 189 LBS

## 2023-02-24 DIAGNOSIS — R05.2 SUBACUTE COUGH: Primary | ICD-10-CM

## 2023-02-24 DIAGNOSIS — R06.2 WHEEZING: ICD-10-CM

## 2023-02-24 DIAGNOSIS — I50.23 ACUTE ON CHRONIC SYSTOLIC HEART FAILURE (H): ICD-10-CM

## 2023-02-24 DIAGNOSIS — R05.2 SUBACUTE COUGH: ICD-10-CM

## 2023-02-24 LAB — NT-PROBNP SERPL-MCNC: 1388 PG/ML (ref 0–1800)

## 2023-02-24 PROCEDURE — 83880 ASSAY OF NATRIURETIC PEPTIDE: CPT

## 2023-02-24 PROCEDURE — 71046 X-RAY EXAM CHEST 2 VIEWS: CPT | Mod: TC | Performed by: RADIOLOGY

## 2023-02-24 PROCEDURE — 99213 OFFICE O/P EST LOW 20 MIN: CPT

## 2023-02-24 PROCEDURE — 36415 COLL VENOUS BLD VENIPUNCTURE: CPT

## 2023-02-24 RX ORDER — ALBUTEROL SULFATE 90 UG/1
2 AEROSOL, METERED RESPIRATORY (INHALATION) EVERY 6 HOURS PRN
Qty: 18 G | Refills: 0 | Status: SHIPPED | OUTPATIENT
Start: 2023-02-24

## 2023-02-24 ASSESSMENT — ENCOUNTER SYMPTOMS: COUGH: 1

## 2023-02-24 NOTE — ASSESSMENT & PLAN NOTE
Discussed various etiologies behind subacute cough with patient today, including postinfectious cough, bronchitis, reflux.  Did consider heart failure exacerbation considering patient's history, although he was just seen by cardiology at the end of January and had a relatively normal echo/labs.  He does not have any symptoms concerning for infection, it does not sound like a cold or upper respiratory infection preceded the onset of his cough.  He appears euvolemic on exam today, correlating with patient's report of symptoms.  Did update a BNP just to verify.  Also obtained a chest x-ray to rule out any more concerning findings such as pneumonia.  Review of his encounter with the [facility] cardiologist revealed that he was started on losartan at his last appointment at the end of January.  His cough started almost immediately after starting this medication.  We reviewed that although a cough is not as common with ARB's as they are with ACEs it does still carry that side effect.  Pending a benign work-up today, I encouraged him to reach out to his cardiologist to discuss this potential side effect to and if he believes the medication needs to be changed/discontinued.  We did discuss symptomatic cares, including a prescription for an albuterol inhaler with the presence of wheezing auscultated on exam.  Additionally, we discussed the use of humidified air and steam showers.  Patient expressed understanding of and agreement with this plan.  All questions were answered.

## 2023-02-24 NOTE — PATIENT INSTRUCTIONS
I will follow-up with you today on the results of your chest x-ray.  Your physical examination was at your baseline today in clinic.  We will draw your BNP, or the measure of heart failure today.  My suspicions are low, as it was just normal back in January.  You should call your cardiologist office and discuss the onset of your cough when they started you on losartan.  You do not necessarily need to stop this medication today, but it could be contributing.  Ultimately I defer this to them.  For your cough, I will prescribe an albuterol inhaler. You have some wheezing, which this can help with.  You can take 1 to 2 puffs before activity or when you notice her cough/wheezing is the worst.  Additionally, humidified air can be helpful.  You could also try a hot steam shower at least once a day, and sit in the bathroom for at least 10 minutes.  You should follow-up if despite all of this, your symptoms remain, or if you develop any worsening symptoms such as shortness of breath, chest pain, swelling in your legs a, or other symptoms.

## 2023-02-24 NOTE — PROGRESS NOTES
Problem List Items Addressed This Visit        Respiratory    Cough - Primary     Discussed various etiologies behind subacute cough with patient today, including postinfectious cough, bronchitis, reflux.  Did consider heart failure exacerbation considering patient's history, although he was just seen by cardiology at the end of January and had a relatively normal echo/labs.  He does not have any symptoms concerning for infection, it does not sound like a cold or upper respiratory infection preceded the onset of his cough.  He appears euvolemic on exam today, correlating with patient's report of symptoms.  Did update a BNP just to verify.  Also obtained a chest x-ray to rule out any more concerning findings such as pneumonia.  Review of his encounter with the [facility] cardiologist revealed that he was started on losartan at his last appointment at the end of January.  His cough started almost immediately after starting this medication.  We reviewed that although a cough is not as common with ARB's as they are with ACEs it does still carry that side effect.  Pending a benign work-up today, I encouraged him to reach out to his cardiologist to discuss this potential side effect to and if he believes the medication needs to be changed/discontinued.  We did discuss symptomatic cares, including a prescription for an albuterol inhaler with the presence of wheezing auscultated on exam.  Additionally, we discussed the use of humidified air and steam showers.  Patient expressed understanding of and agreement with this plan.  All questions were answered.         Relevant Medications    albuterol (PROAIR HFA/PROVENTIL HFA/VENTOLIN HFA) 108 (90 Base) MCG/ACT inhaler    Other Relevant Orders    XR Chest 2 Views       Circulatory    Acute on chronic systolic heart failure (H)    Relevant Orders    XR Chest 2 Views    BNP-N terminal pro   Other Visit Diagnoses     Wheezing        Relevant Medications    albuterol (PROAIR  "HFA/PROVENTIL HFA/VENTOLIN HFA) 108 (90 Base) MCG/ACT inhaler         Libby BellPABLO dickson CNP on 2/24/2023 at 10:44 AM    Mateo Lugo is a 82 year old who presents for the following health issues     Chief Complaint   Patient presents with     Cough     Since late January      Patient reports that he has had a semiproductive cough since the end of January.  It is mostly dry and hacking.  When it is productive, the phlegm will simply be clear.  He has baseline dyspnea on exertion, and states it is not necessarily worse at this time.  Is noting some occasional wheezing.  Denies the presence of any chest pain or palpitations.  Reports that he was having some increased leg swelling, but to his cardiologist to increase his Lasix and his wife reports that his \"legs are the skin he is to she is ever seen.\"  Cannot identify any aggravating factors or alleviating factors; \"I will just be sitting there and it comes out of nowhere.\"  He denies the presence of a preceding viral infection or cold.  He states that he has a history significant for just of heart failure, and just had an updated echocardiogram and cardiology visit with his [facility] physician at the end of January.  He states that his ejection fraction was slightly lower, which correlates with a chart review of that note.  Of note, he was also started on losartan at this visit for that reduced ejection fraction.    Cough     Review of Systems   Respiratory: Positive for cough.         Objective    /66 (BP Location: Left arm, Patient Position: Sitting, Cuff Size: Adult Regular)   Pulse 62   Wt 85.7 kg (189 lb)   SpO2 98%   BMI 27.51 kg/m    Body mass index is 27.51 kg/m .  Physical Exam  Vitals and nursing note reviewed.   Constitutional:       General: He is not in acute distress.     Appearance: Normal appearance. He is not ill-appearing.   HENT:      Head: Normocephalic and atraumatic.   Cardiovascular:      Rate and Rhythm: Normal rate and regular " rhythm.      Pulses: Normal pulses.      Heart sounds: No murmur heard.    No friction rub. No gallop.      Comments: No JVD  Pulmonary:      Effort: Pulmonary effort is normal. No respiratory distress.      Breath sounds: No stridor. Wheezing (mild, scattered primarily in bronchovesicular fields) present. No rhonchi.   Musculoskeletal:      Right lower leg: Edema (trace) present.      Left lower leg: Edema (trace) present.   Neurological:      Mental Status: He is alert.       EXAM: XR CHEST 2 VIEWS  LOCATION: [facility]  DATE/TIME: 2/24/2023 10:22 AM     INDICATION:  Subacute cough, Acute on chronic systolic heart failure (H)  COMPARISON: Frontal and lateral views of the chest 02/24/2023                                                                      IMPRESSION:      Left subclavian approach CRT-P has right atrial appendage, right ventricle, and coronary sinus leads. There is a bioprosthetic valve in aortic position previous mitral valve repair. Unchanged cardiac silhouette is at the upper limit of normal in size.   Vascular pedicle width is normal. Mild tortuosity of the descending thoracic aorta. Sternal wires are intact and aligned.     Symmetric lung inflation. Unchanged cephalization of pulmonary blood flow suggesting left atrial hypertension. No lung edema or consolidation. No focal lung volume loss.     Diaphragm curvature is normal. No pleural effusion.     There are diffuse thoracic spine degenerative osteophytes.      This note has been dictated using voice recognition software. Any grammatical or context distortions are unintentional and inherent to the software

## 2023-03-03 ENCOUNTER — ANTICOAGULATION THERAPY VISIT (OUTPATIENT)
Dept: ANTICOAGULATION | Facility: CLINIC | Age: 83
End: 2023-03-03

## 2023-03-03 ENCOUNTER — LAB (OUTPATIENT)
Dept: LAB | Facility: CLINIC | Age: 83
End: 2023-03-03
Payer: COMMERCIAL

## 2023-03-03 DIAGNOSIS — Z98.890 H/O MITRAL VALVE REPAIR: ICD-10-CM

## 2023-03-03 DIAGNOSIS — I48.92 ATRIAL FLUTTER, PAROXYSMAL (H): ICD-10-CM

## 2023-03-03 DIAGNOSIS — Z95.2 S/P AVR: ICD-10-CM

## 2023-03-03 DIAGNOSIS — I48.20 CHRONIC ATRIAL FIBRILLATION (H): ICD-10-CM

## 2023-03-03 DIAGNOSIS — I48.20 CHRONIC ATRIAL FIBRILLATION (H): Primary | ICD-10-CM

## 2023-03-03 DIAGNOSIS — Z79.01 LONG TERM (CURRENT) USE OF ANTICOAGULANTS: ICD-10-CM

## 2023-03-03 LAB — INR BLD: 2.6 (ref 0.9–1.1)

## 2023-03-03 PROCEDURE — 85610 PROTHROMBIN TIME: CPT

## 2023-03-03 PROCEDURE — 36416 COLLJ CAPILLARY BLOOD SPEC: CPT

## 2023-03-03 NOTE — PROGRESS NOTES
ANTICOAGULATION MANAGEMENT     Parish Bills 82 year old male is on warfarin with therapeutic INR result. (Goal INR 2.0-3.0)    Recent labs: (last 7 days)     03/03/23  0814   INR 2.6*       ASSESSMENT     Warfarin Lab Questionnaire    Dose in Tablet or Mg 3/3/2023   TAB or MG? milligram (mg)     Pt Rptd Dose JON MONDAY TUESDAY WEDNESDAY THURSDAY FRIDAY SATURDAY   3/3/2023 5 5 7.5 5 5 5 5     Warfarin Lab Questionnaire 3/3/2023   Missed doses? No   Medication changes? No   Abnormal bleeding? No   Shortness of breath? No   Injuries or illness since last INR? No - reported cough is better now.        - previously reported cough, since starting Losartan 25mg daily in 1/9/23.   Changes in diet or alcohol? No   Upcoming surgery, procedure? No       Additional findings: None       PLAN     Recommended plan for temporary change(s) affecting INR     Dosing Instructions: Continue your current warfarin dose with next INR in 2 weeks       Summary  As of 3/3/2023    Full warfarin instructions:  7.5 mg every Tue; 5 mg all other days   Next INR check:  3/17/2023             Telephone call with  wife, Ileana ([phone]) who verbalizes understanding and agrees to plan    Lab visit scheduled - INR ON 3/17/23 @ [facility]    Education provided:     Taking warfarin: Importance of taking warfarin as instructed    Goal range and lab monitoring: goal range and significance of current result    Plan made per [facility] anticoagulation protocol    Kori Ziegler RN  Anticoagulation Clinic  3/3/2023    _______________________________________________________________________     Anticoagulation Episode Summary     Current INR goal:  2.0-3.0   TTR:  72.1 % (1 y)   Target end date:  Indefinite   Send INR reminders to:  [facility]    Indications    Chronic atrial fibrillation (H) [I48.20]  H/O mitral valve repair [Z98.890]  S/P AVR [Z95.2]  A-fib (H) (Resolved) [I48.91]  Long term (current) use of anticoagulants [Z79.01]  Atrial  flutter  paroxysmal (H) [I48.92]           Comments:           Anticoagulation Care Providers     Provider Role Specialty Phone number    Isidro Au MD Referring Family Medicine [phone]

## 2023-03-17 ENCOUNTER — ANTICOAGULATION THERAPY VISIT (OUTPATIENT)
Dept: ANTICOAGULATION | Facility: CLINIC | Age: 83
End: 2023-03-17

## 2023-03-17 ENCOUNTER — LAB (OUTPATIENT)
Dept: LAB | Facility: CLINIC | Age: 83
End: 2023-03-17
Payer: COMMERCIAL

## 2023-03-17 DIAGNOSIS — Z95.2 S/P AVR: ICD-10-CM

## 2023-03-17 DIAGNOSIS — Z79.01 LONG TERM (CURRENT) USE OF ANTICOAGULANTS: ICD-10-CM

## 2023-03-17 DIAGNOSIS — I48.92 ATRIAL FLUTTER, PAROXYSMAL (H): ICD-10-CM

## 2023-03-17 DIAGNOSIS — I48.20 CHRONIC ATRIAL FIBRILLATION (H): Primary | ICD-10-CM

## 2023-03-17 DIAGNOSIS — Z98.890 H/O MITRAL VALVE REPAIR: ICD-10-CM

## 2023-03-17 DIAGNOSIS — I48.20 CHRONIC ATRIAL FIBRILLATION (H): ICD-10-CM

## 2023-03-17 LAB — INR BLD: 2.5 (ref 0.9–1.1)

## 2023-03-17 PROCEDURE — 36416 COLLJ CAPILLARY BLOOD SPEC: CPT

## 2023-03-17 PROCEDURE — 85610 PROTHROMBIN TIME: CPT

## 2023-03-17 NOTE — PROGRESS NOTES
ANTICOAGULATION MANAGEMENT     Parish SANCHEZ Sanju 82 year old male is on warfarin with therapeutic INR result. (Goal INR 2.0-3.0)    Recent labs: (last 7 days)     03/17/23  0805   INR 2.5*       ASSESSMENT     Warfarin Lab Questionnaire    Dose in Tablet or Mg 3/17/2023   TAB or MG? - warfarin 5mg tabs (evenings) milligram (mg)     Pt Rptd Dose JON MONDAY TUESDAY WEDNESDAY THURSDAY FRIDAY SATURDAY   3/17/2023 5 5 7.5 5 5 5 5     Warfarin Lab Questionnaire 3/17/2023   Missed doses? No   Medication changes? No   Abnormal bleeding? No   Shortness of breath? No   Injuries or illness since last INR? No   Changes in diet or alcohol? No   Upcoming surgery, procedure? No       Additional findings: None       PLAN     Recommended plan for no diet, medication or health factor changes affecting INR     Dosing Instructions: Continue your current warfarin dose with next INR in 3 weeks       Summary  As of 3/17/2023    Full warfarin instructions:  7.5 mg every Tue; 5 mg all other days   Next INR check:  4/7/2023             Telephone call with  Parish ([phone]) who verbalizes understanding and agrees to plan    Lab visit scheduled - INR on 4/12/23 @ [facility]    Education provided:     Taking warfarin: Importance of taking warfarin as instructed    Goal range and lab monitoring: goal range and significance of current result    Plan made per [facility] anticoagulation protocol    Kori Ziegler RN  Anticoagulation Clinic  3/17/2023    _______________________________________________________________________     Anticoagulation Episode Summary     Current INR goal:  2.0-3.0   TTR:  72.1 % (1 y)   Target end date:  Indefinite   Send INR reminders to:  [facility]    Indications    Chronic atrial fibrillation (H) [I48.20]  H/O mitral valve repair [Z98.890]  S/P AVR [Z95.2]  A-fib (H) (Resolved) [I48.91]  Long term (current) use of anticoagulants [Z79.01]  Atrial flutter  paroxysmal (H) [I48.92]           Comments:            Anticoagulation Care Providers     Provider Role Specialty Phone number    Isidro Au MD Referring Family Medicine [phone]

## 2023-04-12 ENCOUNTER — ANTICOAGULATION THERAPY VISIT (OUTPATIENT)
Dept: ANTICOAGULATION | Facility: CLINIC | Age: 83
End: 2023-04-12

## 2023-04-12 ENCOUNTER — LAB (OUTPATIENT)
Dept: LAB | Facility: CLINIC | Age: 83
End: 2023-04-12
Payer: COMMERCIAL

## 2023-04-12 DIAGNOSIS — Z98.890 H/O MITRAL VALVE REPAIR: ICD-10-CM

## 2023-04-12 DIAGNOSIS — Z95.2 S/P AVR: ICD-10-CM

## 2023-04-12 DIAGNOSIS — Z79.01 LONG TERM (CURRENT) USE OF ANTICOAGULANTS: ICD-10-CM

## 2023-04-12 DIAGNOSIS — I48.20 CHRONIC ATRIAL FIBRILLATION (H): ICD-10-CM

## 2023-04-12 DIAGNOSIS — I48.92 ATRIAL FLUTTER, PAROXYSMAL (H): ICD-10-CM

## 2023-04-12 DIAGNOSIS — I48.20 CHRONIC ATRIAL FIBRILLATION (H): Primary | ICD-10-CM

## 2023-04-12 LAB — INR BLD: 2 (ref 0.9–1.1)

## 2023-04-12 PROCEDURE — 85610 PROTHROMBIN TIME: CPT

## 2023-04-12 PROCEDURE — 36416 COLLJ CAPILLARY BLOOD SPEC: CPT

## 2023-04-12 NOTE — PROGRESS NOTES
ANTICOAGULATION MANAGEMENT     Parish Bills 82 year old male is on warfarin with therapeutic INR result. (Goal INR 2.0-3.0)    Recent labs: (last 7 days)     04/12/23  0814   INR 2.0*       ASSESSMENT     Warfarin Lab Questionnaire        3/17/2023     8:08 AM   Dose in Tablet or Mg   TAB or MG? - warfarin 5mg tablets (evenings) milligram (mg)     Pt Rptd Dose SUNDAY MONDAY TUESDAY WED THURS FRIDAY SATURDAY 4/12/2023   8:17 AM 5 5 7.5 5 5 5 5         4/12/2023     8:17 AM   Warfarin Lab Questionnaire   Missed doses? No   Medication changes? No   Abnormal bleeding? No   Shortness of breath? No   Injuries or illness since last INR? Yes   If yes, please explain: hand injury   Changes in diet or alcohol? No - reported had brussel sprouts, which he loves to eat.   Upcoming surgery, procedure? No       Additional findings: None       PLAN     Recommended plan for temporary change(s) affecting INR     Dosing Instructions: Continue your current warfarin dose with next INR in 4 weeks       Summary  As of 4/12/2023    Full warfarin instructions:  7.5 mg every Tue; 5 mg all other days   Next INR check:  5/10/2023             Telephone call with  Parish ([phone]) who verbalizes understanding and agrees to plan    Lab visit scheduled - INR on 5/15/23 @ [facility].    Education provided:     Taking warfarin: Importance of taking warfarin as instructed    Goal range and lab monitoring: goal range and significance of current result    Dietary considerations: importance of consistent vitamin K intake and impact of vitamin K foods on INR    Plan made per [facility] anticoagulation protocol    Kori Ziegler, RN  Anticoagulation Clinic  4/12/2023    _______________________________________________________________________     Anticoagulation Episode Summary     Current INR goal:  2.0-3.0   TTR:  75.2 % (1 y)   Target end date:  Indefinite   Send INR reminders to:  [facility]    Indications    Chronic atrial fibrillation (H)  [I48.20]  H/O mitral valve repair [Z98.890]  S/P AVR [Z95.2]  A-fib (H) (Resolved) [I48.91]  Long term (current) use of anticoagulants [Z79.01]  Atrial flutter  paroxysmal (H) [I48.92]           Comments:           Anticoagulation Care Providers     Provider Role Specialty Phone number    Isidro Au MD [facility] [phone]

## 2023-05-01 DIAGNOSIS — F33.41 RECURRENT MAJOR DEPRESSIVE DISORDER, IN PARTIAL REMISSION (H): ICD-10-CM

## 2023-05-01 RX ORDER — PAROXETINE 10 MG/1
TABLET, FILM COATED ORAL
Qty: 180 TABLET | Refills: 3 | Status: SHIPPED | OUTPATIENT
Start: 2023-05-01 | End: 2024-06-12

## 2023-05-01 NOTE — TELEPHONE ENCOUNTER
"Routing refill request to provider for review/approval because:  Labs not current:  PHQ-9    Last Written Prescription Date: 12/7/22  Last Fill Quantity: 180, # refills: 1  Last office visit provider: Sam 2/24/23      Requested Prescriptions   Pending Prescriptions Disp Refills    PARoxetine (PAXIL) 10 MG tablet [Pharmacy Med Name: PARoxetine HCl 10 MG Oral Tablet] 180 tablet 3     Sig: TAKE 2 TABLETS BY MOUTH EVERY  MORNING       SSRIs Protocol Failed - 5/1/2023 11:37 AM        Failed - PHQ-9 score less than 5 in past 6 months     Please review last PHQ-9 score.           Failed - Recent (6 mo) or future (30 days) visit within the authorizing provider's specialty     Patient had office visit in the last 6 months or has a visit in the next 30 days with authorizing provider or within the authorizing provider's specialty.  See \"Patient Info\" tab in inbasket, or \"Choose Columns\" in Meds & Orders section of the refill encounter.            Passed - Medication is active on med list        Passed - Patient is age 18 or older           Zara Osorio RN 05/01/23 2:41 PM    "

## 2023-05-11 DIAGNOSIS — Z79.01 LONG TERM (CURRENT) USE OF ANTICOAGULANTS: ICD-10-CM

## 2023-05-11 DIAGNOSIS — I48.20 CHRONIC ATRIAL FIBRILLATION (H): ICD-10-CM

## 2023-05-11 RX ORDER — WARFARIN SODIUM 5 MG/1
TABLET ORAL
Qty: 10 TABLET | Refills: 0 | Status: SHIPPED | OUTPATIENT
Start: 2023-05-11 | End: 2024-09-23

## 2023-05-11 RX ORDER — WARFARIN SODIUM 5 MG/1
TABLET ORAL
Qty: 110 TABLET | Refills: 3 | Status: CANCELLED | OUTPATIENT
Start: 2023-05-11

## 2023-05-11 RX ORDER — WARFARIN SODIUM 5 MG/1
TABLET ORAL
Qty: 110 TABLET | Refills: 3 | Status: SHIPPED | OUTPATIENT
Start: 2023-05-11

## 2023-05-11 RX ORDER — WARFARIN SODIUM 5 MG/1
TABLET ORAL
Qty: 110 TABLET | Refills: 3 | Status: SHIPPED | OUTPATIENT
Start: 2023-05-11 | End: 2023-05-11

## 2023-05-11 NOTE — TELEPHONE ENCOUNTER
Repended prescription for warfarin.  Pended 110 tablet with 3 refills to mail order pharmacy.  Also pended local prescription with 10 tablets to bridge patient until he receives his mail order.    Jassi Live RN     [facility]

## 2023-05-11 NOTE — TELEPHONE ENCOUNTER
Patient requesting a WEEK WORTH to be sent to pharmacy, stated he needs a month worth sent to home delivery also for after this. Wants part of a fill so that there is time till the medication can be delivered to home.

## 2023-05-11 NOTE — TELEPHONE ENCOUNTER
Reason for Call:  Other prescription    Detailed comments: PT IS OUT OF warfarin ANTICOAGULANT (COUMADIN) 5 MG tablet    PLEASE SEND TO PHARMACY    PLEASE CALL PT BACK     Phone Number Patient can be reached at: Cell number on file:    Telephone Information:   Mobile [phone]       Best Time: ANYTIME    Can we leave a detailed message on this number? YES    Call taken on 5/11/2023 at 7:41 AM by Chente Estrada

## 2023-05-11 NOTE — TELEPHONE ENCOUNTER
Please fill for a month worth, patient needs this sent to mail RX to home. Getting a week worth or less also because he is out of pills right now. Two pended orders for same medication at different pharmacy's.

## 2023-05-15 ENCOUNTER — LAB (OUTPATIENT)
Dept: LAB | Facility: CLINIC | Age: 83
End: 2023-05-15
Payer: COMMERCIAL

## 2023-05-15 ENCOUNTER — ANTICOAGULATION THERAPY VISIT (OUTPATIENT)
Dept: ANTICOAGULATION | Facility: CLINIC | Age: 83
End: 2023-05-15

## 2023-05-15 DIAGNOSIS — I48.20 CHRONIC ATRIAL FIBRILLATION (H): ICD-10-CM

## 2023-05-15 DIAGNOSIS — Z98.890 H/O MITRAL VALVE REPAIR: ICD-10-CM

## 2023-05-15 DIAGNOSIS — Z79.01 LONG TERM (CURRENT) USE OF ANTICOAGULANTS: ICD-10-CM

## 2023-05-15 DIAGNOSIS — Z95.2 S/P AVR: ICD-10-CM

## 2023-05-15 DIAGNOSIS — I48.92 ATRIAL FLUTTER, PAROXYSMAL (H): ICD-10-CM

## 2023-05-15 DIAGNOSIS — I48.20 CHRONIC ATRIAL FIBRILLATION (H): Primary | ICD-10-CM

## 2023-05-15 LAB — INR BLD: 2.6 (ref 0.9–1.1)

## 2023-05-15 PROCEDURE — 36416 COLLJ CAPILLARY BLOOD SPEC: CPT

## 2023-05-15 PROCEDURE — 85610 PROTHROMBIN TIME: CPT

## 2023-05-15 NOTE — PROGRESS NOTES
ANTICOAGULATION MANAGEMENT     Parish Bills 82 year old male is on warfarin with therapeutic INR result. (Goal INR 2.0-3.0)    Recent labs: (last 7 days)     05/15/23  0808   INR 2.6*       ASSESSMENT       Source(s): Chart Review and Patient/Caregiver Call       Warfarin doses taken: Warfarin taken as instructed    Diet: No new diet changes identified    Medication/supplement changes: None noted    New illness, injury, or hospitalization: No    Signs or symptoms of bleeding or clotting: No    Previous result: Therapeutic last 3 visits    Additional findings: None         PLAN     Recommended plan for no diet, medication or health factor changes affecting INR     Dosing Instructions: Continue your current warfarin dose with next INR in 5 weeks       Summary  As of 5/15/2023    Full warfarin instructions:  7.5 mg every Tue; 5 mg all other days   Next INR check:  6/19/2023             Telephone call with  Parish ([phone]) who verbalizes understanding and agrees to plan    Lab visit scheduled INR on 6/19/23 @ [facility].    Education provided:     Taking warfarin: Importance of taking warfarin as instructed    Goal range and lab monitoring: goal range and significance of current result    Dietary considerations: importance of consistent vitamin K intake    Plan made per [facility] anticoagulation protocol    Kori Ziegler RN  Anticoagulation Clinic  5/15/2023    _______________________________________________________________________     Anticoagulation Episode Summary     Current INR goal:  2.0-3.0   TTR:  80.7 % (1 y)   Target end date:  Indefinite   Send INR reminders to:  [facility]    Indications    Chronic atrial fibrillation (H) [I48.20]  H/O mitral valve repair [Z98.890]  S/P AVR [Z95.2]  A-fib (H) (Resolved) [I48.91]  Long term (current) use of anticoagulants [Z79.01]  Atrial flutter  paroxysmal (H) [I48.92]           Comments:           Anticoagulation Care Providers     Provider Role Specialty  Phone number    Isidro Au MD [facility] Medicine [phone]

## 2023-06-14 ENCOUNTER — TELEPHONE (OUTPATIENT)
Dept: FAMILY MEDICINE | Facility: CLINIC | Age: 83
End: 2023-06-14
Payer: COMMERCIAL

## 2023-06-14 NOTE — TELEPHONE ENCOUNTER
Reason for Call:  Appointment Request    Patient requesting this type of appt: Pre-op    Requested provider: Isidro Au    Reason patient unable to be scheduled: Not within requested timeframe    When does patient want to be seen/preferred time: 3-7 days    Comments: Parish has a pace maker replacement scheduled for 7/6 and would like to do a pre-op with pcp before. First available won't be until 7/21 with Dr. Au and pt would like to see if he can be squeezed in before 7/6    Okay to leave a detailed message?: Yes at Home number on file [phone] (home)    Call taken on 6/14/2023 at 10:26 AM by Melonie Esquivel

## 2023-06-19 ENCOUNTER — LAB (OUTPATIENT)
Dept: LAB | Facility: CLINIC | Age: 83
End: 2023-06-19
Payer: COMMERCIAL

## 2023-06-19 ENCOUNTER — ANTICOAGULATION THERAPY VISIT (OUTPATIENT)
Dept: ANTICOAGULATION | Facility: CLINIC | Age: 83
End: 2023-06-19

## 2023-06-19 DIAGNOSIS — Z79.01 LONG TERM (CURRENT) USE OF ANTICOAGULANTS: ICD-10-CM

## 2023-06-19 DIAGNOSIS — I48.20 CHRONIC ATRIAL FIBRILLATION (H): Primary | ICD-10-CM

## 2023-06-19 DIAGNOSIS — Z98.890 H/O MITRAL VALVE REPAIR: ICD-10-CM

## 2023-06-19 DIAGNOSIS — Z95.2 S/P AVR: ICD-10-CM

## 2023-06-19 DIAGNOSIS — I48.20 CHRONIC ATRIAL FIBRILLATION (H): ICD-10-CM

## 2023-06-19 DIAGNOSIS — I48.92 ATRIAL FLUTTER, PAROXYSMAL (H): ICD-10-CM

## 2023-06-19 LAB — INR BLD: 2.4 (ref 0.9–1.1)

## 2023-06-19 PROCEDURE — 85610 PROTHROMBIN TIME: CPT

## 2023-06-19 PROCEDURE — 36416 COLLJ CAPILLARY BLOOD SPEC: CPT

## 2023-06-19 NOTE — PROGRESS NOTES
ANTICOAGULATION MANAGEMENT     Parish Bills 82 year old male is on warfarin with therapeutic INR result. (Goal INR 2.0-3.0)    Recent labs: (last 7 days)     06/19/23  0808   INR 2.4*       ASSESSMENT     Warfarin Lab Questionnaire    Warfarin Doses Last 7 Days      6/19/2023     8:03 AM   Dose in Tablet or Mg   TAB or MG? - warfarin 5mg tablets (evenings) milligram (mg)     Pt Rptd Dose SUNDAY MONDAY TUESDAY WED THURS FRIDAY SATURDAY 6/19/2023   8:03 AM 5 5 5 7.5 5 5 5         6/19/2023   Warfarin Lab Questionnaire   Missed doses within past 14 days? No   Changes in diet or alcohol within past 14 days? No   Medication changes since last result? No   Injuries or illness since last result? No   New shortness of breath, severe headaches or sudden changes in vision since last result? No   Abnormal bleeding since last result? No   Upcoming surgery, procedure? Yes - scheduled for Pacemaker generator replacement.     Please explain, date scheduled? july 6   Best number to call with results? [phone]        Previous result: Therapeutic last 4 visits.    Additional findings:  Parish was not informed if warfarin should be held  prior to pacemaker generator change on 7/6/23.  But has preop on 6/28/23 and will call [facility] if any special instructions are given.       PLAN     Recommended plan for no diet, medication or health factor changes affecting INR     Dosing Instructions: Continue your current warfarin dose with next INR in 4 weeks       Summary  As of 6/19/2023    Full warfarin instructions:  7.5 mg every Tue; 5 mg all other days   Next INR check:  7/17/2023             Telephone call with  Parish ([phone]) who verbalizes understanding and agrees to plan    Lab visit scheduled - INR on 7/17/23 @ [facility]    Education provided:     Taking warfarin: Importance of taking warfarin as instructed    Goal range and lab monitoring: goal range and significance of current result    Plan made per [facility] anticoagulation  protocol    Kori Ziegler RN  Anticoagulation Clinic  6/19/2023    _______________________________________________________________________     Anticoagulation Episode Summary     Current INR goal:  2.0-3.0   TTR:  84.5 % (1 y)   Target end date:  Indefinite   Send INR reminders to:  [facility]    Indications    Chronic atrial fibrillation (H) [I48.20]  H/O mitral valve repair [Z98.890]  S/P AVR [Z95.2]  A-fib (H) (Resolved) [I48.91]  Long term (current) use of anticoagulants [Z79.01]  Atrial flutter  paroxysmal (H) [I48.92]           Comments:           Anticoagulation Care Providers     Provider Role Specialty Phone number    Isidro Au MD Referring Family Medicine [phone]

## 2023-06-21 NOTE — LETTER
July 14, 2022      Parish Bills  [address]   [facility] [address]        Dear ,    We are writing to inform you of your test results.    hemoglobin stable from 3 months ago and last year.  We can monitor at 6-month intervals or sooner if symptoms change.       Resulted Orders   CBC with platelets   Result Value Ref Range    WBC Count 5.7 4.0 - 11.0 10e3/uL    RBC Count 3.87 (L) 4.40 - 5.90 10e6/uL    Hemoglobin 11.3 (L) 13.3 - 17.7 g/dL    Hematocrit 34.3 (L) 40.0 - 53.0 %    MCV 89 78 - 100 fL    MCH 29.2 26.5 - 33.0 pg    MCHC 32.9 31.5 - 36.5 g/dL    RDW 14.2 10.0 - 15.0 %    Platelet Count 157 150 - 450 10e3/uL       If you have any questions or concerns, please call the clinic at the number listed above.       Sincerely,                 PEDIATRIC HOSPITALIST OBSERVATION ADMISSION NOTE    History obtained from: parents  Live video/phone  service used? No    History Of Present Illness:     Jarred is a 3 year old male born full term with PMH of recurrent acute otitis media and prolonged hyperbilirubinemia requiring liver biopsy presenting with abdominal pain and fever. Per parents abdominal pain started on Saturday 6/17 with daily emesis (last episode this morning). No diarrhea. Father states patient may be a little bit constipated, although did have 2 bowel movements yesterday and one today described as firm and regular appearance. This morning, the patient was laying on the floor in pain and was refusing to move and the parents took his temperature and noted that it was 101 F so they gave him Tylenol before coming to the emergency department.  Due to this fever, and the worsening episodes of abdominal pain, decided to bring back to the emergency department.     ROS:  Review of Systems   Constitutional: Positive for fever.   Gastrointestinal: Positive for abdominal pain and vomiting.   All other systems reviewed and are negative.    Complete review of systems reviewed, pertinent findings noted above, remaining review of systems otherwise negative.    ED Course:  In the ED, patient afebrile. US appendix and intussusception unremarkable. XR abdomen with moderate stool burden. CBC with no leukocytosis, anemia or thrombocytopenia.  The strep A PCR is negative. Had e/o left otitis media on exam and was treated with CTX 50 mg/kg once. Labs reassuring. Given bolus x1, docusate sodium x1, zofran x1.     Past Medical History   has no past medical history on file.  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   Born full term     Developmental History  Normal development/met milestones    Surgical History   has no past surgical history on file.    Social History  Lives at home with parents and   sibling     Family History  None    Allergies  ALLERGIES:  Patient has no known allergies.    Home Medications  None    Immunizations  Immunization History   Administered Date(s) Administered   • COVID Moderna 6M-5Y 2022     Up to date Yes, COVID vaccination Yes    PCP:  Bre Toscano, DO    Physical Exam  Temp:  [97 °F (36.1 °C)-99.3 °F (37.4 °C)] 97 °F (36.1 °C)  Heart Rate:  [84-98] 90  Resp:  [22-28] 22  BP: (117)/(53-78) 117/53     Intake/Output Summary (Last 24 hours) at 2023 1622  Last data filed at 2023 1500  Gross per 24 hour   Intake 120 ml   Output --   Net 120 ml         General:  Not in acute distress  Skin:  Warm and dry without rash.    Head:  Normocephalic, atraumatic  Neck: Supple  Eyes:  Normal conjunctivae and sclerae.  Pupils equal, round and reactive to light.  Extraocular movements intact.  ENT:  R TM opaque and injected with purulent fluid behind the TM No visible landmark bony landmarks. L TM slightly injucted, non opaque apparent.   Mucous membranes are moist.  No pharyngeal erythema or exudate.  No facial tenderness.  Normal nasal mucosa. No mouth lesions.   Cardiovascular:  Symmetrical pulses.  Regular rate and rhythm without murmur. Cap refill less than 2 sec.   Respiratory:  Normal respiratory effort.  Clear to auscultation.   Gastrointestinal:  Soft, non tender and non distended.  Normal bowel sounds.  No hepatomegaly or splenomegaly.   Musculoskeletal:  No deformity or edema.  No tenderness to palpation.   Neurologic: Alert and appropriately responsive. Symmetric movement bilaterally     LABORATORY DATA:    Admission on 2023   Component Date Value Ref Range Status   • STREPTOCOCCUS GROUP A PCR 2023 Not Detected  Not Detected Final    This result was obtained by RT-PCR amplification followed by fluorescence detection. This test has been cleared by the U.S. Food and Drug Administration for detection of Strep A.   • Sodium 2023 138  135 - 145  mmol/L Final   • Potassium 06/21/2023 3.8  3.4 - 5.1 mmol/L Final   • Chloride 06/21/2023 106  97 - 110 mmol/L Final   • Carbon Dioxide 06/21/2023 25  21 - 32 mmol/L Final   • Anion Gap 06/21/2023 11  7 - 19 mmol/L Final   • Glucose 06/21/2023 76  70 - 99 mg/dL Final   • BUN 06/21/2023 8  5 - 18 mg/dL Final   • Creatinine 06/21/2023 0.31  0.21 - 0.70 mg/dL Final   • Glomerular Filtration Rate 06/21/2023    Final    GFR not calculated for age less than 18 years   • BUN/Cr 06/21/2023 26 (H)  7 - 25 Final   • Calcium 06/21/2023 9.9  8.0 - 11.0 mg/dL Final   • Bilirubin, Total 06/21/2023 0.4  0.2 - 1.4 mg/dL Final   • GOT/AST 06/21/2023 25  10 - 55 Units/L Final   • GPT/ALT 06/21/2023 32  6 - 45 Units/L Final   • Alkaline Phosphatase 06/21/2023 257  125 - 272 Units/L Final   • Albumin 06/21/2023 3.7  3.5 - 4.8 g/dL Final   • Protein, Total 06/21/2023 7.1  6.0 - 8.0 g/dL Final   • Globulin 06/21/2023 3.4  2.0 - 4.0 g/dL Final   • A/G Ratio 06/21/2023 1.1  1.0 - 2.4 Final   • Procalcitonin 06/21/2023 <0.05  <=0.09 ng/mL Final      Bacterial Sepsis:  <0.5 ng/mL:    Sepsis not likely. Localized bacterial infection possible.  0.5 - 2 ng/mL: Sepsis or other conditions possible  >2.0 ng/mL:    High risk of sepsis/septic shock    NOTE: If bacterial sepsis is of high clinical suspicion but PCT<2 ng/mL,  consider recheck PCT 6-24 hours after initial test.     Lower Respiratory Tract Infection (LRTI):  <0.1 ng/mL:       Bacterial infection highly unlikely  0.1 - 0.25 ng/mL: Bacterial infection unlikely  0.26 - 0.5 ng/mL: Bacterial infection likely  > 0.5 ng/mL:      Bacterial infection highly likely    NOTE: Patients with advanced CKD or medical/surgical trauma may have  elevated baseline PCT (>0.5 ng/mL) in the absence of bacterial infection. If  bacterial infection is suspected, consider repeat PCT in 2 to 4 days to guide de-escalation or discontinuation of antibiotic therapy.  Higher reference range cutoffs may be appropriate  for patients <3 days old.     • C-Reactive Protein 06/21/2023 <0.3  <=1.0 mg/dL Final   • RBC Sedimentation Rate 06/21/2023 15  0 - 20 mm/hr Final   • COLOR, URINALYSIS 06/21/2023 Straw   Final   • APPEARANCE, URINALYSIS 06/21/2023 Clear   Final   • GLUCOSE, URINALYSIS 06/21/2023 Negative  Negative mg/dL Final   • BILIRUBIN, URINALYSIS 06/21/2023 Negative  Negative Final   • KETONES, URINALYSIS 06/21/2023 20 (A)  Negative mg/dL Final   • SPECIFIC GRAVITY, URINALYSIS 06/21/2023 1.012  1.005 - 1.030 Final    Measured by refractometry   • OCCULT BLOOD, URINALYSIS 06/21/2023 Negative  Negative Final   • PH, URINALYSIS 06/21/2023 7.5 (H)  5.0 - 7.0 Final   • PROTEIN, URINALYSIS 06/21/2023 Negative  Negative mg/dL Final   • UROBILINOGEN, URINALYSIS 06/21/2023 0.2  0.2, 1.0 mg/dL Final   • NITRITE, URINALYSIS 06/21/2023 Negative  Negative Final   • LEUKOCYTE ESTERASE, URINALYSIS 06/21/2023 Negative  Negative Final   • SQUAMOUS EPITHELIAL, URINALYSIS 06/21/2023 None Seen  None Seen, 1 to 5 /hpf Final   • ERYTHROCYTES, URINALYSIS 06/21/2023 1 to 2  None Seen, 1 to 2 /hpf Final   • LEUKOCYTES, URINALYSIS 06/21/2023 1 to 5  None Seen, 1 to 5 /hpf Final   • BACTERIA, URINALYSIS 06/21/2023 None Seen  None Seen /hpf Final   • HYALINE CASTS, URINALYSIS 06/21/2023 None Seen  None Seen, 1 to 5 /lpf Final   • MUCUS 06/21/2023 Present   Final   • WBC 06/21/2023 12.6  6.0 - 17.0 K/mcL Final   • RBC 06/21/2023 4.80  3.90 - 5.30 mil/mcL Final   • HGB 06/21/2023 12.5  11.5 - 13.5 g/dL Final   • HCT 06/21/2023 36.7  34.0 - 40.0 % Final   • MCV 06/21/2023 76.5  70.0 - 86.0 fl Final   • MCH 06/21/2023 26.0  24.0 - 30.0 pg Final   • MCHC 06/21/2023 34.1  30.0 - 36.0 g/dL Final   • RDW-CV 06/21/2023 13.1  11.0 - 15.0 % Final   • RDW-SD 06/21/2023 35.9  35.0 - 47.0 fL Final   • PLT 06/21/2023 279  140 - 450 K/mcL Final   • NRBC 06/21/2023 0  <=0 /100 WBC Final   • Neutrophil, Percent 06/21/2023 66  % Final   • Lymphocytes, Percent 06/21/2023 24   % Final   • Mono, Percent 06/21/2023 10  % Final   • Eosinophils, Percent 06/21/2023 0  % Final   • Basophils, Percent 06/21/2023 0  % Final   • Immature Granulocytes 06/21/2023 0  % Final   • Absolute Neutrophils 06/21/2023 8.4  1.5 - 8.5 K/mcL Final   • Absolute Lymphocytes 06/21/2023 3.0  3.0 - 9.5 K/mcL Final   • Absolute Monocytes 06/21/2023 1.2 (H)  0.0 - 0.8 K/mcL Final   • Absolute Eosinophils  06/21/2023 0.0  0.0 - 0.7 K/mcL Final   • Absolute Basophils 06/21/2023 0.0  0.0 - 0.2 K/mcL Final   • Absolute Immature Granulocytes 06/21/2023 0.0  0.0 - 0.2 K/mcL Final         IMAGING STUDIES:    XR ABDOMEN 1 VIEW   Final Result   Nonobstructive bowel gas pattern. Moderate stool burden.   Additional findings as above.      Electronically Signed by: ANASTACIA ZULUAGA M.D.    Signed on: 6/21/2023 10:56 AM    Workstation ID: [id number]      US APPENDIX   Final Result      Nonvisualized appendix. No specific secondary sonographic evidence of   appendicitis.      Nonspecific trace free fluid.      Electronically Signed by: ANASTACIA ZULUAGA M.D.    Signed on: 6/21/2023 10:12 AM    Workstation ID: [id number]      US INTUSSUSCEPTION   Final Result   No sonographic evidence of intussusception.      Electronically Signed by: ANASTACIA ZULUAGA M.D.    Signed on: 6/21/2023 10:02 AM    Workstation ID: [id number]           ASSESSMENT:  Jarred Flores is a 3 year old male with significant PMH of recurrent otitis media admitted to the observation unit for abdominal pain and emesis likely secondary to constipation. US abdomen shows moderate stool burden. Will continue to monitor overall status.     Principal Problem:    Generalized abdominal pain      Problem List:  1. Abdominal pain  2. Emesis  3. Fever     PLAN:  - D5NS @ M with KCl 20 mEq  - GPD  -  Continue Miralax BID   - s/p enemeez x1, consider repeat enema as needed   - Otitis media likely chronicS/p CTX 50 mg/kg in the ED x1   Antibiotic Decision Making  Patient on  Antibiotics: - No    VTE: 1 (Dehydration (bolus in past 24 hrs)), at baseline mobility, SCDs not indicated  Lines: PIV  Function, utilization, and necessity addressed/discussed on rounds    DISPO: will be ready for discharge possibly tomorrow if he remains stable with no acute events overnight.     We discussed the current management plan with patient and family, who stated understating of, and agreement with, current plan. All of their questions and concerns were addressed.    RN present on rounds/updated on plan of care.      “By signing below, IHelder attest that this documentation has been prepared in the presence of and under the direction of TONJA Nichole    6/21/2023  4:57 PM    “I, TONJA GOINS, personally performed the services described in this documentation. All medical record entries made by the scribe/nurse CTA were at my direction and in my presence. I have reviewed the chart and agree that the record reflects my personal performance and is accurate and complete.”    Tonja Goins MD      6/21/2023  4:22 PM

## 2023-06-28 ENCOUNTER — OFFICE VISIT (OUTPATIENT)
Dept: FAMILY MEDICINE | Facility: CLINIC | Age: 83
End: 2023-06-28
Payer: COMMERCIAL

## 2023-06-28 ENCOUNTER — ANTICOAGULATION THERAPY VISIT (OUTPATIENT)
Dept: ANTICOAGULATION | Facility: CLINIC | Age: 83
End: 2023-06-28

## 2023-06-28 VITALS
RESPIRATION RATE: 18 BRPM | WEIGHT: 196.4 LBS | DIASTOLIC BLOOD PRESSURE: 59 MMHG | TEMPERATURE: 97.6 F | HEART RATE: 55 BPM | OXYGEN SATURATION: 95 % | BODY MASS INDEX: 28.12 KG/M2 | SYSTOLIC BLOOD PRESSURE: 134 MMHG | HEIGHT: 70 IN

## 2023-06-28 DIAGNOSIS — I48.20 CHRONIC ATRIAL FIBRILLATION (H): ICD-10-CM

## 2023-06-28 DIAGNOSIS — Z01.818 PREOPERATIVE EXAMINATION: Primary | ICD-10-CM

## 2023-06-28 DIAGNOSIS — Z79.01 LONG TERM (CURRENT) USE OF ANTICOAGULANTS: ICD-10-CM

## 2023-06-28 DIAGNOSIS — Z98.890 H/O MITRAL VALVE REPAIR: ICD-10-CM

## 2023-06-28 DIAGNOSIS — I48.20 CHRONIC ATRIAL FIBRILLATION (H): Primary | ICD-10-CM

## 2023-06-28 DIAGNOSIS — I48.92 ATRIAL FLUTTER, PAROXYSMAL (H): ICD-10-CM

## 2023-06-28 DIAGNOSIS — Z95.2 S/P AVR: ICD-10-CM

## 2023-06-28 LAB
ERYTHROCYTE [DISTWIDTH] IN BLOOD BY AUTOMATED COUNT: 13.7 % (ref 10–15)
HCT VFR BLD AUTO: 32.3 % (ref 40–53)
HGB BLD-MCNC: 10.6 G/DL (ref 13.3–17.7)
INR BLD: 2.8 (ref 0.9–1.1)
MCH RBC QN AUTO: 30.6 PG (ref 26.5–33)
MCHC RBC AUTO-ENTMCNC: 32.8 G/DL (ref 31.5–36.5)
MCV RBC AUTO: 93 FL (ref 78–100)
PLATELET # BLD AUTO: 133 10E3/UL (ref 150–450)
RBC # BLD AUTO: 3.46 10E6/UL (ref 4.4–5.9)
WBC # BLD AUTO: 5.8 10E3/UL (ref 4–11)

## 2023-06-28 PROCEDURE — 85027 COMPLETE CBC AUTOMATED: CPT | Performed by: FAMILY MEDICINE

## 2023-06-28 PROCEDURE — 36415 COLL VENOUS BLD VENIPUNCTURE: CPT | Performed by: FAMILY MEDICINE

## 2023-06-28 PROCEDURE — 85610 PROTHROMBIN TIME: CPT | Performed by: FAMILY MEDICINE

## 2023-06-28 PROCEDURE — 99214 OFFICE O/P EST MOD 30 MIN: CPT | Performed by: FAMILY MEDICINE

## 2023-06-28 PROCEDURE — 80048 BASIC METABOLIC PNL TOTAL CA: CPT | Performed by: FAMILY MEDICINE

## 2023-06-28 ASSESSMENT — PATIENT HEALTH QUESTIONNAIRE - PHQ9
SUM OF ALL RESPONSES TO PHQ QUESTIONS 1-9: 14
SUM OF ALL RESPONSES TO PHQ QUESTIONS 1-9: 14
10. IF YOU CHECKED OFF ANY PROBLEMS, HOW DIFFICULT HAVE THESE PROBLEMS MADE IT FOR YOU TO DO YOUR WORK, TAKE CARE OF THINGS AT HOME, OR GET ALONG WITH OTHER PEOPLE: SOMEWHAT DIFFICULT

## 2023-06-28 NOTE — PROGRESS NOTES
[facility]  [address] [facility] [address]  Phone: [phone]  Fax: [phone]  Primary Provider: Isidro Au  Pre-op Performing Provider: ISIDRO AU      PREOPERATIVE EVALUATION:  Today's date: 6/28/2023    Parish Bills is a 82 year old male who presents for a preoperative evaluation.      6/28/2023     1:18 PM   Additional Questions   Roomed by Soco SANCHEZ CMA     Surgical Information:  Surgery/Procedure: Pace marker replacement  Surgery Location: [facility]  Surgeon: Dr. Nixon  Surgery Date: 07/06/2023  Time of Surgery: 6:15 AM  Where patient plans to recover: At home with family  Fax number for surgical facility: [phone]    Assessment & Plan     The proposed surgical procedure is considered INTERMEDIATE risk.    Preoperative examination  Pt to proceed with generator replacement  - CBC with Platelets; Future  - BASIC METABOLIC PANEL; Future    Chronic atrial fibrillation (H)  Patient is due for INR goal is to have INR is close to 2.0 for procedure  Patient going in for generator replacement for pacemaker         Implanted Device:   - Type of device: The Patient advised to bring device information on day of surgery.       - No identified additional risk factors other than previously addressed  Pt on warfarin- checking INR today  Antiplatelet or Anticoagulation Medication Instructions:   - warfarin: Warfarin not being held- checking INR to get level close to 2.0 for procedure    Additional Medication Instructions:  Pt aware of what medications to hold for procedure, continue with warfarin and metoprolol day of procedure    RECOMMENDATION:  APPROVAL GIVEN to proceed with proposed procedure, without further diagnostic evaluation.        Subjective       HPI related to upcoming procedure: Patient here for preoperative examination.  Patient has tolerated previous procedures without complication.  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  He expresses no new cardiovascular symptoms of concern.  He does note that he could be more active he has been noting with his aging that he has increasing fatigue.  We did discuss the combination of his many multiple medications likely contributing as well as aging.  Encouraged him to increase exercise.  EKG reviewed from less than 90 days ago all showing ventricular paced rhythm in A-fib similar to prior.      Review of Systems  Constitutional, neuro, ENT, endocrine, pulmonary, cardiac, gastrointestinal, genitourinary, musculoskeletal, integument and psychiatric systems are negative, except as otherwise noted.    Patient Active Problem List    Diagnosis Date Noted     Long term (current) use of anticoagulants 02/15/2023     Priority: Medium     Atrial flutter, paroxysmal (H) 02/15/2023     Priority: Medium     Bronchitis 03/23/2022     Priority: Medium     Mild major depression (H) 11/04/2020     Priority: Medium     Acute on chronic systolic heart failure (H)      Priority: Medium     Right leg pain 08/14/2020     Priority: Medium     Cellulitis of leg, right 08/14/2020     Priority: Medium     Chronic atrial fibrillation (H)      Priority: Medium     Created by Conversion         Polyp of ascending colon, unspecified type 07/28/2020     Priority: Medium     Rectal polyp 07/28/2020     Priority: Medium     Right hip pain 07/28/2020     Priority: Medium     Infection of prosthetic joint, subsequent encounter 07/28/2020     Priority: Medium     Added automatically from request for surgery [id number]         Abdominal aortic aneurysm (AAA) (H) 08/26/2019     Priority: Medium     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     Priority: Medium     History of colonic polyps 04/11/2019     Priority: Medium     Coagulopathy (H) 07/30/2018     Priority: Medium     S/P AVR 07/30/2018     Priority: Medium     Stress hyperglycemia 07/30/2018     Priority: Medium     Family  history of colon cancer 02/02/2017     Priority: Medium     Malignant tumor of rectum (H) 01/31/2017     Priority: Medium     Benign neoplasm of ascending colon 01/31/2017     Priority: Medium     Encounter for screening for malignant neoplasm of colon 01/31/2017     Priority: Medium     Family history of malignant neoplasm of digestive organ 01/31/2017     Priority: Medium     Dysthymia 11/03/2016     Priority: Medium     Nonrheumatic aortic valve stenosis 11/03/2016     Priority: Medium     Aortic Stenosis      Priority: Medium     Created by Conversion  Replacement Utility updated for latest IMO load         Aneurysm Of The Abdominal Aorta      Priority: Medium     Created by Conversion  Replacement Utility updated for latest IMO load         Osteoarthritis      Priority: Medium     Created by Conversion  Replacement Utility updated for latest IMO load         Biventricular cardiac pacemaker in situ 02/17/2015     Priority: Medium     Cough 02/09/2015     Priority: Medium     Edema 02/09/2015     Priority: Medium     Lumbar radiculopathy 02/09/2015     Priority: Medium     Malignant neoplasm of skin 02/09/2015     Priority: Medium     H/O mitral valve repair 12/03/2014     Priority: Medium     PVC (premature ventricular contraction) 12/03/2014     Priority: Medium     Postoperative anemia due to acute blood loss 09/16/2014     Priority: Medium     Post-operative pain 09/15/2014     Priority: Medium     Subconjunctival Hemorrhage      Priority: Medium     Bicuspid Aortic Valve      Priority: Medium     Created by Conversion         Benign Prostatic Hypertrophy      Priority: Medium     Created by Conversion  [facility] Annotation: Apr 18 2008  5:34PM - Lorena Diop: bx (-) in   2005;   **;         Abrasion Of The Ear      Priority: Medium     Created by Conversion         Hyperlipidemia      Priority: Medium     Created by Conversion         Cardiomyopathy      Priority: Medium     Created by Conversion          Strained Left Pectoralis Major Muscle      Priority: Medium     Created by Conversion         Essential Hypercholesterolemia      Priority: Medium     Created by Conversion         Chest Pain      Priority: Medium     Created by Conversion         Neck Strain      Priority: Medium     Created by Conversion         Lumbar Strain      Priority: Medium     Created by Conversion         Blood In Urine      Priority: Medium     Created by Conversion         Esophageal Reflux      Priority: Medium     Created by Conversion         Hypertension 03/31/2012     Priority: Medium     Iliac artery aneurysm (H) 03/31/2012     Priority: Medium      Past Medical History:   Diagnosis Date     Abdominal aortic aneurysm (H)      Alcohol abuse      Anemia      Aortic stenosis      Arthritis      Atrial fibrillation (H)      BPH (benign prostatic hyperplasia)      CAD (coronary artery disease)      Cardiomyopathy (H)      Cataract     left     Chest pain      CHF (congestive heart failure) (H)      Congenital insufficiency of aortic valve      Coronary artery disease      Disease of pancreas     gallstones related     Dysthymia      Enlarged prostate      FH: mitral valve repair      H/O aortic valve repair '     High blood pressure      Hypercholesteremia      Hyperlipidemia      Hypertrophy of prostate      Lumbar radiculopathy      Mitral valve prolapse      Neck strain      Nonrheumatic aortic (valve) stenosis      Osteoarthrosis      Pacemaker      Rectal cancer (H)      Reflux esophagitis      Subconjunctival bleed      Past Surgical History:   Procedure Laterality Date     ABDOMINAL AORTIC ANEURYSM REPAIR  2009     AORTIC VALVE REPLACEMENT       BYPASS GRAFT ARTERY CORONARY      x1 with OLIVAS to LAD     CARDIOVERSION      X3     CARDIOVERSION       CERVICAL FUSION  1972     CHOLECYSTECTOMY       EMBOLIZATION  01/31/2018     ILIAC ARTERY ANEURYSM REPAIR  03/29/2012     JOINT REPLACEMENT Right     RAMÓN     LAPAROSCOPIC  CHOLECYSTECTOMY       LUMBAR LAMINECTOMY Right 9/15/2014    Procedure: OPEN DECOMPRESSION L2-4 RIGHT ;  Surgeon: Elroy French MD;  Location: [facility];  Service:      MITRAL VALVE REPAIR  2006     OTHER SURGICAL HISTORY      CT coronary angiogram     OTHER SURGICAL HISTORY  2009    [facility] AAA REPAIR EXPOSE ILIAC ART ABD INCIS UNILAT      OTHER SURGICAL HISTORY  2010    MS SURG ONLY REMOVE CATARACT INTRACAP INSERT LENS      MS EXCIS RECTAL TUMOR, TRANSANAL, PARTIAL THICKNESS N/A 3/6/2017    Procedure: TRANSANAL EXCISION OF RECTAL TUMOR AND PROCTOSCOPY;  Surgeon: Alexander Mcgrath MD;  Location: [facility];  Service: General     STERNOTOMY      redo     TOTAL HIP ARTHROPLASTY Right 2004     TRANSRECTAL ULTRASONIC, TRANSURETHRAL RESECTION (TUR) OF PROSTATE CYST       XR MAJOR JOINT OR BURSA INJ/ASP UNILATERAL  7/29/2020     [facility] CERV SPINE FUSN,ANTER,BELOW C2      Description: Cervical Vertebral Fusion;  Recorded: 04/18/2008;     [facility] REPR ANEURYSM/GRFT INS,ABDOMINAL AORTA      Description: Abdominal Aortic Aneurysm Repair For Dilation Or Occlusion;  Recorded: 04/05/2012;     [facility] REVISE FEM PART OF TOTAL HIP Right 7/31/2020    Procedure: REVISION, ARTHROPLASTY, HIP, WITH FEMORAL HEAD AND  ACETABULAR LINER REPLACEMENT, WITH IRRIGATION + DEBRIDEMENT;  Surgeon: Jason Jerez MD;  Location: [facility];  Service: Orthopedics     Current Outpatient Medications   Medication Sig Dispense Refill     albuterol (PROAIR HFA/PROVENTIL HFA/VENTOLIN HFA) 108 (90 Base) MCG/ACT inhaler Inhale 2 puffs into the lungs every 6 hours as needed for shortness of breath, wheezing or cough 18 g 0     amoxicillin (AMOXIL) 500 MG capsule [AMOXICILLIN (AMOXIL) 500 MG CAPSULE] Take 2,000 mg by mouth once as needed (for dental work). As needed for Hx mitral valve repair. Take one hour before appointment        aspirin (ASPIRIN LOW DOSE) 81 MG EC tablet [ASPIRIN (ASPIRIN LOW DOSE) 81 MG EC TABLET] Take 1 tablet by  mouth daily.       atorvastatin (LIPITOR) 40 MG tablet Take 1 tablet (40 mg) by mouth At Bedtime 90 tablet 3     buPROPion (WELLBUTRIN XL) 150 MG 24 hr tablet TAKE 1 TABLET BY MOUTH IN  THE MORNING 90 tablet 3     folic acid/multivit-min/lutein (CENTRUM SILVER ORAL) [FOLIC ACID/MULTIVIT-MIN/LUTEIN (CENTRUM SILVER ORAL)] Take 1 tablet by mouth daily.       furosemide (LASIX) 40 MG tablet [FUROSEMIDE (LASIX) 40 MG TABLET] Take 1 tablet (40 mg total) by mouth 2 (two) times a day at 9am and 6pm. (Patient taking differently: Take 40 mg by mouth daily) 60 tablet 0     hydrOXYzine (VISTARIL) 25 MG capsule TAKE 1 CAPSULE (25 MG) BY MOUTH DAILY AS NEEDED FOR ANXIETY OR OTHER (INSOMNIA) 90 capsule 0     hydrOXYzine HCL (ATARAX) 25 MG tablet [HYDROXYZINE HCL (ATARAX) 25 MG TABLET] Take 1 tablet (25 mg total) by mouth every 6 (six) hours as needed for anxiety. 360 tablet 0     metoprolol succinate ER (TOPROL XL) 50 MG 24 hr tablet Take 1 tablet (50 mg) by mouth daily Hold for systolic BP less than 95 or heart rate less than 55. 90 tablet 3     nitroGLYcerin (NITROSTAT) 0.4 MG sublingual tablet Place 1 tablet (0.4 mg) under the tongue every 5 minutes as needed for chest pain For chest pain place 1 tablet under the tongue every 5 minutes for 3 doses. If symptoms persist 5 minutes after 1st dose call 911. 25 tablet 0     PARoxetine (PAXIL) 10 MG tablet TAKE 2 TABLETS BY MOUTH EVERY  MORNING 180 tablet 3     sertraline (ZOLOFT) 100 MG tablet [SERTRALINE (ZOLOFT) 100 MG TABLET] TAKE ONE TABLET BY MOUTH ONCE DAILY ALONG WITH 50MG TO EQUAL 150MG 90 tablet 1     sertraline (ZOLOFT) 50 MG tablet [SERTRALINE (ZOLOFT) 50 MG TABLET] TAKE ONE TABLET BY MOUTH ONCE DAILY ALONG WITH 100MG TO EQUAL 150MG 90 tablet 1     tamsulosin (FLOMAX) 0.4 MG capsule Take 1 capsule (0.4 mg) by mouth daily (with breakfast) 90 capsule 3     warfarin ANTICOAGULANT (COUMADIN) 5 MG tablet Take 1 tablet (5mg) to 1 and 1/2 tablets (7.5mg) by mouth daily, as  "directed.  Adjust dose based on INR results. 110 tablet 3     warfarin ANTICOAGULANT (COUMADIN) 5 MG tablet Take 1 tablet (5mg) to 1 and 1/2 tablets (7.5mg) by mouth daily, as directed.  Adjust dose based on INR results. 10 tablet 0       Allergies   Allergen Reactions     Hydrocodone-Acetaminophen Other (See Comments)     hallucination     Lisinopril Cough     Oxycodone-Acetaminophen Other (See Comments)     hallucination     Amiodarone Rash        Social History     Tobacco Use     Smoking status: Former     Types: Cigarettes     Quit date: 9/15/1980     Years since quittin.8     Smokeless tobacco: Never   Substance Use Topics     Alcohol use: No     Comment: Alcoholic Drinks/day: quit        History   Drug Use No         Objective     /59 (BP Location: Left arm, Patient Position: Sitting, Cuff Size: Adult Large)   Pulse 55   Temp 97.6  F (36.4  C) (Oral)   Resp 18   Ht 1.765 m (5' 9.5\")   Wt 89.1 kg (196 lb 6.4 oz)   SpO2 95%   BMI 28.59 kg/m      Physical Exam    GENERAL APPEARANCE: healthy, alert and no distress     EYES: EOMI,  PERRL     HENT: ear canals and TM's normal and nose and mouth without ulcers or lesions     RESP: lungs clear to auscultation - no rales, rhonchi or wheezes     CV: regular rates and rhythm     ABDOMEN: bowel sounds normal     MS: extremities normal- no gross deformities noted, no evidence of inflammation in joints, FROM in all extremities.     SKIN: no suspicious lesions or rashes     NEURO: Normal strength and tone, sensory exam grossly normal, mentation intact and speech normal     PSYCH: mentation appears normal. and affect normal/bright    Recent Labs   Lab Test 23  0808 05/15/23  0808 23  0814 23  0814 22  0800 22  0805 22  0840 22  1146   HGB  --   --   --   --   --  11.3*  --  11.5*   PLT  --   --   --   --   --  157  --  160   INR 2.4* 2.6*   < >  --    < > 2.6*   < > 3.0*   NA  --   --   --  140  --   --   --  141 "   POTASSIUM  --   --   --  4.7  --   --   --  4.5   CR  --   --   --  1.04  --   --   --  1.04    < > = values in this interval not displayed.        Diagnostics:  Labs pending at this time.  Results will be reviewed when available.   No EKG this visit, completed in the last 90 days.    Revised Cardiac Risk Index (RCRI):  The patient has the following serious cardiovascular risks for perioperative complications:   - Coronary Artery Disease (MI, positive stress test, angina, Qs on EKG) = 1 point     RCRI Interpretation: 1 point: Class II (low risk - 0.9% complication rate)           Signed Electronically by: Isidro Au MD  Copy of this evaluation report is provided to requesting physician.      Answers for HPI/ROS submitted by the patient on 6/28/2023  If you checked off any problems, how difficult have these problems made it for you to do your work, take care of things at home, or get along with other people?: Somewhat difficult  PHQ9 TOTAL SCORE: 14

## 2023-06-28 NOTE — PROGRESS NOTES
ANTICOAGULATION MANAGEMENT     Parish Bills 82 year old male is on warfarin with therapeutic INR result. (Goal INR 2.0-3.0)    Recent labs: (last 7 days)     06/28/23  1418   INR 2.8*       ASSESSMENT       Source(s): Chart Review    Previous INR was Therapeutic last 2(+) visits    Medication, diet, health changes since last INR chart reviewed; none identified     PLAN     Unable to reach Parish today.    left message to call back to discuss surgery plans, to have inr closer to 2.0    Follow up required to confirm warfarin dose taken and assess for changes and discuss dosing instructions and confirm understanding of instructions    Zahra Gonzalez RN  Anticoagulation Clinic  6/28/2023

## 2023-06-29 DIAGNOSIS — N28.9 RENAL INSUFFICIENCY: ICD-10-CM

## 2023-06-29 DIAGNOSIS — D64.9 ANEMIA, UNSPECIFIED TYPE: Primary | ICD-10-CM

## 2023-06-29 LAB
ANION GAP SERPL CALCULATED.3IONS-SCNC: 11 MMOL/L (ref 7–15)
BUN SERPL-MCNC: 25.6 MG/DL (ref 8–23)
CALCIUM SERPL-MCNC: 8.8 MG/DL (ref 8.8–10.2)
CHLORIDE SERPL-SCNC: 102 MMOL/L (ref 98–107)
CREAT SERPL-MCNC: 1.28 MG/DL (ref 0.67–1.17)
DEPRECATED HCO3 PLAS-SCNC: 25 MMOL/L (ref 22–29)
GFR SERPL CREATININE-BSD FRML MDRD: 56 ML/MIN/1.73M2
GLUCOSE SERPL-MCNC: 184 MG/DL (ref 70–99)
POTASSIUM SERPL-SCNC: 4.3 MMOL/L (ref 3.4–5.3)
SODIUM SERPL-SCNC: 138 MMOL/L (ref 136–145)

## 2023-06-29 NOTE — PROGRESS NOTES
ANTICOAGULATION MANAGEMENT     Parish Bills 82 year old male is on warfarin with therapeutic INR result. (Goal INR 2.0-3.0)    Recent labs: (last 7 days)     06/28/23  1418   INR 2.8*       ASSESSMENT       Source(s): Chart Review and Patient/Caregiver Call       Warfarin doses taken: Warfarin taken as instructed    Diet: No new diet changes identified    Medication/supplement changes: Yes.    No interruption with warfarin therapy, need INR closer to 2.0 for procedure.    New illness, injury, or hospitalization: Yes:    Preop on 6/28/23 with Dr. Au - for pacemaker Bi-V generator replacement on 7/6/23.    Signs or symptoms of bleeding or clotting: No    Previous result: Therapeutic last 5 visits    Additional findings: None       PLAN     Recommended plan for temporary change(s) affecting INR     Dosing Instructions: Continue your current warfarin dose with next INR in 1 week     - advised to take partial warfarin dose on 7/4/23, to ensure INR is closer to 2.0 prior to pacemaker generator change on 7/6/23.      Summary  As of 6/28/2023    Full warfarin instructions:  7/4: 2.5 mg; Otherwise 7.5 mg every Tue; 5 mg all other days   Next INR check:  7/5/2023             Telephone call with  Parish ([phone]) who verbalizes understanding and agrees to plan    Lab visit scheduled - INR on 7/5/23 @ [facility].    Education provided:     Taking warfarin: Importance of taking warfarin as instructed    Goal range and lab monitoring: goal range and significance of current result    Plan made per [facility] anticoagulation protocol    Kori Ziegler RN  Anticoagulation Clinic  6/29/2023    _______________________________________________________________________     Anticoagulation Episode Summary     Current INR goal:  2.0-3.0   TTR:  84.8 % (1 y)   Target end date:  Indefinite   Send INR reminders to:  [facility]    Indications    Chronic atrial fibrillation (H) [I48.20]  H/O mitral valve repair [Z98.890]  S/P  AVR [Z95.2]  A-fib (H) (Resolved) [I48.91]  Long term (current) use of anticoagulants [Z79.01]  Atrial flutter  paroxysmal (H) [I48.92]           Comments:           Anticoagulation Care Providers     Provider Role Specialty Phone number    Isidro Au MD [facility] Family Medicine [phone]

## 2023-07-05 ENCOUNTER — LAB (OUTPATIENT)
Dept: LAB | Facility: CLINIC | Age: 83
End: 2023-07-05
Payer: COMMERCIAL

## 2023-07-05 ENCOUNTER — ANTICOAGULATION THERAPY VISIT (OUTPATIENT)
Dept: ANTICOAGULATION | Facility: CLINIC | Age: 83
End: 2023-07-05

## 2023-07-05 DIAGNOSIS — I48.92 ATRIAL FLUTTER, PAROXYSMAL (H): ICD-10-CM

## 2023-07-05 DIAGNOSIS — Z00.00 HEALTH CARE MAINTENANCE: ICD-10-CM

## 2023-07-05 DIAGNOSIS — Z79.01 LONG TERM (CURRENT) USE OF ANTICOAGULANTS: ICD-10-CM

## 2023-07-05 DIAGNOSIS — I48.20 CHRONIC ATRIAL FIBRILLATION (H): Primary | ICD-10-CM

## 2023-07-05 DIAGNOSIS — Z95.2 S/P AVR: ICD-10-CM

## 2023-07-05 DIAGNOSIS — Z98.890 H/O MITRAL VALVE REPAIR: ICD-10-CM

## 2023-07-05 DIAGNOSIS — I48.20 CHRONIC ATRIAL FIBRILLATION (H): ICD-10-CM

## 2023-07-05 LAB — INR BLD: 2.4 (ref 0.9–1.1)

## 2023-07-05 PROCEDURE — 36416 COLLJ CAPILLARY BLOOD SPEC: CPT

## 2023-07-05 PROCEDURE — 85610 PROTHROMBIN TIME: CPT

## 2023-07-05 RX ORDER — NITROGLYCERIN 0.4 MG/1
0.4 TABLET SUBLINGUAL EVERY 5 MIN PRN
Qty: 25 TABLET | Refills: 0 | Status: SHIPPED | OUTPATIENT
Start: 2023-07-05

## 2023-07-05 NOTE — TELEPHONE ENCOUNTER
"Prescription approved per [facility] Refill Protocol.    Last Written Prescription Date: 12/14/22  Last Fill Quantity: 25, # refills: 0  Last office visit provider: Roe 6/28/    Requested Prescriptions   Pending Prescriptions Disp Refills     nitroGLYcerin (NITROSTAT) 0.4 MG sublingual tablet 25 tablet 0     Sig: Place 1 tablet (0.4 mg) under the tongue every 5 minutes as needed for chest pain For chest pain place 1 tablet under the tongue every 5 minutes for 3 doses. If symptoms persist 5 minutes after 1st dose call 911.       Nitrates Failed - 7/5/2023  2:14 PM        Failed - Sublingual nitro order needs review     If refill exceeds 1 bottle per month, please forward request to provider.           Passed - Blood pressure under 140/90 in past 12 months     BP Readings from Last 3 Encounters:   06/28/23 134/59   02/24/23 130/66   01/27/23 126/60                 Passed - Pt is not on erectile dysfunction medications        Passed - Recent (12 mo) or future (30 days) visit within the authorizing provider's specialty     Patient has had an office visit with the authorizing provider or a provider within the authorizing providers department within the previous 12 mos or has a future within next 30 days. See \"Patient Info\" tab in inbasket, or \"Choose Columns\" in Meds & Orders section of the refill encounter.              Passed - Medication is active on med list        Passed - Patient is age 18 or older             Zara sOorio RN 07/05/23 2:17 PM    "

## 2023-07-05 NOTE — PROGRESS NOTES
ANTICOAGULATION MANAGEMENT     Parish Bills 82 year old male is on warfarin with therapeutic INR result. (Goal INR 2.0-3.0)    Recent labs: (last 7 days)     07/05/23  0756   INR 2.4*       ASSESSMENT     Warfarin Lab Questionnaire    Warfarin Doses Last 7 Days      7/5/2023     8:00 AM   Dose in Tablet or Mg   TAB or MG? - warfarin 5mg tablets (evenings). milligram (mg)     Pt Rptd Dose SUNDAY MONDAY TUESDAY WED THURS FRIDAY SATURDAY 7/5/2023   8:00 AM 5 5 0.5 0 5 5 5         7/5/2023   Warfarin Lab Questionnaire   Missed doses within past 14 days? No   Changes in diet or alcohol within past 14 days? No   Medication changes since last result? No   Injuries or illness since last result? No - Scheduled for pacemaker Bi-V generator replacement on 7/6/23.     New shortness of breath, severe headaches or sudden changes in vision since last result? No   Abnormal bleeding since last result? No   Upcoming surgery, procedure? Yes   Please explain, date scheduled? tommorow     Previous result: Therapeutic last 6 visits.    Additional findings:  Need INR closer to 2.0 prior to pacemaker generator replacement on 7/6/23.       PLAN     Recommended plan for temporary change(s) affecting INR     Dosing Instructions: partial hold then continue your current warfarin dose with next INR in 1-2 weeks       Summary  As of 7/5/2023    Full warfarin instructions:  7/5: 2.5 mg; Otherwise 7.5 mg every Tue; 5 mg all other days   Next INR check:  7/19/2023             Telephone call with  Parish ([phone])  who verbalizes understanding and agrees to plan    Lab visit scheduled - INR on 7/17/23 @ [facility].    Education provided:     Taking warfarin: Importance of taking warfarin as instructed    Goal range and lab monitoring: goal range and significance of current result    Plan made per [facility] anticoagulation protocol    Kori Ziegler, WIL  Anticoagulation  Clinic  7/5/2023    _______________________________________________________________________     Anticoagulation Episode Summary     Current INR goal:  2.0-3.0   TTR:  84.8 % (1 y)   Target end date:  Indefinite   Send INR reminders to:  [facility]    Indications    Chronic atrial fibrillation (H) [I48.20]  H/O mitral valve repair [Z98.890]  S/P AVR [Z95.2]  A-fib (H) (Resolved) [I48.91]  Long term (current) use of anticoagulants [Z79.01]  Atrial flutter  paroxysmal (H) [I48.92]           Comments:           Anticoagulation Care Providers     Provider Role Specialty Phone number    Isidro Au MD Referring Family Medicine [phone]
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
## 2023-07-06 ENCOUNTER — TRANSFERRED RECORDS (OUTPATIENT)
Dept: HEALTH INFORMATION MANAGEMENT | Facility: CLINIC | Age: 83
End: 2023-07-06
Payer: COMMERCIAL

## 2023-07-17 ENCOUNTER — ANTICOAGULATION THERAPY VISIT (OUTPATIENT)
Dept: ANTICOAGULATION | Facility: CLINIC | Age: 83
End: 2023-07-17

## 2023-07-17 ENCOUNTER — LAB (OUTPATIENT)
Dept: LAB | Facility: CLINIC | Age: 83
End: 2023-07-17
Payer: COMMERCIAL

## 2023-07-17 DIAGNOSIS — I48.20 CHRONIC ATRIAL FIBRILLATION (H): Primary | ICD-10-CM

## 2023-07-17 DIAGNOSIS — I48.92 ATRIAL FLUTTER, PAROXYSMAL (H): ICD-10-CM

## 2023-07-17 DIAGNOSIS — Z79.01 LONG TERM (CURRENT) USE OF ANTICOAGULANTS: ICD-10-CM

## 2023-07-17 DIAGNOSIS — Z95.2 S/P AVR: ICD-10-CM

## 2023-07-17 DIAGNOSIS — I48.20 CHRONIC ATRIAL FIBRILLATION (H): ICD-10-CM

## 2023-07-17 DIAGNOSIS — Z98.890 H/O MITRAL VALVE REPAIR: ICD-10-CM

## 2023-07-17 LAB — INR BLD: 2.4 (ref 0.9–1.1)

## 2023-07-17 PROCEDURE — 85610 PROTHROMBIN TIME: CPT

## 2023-07-17 PROCEDURE — 36416 COLLJ CAPILLARY BLOOD SPEC: CPT

## 2023-07-17 NOTE — PROGRESS NOTES
ANTICOAGULATION MANAGEMENT     Parish Bills 82 year old male is on warfarin with therapeutic INR result. (Goal INR 2.0-3.0)    Recent labs: (last 7 days)     07/17/23  0807   INR 2.4*       ASSESSMENT     Warfarin Lab Questionnaire    Warfarin Doses Last 7 Days      7/17/2023     8:06 AM   Dose in Tablet or Mg   TAB or MG? - warfarin 5mg tablets (evenings. milligram (mg)     Pt Rptd Dose SUNDAY MONDAY TUESDAY WED THURS FRIDAY SATURDAY 7/17/2023   8:06 AM 5 5 7.5 5 5 5 5         7/17/2023   Warfarin Lab Questionnaire   Missed doses within past 14 days? No   Changes in diet or alcohol within past 14 days? No   Medication changes since last result? No   Injuries or illness since last result? Yes - s/p Bi-V generator replaced on 7/6/23 th Dr. Domenico Nixon @ [facility].         - d/t battery depletion.   If yes, please explain: new pace maker   New shortness of breath, severe headaches or sudden changes in vision since last result? No   Abnormal bleeding since last result? No   Upcoming surgery, procedure? No   Best number to call with results? [phone]     Previous result: Therapeutic last 7 INR visits.    Additional findings: None       PLAN     Recommended plan for no diet, medication or health factor changes affecting INR     Dosing Instructions: Continue your current warfarin dose with next INR in 1-2 weeks       Summary  As of 7/17/2023      Full warfarin instructions:  7.5 mg every Tue; 5 mg all other days   Next INR check:  7/31/2023               Telephone call with Parish ([phone] ) who verbalizes understanding and agrees to plan   - if INR continues to be therapeutic, will extend next INR in 4-5 wks.   - this is to ensure INR stability s/p generator pacer change on 7/6/23.    Lab visit scheduled - INR on 7/26/23 @ [facility].    Education provided:   Taking warfarin: Importance of taking warfarin as instructed  Goal range and lab monitoring: goal range and significance of current result    Plan made per [facility]  anticoagulation protocol    Kori Ziegler RN  Anticoagulation Clinic  7/17/2023    _______________________________________________________________________     Anticoagulation Episode Summary       Current INR goal:  2.0-3.0   TTR:  84.8 % (1 y)   Target end date:  Indefinite   Send INR reminders to:  [facility]    Indications    Chronic atrial fibrillation (H) [I48.20]  H/O mitral valve repair [Z98.890]  S/P AVR [Z95.2]  A-fib (H) (Resolved) [I48.91]  Long term (current) use of anticoagulants [Z79.01]  Atrial flutter  paroxysmal (H) [I48.92]             Comments:               Anticoagulation Care Providers       Provider Role Specialty Phone number    Isidro Au MD Referring Family Medicine [phone]

## 2023-07-25 DIAGNOSIS — I10 HTN (HYPERTENSION): ICD-10-CM

## 2023-07-25 DIAGNOSIS — R35.0 BENIGN PROSTATIC HYPERPLASIA WITH URINARY FREQUENCY: ICD-10-CM

## 2023-07-25 DIAGNOSIS — N40.1 BENIGN PROSTATIC HYPERPLASIA WITH URINARY FREQUENCY: ICD-10-CM

## 2023-07-25 RX ORDER — TAMSULOSIN HYDROCHLORIDE 0.4 MG/1
CAPSULE ORAL
Qty: 100 CAPSULE | Refills: 2 | OUTPATIENT
Start: 2023-07-25

## 2023-07-25 RX ORDER — METOPROLOL SUCCINATE 50 MG/1
TABLET, EXTENDED RELEASE ORAL
Qty: 100 TABLET | Refills: 2 | OUTPATIENT
Start: 2023-07-25

## 2023-07-26 ENCOUNTER — ANTICOAGULATION THERAPY VISIT (OUTPATIENT)
Dept: ANTICOAGULATION | Facility: CLINIC | Age: 83
End: 2023-07-26

## 2023-07-26 ENCOUNTER — LAB (OUTPATIENT)
Dept: LAB | Facility: CLINIC | Age: 83
End: 2023-07-26
Payer: COMMERCIAL

## 2023-07-26 DIAGNOSIS — Z79.01 LONG TERM (CURRENT) USE OF ANTICOAGULANTS: ICD-10-CM

## 2023-07-26 DIAGNOSIS — Z95.2 S/P AVR: ICD-10-CM

## 2023-07-26 DIAGNOSIS — D64.9 ANEMIA, UNSPECIFIED TYPE: ICD-10-CM

## 2023-07-26 DIAGNOSIS — Z98.890 H/O MITRAL VALVE REPAIR: ICD-10-CM

## 2023-07-26 DIAGNOSIS — N28.9 RENAL INSUFFICIENCY: ICD-10-CM

## 2023-07-26 DIAGNOSIS — I48.20 CHRONIC ATRIAL FIBRILLATION (H): Primary | ICD-10-CM

## 2023-07-26 DIAGNOSIS — I48.92 ATRIAL FLUTTER, PAROXYSMAL (H): ICD-10-CM

## 2023-07-26 DIAGNOSIS — I48.20 CHRONIC ATRIAL FIBRILLATION (H): ICD-10-CM

## 2023-07-26 LAB
CREAT SERPL-MCNC: 1.12 MG/DL (ref 0.67–1.17)
ERYTHROCYTE [DISTWIDTH] IN BLOOD BY AUTOMATED COUNT: 13.8 % (ref 10–15)
GFR SERPL CREATININE-BSD FRML MDRD: 65 ML/MIN/1.73M2
HCT VFR BLD AUTO: 34 % (ref 40–53)
HGB BLD-MCNC: 11.2 G/DL (ref 13.3–17.7)
INR BLD: 2.3 (ref 0.9–1.1)
MCH RBC QN AUTO: 31 PG (ref 26.5–33)
MCHC RBC AUTO-ENTMCNC: 32.9 G/DL (ref 31.5–36.5)
MCV RBC AUTO: 94 FL (ref 78–100)
PLATELET # BLD AUTO: 139 10E3/UL (ref 150–450)
RBC # BLD AUTO: 3.61 10E6/UL (ref 4.4–5.9)
WBC # BLD AUTO: 5.5 10E3/UL (ref 4–11)

## 2023-07-26 PROCEDURE — 85027 COMPLETE CBC AUTOMATED: CPT

## 2023-07-26 PROCEDURE — 85610 PROTHROMBIN TIME: CPT

## 2023-07-26 PROCEDURE — 82565 ASSAY OF CREATININE: CPT

## 2023-07-26 PROCEDURE — 36415 COLL VENOUS BLD VENIPUNCTURE: CPT

## 2023-07-26 NOTE — PROGRESS NOTES
ANTICOAGULATION MANAGEMENT     Parish SANCHEZ Sanju 83 year old male is on warfarin with therapeutic INR result. (Goal INR 2.0-3.0)    Recent labs: (last 7 days)     07/26/23  0756   INR 2.3*       ASSESSMENT     Warfarin Lab Questionnaire    Warfarin Doses Last 7 Days      7/26/2023     7:59 AM   Dose in Tablet or Mg   TAB or MG? milligram (mg)     Pt Rptd Dose SUNDAY MONDAY TUESDAY WED THURS FRIDAY SATURDAY 7/26/2023   7:59 AM 5 5 5 5.5 5 5 5         7/26/2023   Warfarin Lab Questionnaire   Missed doses within past 14 days? No   Changes in diet or alcohol within past 14 days? No   Medication changes since last result? No   Injuries or illness since last result? No   New shortness of breath, severe headaches or sudden changes in vision since last result? No   Abnormal bleeding since last result? No   Upcoming surgery, procedure? No   Best number to call with results? [phone]     Previous result: Therapeutic last 2(+) visits  Additional findings: None       PLAN     Recommended plan for no diet, medication or health factor changes affecting INR     Dosing Instructions: Continue your current warfarin dose with next INR in 4-5 weeks       Summary  As of 7/26/2023      Full warfarin instructions:  7.5 mg every Tue; 5 mg all other days   Next INR check:  8/30/2023               Telephone call with Parish who verbalizes understanding and agrees to plan    Lab visit scheduled    Education provided:   Please call back if any changes to your diet, medications or how you've been taking warfarin  Goal range and lab monitoring: goal range and significance of current result and Importance of therapeutic range    Plan made per [facility] anticoagulation protocol    Laura Hauser, RN  Anticoagulation Clinic  7/28/2023    _______________________________________________________________________     Anticoagulation Episode Summary       Current INR goal:  2.0-3.0   TTR:  84.7 % (1 y)   Target end date:  Indefinite   Send [facility] reminders  to:  [facility]    Indications    Chronic atrial fibrillation (H) [I48.20]  H/O mitral valve repair [Z98.890]  S/P AVR [Z95.2]  A-fib (H) (Resolved) [I48.91]  Long term (current) use of anticoagulants [Z79.01]  Atrial flutter  paroxysmal (H) [I48.92]             Comments:               Anticoagulation Care Providers       Provider Role Specialty Phone number    Isidro Au MD Referring Family Medicine [phone]

## 2023-07-27 ENCOUNTER — TELEPHONE (OUTPATIENT)
Dept: FAMILY MEDICINE | Facility: CLINIC | Age: 83
End: 2023-07-27
Payer: COMMERCIAL

## 2023-07-27 NOTE — TELEPHONE ENCOUNTER
----- Message from Isidro Au MD sent at 7/27/2023 12:35 PM CDT -----  Please inform patient that hemoglobin levels are improving and kidney function is back to baseline.  No concerns at this time.

## 2023-08-24 ENCOUNTER — NURSE TRIAGE (OUTPATIENT)
Dept: NURSING | Facility: CLINIC | Age: 83
End: 2023-08-24
Payer: COMMERCIAL

## 2023-08-24 NOTE — TELEPHONE ENCOUNTER
Nurse Triage SBAR    Is this a 2nd Level Triage? YES, LICENSED PRACTITIONER REVIEW IS REQUIRED    Situation: Pt calling with concerns for low blood pressure.    Background: Pt states he is supposed to be taking 50 mg metoprolol ER every evening unless is SBP is less than 95.    Assessment: Pt states he has not taken his metoprolol for the last 2 days as his SBP has been below 95. Most recent this evening is 83/44 and 86/44. HR 60 as pt is paced. He is c/o some dizziness which is not new for him. It is not worse than usual. Triaged for 911. Pt does not feel this is necessary.     Protocol Recommended Disposition:   Call  Now    Recommendation: Please contact pt at [phone] number to discuss. Advised to hold metoprolol until he hears from PCP and to call back with worsening symptoms.     Reason for Disposition   Systolic BP < 90 and feeling dizzy, lightheaded, or weak    Additional Information   Negative: Difficult to awaken or acting confused  (e.g., disoriented, slurred speech)   Negative: Sounds like a life-threatening emergency to the triager    Protocols used: Low Blood Pressure-A-OH    Kathy Mejias RN  [facility] Nurse Advisor   8/24/2023  4:45 PM

## 2023-08-25 NOTE — TELEPHONE ENCOUNTER
"Parish returned call with BP readings this morning, checked with a few minutes between each readin/48  97/52  104/67  90/44    Has not taken metoprolol for the past 3 days. Does endorse some minor dizziness, but states this is \"pretty steady\" for him. No symptoms of illness, no recent illness. Parish does state he \"probably hasn't been the best\" with hydration over the past week or so. Advised he try to increase water intake, aiming for 6-8 8oz glasses of water per day. Routing to PCP to further advise.    Zara Osorio RN    "

## 2023-08-25 NOTE — TELEPHONE ENCOUNTER
Pt should continue to hold metoprolol- please see how his blood pressure is today.  Is he having any symptoms of illness?  Is he keeping well hydrated?

## 2023-08-25 NOTE — TELEPHONE ENCOUNTER
Called and spoke with Ileana, patient's wife. She states his SBP did go up some last night to around 106 and has not been checked since. Parish was currently in the shower. Asked for her to have Parish call back when he is able to sit for a few minutes and recheck his BP. She stated understanding and will have him call back shortly.    Zara Osorio RN

## 2023-08-25 NOTE — TELEPHONE ENCOUNTER
Agree with increasing water consumption as discussed in the prior note.  Please have patient continue to hold his beta-blocker-monitor blood pressure readings and update us next week.  Any worsening drop in blood pressure or new symptoms develop over the weekend he should be seen for evaluation.

## 2023-08-25 NOTE — TELEPHONE ENCOUNTER
Called and spoke with patient. Relayed PCP recommendations. Patient stated understanding and is in agreement with plan, will call Monday with an update.    Zara Osorio RN

## 2023-08-28 NOTE — TELEPHONE ENCOUNTER
Called and spoke with patient. He had just rechecked his BP and now it was down to 91/44, patient does have pacer an SC always in 60's. Denies any symptoms other than some minor fatigue. Scheduled patient with PCP for 8/30 at 9:30 am. Advised patient to be seen sooner if symptoms develop or BP drops further. Routing to PCP as an FYI.    Zara Osorio RN

## 2023-08-28 NOTE — TELEPHONE ENCOUNTER
Please have patient continue without blood pressure medication- if blood pressure decreasing or symptoms develop that are new this week I would recommend he be seen for evaluation.

## 2023-08-28 NOTE — TELEPHONE ENCOUNTER
Talked with patient,    Drinking lots of water. No major symptoms present.    BP readings from this AM:  100/51  105/51    Jassi Live RN     [facility]

## 2023-08-30 ENCOUNTER — LAB (OUTPATIENT)
Dept: LAB | Facility: CLINIC | Age: 83
End: 2023-08-30
Payer: COMMERCIAL

## 2023-08-30 ENCOUNTER — ANTICOAGULATION THERAPY VISIT (OUTPATIENT)
Dept: ANTICOAGULATION | Facility: CLINIC | Age: 83
End: 2023-08-30

## 2023-08-30 ENCOUNTER — OFFICE VISIT (OUTPATIENT)
Dept: FAMILY MEDICINE | Facility: CLINIC | Age: 83
End: 2023-08-30
Payer: COMMERCIAL

## 2023-08-30 VITALS
RESPIRATION RATE: 16 BRPM | HEART RATE: 60 BPM | WEIGHT: 196 LBS | SYSTOLIC BLOOD PRESSURE: 139 MMHG | OXYGEN SATURATION: 98 % | DIASTOLIC BLOOD PRESSURE: 69 MMHG | TEMPERATURE: 98.3 F | BODY MASS INDEX: 28.53 KG/M2

## 2023-08-30 DIAGNOSIS — I48.92 ATRIAL FLUTTER, PAROXYSMAL (H): ICD-10-CM

## 2023-08-30 DIAGNOSIS — Z79.01 LONG TERM (CURRENT) USE OF ANTICOAGULANTS: ICD-10-CM

## 2023-08-30 DIAGNOSIS — Z95.2 S/P AVR: ICD-10-CM

## 2023-08-30 DIAGNOSIS — I95.1 ORTHOSTATIC HYPOTENSION: Primary | ICD-10-CM

## 2023-08-30 DIAGNOSIS — I48.20 CHRONIC ATRIAL FIBRILLATION (H): Primary | ICD-10-CM

## 2023-08-30 DIAGNOSIS — I48.20 CHRONIC ATRIAL FIBRILLATION (H): ICD-10-CM

## 2023-08-30 DIAGNOSIS — Z98.890 H/O MITRAL VALVE REPAIR: ICD-10-CM

## 2023-08-30 LAB — INR BLD: 2.3 (ref 0.9–1.1)

## 2023-08-30 PROCEDURE — 85610 PROTHROMBIN TIME: CPT

## 2023-08-30 PROCEDURE — 36416 COLLJ CAPILLARY BLOOD SPEC: CPT

## 2023-08-30 PROCEDURE — 99213 OFFICE O/P EST LOW 20 MIN: CPT | Performed by: FAMILY MEDICINE

## 2023-08-30 RX ORDER — LOSARTAN POTASSIUM 25 MG/1
1 TABLET ORAL DAILY
COMMUNITY
Start: 2023-01-12 | End: 2023-09-06

## 2023-08-30 ASSESSMENT — PATIENT HEALTH QUESTIONNAIRE - PHQ9
SUM OF ALL RESPONSES TO PHQ QUESTIONS 1-9: 8
10. IF YOU CHECKED OFF ANY PROBLEMS, HOW DIFFICULT HAVE THESE PROBLEMS MADE IT FOR YOU TO DO YOUR WORK, TAKE CARE OF THINGS AT HOME, OR GET ALONG WITH OTHER PEOPLE: SOMEWHAT DIFFICULT
SUM OF ALL RESPONSES TO PHQ QUESTIONS 1-9: 8

## 2023-08-30 NOTE — PATIENT INSTRUCTIONS
Blood pressure in clinic was normal- 136/61  Matched closely with home cuff- 131/57    At this time I would stop the losartan- restart metoprolol-   I would send blood pressure readings to Dr. Au in 10 days    Atrial fib might be making home cuff read inaccurately if there is more of a rhythm problem when taking the blood pressure

## 2023-08-30 NOTE — PROGRESS NOTES
"  Assessment & Plan   Hypotension  Patient's blood pressure is improved today is at 139/69  Has been without metoprolol for about a week but during visit today we found out that he is also been on losartan put on by his cardiologist  He is in A-fib rate controlled in the clinic  His cuff that he brought from home is reading similar to ours in clinic    That he is on losartan and metoprolol would rather him go back on the metoprolol and discontinue losartan and monitor blood pressure  He is in agreement and will send me blood pressure readings in a week         BMI:   Estimated body mass index is 28.53 kg/m  as calculated from the following:    Height as of 6/28/23: 1.765 m (5' 9.5\").    Weight as of this encounter: 88.9 kg (196 lb).       Isidro Au MD  [facility]    Mateo Lugo is a 83 year old, presenting for the following health issues:  Blood Pressure Check (Having low blood pressure readings at home, dizzy, fatigue, pulsating sensation )        8/30/2023     9:41 AM   Additional Questions   Roomed by Brittany SANCHEZ CMA   Patient contacted clinic last week with hypotension and dizziness-had stopped metoprolol on his own at this point and was holding it for the last week  Blood pressure readings have been fluctuating at home in the office today they are normal-he brings his home cuff with him and is reading similar to ours in clinic.  In review of records his cardiologist from another system had placed him on losartan as well which we were unaware of.  Discussed with him I would rather him be on the metoprolol with his heart history then losartan so we will have him restart metoprolol tomorrow and discontinue losartan and monitor blood pressure.  Patient has a pacemaker and is rate controlled however he is likely having some more atrial fibrillation burden that may be would be improved with going back on the beta-blocker.    History of Present Illness       Hypertension: He " presents for follow up of hypertension.  He does check blood pressure  regularly outside of the clinic. Outpatient blood pressures have not been over 140/90. He follows a low salt diet.     Vascular Disease:  He presents for follow up of vascular disease.      He takes daily aspirin.    He eats 0-1 servings of fruits and vegetables daily.He consumes 0 sweetened beverage(s) daily.He exercises with enough effort to increase his heart rate 9 or less minutes per day.  He exercises with enough effort to increase his heart rate 3 or less days per week.   He is taking medications regularly.             Review of Systems         Objective    /69 (BP Location: Left arm, Patient Position: Sitting, Cuff Size: Adult Large)   Pulse 60   Temp 98.3  F (36.8  C) (Oral)   Resp 16   Wt 88.9 kg (196 lb)   SpO2 98%   BMI 28.53 kg/m    Body mass index is 28.53 kg/m .  Physical Exam   GENERAL: healthy, alert and no distress  RESP: lungs clear to auscultation - no rales, rhonchi or wheezes  CV: Regular rate at 60 irregular rhythm  MS: no gross musculoskeletal defects noted, no edema

## 2023-08-30 NOTE — PROGRESS NOTES
ANTICOAGULATION MANAGEMENT     Parish Bills 83 year old male is on warfarin with therapeutic INR result. (Goal INR 2.0-3.0)    Recent labs: (last 7 days)     08/30/23  0800   INR 2.3*       ASSESSMENT     Warfarin Lab Questionnaire    Warfarin Doses Last 7 Days      8/30/2023     8:04 AM   Dose in Tablet or Mg   TAB or MG? - 5mg warfarin tablets (evenings) milligram (mg)     Pt Rptd Dose SUNDAY MONDAY TUESDAY WED THURS FRIDAY SATURDAY 8/30/2023   8:04 AM 5 5 7.5 5 5 5 5         8/30/2023   Warfarin Lab Questionnaire   Missed doses within past 14 days? No   Changes in diet or alcohol within past 14 days? No   Medication changes since last result? No   Injuries or illness since last result? No - BP (hypotension) follow-up today with Dr. Au - resumed on Metoprolol Suc. and discontinue Losartan, and monitor BP readings closely.     New shortness of breath, severe headaches or sudden changes in vision since last result? No   Abnormal bleeding since last result? No   Upcoming surgery, procedure? No     Previous result: Therapeutic last 9(+) INR visits.    Additional findings: None       PLAN     Recommended plan for ongoing change(s) affecting INR     Dosing Instructions: Continue your current warfarin dose with next INR in 8 weeks       Summary  As of 8/30/2023      Full warfarin instructions:  7.5 mg every Tue; 5 mg all other days   Next INR check:  10/25/2023               Detailed voice message left for Parish with dosing instructions and follow up date.     Contact [phone] to schedule and with any changes, questions or concerns.    - patient likes to schedule INRs 4-5 wks, not 8 wks.    Education provided:   Please call back if any changes to your diet, medications or how you've been taking warfarin  Taking warfarin: Importance of taking warfarin as instructed  Goal range and lab monitoring: goal range and significance of current result  Dietary considerations: importance of consistent vitamin K  intake  Interaction IS NOT anticipated between warfarin and Metoprolol / Losartan    Plan made per [facility] anticoagulation protocol    Kori Ziegler RN  Anticoagulation Clinic  8/30/2023    _______________________________________________________________________     Anticoagulation Episode Summary       Current INR goal:  2.0-3.0   TTR:  84.8 % (1 y)   Target end date:  Indefinite   Send INR reminders to:  [facility]    Indications    Chronic atrial fibrillation (H) [I48.20]  H/O mitral valve repair [Z98.890]  S/P AVR [Z95.2]  A-fib (H) (Resolved) [I48.91]  Long term (current) use of anticoagulants [Z79.01]  Atrial flutter  paroxysmal (H) [I48.92]             Comments:               Anticoagulation Care Providers       Provider Role Specialty Phone number    Isidro Au MD Referring Family Medicine [phone]

## 2023-09-05 ENCOUNTER — TELEPHONE (OUTPATIENT)
Dept: FAMILY MEDICINE | Facility: CLINIC | Age: 83
End: 2023-09-05
Payer: COMMERCIAL

## 2023-09-05 DIAGNOSIS — I10 HTN (HYPERTENSION): ICD-10-CM

## 2023-09-05 DIAGNOSIS — I10 ESSENTIAL HYPERTENSION: Primary | ICD-10-CM

## 2023-09-05 NOTE — TELEPHONE ENCOUNTER
General Call    Contacts         Type Contact Phone/Fax    09/05/2023 11:27 AM CDT Phone (Incoming) Parish Bills (Self) [phone] (H)          Reason for Call:  Patient calling in with update.     What are your questions or concerns: stated BP has been good and other than that he is just tired.    77/48  92/43  87/49  85/46    Please advise patient if you want anything changed with a phone call.     Date of last appointment with provider: 8-    Okay to leave a detailed message?: Yes at Home number on file [phone] (home)

## 2023-09-05 NOTE — TELEPHONE ENCOUNTER
"  Symptoms    Describe your symptoms: low blood pressure    Any pain: No    How long have you been having symptoms: Since a week  Aug 30th    Have you been seen for this:  Yes: 8/30 with Dr Au who changed medication at that time.  Parish states that the BP have remained low and feels the symptoms have gotten worse.      From 8/30 OV:  \"Assessment & Plan   Hypotension  Patient's blood pressure is improved today is at 139/69  Has been without metoprolol for about a week but during visit today we found out that he is also been on losartan put on by his cardiologist  He is in A-fib rate controlled in the clinic  His cuff that he brought from home is reading similar to ours in clinic     That he is on losartan and metoprolol would rather him go back on the metoprolol and discontinue losartan and monitor blood pressure  He is in agreement and will send me blood pressure readings in a week\"    Appointment offered?: No    Triage offered?: No    Home remedies tried: na-     Preferred Pharmacy:   [facility] - [address] [address]  Phone: [phone] Fax: [phone]    Okay to leave a detailed message?: Yes at Home number on file [phone] (home)      Routing to Dr Au to advise.        "

## 2023-09-06 ENCOUNTER — ALLIED HEALTH/NURSE VISIT (OUTPATIENT)
Dept: FAMILY MEDICINE | Facility: CLINIC | Age: 83
End: 2023-09-06
Payer: COMMERCIAL

## 2023-09-06 VITALS — HEART RATE: 68 BPM | SYSTOLIC BLOOD PRESSURE: 122 MMHG | DIASTOLIC BLOOD PRESSURE: 64 MMHG

## 2023-09-06 DIAGNOSIS — I10 HTN (HYPERTENSION): Primary | ICD-10-CM

## 2023-09-06 PROCEDURE — 99207 PR NO CHARGE NURSE ONLY: CPT

## 2023-09-06 RX ORDER — METOPROLOL SUCCINATE 50 MG/1
25 TABLET, EXTENDED RELEASE ORAL DAILY
Qty: 90 TABLET | Refills: 3
Start: 2023-09-06

## 2023-09-06 ASSESSMENT — PAIN SCALES - GENERAL: PAINLEVEL: NO PAIN (0)

## 2023-09-06 NOTE — TELEPHONE ENCOUNTER
Patient called back and I relayed Dr. Au's message to the patient. Patient informed me that he was already contacted from cardiology and was told to come in to [place] and have his BP checked. Stated cardiology told him to do the same thing with the medication that Dr. Au told him to do.

## 2023-09-06 NOTE — PROGRESS NOTES
Home cuff:112/64 then 108/62  done   I met with Parish Bills at the request of Dr Au to recheck his blood pressure.  Blood pressure medications on the med list were reviewed with patient.    Patient has taken all medications as per usual regimen: Yes  Patient reports tolerating them without any issues or concerns: Yes-     Vitals:    09/06/23 1450   BP: 122/64   BP Location: Left arm   Patient Position: Sitting   Cuff Size: Adult Regular   Pulse: 68       Blood pressure was taken, previous encounter was reviewed, recorded blood pressure below 140/90.  Patient was discharged and the note will be sent to the provider for final review.     Parish states that he is having Bps  70-80 systolic and  40s diastolic on home BP machine.   He is dizzy at rest along with when standing up.      Reviewed medication with Parish and the parameters noted on instructions.  He is aware.

## 2023-09-06 NOTE — TELEPHONE ENCOUNTER
Blood pressure still low- I would like him to remain off losartan- and cut metoprolol in 1/2  Send readings in one week

## 2023-09-12 ENCOUNTER — TELEPHONE (OUTPATIENT)
Dept: FAMILY MEDICINE | Facility: CLINIC | Age: 83
End: 2023-09-12
Payer: COMMERCIAL

## 2023-09-12 DIAGNOSIS — I10 ESSENTIAL HYPERTENSION: Primary | ICD-10-CM

## 2023-09-12 RX ORDER — LOSARTAN POTASSIUM 25 MG/1
12.5 TABLET ORAL DAILY
Start: 2023-09-12 | End: 2024-09-13

## 2023-09-12 NOTE — TELEPHONE ENCOUNTER
Incoming call from pt:  Wanted to make sure that Dr Au has received records of this recent communication with his cardiologist, specifically that his cardiologist recommended a dosage change to the losartan to help with low BP issues that he has been having recently. PT states that since changing the dosage, he has been feeling a lot better

## 2023-09-12 NOTE — TELEPHONE ENCOUNTER
What was the change in dosing cardiology recommended? I am glad he is feeling better but I would like to update his medication list with what he is currently taking.

## 2023-09-12 NOTE — TELEPHONE ENCOUNTER
Per telephone encounter on 09/05/23:    Telephone Encounter - Margarita Gonzalez RN - 09/07/2023 10:50 AM CDT  Formatting of this note might be different from the original.  Discussed patient's BP concerns with Dr. Mcfarlane.  VORB:  Decrease Toprol XL to 25 mg daily.  Resume losartan 12.5 mg daily given reduced LVF.  Agree with purchasing an OMRON BP cuff and continue to monitor BP and call if further concerns.  Repeat echocardiogram and follow in January as planned.

## 2023-09-18 ENCOUNTER — TELEPHONE (OUTPATIENT)
Dept: FAMILY MEDICINE | Facility: CLINIC | Age: 83
End: 2023-09-18
Payer: COMMERCIAL

## 2023-09-18 NOTE — TELEPHONE ENCOUNTER
Patient Returning Call    Reason for call:  Parish would like to get a call back from [facility] to discuss the change in his medications    Information relayed to patient:  INR will give you a call once they receive this message    Patient has additional questions:  No      Okay to leave a detailed message?: Yes at Home number on file [phone] (home)

## 2023-09-18 NOTE — TELEPHONE ENCOUNTER
Called and talked with Parish     - reviewed chart:    - Recent order from [facility] on 9/6/23 - stop Losartan 25mg daily    - hypotension - reported having Bps 70-80 systolic and 40s diastolic on home BP machine, and dizziness at rest along with when standing up.      - on 9/12/23 per Dr. Au - decrease Metoprol Suc. to 25mg daily, However, he stated he is taking 50mg daily, and resume Losartan and decrease dose to 12.5mg daily.  Continue to monitor BP readings.      - no known interaction with warfarin - Metoprolol and Losartan      - scheduled INR on 10/4/23 @ [facility].

## 2023-09-26 ENCOUNTER — IMMUNIZATION (OUTPATIENT)
Dept: FAMILY MEDICINE | Facility: CLINIC | Age: 83
End: 2023-09-26
Payer: COMMERCIAL

## 2023-09-26 PROCEDURE — 90662 IIV NO PRSV INCREASED AG IM: CPT

## 2023-09-26 PROCEDURE — G0008 ADMIN INFLUENZA VIRUS VAC: HCPCS

## 2023-10-04 ENCOUNTER — ANTICOAGULATION THERAPY VISIT (OUTPATIENT)
Dept: ANTICOAGULATION | Facility: CLINIC | Age: 83
End: 2023-10-04

## 2023-10-04 ENCOUNTER — LAB (OUTPATIENT)
Dept: LAB | Facility: CLINIC | Age: 83
End: 2023-10-04
Payer: COMMERCIAL

## 2023-10-04 DIAGNOSIS — E78.5 HYPERLIPIDEMIA LDL GOAL <100: ICD-10-CM

## 2023-10-04 DIAGNOSIS — I48.20 CHRONIC ATRIAL FIBRILLATION (H): Primary | ICD-10-CM

## 2023-10-04 DIAGNOSIS — Z79.01 LONG TERM (CURRENT) USE OF ANTICOAGULANTS: ICD-10-CM

## 2023-10-04 DIAGNOSIS — Z95.2 S/P AVR: ICD-10-CM

## 2023-10-04 DIAGNOSIS — Z98.890 H/O MITRAL VALVE REPAIR: ICD-10-CM

## 2023-10-04 DIAGNOSIS — I48.20 CHRONIC ATRIAL FIBRILLATION (H): ICD-10-CM

## 2023-10-04 DIAGNOSIS — I48.92 ATRIAL FLUTTER, PAROXYSMAL (H): ICD-10-CM

## 2023-10-04 LAB — INR BLD: 2.5 (ref 0.9–1.1)

## 2023-10-04 PROCEDURE — 85610 PROTHROMBIN TIME: CPT

## 2023-10-04 PROCEDURE — 36416 COLLJ CAPILLARY BLOOD SPEC: CPT

## 2023-10-04 RX ORDER — ATORVASTATIN CALCIUM 40 MG/1
40 TABLET, FILM COATED ORAL AT BEDTIME
Qty: 100 TABLET | Refills: 2 | Status: SHIPPED | OUTPATIENT
Start: 2023-10-04

## 2023-10-04 NOTE — PROGRESS NOTES
ANTICOAGULATION MANAGEMENT     Parish SANCHEZ Sanju 83 year old male is on warfarin with therapeutic INR result. (Goal INR 2.0-3.0)    Recent labs: (last 7 days)     10/04/23  0809   INR 2.5*       ASSESSMENT     Warfarin Lab Questionnaire    Warfarin Doses Last 7 Days      10/4/2023     8:08 AM   Dose in Tablet or Mg   TAB or MG? - 5mg warfarin tablets (evenings) milligram (mg)     Pt Rptd Dose JON MONDAY TUESDAY WED THURS FRIDAY SATURDAY   10/4/2023   8:08 AM 5 5 7.5 5 5 5 5         10/4/2023   Warfarin Lab Questionnaire   Missed doses within past 14 days? No   Changes in diet or alcohol within past 14 days? No   Medication changes since last result? No - reported blood pressure readings doing well.     Injuries or illness since last result? No    New shortness of breath, severe headaches or sudden changes in vision since last result? No   Abnormal bleeding since last result? No   Upcoming surgery, procedure? No   Best number to call with results? [phone]     Previous result: Therapeutic last 9(+) INR visits.    Additional findings:  Flu Shot on 9/26/23.       PLAN     Recommended plan for no diet, medication or health factor changes affecting INR     Dosing Instructions: Continue your current warfarin dose with next INR in 5 weeks       Summary  As of 10/4/2023      Full warfarin instructions:  7.5 mg every Tue; 5 mg all other days   Next INR check:  11/8/2023               Telephone call with Parish who verbalizes understanding and agrees to plan    Lab visit scheduled - INR on 11/8/23 @ [facility]    Education provided:   Taking warfarin: Importance of taking warfarin as instructed  Goal range and lab monitoring: goal range and significance of current result    Plan made per [facility] anticoagulation protocol    Kori Ziegler, RN  Anticoagulation Clinic  10/4/2023    _______________________________________________________________________     Anticoagulation Episode Summary       Current INR goal:  2.0-3.0   TTR:  87.0  % (1 y)   Target end date:  Indefinite   Send INR reminders to:  [facility]    Indications    Chronic atrial fibrillation (H) [I48.20]  H/O mitral valve repair [Z98.890]  S/P AVR [Z95.2]  A-fib (H) (Resolved) [I48.91]  Long term (current) use of anticoagulants [Z79.01]  Atrial flutter  paroxysmal (H) [I48.92]             Comments:               Anticoagulation Care Providers       Provider Role Specialty Phone number    Isidro Au MD Referring Family Medicine [phone]

## 2023-10-18 DIAGNOSIS — F32.0 CURRENT MILD EPISODE OF MAJOR DEPRESSIVE DISORDER, UNSPECIFIED WHETHER RECURRENT (H): ICD-10-CM

## 2023-10-18 RX ORDER — BUPROPION HYDROCHLORIDE 150 MG/1
TABLET ORAL
Qty: 90 TABLET | Refills: 3 | Status: SHIPPED | OUTPATIENT
Start: 2023-10-18 | End: 2024-09-15

## 2023-10-30 DIAGNOSIS — R35.0 BENIGN PROSTATIC HYPERPLASIA WITH URINARY FREQUENCY: ICD-10-CM

## 2023-10-30 DIAGNOSIS — N40.1 BENIGN PROSTATIC HYPERPLASIA WITH URINARY FREQUENCY: ICD-10-CM

## 2023-10-30 RX ORDER — TAMSULOSIN HYDROCHLORIDE 0.4 MG/1
0.4 CAPSULE ORAL DAILY
Qty: 60 CAPSULE | Refills: 2 | Status: SHIPPED | OUTPATIENT
Start: 2023-10-30 | End: 2024-04-25

## 2023-11-08 ENCOUNTER — ALLIED HEALTH/NURSE VISIT (OUTPATIENT)
Dept: FAMILY MEDICINE | Facility: CLINIC | Age: 83
End: 2023-11-08
Payer: COMMERCIAL

## 2023-11-08 ENCOUNTER — LAB (OUTPATIENT)
Dept: LAB | Facility: CLINIC | Age: 83
End: 2023-11-08
Payer: COMMERCIAL

## 2023-11-08 ENCOUNTER — ANTICOAGULATION THERAPY VISIT (OUTPATIENT)
Dept: ANTICOAGULATION | Facility: CLINIC | Age: 83
End: 2023-11-08

## 2023-11-08 DIAGNOSIS — Z98.890 H/O MITRAL VALVE REPAIR: ICD-10-CM

## 2023-11-08 DIAGNOSIS — Z79.01 LONG TERM (CURRENT) USE OF ANTICOAGULANTS: ICD-10-CM

## 2023-11-08 DIAGNOSIS — I48.92 ATRIAL FLUTTER, PAROXYSMAL (H): ICD-10-CM

## 2023-11-08 DIAGNOSIS — I48.20 CHRONIC ATRIAL FIBRILLATION (H): Primary | ICD-10-CM

## 2023-11-08 DIAGNOSIS — Z23 NEED FOR VACCINATION: Primary | ICD-10-CM

## 2023-11-08 DIAGNOSIS — Z95.2 S/P AVR: ICD-10-CM

## 2023-11-08 DIAGNOSIS — I48.20 CHRONIC ATRIAL FIBRILLATION (H): ICD-10-CM

## 2023-11-08 LAB — INR BLD: 2.5 (ref 0.9–1.1)

## 2023-11-08 PROCEDURE — 36416 COLLJ CAPILLARY BLOOD SPEC: CPT

## 2023-11-08 PROCEDURE — 90480 ADMN SARSCOV2 VAC 1/ONLY CMP: CPT

## 2023-11-08 PROCEDURE — 85610 PROTHROMBIN TIME: CPT

## 2023-11-08 PROCEDURE — 99207 PR NO CHARGE NURSE ONLY: CPT

## 2023-11-08 PROCEDURE — 91320 SARSCV2 VAC 30MCG TRS-SUC IM: CPT

## 2023-11-08 NOTE — PROGRESS NOTES
ANTICOAGULATION MANAGEMENT     Parish SANCHEZ Sanju 83 year old male is on warfarin with therapeutic INR result. (Goal INR 2.0-3.0)    Recent labs: (last 7 days)     11/08/23  0803   INR 2.5*       ASSESSMENT     Warfarin Lab Questionnaire    Warfarin Doses Last 7 Days      11/8/2023     8:06 AM   Dose in Tablet or Mg   TAB or MG? - 5mg warfarin tablets (evenings)  milligram (mg)     Pt Rptd Dose SUNDAY MONDAY TUESDAY WED THURS FRIDAY SATURDAY 11/8/2023   8:06 AM 5 5 7.5 5 5 5 5         11/8/2023   Warfarin Lab Questionnaire   Missed doses within past 14 days? No   Changes in diet or alcohol within past 14 days? No   Medication changes since last result? No   Injuries or illness since last result? No   New shortness of breath, severe headaches or sudden changes in vision since last result? No   Abnormal bleeding since last result? No   Upcoming surgery, procedure? No   Best number to call with results? [phone]     Previous result: Therapeutic last 11 INR visits.    Additional findings: None       PLAN     Recommended plan for no diet, medication or health factor changes affecting INR     Dosing Instructions: Continue your current warfarin dose with next INR in 5-6 weeks       Summary  As of 11/8/2023      Full warfarin instructions:  7.5 mg every Tue; 5 mg all other days   Next INR check:  12/13/2023               Telephone call with Parish who verbalizes understanding and agrees to plan    Lab visit scheduled - INR on 12/20/23 @ [facility].    Education provided:   Taking warfarin: Importance of taking warfarin as instructed  Goal range and lab monitoring: goal range and significance of current result    Plan made per [facility] anticoagulation protocol    Kori Ziegler, RN  Anticoagulation Clinic  11/8/2023    _______________________________________________________________________     Anticoagulation Episode Summary       Current INR goal:  2.0-3.0   TTR:  90.2% (1 y)   Target end date:  Indefinite   Send INR  reminders to:  [facility]    Indications    Chronic atrial fibrillation (H) [I48.20]  H/O mitral valve repair [Z98.890]  S/P AVR [Z95.2]  A-fib (H) (Resolved) [I48.91]  Long term (current) use of anticoagulants [Z79.01]  Atrial flutter  paroxysmal (H) [I48.92]             Comments:               Anticoagulation Care Providers       Provider Role Specialty Phone number    Isidro Au MD Referring Family Medicine [phone]

## 2023-12-20 ENCOUNTER — ANTICOAGULATION THERAPY VISIT (OUTPATIENT)
Dept: ANTICOAGULATION | Facility: CLINIC | Age: 83
End: 2023-12-20

## 2023-12-20 ENCOUNTER — LAB (OUTPATIENT)
Dept: LAB | Facility: CLINIC | Age: 83
End: 2023-12-20
Payer: COMMERCIAL

## 2023-12-20 DIAGNOSIS — Z79.01 LONG TERM (CURRENT) USE OF ANTICOAGULANTS: ICD-10-CM

## 2023-12-20 DIAGNOSIS — I48.20 CHRONIC ATRIAL FIBRILLATION (H): ICD-10-CM

## 2023-12-20 DIAGNOSIS — Z98.890 H/O MITRAL VALVE REPAIR: ICD-10-CM

## 2023-12-20 DIAGNOSIS — I48.20 CHRONIC ATRIAL FIBRILLATION (H): Primary | ICD-10-CM

## 2023-12-20 DIAGNOSIS — I48.92 ATRIAL FLUTTER, PAROXYSMAL (H): ICD-10-CM

## 2023-12-20 DIAGNOSIS — Z95.2 S/P AVR: ICD-10-CM

## 2023-12-20 LAB — INR BLD: 2.8 (ref 0.9–1.1)

## 2023-12-20 PROCEDURE — 85610 PROTHROMBIN TIME: CPT

## 2023-12-20 PROCEDURE — 36416 COLLJ CAPILLARY BLOOD SPEC: CPT

## 2023-12-20 NOTE — PROGRESS NOTES
ANTICOAGULATION MANAGEMENT     Parish Bills 83 year old male is on warfarin with therapeutic INR result. (Goal INR 2.0-3.0)    Recent labs: (last 7 days)     12/20/23  0811   INR 2.8*       ASSESSMENT     Warfarin Lab Questionnaire    Warfarin Doses Last 7 Days      12/20/2023     8:14 AM   Dose in Tablet or Mg   TAB or MG? - 5mg warfarin tablets (evenings)   milligram (mg)     Pt Rptd Dose SUNDAY MONDAY TUESDAY WED THURS FRIDAY SATURDAY 12/20/2023   8:14 AM 5 5 7.5 5 5 5 5         12/20/2023   Warfarin Lab Questionnaire   Missed doses within past 14 days? No   Changes in diet or alcohol within past 14 days? No   Medication changes since last result? No   Injuries or illness since last result? No   New shortness of breath, severe headaches or sudden changes in vision since last result? No   Abnormal bleeding since last result? No   Upcoming surgery, procedure? No     Previous result: Therapeutic last 12 INR visits.    Additional findings: None       PLAN     Recommended plan for no diet, medication or health factor changes affecting INR     Dosing Instructions: Continue your current warfarin dose with next INR in 5 weeks       Summary  As of 12/20/2023      Full warfarin instructions:  7.5 mg every Tue; 5 mg all other days   Next INR check:  1/24/2024               Telephone call with Parish who verbalizes understanding and agrees to plan    Lab visit scheduled - INR on 1/24/24 @ [facility]    Education provided:   Taking warfarin: Importance of taking warfarin as instructed  Goal range and lab monitoring: goal range and significance of current result    Plan made per [facility] anticoagulation protocol    Kori Ziegler RN  Anticoagulation Clinic  12/20/2023    _______________________________________________________________________     Anticoagulation Episode Summary       Current INR goal:  2.0-3.0   TTR:  94.3% (1 y)   Target end date:  Indefinite   Send INR reminders to:  [facility]    Indications     Chronic atrial fibrillation (H) [I48.20]  H/O mitral valve repair [Z98.890]  S/P AVR [Z95.2]  A-fib (H) (Resolved) [I48.91]  Long term (current) use of anticoagulants [Z79.01]  Atrial flutter  paroxysmal (H) [I48.92]             Comments:               Anticoagulation Care Providers       Provider Role Specialty Phone number    Isidro Au MD Referring [facility] [phone]

## 2024-01-22 ENCOUNTER — DOCUMENTATION ONLY (OUTPATIENT)
Dept: FAMILY MEDICINE | Facility: CLINIC | Age: 84
End: 2024-01-22
Payer: COMMERCIAL

## 2024-01-22 DIAGNOSIS — Z79.01 LONG TERM (CURRENT) USE OF ANTICOAGULANTS: ICD-10-CM

## 2024-01-22 DIAGNOSIS — I48.20 CHRONIC ATRIAL FIBRILLATION (H): Primary | ICD-10-CM

## 2024-01-22 DIAGNOSIS — I48.4 ATYPICAL ATRIAL FLUTTER (H): ICD-10-CM

## 2024-01-22 NOTE — PROGRESS NOTES
ANTICOAGULATION CLINIC REFERRAL RENEWAL REQUEST       An annual renewal order is required for all patients referred to [facility].?  Please review and sign the pended referral order for Parish Bills.       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     Mechanical heart valve present?  NO       Current goal range   INR: 2.0-3.0     Goal appropriate for indication? Goal INR 2-3, standard for indication(s) above     Time in Therapeutic Range (TTR)  (Goal > 60%) 94.3 %       Office visit with referring provider's group within last year Yes on 8/30/2023       Kori Ziegler RN  [facility]

## 2024-01-24 ENCOUNTER — LAB (OUTPATIENT)
Dept: LAB | Facility: CLINIC | Age: 84
End: 2024-01-24
Payer: COMMERCIAL

## 2024-01-24 ENCOUNTER — ANTICOAGULATION THERAPY VISIT (OUTPATIENT)
Dept: ANTICOAGULATION | Facility: CLINIC | Age: 84
End: 2024-01-24

## 2024-01-24 DIAGNOSIS — Z95.2 S/P AVR: ICD-10-CM

## 2024-01-24 DIAGNOSIS — I48.4 ATYPICAL ATRIAL FLUTTER (H): ICD-10-CM

## 2024-01-24 DIAGNOSIS — I48.20 CHRONIC ATRIAL FIBRILLATION (H): ICD-10-CM

## 2024-01-24 DIAGNOSIS — I48.92 ATRIAL FLUTTER, PAROXYSMAL (H): ICD-10-CM

## 2024-01-24 DIAGNOSIS — Z79.01 LONG TERM (CURRENT) USE OF ANTICOAGULANTS: ICD-10-CM

## 2024-01-24 DIAGNOSIS — Z98.890 H/O MITRAL VALVE REPAIR: ICD-10-CM

## 2024-01-24 DIAGNOSIS — I48.20 CHRONIC ATRIAL FIBRILLATION (H): Primary | ICD-10-CM

## 2024-01-24 LAB — INR BLD: 2.4 (ref 0.9–1.1)

## 2024-01-24 PROCEDURE — 36416 COLLJ CAPILLARY BLOOD SPEC: CPT

## 2024-01-24 PROCEDURE — 85610 PROTHROMBIN TIME: CPT

## 2024-01-24 NOTE — PROGRESS NOTES
ANTICOAGULATION MANAGEMENT     Parish Bills 83 year old male is on warfarin with therapeutic INR result. (Goal INR 2.0-3.0)    Recent labs: (last 7 days)     01/24/24  0804   INR 2.4*       ASSESSMENT     Warfarin Lab Questionnaire    Warfarin Doses Last 7 Days      1/24/2024     8:06 AM   Dose in Tablet or Mg   TAB or MG? - 5mg warfarin tablets (evenings)    milligram (mg)     Pt Rptd Dose SUNDAY MONDAY TUESDAY WED THURS FRIDAY SATURDAY 1/24/2024   8:06 AM 5 5 7.5 5 5 5 5         1/24/2024   Warfarin Lab Questionnaire   Missed doses within past 14 days? No   Changes in diet or alcohol within past 14 days? No   Medication changes since last result? No   Injuries or illness since last result? No   New shortness of breath, severe headaches or sudden changes in vision since last result? No   Abnormal bleeding since last result? No   Upcoming surgery, procedure? No     Previous result: Therapeutic last 13 INR visits.    Additional findings:  Chart reviewed.       PLAN     Recommended plan for no diet, medication or health factor changes affecting INR     Dosing Instructions: Continue your current warfarin dose with next INR in 4-6 weeks       Summary  As of 1/24/2024      Full warfarin instructions:  7.5 mg every Tue; 5 mg all other days   Next INR check:  3/6/2024               Detailed voice message left for Parish with dosing instructions and follow up date.     Contact [phone] to schedule and with any changes, questions or concerns.     Education provided:   Please call back if any changes to your diet, medications or how you've been taking warfarin  Taking warfarin: Importance of taking warfarin as instructed  Goal range and lab monitoring: goal range and significance of current result  Dietary considerations: importance of consistent vitamin K intake    Plan made per [facility] anticoagulation protocol    Kori Ziegler, RN  Anticoagulation  Clinic  1/24/2024    _______________________________________________________________________     Anticoagulation Episode Summary       Current INR goal:  2.0-3.0   TTR:  94.3% (1 y)   Target end date:  Indefinite   Send INR reminders to:  [facility]    Indications    Chronic atrial fibrillation (H) [I48.20]  H/O mitral valve repair [Z98.890]  S/P AVR [Z95.2]  A-fib (H) (Resolved) [I48.91]  Long term (current) use of anticoagulants [Z79.01]  Atrial flutter  paroxysmal (H) [I48.92]  Atypical atrial flutter (H) [I48.4]             Comments:               Anticoagulation Care Providers       Provider Role Specialty Phone number    Isidro Au MD Referring Family Medicine [phone]

## 2024-02-21 NOTE — TELEPHONE ENCOUNTER
RN cannot approve Refill Request: Furosemide    RN can NOT refill this medication historical medication requested. Last office visit: 8/28/2018 Isidro Au MD Last Physical: 7/12/2018 Last MTM visit: Visit date not found Last visit same specialty: 8/28/2018 Isidro Au MD.  Next visit within 3 mo: Visit date not found  Next physical within 3 mo: Visit date not found      Heather Arreola, [facility] Triage/Med Refill 2/20/2019    Requested Prescriptions   Pending Prescriptions Disp Refills     furosemide (LASIX) 20 MG tablet [Pharmacy Med Name: FUROSEMIDE 20 MG TABLET] 180 tablet 0     Sig: TAKE 1 TABLET BY MOUTH 2 TIMES A DAY    Diuretics/Combination Diuretics Refill Protocol  Passed - 2/18/2019  2:40 PM       Passed - Visit with PCP or prescribing provider visit in past 12 months    Last office visit with prescriber/PCP: 8/28/2018 Isidro Au MD OR same dept: 8/28/2018 Isidro Au MD OR same specialty: 8/28/2018 Isidro Au MD  Last physical: 7/12/2018 Last MTM visit: Visit date not found   Next visit within 3 mo: Visit date not found  Next physical within 3 mo: Visit date not found  Prescriber OR PCP: Isidro Au MD  Last diagnosis associated with med order: 1. Hyperlipidemia LDL goal <100  - atorvastatin (LIPITOR) 40 MG tablet; Take 1 tablet (40 mg total) by mouth at bedtime.  Dispense: 90 tablet; Refill: 1    2. HTN (hypertension)  - metoprolol succinate (TOPROL-XL) 50 MG 24 hr tablet; TAKE ONE TABLET BY MOUTH DAILY. HOLD FOR SYSTOLIC BP <95 OR HEART RATE <55  Dispense: 90 tablet; Refill: 1    If protocol passes may refill for 12 months if within 3 months of last provider visit (or a total of 15 months).            Passed - Serum Potassium in past 12 months     Lab Results   Component Value Date    Potassium 4.1 08/28/2018            Passed - Serum Sodium in past 12 months     Lab Results   Component Value Date    Sodium 137 08/28/2018             Passed - Blood pressure on file in past 12 months    BP Readings from Last 1 Encounters:   08/28/18 108/58            Passed - Serum Creatinine in past 12 months     Creatinine   Date Value Ref Range Status   08/28/2018 0.96 0.70 - 1.30 mg/dL Final           Signed Prescriptions Disp Refills     metoprolol succinate (TOPROL-XL) 50 MG 24 hr tablet 90 tablet 1     Sig: TAKE ONE TABLET BY MOUTH DAILY. HOLD FOR SYSTOLIC BP <95 OR HEART RATE <55    Beta-Blockers Refill Protocol Passed - 2/18/2019  2:40 PM       Passed - PCP or prescribing provider visit in past 12 months or next 3 months    Last office visit with prescriber/PCP: 8/28/2018 Isidro Au MD OR same dept: 8/28/2018 Isidro Au MD OR same specialty: 8/28/2018 Isidro Au MD  Last physical: 7/12/2018 Last MTM visit: Visit date not found   Next visit within 3 mo: Visit date not found  Next physical within 3 mo: Visit date not found  Prescriber OR PCP: Isidro Au MD  Last diagnosis associated with med order: 1. Hyperlipidemia LDL goal <100  - atorvastatin (LIPITOR) 40 MG tablet; Take 1 tablet (40 mg total) by mouth at bedtime.  Dispense: 90 tablet; Refill: 1    2. HTN (hypertension)  - metoprolol succinate (TOPROL-XL) 50 MG 24 hr tablet; TAKE ONE TABLET BY MOUTH DAILY. HOLD FOR SYSTOLIC BP <95 OR HEART RATE <55  Dispense: 90 tablet; Refill: 1    If protocol passes may refill for 12 months if within 3 months of last provider visit (or a total of 15 months).            Passed - Blood pressure filed in past 12 months    BP Readings from Last 1 Encounters:   08/28/18 108/58             atorvastatin (LIPITOR) 40 MG tablet 90 tablet 1     Sig: Take 1 tablet (40 mg total) by mouth at bedtime.    Statins Refill Protocol (Hmg CoA Reductase Inhibitors) Passed - 2/18/2019  2:40 PM       Passed - PCP or prescribing provider visit in past 12 months     Last office visit with prescriber/PCP: 8/28/2018 Isidro Au MD OR  same dept: 8/28/2018 Isidro Au MD OR same specialty: 8/28/2018 Isidro Au MD  Last physical: 7/12/2018 Last MTM visit: Visit date not found   Next visit within 3 mo: Visit date not found  Next physical within 3 mo: Visit date not found  Prescriber OR PCP: Isidro Au MD  Last diagnosis associated with med order: 1. Hyperlipidemia LDL goal <100  - atorvastatin (LIPITOR) 40 MG tablet; Take 1 tablet (40 mg total) by mouth at bedtime.  Dispense: 90 tablet; Refill: 1    2. HTN (hypertension)  - metoprolol succinate (TOPROL-XL) 50 MG 24 hr tablet; TAKE ONE TABLET BY MOUTH DAILY. HOLD FOR SYSTOLIC BP <95 OR HEART RATE <55  Dispense: 90 tablet; Refill: 1    If protocol passes may refill for 12 months if within 3 months of last provider visit (or a total of 15 months).                 Declines Yes

## 2024-02-28 ENCOUNTER — LAB (OUTPATIENT)
Dept: LAB | Facility: CLINIC | Age: 84
End: 2024-02-28
Payer: COMMERCIAL

## 2024-02-28 ENCOUNTER — ANTICOAGULATION THERAPY VISIT (OUTPATIENT)
Dept: ANTICOAGULATION | Facility: CLINIC | Age: 84
End: 2024-02-28

## 2024-02-28 DIAGNOSIS — I48.92 ATRIAL FLUTTER, PAROXYSMAL (H): ICD-10-CM

## 2024-02-28 DIAGNOSIS — Z79.01 LONG TERM (CURRENT) USE OF ANTICOAGULANTS: ICD-10-CM

## 2024-02-28 DIAGNOSIS — Z98.890 H/O MITRAL VALVE REPAIR: ICD-10-CM

## 2024-02-28 DIAGNOSIS — Z95.2 S/P AVR: ICD-10-CM

## 2024-02-28 DIAGNOSIS — I48.4 ATYPICAL ATRIAL FLUTTER (H): ICD-10-CM

## 2024-02-28 DIAGNOSIS — I48.20 CHRONIC ATRIAL FIBRILLATION (H): ICD-10-CM

## 2024-02-28 DIAGNOSIS — I48.20 CHRONIC ATRIAL FIBRILLATION (H): Primary | ICD-10-CM

## 2024-02-28 LAB — INR BLD: 2.7 (ref 0.9–1.1)

## 2024-02-28 PROCEDURE — 36416 COLLJ CAPILLARY BLOOD SPEC: CPT

## 2024-02-28 PROCEDURE — 85610 PROTHROMBIN TIME: CPT

## 2024-02-28 NOTE — PROGRESS NOTES
ANTICOAGULATION MANAGEMENT     Parish Bills 83 year old male is on warfarin with therapeutic INR result. (Goal INR 2.0-3.0)    Recent labs: (last 7 days)     02/28/24  0746   INR 2.7*       ASSESSMENT     Warfarin Lab Questionnaire    Warfarin Doses Last 7 Days      2/28/2024     7:49 AM   Dose in Tablet or Mg   TAB or MG? - 5mg warfarin tablets (evenings)     milligram (mg)     Pt Rptd Dose SUNDAY MONDAY TUESDAY WED THURS FRIDAY SATURDAY 2/28/2024   7:49 AM 5 5 7.5 5 5 5 5         2/28/2024   Warfarin Lab Questionnaire   Missed doses within past 14 days? No   Changes in diet or alcohol within past 14 days? No   Medication changes since last result? No   Injuries or illness since last result? No   New shortness of breath, severe headaches or sudden changes in vision since last result? No   Abnormal bleeding since last result? No   Upcoming surgery, procedure? No     Previous result: Therapeutic last 14 INR visits.    Additional findings: None       PLAN     Recommended plan for no diet, medication or health factor changes affecting INR     Dosing Instructions: Continue your current warfarin dose with next INR in 4 weeks       Summary  As of 2/28/2024      Full warfarin instructions:  7.5 mg every Tue; 5 mg all other days   Next INR check:  3/27/2024               Telephone call with Parish who verbalizes understanding and agrees to plan    Lab visit scheduled - INR on 3/29/24 @ [facility]    Education provided:   Taking warfarin: Importance of taking warfarin as instructed  Goal range and lab monitoring: goal range and significance of current result    Plan made per [facility] anticoagulation protocol    Koir Ziegler, RN  Anticoagulation Clinic  2/28/2024    _______________________________________________________________________     Anticoagulation Episode Summary       Current INR goal:  2.0-3.0   TTR:  100.0% (1 y)   Target end date:  Indefinite   Send INR reminders to:  [facility]    Indications     Chronic atrial fibrillation (H) [I48.20]  H/O mitral valve repair [Z98.890]  S/P AVR [Z95.2]  A-fib (H) (Resolved) [I48.91]  Long term (current) use of anticoagulants [Z79.01]  Atrial flutter  paroxysmal (H) [I48.92]  Atypical atrial flutter (H) [I48.4]             Comments:               Anticoagulation Care Providers       Provider Role Specialty Phone number    Isidro Au MD Referring [facility] [phone]

## 2024-03-29 ENCOUNTER — ANTICOAGULATION THERAPY VISIT (OUTPATIENT)
Dept: ANTICOAGULATION | Facility: CLINIC | Age: 84
End: 2024-03-29

## 2024-03-29 ENCOUNTER — LAB (OUTPATIENT)
Dept: LAB | Facility: CLINIC | Age: 84
End: 2024-03-29
Payer: COMMERCIAL

## 2024-03-29 DIAGNOSIS — I48.4 ATYPICAL ATRIAL FLUTTER (H): ICD-10-CM

## 2024-03-29 DIAGNOSIS — I48.92 ATRIAL FLUTTER, PAROXYSMAL (H): ICD-10-CM

## 2024-03-29 DIAGNOSIS — I48.20 CHRONIC ATRIAL FIBRILLATION (H): Primary | ICD-10-CM

## 2024-03-29 DIAGNOSIS — Z95.2 S/P AVR: ICD-10-CM

## 2024-03-29 DIAGNOSIS — Z79.01 LONG TERM (CURRENT) USE OF ANTICOAGULANTS: ICD-10-CM

## 2024-03-29 DIAGNOSIS — Z98.890 H/O MITRAL VALVE REPAIR: ICD-10-CM

## 2024-03-29 DIAGNOSIS — I48.20 CHRONIC ATRIAL FIBRILLATION (H): ICD-10-CM

## 2024-03-29 LAB — INR BLD: 2.8 (ref 0.9–1.1)

## 2024-03-29 PROCEDURE — 36416 COLLJ CAPILLARY BLOOD SPEC: CPT

## 2024-03-29 PROCEDURE — 85610 PROTHROMBIN TIME: CPT

## 2024-03-29 NOTE — PROGRESS NOTES
ANTICOAGULATION MANAGEMENT     Parish Bills 83 year old male is on warfarin with therapeutic INR result. (Goal INR 2.0-3.0)    Recent labs: (last 7 days)     03/29/24  0751   INR 2.8*       ASSESSMENT     Warfarin Lab Questionnaire    Warfarin Doses Last 7 Days      3/29/2024     7:49 AM   Dose in Tablet or Mg   TAB or MG? milligram (mg)     Pt Rptd Dose JON MONDAY TUESDAY WED THURS FRIDAY SATURDAY   3/29/2024   7:49 AM 5 5 7.5 5 5 5 5         3/29/2024   Warfarin Lab Questionnaire   Missed doses within past 14 days? No   Changes in diet or alcohol within past 14 days? No   Medication changes since last result? No   Injuries or illness since last result? No   New shortness of breath, severe headaches or sudden changes in vision since last result? No   Abnormal bleeding since last result? No   Upcoming surgery, procedure? No     Previous result: Therapeutic last 2(+) visits  Additional findings: None       PLAN     Recommended plan for no diet, medication or health factor changes affecting INR     Dosing Instructions: Continue your current warfarin dose with next INR in 4 weeks       Summary  As of 3/29/2024      Full warfarin instructions:  7.5 mg every Tue; 5 mg all other days   Next INR check:  4/26/2024               Telephone call with Parish who verbalizes understanding and agrees to plan    Lab visit scheduled    Education provided:   Contact [phone] with any changes, questions or concerns.     Plan made per [facility] anticoagulation protocol    Zahra Gonzalez RN  Anticoagulation Clinic  3/29/2024    _______________________________________________________________________     Anticoagulation Episode Summary       Current INR goal:  2.0-3.0   TTR:  100.0% (1 y)   Target end date:  Indefinite   Send INR reminders to:  [facility]    Indications    Chronic atrial fibrillation (H) [I48.20]  H/O mitral valve repair [Z98.890]  S/P AVR [Z95.2]  A-fib (H) (Resolved) [I48.91]  Long term (current) use of  anticoagulants [Z79.01]  Atrial flutter  paroxysmal (H) [I48.92]  Atypical atrial flutter (H) [I48.4]             Comments:               Anticoagulation Care Providers       Provider Role Specialty Phone number    Isidro Au MD [facility] [phone]

## 2024-04-25 ENCOUNTER — LAB (OUTPATIENT)
Dept: LAB | Facility: CLINIC | Age: 84
End: 2024-04-25
Payer: COMMERCIAL

## 2024-04-25 ENCOUNTER — ANTICOAGULATION THERAPY VISIT (OUTPATIENT)
Dept: ANTICOAGULATION | Facility: CLINIC | Age: 84
End: 2024-04-25

## 2024-04-25 DIAGNOSIS — I48.4 ATYPICAL ATRIAL FLUTTER (H): ICD-10-CM

## 2024-04-25 DIAGNOSIS — Z79.01 LONG TERM (CURRENT) USE OF ANTICOAGULANTS: ICD-10-CM

## 2024-04-25 DIAGNOSIS — I48.92 ATRIAL FLUTTER, PAROXYSMAL (H): ICD-10-CM

## 2024-04-25 DIAGNOSIS — I48.20 CHRONIC ATRIAL FIBRILLATION (H): ICD-10-CM

## 2024-04-25 DIAGNOSIS — Z95.2 S/P AVR: ICD-10-CM

## 2024-04-25 DIAGNOSIS — Z98.890 H/O MITRAL VALVE REPAIR: ICD-10-CM

## 2024-04-25 DIAGNOSIS — N40.1 BENIGN PROSTATIC HYPERPLASIA WITH URINARY FREQUENCY: ICD-10-CM

## 2024-04-25 DIAGNOSIS — R35.0 BENIGN PROSTATIC HYPERPLASIA WITH URINARY FREQUENCY: ICD-10-CM

## 2024-04-25 DIAGNOSIS — I48.20 CHRONIC ATRIAL FIBRILLATION (H): Primary | ICD-10-CM

## 2024-04-25 LAB — INR BLD: 2.9 (ref 0.9–1.1)

## 2024-04-25 PROCEDURE — 36416 COLLJ CAPILLARY BLOOD SPEC: CPT

## 2024-04-25 PROCEDURE — 85610 PROTHROMBIN TIME: CPT

## 2024-04-25 RX ORDER — TAMSULOSIN HYDROCHLORIDE 0.4 MG/1
0.4 CAPSULE ORAL DAILY
Qty: 90 CAPSULE | Refills: 0 | Status: SHIPPED | OUTPATIENT
Start: 2024-04-25 | End: 2024-07-16

## 2024-04-25 NOTE — PROGRESS NOTES
ANTICOAGULATION MANAGEMENT     Parish Bills 83 year old male is on warfarin with therapeutic INR result. (Goal INR 2.0-3.0)    Recent labs: (last 7 days)     04/25/24  0807   INR 2.9*       ASSESSMENT     Warfarin Lab Questionnaire    Warfarin Doses Last 7 Days      4/25/2024     8:06 AM   Dose in Tablet or Mg   TAB or MG? milligram (mg)     Pt Rptd Dose SUNDAY MONDAY TUESDAY WED THURS FRIDAY SATURDAY 4/25/2024   8:06 AM 5 5 7.5 5 5 5 5         4/25/2024   Warfarin Lab Questionnaire   Missed doses within past 14 days? No   Changes in diet or alcohol within past 14 days? No - however, he did report eating less the last couple days of Vitamin-K foods.     Medication changes since last result? No   Injuries or illness since last result? No   New shortness of breath, severe headaches or sudden changes in vision since last result? No   Abnormal bleeding since last result? No   Upcoming surgery, procedure? No     Previous result: Therapeutic last 16 INR visits.    Additional findings: None       PLAN     Recommended plan for temporary change(s) affecting INR     Dosing Instructions: Continue your current warfarin dose with next INR in 4 weeks       Summary  As of 4/25/2024      Full warfarin instructions:  7.5 mg every Tue; 5 mg all other days   Next INR check:  5/30/2024               Telephone call with Parish who verbalizes understanding and agrees to plan   - provided education - warfarin sensitive to changes of vitamin-K intake.  Important to stay consistent with his usual intake.    Lab visit scheduled - INR on 5/23/24 @ [facility]    Education provided:   Taking warfarin: Importance of taking warfarin as instructed  Goal range and lab monitoring: goal range and significance of current result  Dietary considerations: importance of consistent vitamin K intake and impact of vitamin K foods on INR    Plan made per [facility] anticoagulation protocol    Kori Ziegler, RN  Anticoagulation  Clinic  4/25/2024    _______________________________________________________________________     Anticoagulation Episode Summary       Current INR goal:  2.0-3.0   TTR:  100.0% (1 y)   Target end date:  Indefinite   Send INR reminders to:  [facility]    Indications    Chronic atrial fibrillation (H) [I48.20]  H/O mitral valve repair [Z98.890]  S/P AVR [Z95.2]  A-fib (H) (Resolved) [I48.91]  Long term (current) use of anticoagulants [Z79.01]  Atrial flutter  paroxysmal (H) [I48.92]  Atypical atrial flutter (H) [I48.4]             Comments:               Anticoagulation Care Providers       Provider Role Specialty Phone number    Isidro Au MD Referring Family Medicine [phone]

## 2024-05-15 ENCOUNTER — TELEPHONE (OUTPATIENT)
Dept: FAMILY MEDICINE | Facility: CLINIC | Age: 84
End: 2024-05-15
Payer: COMMERCIAL

## 2024-05-15 NOTE — TELEPHONE ENCOUNTER
Attempted to reach Parish DANIEL Bills in regards to their losartan being overdue for refill. Left voicemail, with call back number, requesting return call. Per our records last filled 1/4/24 for 100 day supply and is 32 days late to refill.      Emperatriz Garcia, PharmD, [facility]  Population Health Pharmacist  [phone]

## 2024-05-23 ENCOUNTER — ANTICOAGULATION THERAPY VISIT (OUTPATIENT)
Dept: ANTICOAGULATION | Facility: CLINIC | Age: 84
End: 2024-05-23

## 2024-05-23 ENCOUNTER — LAB (OUTPATIENT)
Dept: LAB | Facility: CLINIC | Age: 84
End: 2024-05-23
Payer: COMMERCIAL

## 2024-05-23 DIAGNOSIS — I48.4 ATYPICAL ATRIAL FLUTTER (H): ICD-10-CM

## 2024-05-23 DIAGNOSIS — I48.92 ATRIAL FLUTTER, PAROXYSMAL (H): ICD-10-CM

## 2024-05-23 DIAGNOSIS — Z79.01 LONG TERM (CURRENT) USE OF ANTICOAGULANTS: ICD-10-CM

## 2024-05-23 DIAGNOSIS — I48.20 CHRONIC ATRIAL FIBRILLATION (H): Primary | ICD-10-CM

## 2024-05-23 DIAGNOSIS — I48.20 CHRONIC ATRIAL FIBRILLATION (H): ICD-10-CM

## 2024-05-23 DIAGNOSIS — Z98.890 H/O MITRAL VALVE REPAIR: ICD-10-CM

## 2024-05-23 DIAGNOSIS — Z95.2 S/P AVR: ICD-10-CM

## 2024-05-23 LAB — INR BLD: 2.6 (ref 0.9–1.1)

## 2024-05-23 PROCEDURE — 85610 PROTHROMBIN TIME: CPT

## 2024-05-23 PROCEDURE — 36416 COLLJ CAPILLARY BLOOD SPEC: CPT

## 2024-05-23 NOTE — PROGRESS NOTES
ANTICOAGULATION MANAGEMENT     Parish Bills 83 year old male is on warfarin with therapeutic INR result. (Goal INR 2.0-3.0)    Recent labs: (last 7 days)     05/23/24  0808   INR 2.6*       ASSESSMENT     Warfarin Lab Questionnaire    Warfarin Doses Last 7 Days      5/23/2024     8:06 AM   Dose in Tablet or Mg   TAB or MG? - 5mg warfarin tablets (evenings) milligram (mg)     Pt Rptd Dose SUNDAY MONDAY TUESDAY WED THURS FRIDAY SATURDAY 5/23/2024   8:06 AM 5 5 7.5 5 5 5 5         5/23/2024   Warfarin Lab Questionnaire   Missed doses within past 14 days? No   Changes in diet or alcohol within past 14 days? No   Medication changes since last result? No   Injuries or illness since last result? No   New shortness of breath, severe headaches or sudden changes in vision since last result? No   Abnormal bleeding since last result? No   Upcoming surgery, procedure? No     Previous result: Therapeutic last 17 INR visits.    Additional findings:  Chart reviewed       PLAN     Recommended plan for no diet, medication or health factor changes affecting INR     Dosing Instructions: Continue your current warfarin dose with next INR in 5 weeks       Summary  As of 5/23/2024      Full warfarin instructions:  7.5 mg every Tue; 5 mg all other days   Next INR check:  6/27/2024               Detailed voice message left for Parish with dosing instructions and follow up date.     Contact [phone] to schedule and with any changes, questions or concerns.     Education provided:   Please call back if any changes to your diet, medications or how you've been taking warfarin  Taking warfarin: Importance of taking warfarin as instructed  Goal range and lab monitoring: goal range and significance of current result    Plan made per [facility] anticoagulation protocol    Kori Ziegler, RN  Anticoagulation Clinic  5/23/2024    _______________________________________________________________________     Anticoagulation Episode Summary        Current INR goal:  2.0-3.0   TTR:  100.0% (1 y)   Target end date:  Indefinite   Send INR reminders to:  [facility]    Indications    Chronic atrial fibrillation (H) [I48.20]  H/O mitral valve repair [Z98.890]  S/P AVR [Z95.2]  A-fib (H) (Resolved) [I48.91]  Long term (current) use of anticoagulants [Z79.01]  Atrial flutter  paroxysmal (H) [I48.92]  Atypical atrial flutter (H) [I48.4]             Comments:               Anticoagulation Care Providers       Provider Role Specialty Phone number    Isidro Au MD Referring Family Medicine [phone]

## 2024-06-04 ENCOUNTER — TELEPHONE (OUTPATIENT)
Dept: FAMILY MEDICINE | Facility: CLINIC | Age: 84
End: 2024-06-04
Payer: COMMERCIAL

## 2024-06-04 NOTE — TELEPHONE ENCOUNTER
"Called and spoke with patient. Reports dry cough over the past month or so. Denies any wheezing, chest pain or difficulty breathing. No fever. No changes in cough since onset. Reports hx of HF, no changes in his daily weight but may note some minor swelling to BLE.    Patient would like to discuss changing depression medication with PCP, feels symptoms have not been well-controlled for quite some time.     Reports \"little thing\" present on back for at least the past year. Finds it difficult to describe further, including size. Denies any changes since onset, but the area is painful to touch. No bleeding.    Advised appointment today or tomorrow for evaluation. Patient declines disposition and requests to schedule with only PCP. Scheduled with first available appointment with PCP on 6/12. Again advised patient to consider sooner evaluation, though he continues to decline. Routing to PCP as an FYI.    Zara Osorio RN    "

## 2024-06-04 NOTE — TELEPHONE ENCOUNTER
Reason for Call:  Appointment Request    Patient requesting this type of appt:  Cough, depression medication change, back rash, trimmers worsening, and other    Requested provider: Isidro Au    Reason patient unable to be scheduled: Patient looking to be seen as soon as possible    When does patient want to be seen/preferred time: 3-7 days    Comments: Patient is looking to be seen as soon as possible, requested with in a week    Okay to leave a detailed message?: Yes at Home number on file [phone] (home)    Call taken on 6/4/2024 at 9:06 AM by Oneyda Boyd

## 2024-06-10 ENCOUNTER — TELEPHONE (OUTPATIENT)
Dept: FAMILY MEDICINE | Facility: CLINIC | Age: 84
End: 2024-06-10
Payer: COMMERCIAL

## 2024-06-10 NOTE — TELEPHONE ENCOUNTER
Patient's wife called stating that patient had high fever on 6/4 and tested positive on 6/5 at the [place] [facility]. Patient was admitted for one night and wants to know if he can still come to his appointment with Dr. Au on 6/12.     Brittany approved and informed Ileana that he needs to wear mask upon arriving. She verbalized understanding.

## 2024-06-12 ENCOUNTER — OFFICE VISIT (OUTPATIENT)
Dept: FAMILY MEDICINE | Facility: CLINIC | Age: 84
End: 2024-06-12
Payer: COMMERCIAL

## 2024-06-12 VITALS
DIASTOLIC BLOOD PRESSURE: 70 MMHG | WEIGHT: 195 LBS | RESPIRATION RATE: 20 BRPM | SYSTOLIC BLOOD PRESSURE: 141 MMHG | BODY MASS INDEX: 28.38 KG/M2 | OXYGEN SATURATION: 97 % | TEMPERATURE: 98 F | HEART RATE: 60 BPM

## 2024-06-12 DIAGNOSIS — J06.9 VIRAL URI: ICD-10-CM

## 2024-06-12 DIAGNOSIS — I10 PRIMARY HYPERTENSION: Primary | ICD-10-CM

## 2024-06-12 DIAGNOSIS — F34.1 DYSTHYMIA: ICD-10-CM

## 2024-06-12 PROCEDURE — 99214 OFFICE O/P EST MOD 30 MIN: CPT | Performed by: FAMILY MEDICINE

## 2024-06-12 RX ORDER — PAROXETINE 40 MG/1
40 TABLET, FILM COATED ORAL EVERY MORNING
Qty: 90 TABLET | Refills: 1 | Status: SHIPPED | OUTPATIENT
Start: 2024-06-12 | End: 2024-06-17

## 2024-06-12 RX ORDER — GUAIFENESIN 200 MG/10ML
200 LIQUID ORAL EVERY 4 HOURS PRN
Qty: 180 ML | Refills: 0 | Status: SHIPPED | OUTPATIENT
Start: 2024-06-12 | End: 2024-08-12

## 2024-06-12 ASSESSMENT — PATIENT HEALTH QUESTIONNAIRE - PHQ9: SUM OF ALL RESPONSES TO PHQ QUESTIONS 1-9: 15

## 2024-06-12 ASSESSMENT — ENCOUNTER SYMPTOMS: NUMBNESS: 1

## 2024-06-12 NOTE — PROGRESS NOTES
"  Assessment & Plan     Viral URI  Suspect lingering viral effects from COVID infection  Lungs are clear on examination  Blood pressure and heart rate near goal  Oxygen saturation normal on room air  No wheezing or rhonchi on exam  Discussed cough medicine to help suppress cough at night which is more bothersome  He is not interested in medication with codeine as he is worried about the possible side effects  - guaiFENesin (ROBITUSSIN) 20 mg/mL liquid  Dispense: 180 mL; Refill: 0    Primary hypertension  Blood pressure just slightly elevated but tolerable for patient's age and comorbidities  Continue with current medication    Dysthymia  Patient with ongoing lack of motivation and dysthymia  Currently taking 210 mg tablets of paroxetine discussed increasing to 40 mg  Somewhat hesitant at first although acknowledges that he wants to do be more active and do more things to help with his overall health  He is willing to do a trial of increasing dosing of paroxetine to 40  - PARoxetine (PAXIL) 40 MG tablet  Dispense: 90 tablet; Refill: 1            BMI  Estimated body mass index is 28.38 kg/m  as calculated from the following:    Height as of 6/28/23: 1.765 m (5' 9.5\").    Weight as of this encounter: 88.5 kg (195 lb).       Depression Screening Follow Up        {        Mateo Lugo is a 83 year old, presenting for the following health issues:  Covid Concern (Coughing, shortness of breath ), Depression (Discuss medication/), Numbness (Right hand numbness and pain), and Tremors (Hand tremor, the other day was unable to hold a phone due to shaking )  Patient here for evaluation of ongoing cough.  Greater than a week ago patient was seen and diagnosed with COVID-19 due to increasing cough and shortness of breath.  He was sent out with Tessalon Perles and did not receive Paxlovid.  Patient is no longer having fevers cough is not productive but cough is still present during the day but more so at night.  He been from " sleeping well at night.  On examination lungs are clear vitals are stable-discussed with him likely lingering cough due to viral etiology we could help with some cough medication.  He is not interested in codeine and concerns of its possible side effects so we will go with plain guaifenesin.  Nontoxic-appearing gentleman did not think blood work or imaging is needed today.  Reviewed notes from outside facility.    Blood pressure slightly elevated but close to goal range we will continue to monitor.    Patient discussed lack of motivation and some dysthymia we discussed his overall symptoms and discussed increasing dosing on his SSRI.  After some discussion he is willing to do a trial on 40 mg we will send this to the pharmacy.  He acknowledges that he needs to be more active physically and socially but has has a lack of motivation.  He will update me in a few weeks with change of symptoms.    Patient notes that he periodically has some increasing tremor currently is asymptomatic in the room-discussed with him the possibility of having a beta-blocker on hand as needed which he is not interested at this time he would like to monitor.      6/12/2024     1:24 PM   Additional Questions   Roomed by Brittany SANCHEZ CMA     Numbness  Associated symptoms include numbness.          COVID-19 Symptom Review  How many days ago did these symptoms start?  9    Are any of the following symptoms significant for you?  New or worsening difficulty breathing? No  Worsening cough? No  Fever or chills? No  Headache: No  Sore throat: No  Chest pain: No  Diarrhea: No  Body aches? No    What treatments has patient tried? Acetaminophen and Nonsteroidals   Does patient live in a nursing home, group home, or shelter? No  Does patient have a way to get food/medications during quarantined? Yes, I have a friend or family member who can help me.                    Objective    BP (!) 152/74 (BP Location: Left arm, Patient Position: Sitting, Cuff Size: Adult  Large)   Pulse 60   Temp 98  F (36.7  C) (Oral)   Resp 20   Wt 88.5 kg (195 lb)   SpO2 97%   BMI 28.38 kg/m    Body mass index is 28.38 kg/m .  Physical Exam   GENERAL: alert and no distress  EYES: Eyes grossly normal to inspection, PERRL and conjunctivae and sclerae normal  RESP: lungs clear to auscultation - no rales, rhonchi or wheezes  CV: regular rates and rhythm and no peripheral edema  MS: no gross musculoskeletal defects noted, no edema  NEURO: Normal strength and tone, mentation intact and speech normal  PSYCH: mentation appears normal, affect normal/bright            Signed Electronically by: Isidro Au MD

## 2024-06-17 ENCOUNTER — TELEPHONE (OUTPATIENT)
Dept: FAMILY MEDICINE | Facility: CLINIC | Age: 84
End: 2024-06-17
Payer: COMMERCIAL

## 2024-06-17 DIAGNOSIS — F34.1 DYSTHYMIA: ICD-10-CM

## 2024-06-17 RX ORDER — PAROXETINE 40 MG/1
40 TABLET, FILM COATED ORAL EVERY MORNING
Qty: 90 TABLET | Refills: 1 | Status: SHIPPED | OUTPATIENT
Start: 2024-06-17 | End: 2024-06-26

## 2024-06-17 NOTE — TELEPHONE ENCOUNTER
Medication Question or Refill    Contacts         Type Contact Phone/Fax    06/17/2024 07:59 AM CDT Phone (Incoming) Parish Bills (Self) [phone] (H)            What medication are you calling about (include dose and sig)?: PARoxetine (PAXIL) 40 MG tablet     Preferred Pharmacy:     [facility] - [facility] [address]  [address] [address]  Phone: [phone] Fax: [phone]      Controlled Substance Agreement on file:   CSA -- Patient Level:    CSA: None found at the patient level.       Who prescribed the medication?: Isidro Au    Do you need a refill? Yes, prescription was sent to [facility] in [place],  requesting prescription be sent to [facility]      Okay to leave a detailed message?: Yes at Home number on file [phone] (home)

## 2024-06-26 ENCOUNTER — TELEPHONE (OUTPATIENT)
Dept: FAMILY MEDICINE | Facility: CLINIC | Age: 84
End: 2024-06-26
Payer: COMMERCIAL

## 2024-06-26 DIAGNOSIS — F33.1 MODERATE EPISODE OF RECURRENT MAJOR DEPRESSIVE DISORDER (H): Primary | ICD-10-CM

## 2024-06-26 RX ORDER — PAROXETINE 30 MG/1
30 TABLET, FILM COATED ORAL EVERY MORNING
Qty: 90 TABLET | Refills: 3 | Status: SHIPPED | OUTPATIENT
Start: 2024-06-26

## 2024-06-26 NOTE — TELEPHONE ENCOUNTER
Medication Question or Refill    Contacts       Contact Date/Time Type Contact Phone/Fax    06/26/2024 10:52 AM CDT Phone (Incoming) Parish Bills (Self) [phone] (H)            What medication are you calling about (include dose and sig)?:   PARoxetine (PAXIL) 40 MG tablet     Preferred Pharmacy:         [facility] - [facility] [address]  [address] [address]  Phone: [phone] Fax: [phone]      Controlled Substance Agreement on file:   CSA -- Patient Level:    CSA: None found at the patient level.       Who prescribed the medication?: Isidro Au        Do you have any questions or concerns?  Yes: patient states that he has been having dizzy spells here and there ever since increasing to 40mg, is wondering if his doages can be reduced to 30 mg      Okay to leave a detailed message?: Yes at Home number on file [phone] (home)

## 2024-06-27 ENCOUNTER — LAB (OUTPATIENT)
Dept: LAB | Facility: CLINIC | Age: 84
End: 2024-06-27
Payer: COMMERCIAL

## 2024-06-27 ENCOUNTER — ANTICOAGULATION THERAPY VISIT (OUTPATIENT)
Dept: ANTICOAGULATION | Facility: CLINIC | Age: 84
End: 2024-06-27

## 2024-06-27 DIAGNOSIS — I48.92 ATRIAL FLUTTER, PAROXYSMAL (H): ICD-10-CM

## 2024-06-27 DIAGNOSIS — Z95.2 S/P AVR: ICD-10-CM

## 2024-06-27 DIAGNOSIS — Z79.01 LONG TERM (CURRENT) USE OF ANTICOAGULANTS: ICD-10-CM

## 2024-06-27 DIAGNOSIS — I48.4 ATYPICAL ATRIAL FLUTTER (H): ICD-10-CM

## 2024-06-27 DIAGNOSIS — I48.20 CHRONIC ATRIAL FIBRILLATION (H): ICD-10-CM

## 2024-06-27 DIAGNOSIS — I48.20 CHRONIC ATRIAL FIBRILLATION (H): Primary | ICD-10-CM

## 2024-06-27 DIAGNOSIS — Z98.890 H/O MITRAL VALVE REPAIR: ICD-10-CM

## 2024-06-27 LAB — INR BLD: 3.4 (ref 0.9–1.1)

## 2024-06-27 PROCEDURE — 85610 PROTHROMBIN TIME: CPT

## 2024-06-27 PROCEDURE — 36416 COLLJ CAPILLARY BLOOD SPEC: CPT

## 2024-06-27 NOTE — PROGRESS NOTES
ANTICOAGULATION MANAGEMENT     Parish Bills 83 year old male is on warfarin with supratherapeutic INR result. (Goal INR 2.0-3.0)    Recent labs: (last 7 days)     06/27/24  0801   INR 3.4*       ASSESSMENT     Warfarin Lab Questionnaire    Warfarin Doses Last 7 Days      6/27/2024     7:59 AM   Dose in Tablet or Mg   TAB or MG? - 5mg warfarin tablets (evenings)  milligram (mg)     Pt Rptd Dose SUNDAY MONDAY TUESDAY WED THURS FRIDAY SATURDAY 6/27/2024   7:59 AM 5 5 7.5 5 5 5 5         6/27/2024   Warfarin Lab Questionnaire   Missed doses within past 14 days? No   Changes in diet or alcohol within past 14 days? No   Medication changes since last result? No - 6/26/24 Decreased Paroxetine to 30mg.  (Higher dose was causing dizziness).         - 6/12/24 Paroxetine increased from 20mg to 40mg daily, which affected INR to increase.     Injuries or illness since last result? Yes - reported covid symptoms have resolved.         - seen on 6/12/24 for ongoing cough, non-productive.         - 6/5/24 presented in ED for shortness of breath, fever, and chills. Test for Covid-19 was positive.   If yes, please explain: Covid     New shortness of breath, severe headaches or sudden changes in vision since last result? No   Abnormal bleeding since last result? No   Upcoming surgery, procedure? No        Previous result: Therapeutic last 18 INR visits.    Additional findings: None       PLAN     Recommended plan for ongoing change(s) affecting INR     Dosing Instructions: partial hold then decrease your warfarin dose (6.7% change) with next INR in 2 weeks       Summary  As of 6/27/2024      Full warfarin instructions:  5 mg every day   Next INR check:  7/11/2024               Telephone call with Parish who verbalizes understanding and agrees to plan    Lab visit scheduled    Education provided:   Taking warfarin: Importance of taking warfarin as instructed  Goal range and lab monitoring: goal range and significance of current  result  Interaction IS anticipated between warfarin and Paroxetine    Plan made per [facility] anticoagulation protocol    Kori Ziegler RN  Anticoagulation Clinic  6/27/2024    _______________________________________________________________________     Anticoagulation Episode Summary       Current INR goal:  2.0-3.0   TTR:  95.2% (1 y)   Target end date:  Indefinite   Send INR reminders to:  [facility]    Indications    Chronic atrial fibrillation (H) [I48.20]  H/O mitral valve repair [Z98.890]  S/P AVR [Z95.2]  A-fib (H) (Resolved) [I48.91]  Long term (current) use of anticoagulants [Z79.01]  Atrial flutter  paroxysmal (H) [I48.92]  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

## 2024-07-09 RX ORDER — PAROXETINE 30 MG/1
TABLET, FILM COATED ORAL EVERY MORNING
OUTPATIENT
Start: 2024-07-09

## 2024-07-10 ENCOUNTER — PATIENT OUTREACH (OUTPATIENT)
Dept: CARE COORDINATION | Facility: CLINIC | Age: 84
End: 2024-07-10
Payer: COMMERCIAL

## 2024-07-11 ENCOUNTER — ANTICOAGULATION THERAPY VISIT (OUTPATIENT)
Dept: ANTICOAGULATION | Facility: CLINIC | Age: 84
End: 2024-07-11

## 2024-07-11 ENCOUNTER — LAB (OUTPATIENT)
Dept: LAB | Facility: CLINIC | Age: 84
End: 2024-07-11
Payer: COMMERCIAL

## 2024-07-11 DIAGNOSIS — Z95.2 S/P AVR: ICD-10-CM

## 2024-07-11 DIAGNOSIS — I48.4 ATYPICAL ATRIAL FLUTTER (H): ICD-10-CM

## 2024-07-11 DIAGNOSIS — Z98.890 H/O MITRAL VALVE REPAIR: ICD-10-CM

## 2024-07-11 DIAGNOSIS — Z79.01 LONG TERM (CURRENT) USE OF ANTICOAGULANTS: ICD-10-CM

## 2024-07-11 DIAGNOSIS — I48.20 CHRONIC ATRIAL FIBRILLATION (H): ICD-10-CM

## 2024-07-11 DIAGNOSIS — I48.92 ATRIAL FLUTTER, PAROXYSMAL (H): ICD-10-CM

## 2024-07-11 DIAGNOSIS — I48.20 CHRONIC ATRIAL FIBRILLATION (H): Primary | ICD-10-CM

## 2024-07-11 LAB — INR BLD: 2.8 (ref 0.9–1.1)

## 2024-07-11 PROCEDURE — 85610 PROTHROMBIN TIME: CPT

## 2024-07-11 PROCEDURE — 36416 COLLJ CAPILLARY BLOOD SPEC: CPT

## 2024-07-11 NOTE — PROGRESS NOTES
ANTICOAGULATION MANAGEMENT     Parish SANCHEZ Sanju 83 year old male is on warfarin with therapeutic INR result. (Goal INR 2.0-3.0)    Recent labs: (last 7 days)     07/11/24  1026   INR 2.8*       ASSESSMENT     Warfarin Lab Questionnaire    Warfarin Doses Last 7 Days      7/11/2024    10:29 AM   Dose in Tablet or Mg   TAB or MG? milligram (mg)     Pt Rptd Dose SUNDAY MONDAY TUESDAY WED THURS FRIDAY SATURDAY 7/11/2024  10:29 AM 5 5 5 5 5 5 5         7/11/2024   Warfarin Lab Questionnaire   Missed doses within past 14 days? No - partial dose held 6/27/24 and weekly warfarin dose was also decreased by 6.7%.     Changes in diet or alcohol within past 14 days? No   Medication changes since last result? Yes - continues Paroxetine dose was decreased from 40mg to 30mg daily since 6/26/24.  Higher dose was causing dizziness.       Injuries or illness since last result? No - still some dizziness noted, especially when sitting to standing.     New shortness of breath, severe headaches or sudden changes in vision since last result? No   Abnormal bleeding since last result? No   Upcoming surgery, procedure? No     Best number to call with results? [phone]        Previous result: Supratherapeutic at 3.4 on 6/27/24.    Additional findings: None       PLAN     Recommended plan for temporary change(s) affecting INR     Dosing Instructions: Continue your current warfarin dose with next INR in 2-3 weeks       Summary  As of 7/11/2024      Full warfarin instructions:  5 mg every day   Next INR check:  7/25/2024               Telephone call with Parish who verbalizes understanding and agrees to plan   - advised if dizziness persists and worsen to follow-up with PCP    Lab visit scheduled - INR on 8/1/24 @ [facility]    Education provided: Taking warfarin: Importance of taking warfarin as instructed  Goal range and lab monitoring: goal range and significance of current result    Plan made per [facility] anticoagulation protocol    Kori Ziegler,  RN  Anticoagulation Clinic  7/11/2024    _______________________________________________________________________     Anticoagulation Episode Summary       Current INR goal:  2.0-3.0   TTR:  92.6% (1 y)   Target end date:  Indefinite   Send INR reminders to:  [facility]    Indications    Chronic atrial fibrillation (H) [I48.20]  H/O mitral valve repair [Z98.890]  S/P AVR [Z95.2]  A-fib (H) (Resolved) [I48.91]  Long term (current) use of anticoagulants [Z79.01]  Atrial flutter  paroxysmal (H) [I48.92]  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

## 2024-07-13 DIAGNOSIS — R35.0 BENIGN PROSTATIC HYPERPLASIA WITH URINARY FREQUENCY: ICD-10-CM

## 2024-07-13 DIAGNOSIS — N40.1 BENIGN PROSTATIC HYPERPLASIA WITH URINARY FREQUENCY: ICD-10-CM

## 2024-07-16 RX ORDER — TAMSULOSIN HYDROCHLORIDE 0.4 MG/1
0.4 CAPSULE ORAL DAILY
Qty: 90 CAPSULE | Refills: 3 | Status: SHIPPED | OUTPATIENT
Start: 2024-07-16

## 2024-07-22 ENCOUNTER — ALLIED HEALTH/NURSE VISIT (OUTPATIENT)
Dept: FAMILY MEDICINE | Facility: CLINIC | Age: 84
End: 2024-07-22
Payer: COMMERCIAL

## 2024-07-22 ENCOUNTER — NURSE TRIAGE (OUTPATIENT)
Dept: FAMILY MEDICINE | Facility: CLINIC | Age: 84
End: 2024-07-22

## 2024-07-22 DIAGNOSIS — H61.23 BILATERAL IMPACTED CERUMEN: Primary | ICD-10-CM

## 2024-07-22 PROCEDURE — 69209 REMOVE IMPACTED EAR WAX UNI: CPT | Mod: 50 | Performed by: FAMILY MEDICINE

## 2024-07-22 PROCEDURE — 99207 PR NO CHARGE NURSE ONLY: CPT

## 2024-07-22 NOTE — PROGRESS NOTES
RN ear assessment completed prior to ear irrigation. Otoscopic exam reveals cerumen present and irrigation advised. Post Ear Irrigation exam completed and cerumen plug removed and tympanic membrane intact.  Pain assessment completed.     Patient tolerated procedure:  yes    RN ear assessment completed prior to ear irrigation. RN ear assessment completed prior to ear irrigation.     Celena Swift RN    unable to respond

## 2024-07-22 NOTE — TELEPHONE ENCOUNTER
"Nurse Triage SBAR    Is this a 2nd Level Triage? NO    Situation: earwax    Background: last visit for ear cleaning was 2022    Assessment: Ears have been plugged since yesterday. Left ear is worse than right ear. Denies pain, ringing, redness and discharge.    Protocol Recommended Disposition:   See in Office Within 2 Weeks    Recommendation: Patient scheduled for RN visit for ear cleaning today.          Does the patient meet one of the following criteria for ADS visit consideration? 16+ years old, with an [facility] PCP     TIP  Providers, please consider if this condition is appropriate for management at one of our Acute and Diagnostic Services sites.     If patient is a good candidate, please use dotphrase <dot>triageresponse and select Refer to ADS to document.     Reason for Disposition   Earwax problem and not willing to try CARE ADVICE    Answer Assessment - Initial Assessment Questions  1. LOCATION: \"Which ear is involved?\"       Both ears, Left is worse than right  2. SYMPTOMS: \"What are the main symptoms?\" (e.g., fullness, decreased hearing, itching, discomfort)      Fullness, decreased hearing  3. ONSET: \"When did the  fullness  start?\"      Yesterday 7/21  4. PAIN: \"Is there any earache?\" \"How bad is it?\"  (Scale 1-10; or mild, moderate, severe)      No pain  5. OBJECTS: \"Do you use cotton swabs (Q-tips) in your ear?\" \"Have you put anything else in your ear?\"      No   6. EARWAX HISTORY: \"Have you had problems with earwax before?\" If Yes, ask: \"What did you do the last time?\"      Yes, 2022    Protocols used: Earwax-A-OH    "

## 2024-08-01 ENCOUNTER — ANTICOAGULATION THERAPY VISIT (OUTPATIENT)
Dept: ANTICOAGULATION | Facility: CLINIC | Age: 84
End: 2024-08-01

## 2024-08-01 ENCOUNTER — LAB (OUTPATIENT)
Dept: LAB | Facility: CLINIC | Age: 84
End: 2024-08-01
Payer: COMMERCIAL

## 2024-08-01 DIAGNOSIS — Z98.890 H/O MITRAL VALVE REPAIR: ICD-10-CM

## 2024-08-01 DIAGNOSIS — I48.4 ATYPICAL ATRIAL FLUTTER (H): ICD-10-CM

## 2024-08-01 DIAGNOSIS — I48.20 CHRONIC ATRIAL FIBRILLATION (H): ICD-10-CM

## 2024-08-01 DIAGNOSIS — Z95.2 S/P AVR: ICD-10-CM

## 2024-08-01 DIAGNOSIS — I48.92 ATRIAL FLUTTER, PAROXYSMAL (H): ICD-10-CM

## 2024-08-01 DIAGNOSIS — Z79.01 LONG TERM (CURRENT) USE OF ANTICOAGULANTS: ICD-10-CM

## 2024-08-01 DIAGNOSIS — I48.20 CHRONIC ATRIAL FIBRILLATION (H): Primary | ICD-10-CM

## 2024-08-01 LAB — INR BLD: 2.2 (ref 0.9–1.1)

## 2024-08-01 PROCEDURE — 85610 PROTHROMBIN TIME: CPT

## 2024-08-01 PROCEDURE — 36416 COLLJ CAPILLARY BLOOD SPEC: CPT

## 2024-08-01 NOTE — PROGRESS NOTES
ANTICOAGULATION MANAGEMENT     Parish Bills 84 year old male is on warfarin with therapeutic INR result. (Goal INR 2.0-3.0)    Recent labs: (last 7 days)     08/01/24  0801   INR 2.2*       ASSESSMENT     Source(s): Chart Review and Patient/Caregiver Call     Warfarin doses taken: Warfarin taken as instructed  Diet: No new diet changes identified  Medication/supplement changes: None noted  New illness, injury, or hospitalization:     Doing well.  Signs or symptoms of bleeding or clotting: No  Previous result: Therapeutic last visit at 2.8; previously outside of goal range at 3.4  Additional findings: None       PLAN     Recommended plan for no diet, medication or health factor changes affecting INR     Dosing Instructions: Continue your current warfarin dose with next INR in 3-4 weeks       Summary  As of 8/1/2024      Full warfarin instructions:  5 mg every day   Next INR check:  8/22/2024               Telephone call with Parish who verbalizes understanding and agrees to plan    Lab visit scheduled - INR on 8/29/24 @ [facility].    Education provided: Taking warfarin: Importance of taking warfarin as instructed  Goal range and lab monitoring: goal range and significance of current result    Plan made per [facility] anticoagulation protocol    Kori Ziegler RN  Anticoagulation Clinic  8/1/2024    _______________________________________________________________________     Anticoagulation Episode Summary       Current INR goal:  2.0-3.0   TTR:  92.6% (1 y)   Target end date:  Indefinite   Send INR reminders to:  [facility]    Indications    Chronic atrial fibrillation (H) [I48.20]  H/O mitral valve repair [Z98.890]  S/P AVR [Z95.2]  A-fib (H) (Resolved) [I48.91]  Long term (current) use of anticoagulants [Z79.01]  Atrial flutter  paroxysmal (H) [I48.92]  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

## 2024-08-12 ENCOUNTER — OFFICE VISIT (OUTPATIENT)
Dept: FAMILY MEDICINE | Facility: CLINIC | Age: 84
End: 2024-08-12
Payer: COMMERCIAL

## 2024-08-12 ENCOUNTER — ANCILLARY PROCEDURE (OUTPATIENT)
Dept: GENERAL RADIOLOGY | Facility: CLINIC | Age: 84
End: 2024-08-12
Attending: FAMILY MEDICINE
Payer: COMMERCIAL

## 2024-08-12 VITALS
RESPIRATION RATE: 16 BRPM | TEMPERATURE: 98.2 F | WEIGHT: 198 LBS | DIASTOLIC BLOOD PRESSURE: 79 MMHG | SYSTOLIC BLOOD PRESSURE: 149 MMHG | BODY MASS INDEX: 28.82 KG/M2 | OXYGEN SATURATION: 96 % | HEART RATE: 63 BPM

## 2024-08-12 DIAGNOSIS — R05.1 ACUTE COUGH: ICD-10-CM

## 2024-08-12 DIAGNOSIS — I10 PRIMARY HYPERTENSION: Primary | ICD-10-CM

## 2024-08-12 DIAGNOSIS — F33.1 MODERATE EPISODE OF RECURRENT MAJOR DEPRESSIVE DISORDER (H): ICD-10-CM

## 2024-08-12 LAB
BASOPHILS # BLD AUTO: 0 10E3/UL (ref 0–0.2)
BASOPHILS NFR BLD AUTO: 1 %
EOSINOPHIL # BLD AUTO: 0.2 10E3/UL (ref 0–0.7)
EOSINOPHIL NFR BLD AUTO: 3 %
ERYTHROCYTE [DISTWIDTH] IN BLOOD BY AUTOMATED COUNT: 13.8 % (ref 10–15)
HCT VFR BLD AUTO: 33.8 % (ref 40–53)
HGB BLD-MCNC: 10.9 G/DL (ref 13.3–17.7)
HOLD SPECIMEN: NORMAL
IMM GRANULOCYTES # BLD: 0 10E3/UL
IMM GRANULOCYTES NFR BLD: 0 %
LYMPHOCYTES # BLD AUTO: 0.6 10E3/UL (ref 0.8–5.3)
LYMPHOCYTES NFR BLD AUTO: 12 %
MCH RBC QN AUTO: 31.1 PG (ref 26.5–33)
MCHC RBC AUTO-ENTMCNC: 32.2 G/DL (ref 31.5–36.5)
MCV RBC AUTO: 96 FL (ref 78–100)
MONOCYTES # BLD AUTO: 0.9 10E3/UL (ref 0–1.3)
MONOCYTES NFR BLD AUTO: 16 %
NEUTROPHILS # BLD AUTO: 3.8 10E3/UL (ref 1.6–8.3)
NEUTROPHILS NFR BLD AUTO: 68 %
PLATELET # BLD AUTO: 116 10E3/UL (ref 150–450)
RBC # BLD AUTO: 3.51 10E6/UL (ref 4.4–5.9)
WBC # BLD AUTO: 5.5 10E3/UL (ref 4–11)

## 2024-08-12 PROCEDURE — 85025 COMPLETE CBC W/AUTO DIFF WBC: CPT | Performed by: FAMILY MEDICINE

## 2024-08-12 PROCEDURE — 36415 COLL VENOUS BLD VENIPUNCTURE: CPT | Performed by: FAMILY MEDICINE

## 2024-08-12 PROCEDURE — 71046 X-RAY EXAM CHEST 2 VIEWS: CPT | Mod: TC | Performed by: RADIOLOGY

## 2024-08-12 PROCEDURE — 99214 OFFICE O/P EST MOD 30 MIN: CPT | Performed by: FAMILY MEDICINE

## 2024-08-12 RX ORDER — PREDNISONE 20 MG/1
20 TABLET ORAL DAILY
Qty: 5 TABLET | Refills: 0 | Status: SHIPPED | OUTPATIENT
Start: 2024-08-12

## 2024-08-12 ASSESSMENT — PATIENT HEALTH QUESTIONNAIRE - PHQ9
SUM OF ALL RESPONSES TO PHQ QUESTIONS 1-9: 6
SUM OF ALL RESPONSES TO PHQ QUESTIONS 1-9: 6

## 2024-08-12 ASSESSMENT — ENCOUNTER SYMPTOMS: COUGH: 1

## 2024-08-12 NOTE — PROGRESS NOTES
Assessment & Plan     Primary hypertension  Blood pressure slightly elevated today in the setting of acute illness will monitor and reevaluate once feeling better    Acute cough  Approximately 5 days of ongoing cough and wheezing  White blood cells not elevated no neutrophil shift  No acute infiltrate noted on x-ray films  There is a nodule noted in the left lower lung field on AP view which will need to be followed up on  For wheezing discussed a 5-day burst of prednisone  Discussed update me next week with change of symptoms  - XR Chest 2 Views  - CBC with platelets and differential  - CBC with platelets and differential  - predniSONE (DELTASONE) 20 MG tablet  Dispense: 5 tablet; Refill: 0    Moderate episode of recurrent major depressive disorder (H)  Patient feels his current dosing is working well  Continue with current dosing of paroxetine                  Mateo Lugo is a 84 year old, presenting for the following health issues:  Cough (Productive cough, wheezing X5 days )        8/12/2024    10:57 AM   Additional Questions   Roomed by Brittany SANCHEZ CMA     History of Present Illness       Reason for visit:  Cough, wheezing  Symptom onset:  3-7 days ago  Symptom intensity:  Moderate  Symptom progression:  Staying the same  Had these symptoms before:  Yes  Has tried/received treatment for these symptoms:  No  What makes it worse:  No  What makes it better:  No    He eats 2-3 servings of fruits and vegetables daily.He consumes 0 sweetened beverage(s) daily.He exercises with enough effort to increase his heart rate 9 or less minutes per day.  He exercises with enough effort to increase his heart rate 3 or less days per week.   He is taking medications regularly.     Presenting with a cough for the last 5 days  He is having a lot of wheezing with the cough cough is typically nonproductive  No other sick contacts to his knowledge  No fevers or chills  Blood work showing no white blood cell count elevation nor  neutrophil shift  No acute infiltrate noted on x-ray imaging however there is a nodule left lower lung field the need to be follow-up on the AP view  Ongoing wheezing discussed with patient a burst of prednisone this week and update me next week with change of symptoms  Do not suspect pneumonia or bronchitis  Possible viral etiology  Blood pressure slightly elevated in the setting of acute illness will monitor  Feels current dosing and SSRI has been helping him                Objective    BP (!) 149/79 (BP Location: Left arm, Patient Position: Sitting, Cuff Size: Adult Large)   Pulse 63   Temp 98.2  F (36.8  C) (Oral)   Resp 16   Wt 89.8 kg (198 lb)   SpO2 96%   BMI 28.82 kg/m    Body mass index is 28.82 kg/m .  Physical Exam   GENERAL: alert and no distress  EYES: Eyes grossly normal to inspection, PERRL and conjunctivae and sclerae normal  NECK: no adenopathy, no asymmetry, masses, or scars  RESP: Wheezing at the bases no rhonchi  CV: regular rates and rhythm and no peripheral edema  MS: no gross musculoskeletal defects noted, no edema            Signed Electronically by: Isidro Au MD

## 2024-08-16 ENCOUNTER — TELEPHONE (OUTPATIENT)
Dept: FAMILY MEDICINE | Facility: CLINIC | Age: 84
End: 2024-08-16

## 2024-08-16 NOTE — TELEPHONE ENCOUNTER
----- Message from Isidro Au sent at 8/15/2024  2:41 PM CDT -----  Please contact patient and see how he is feeling- xray imaging on one of the images had a nodule which was no seen on second image- we will need to do a follow up image to further evaluate- but want his symptoms to resolve first.

## 2024-08-19 ENCOUNTER — TELEPHONE (OUTPATIENT)
Dept: FAMILY MEDICINE | Facility: CLINIC | Age: 84
End: 2024-08-19
Payer: COMMERCIAL

## 2024-08-19 ENCOUNTER — TELEPHONE (OUTPATIENT)
Dept: FAMILY MEDICINE | Facility: CLINIC | Age: 84
End: 2024-08-19

## 2024-08-19 DIAGNOSIS — R91.8 PULMONARY NODULES: Primary | ICD-10-CM

## 2024-08-19 PROBLEM — F33.9 MAJOR DEPRESSION, RECURRENT (H): Status: ACTIVE | Noted: 2024-08-19

## 2024-08-19 NOTE — TELEPHONE ENCOUNTER
FYI - Status Update    Who is Calling: patient    Update: patient called into report how he is feeling after starting prednisone per Dr. Casas for update. Patient stated he is doing alright. Seems like everything is loose in his lung. Stated nothing is harsh for coughing, cough is more gentle now and he is able to produce phlegm when he coughs.     Does caller want a call/response back: Yes     Okay to leave a detailed message?: Yes at Cell number on file:    Telephone Information:   Mobile [phone]

## 2024-08-19 NOTE — TELEPHONE ENCOUNTER
----- Message from Isidro Au sent at 8/19/2024  7:52 AM CDT -----  Please contact patient and see how he is feeling this week after the steroids last week.  Please see if the cough and wheezing has resolved?  Please inform him that radiology noted a small nodule on his left lower lung and one of the 2 x-ray images that I would like to follow-up on.  I would like to obtain a lung CT to further evaluate the possible nodule.  If he is okay with this we will place an order for CT of the lung.

## 2024-08-19 NOTE — TELEPHONE ENCOUNTER
Patient notified of results and recommendations. States he will do CT.  He is still coughing, wheezing, states cough is loose.

## 2024-08-27 ENCOUNTER — HOSPITAL ENCOUNTER (OUTPATIENT)
Dept: CT IMAGING | Facility: HOSPITAL | Age: 84
Discharge: HOME | End: 2024-08-27
Attending: FAMILY MEDICINE | Admitting: FAMILY MEDICINE
Payer: COMMERCIAL

## 2024-08-27 DIAGNOSIS — R91.8 PULMONARY NODULES: ICD-10-CM

## 2024-08-27 PROCEDURE — 71250 CT THORAX DX C-: CPT

## 2024-08-29 ENCOUNTER — TELEPHONE (OUTPATIENT)
Dept: FAMILY MEDICINE | Facility: CLINIC | Age: 84
End: 2024-08-29

## 2024-08-29 ENCOUNTER — LAB (OUTPATIENT)
Dept: LAB | Facility: CLINIC | Age: 84
End: 2024-08-29
Payer: COMMERCIAL

## 2024-08-29 ENCOUNTER — ANTICOAGULATION THERAPY VISIT (OUTPATIENT)
Dept: ANTICOAGULATION | Facility: CLINIC | Age: 84
End: 2024-08-29

## 2024-08-29 DIAGNOSIS — I48.4 ATYPICAL ATRIAL FLUTTER (H): ICD-10-CM

## 2024-08-29 DIAGNOSIS — I48.20 CHRONIC ATRIAL FIBRILLATION (H): ICD-10-CM

## 2024-08-29 DIAGNOSIS — I48.92 ATRIAL FLUTTER, PAROXYSMAL (H): ICD-10-CM

## 2024-08-29 DIAGNOSIS — Z95.2 S/P AVR: ICD-10-CM

## 2024-08-29 DIAGNOSIS — Z98.890 H/O MITRAL VALVE REPAIR: ICD-10-CM

## 2024-08-29 DIAGNOSIS — Z79.01 LONG TERM (CURRENT) USE OF ANTICOAGULANTS: ICD-10-CM

## 2024-08-29 DIAGNOSIS — I48.20 CHRONIC ATRIAL FIBRILLATION (H): Primary | ICD-10-CM

## 2024-08-29 DIAGNOSIS — R05.8 OTHER COUGH: Primary | ICD-10-CM

## 2024-08-29 LAB — INR BLD: 2.5 (ref 0.9–1.1)

## 2024-08-29 PROCEDURE — 36416 COLLJ CAPILLARY BLOOD SPEC: CPT

## 2024-08-29 PROCEDURE — 85610 PROTHROMBIN TIME: CPT

## 2024-08-29 RX ORDER — CETIRIZINE HYDROCHLORIDE 10 MG/1
10 TABLET ORAL DAILY
Qty: 30 TABLET | Refills: 1 | Status: SHIPPED | OUTPATIENT
Start: 2024-08-29

## 2024-08-29 NOTE — TELEPHONE ENCOUNTER
Parish calling in to request result form the CT scan ordered by Dr Au.  CT comment relayed to Parish and reviewed yearly follow up plan.  Parish states that he has a CT yearly for his AAA.  He is requesting that Dr Au coordinate with his cardiologist Dr Jason Mcfarlane from [facility] so they can schedule 1 CT yearly to be followed for these concerns.      Parish would also like Dr Au to know that his cough is still present after taking the prednisone.

## 2024-08-29 NOTE — PROGRESS NOTES
ANTICOAGULATION MANAGEMENT     Parish Bills 84 year old male is on warfarin with therapeutic INR result. (Goal INR 2.0-3.0)    Recent labs: (last 7 days)     08/29/24  0808   INR 2.5*       ASSESSMENT     Warfarin Lab Questionnaire    Warfarin Doses Last 7 Days      8/29/2024     8:02 AM   Dose in Tablet or Mg   TAB or MG? milligram (mg)     Pt Rptd Dose SUNDAY MONDAY TUESDAY WED THURS FRIDAY SATURDAY 8/29/2024   8:02 AM 5 5 5 5 5 5 5         8/29/2024   Warfarin Lab Questionnaire   Missed doses within past 14 days? No   Changes in diet or alcohol within past 14 days? No   Medication changes since last result? No   Injuries or illness since last result? No-has had cough past couple weeks. Seems better but not gone.    New shortness of breath, severe headaches or sudden changes in vision since last result? No   Abnormal bleeding since last result? No   Upcoming surgery, procedure? No   Best number to call with results? [phone]        Previous result: Therapeutic last 2(+) visits  Additional findings:  He was on Prednisone for 5 days starting 8/12. He understands to call if any new medications.        PLAN     Recommended plan for no diet, medication or health factor changes affecting INR     Dosing Instructions: Continue your current warfarin dose with next INR in 4 weeks       Summary  As of 8/29/2024      Full warfarin instructions:  5 mg every day   Next INR check:  9/26/2024               Telephone call with Parish who agrees to plan and repeated back plan correctly    Lab visit scheduled    Education provided: Please call back if any changes to your diet, medications or how you've been taking warfarin  Goal range and lab monitoring: goal range and significance of current result and Importance of therapeutic range  Importance of notifying anticoagulation clinic for: changes in medications; a sooner lab recheck maybe needed  Contact [phone] with any changes, questions or concerns.     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

## 2024-08-29 NOTE — TELEPHONE ENCOUNTER
Please inform patient I have sent a pill to the pharmacy for him to start to see if taking this once daily will help suppress the cough- it is an allergy type medication.

## 2024-09-03 NOTE — TELEPHONE ENCOUNTER
Called and spoke with Parish, he states he did not  the medication. Feels the cough is much better.

## 2024-09-04 ENCOUNTER — TELEPHONE (OUTPATIENT)
Dept: FAMILY MEDICINE | Facility: CLINIC | Age: 84
End: 2024-09-04
Payer: COMMERCIAL

## 2024-09-04 NOTE — TELEPHONE ENCOUNTER
Incoming call from patient with questions regarding Chest CT. Questioned cholecystectomy - discussed that this means the gallbladder was not there due to removal. Patient questioned whether these results should be discussed with his vascular and cardiology providers - advised that he could certainly share these results and discuss with his specialty providers to see if they have any additional recommendations. Patient stated understanding and had no further questions at this time.    Zara Osorio RN

## 2024-09-13 DIAGNOSIS — I10 ESSENTIAL HYPERTENSION: ICD-10-CM

## 2024-09-13 DIAGNOSIS — F32.0 CURRENT MILD EPISODE OF MAJOR DEPRESSIVE DISORDER, UNSPECIFIED WHETHER RECURRENT (H): ICD-10-CM

## 2024-09-15 RX ORDER — BUPROPION HYDROCHLORIDE 150 MG/1
TABLET ORAL
Qty: 100 TABLET | Refills: 0 | Status: SHIPPED | OUTPATIENT
Start: 2024-09-15

## 2024-09-15 RX ORDER — LOSARTAN POTASSIUM 25 MG/1
12.5 TABLET ORAL DAILY
Qty: 50 TABLET | Refills: 3 | Status: SHIPPED | OUTPATIENT
Start: 2024-09-15

## 2024-09-23 DIAGNOSIS — I48.20 CHRONIC ATRIAL FIBRILLATION (H): ICD-10-CM

## 2024-09-23 DIAGNOSIS — Z79.01 LONG TERM (CURRENT) USE OF ANTICOAGULANTS: ICD-10-CM

## 2024-09-23 RX ORDER — WARFARIN SODIUM 5 MG/1
TABLET ORAL
Qty: 100 TABLET | Refills: 1 | Status: SHIPPED | OUTPATIENT
Start: 2024-09-23

## 2024-09-23 NOTE — TELEPHONE ENCOUNTER
Patient called requesting urgent refill called in to [facility] for Warfarin 5mg for once a day. He wants Dr. Au to prescribe it.

## 2024-09-23 NOTE — TELEPHONE ENCOUNTER
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 Rx instructions and quantity supplied updated to match patient's current dosing plan. Warfarin 90 day supply with 1 refill granted per [facility] protocol     Zahra Gonzalez RN  Anticoagulation Clinic

## 2024-09-26 ENCOUNTER — LAB (OUTPATIENT)
Dept: LAB | Facility: CLINIC | Age: 84
End: 2024-09-26
Payer: COMMERCIAL

## 2024-09-26 ENCOUNTER — ANTICOAGULATION THERAPY VISIT (OUTPATIENT)
Dept: ANTICOAGULATION | Facility: CLINIC | Age: 84
End: 2024-09-26

## 2024-09-26 DIAGNOSIS — I48.20 CHRONIC ATRIAL FIBRILLATION (H): Primary | ICD-10-CM

## 2024-09-26 DIAGNOSIS — Z95.2 S/P AVR: ICD-10-CM

## 2024-09-26 DIAGNOSIS — I48.92 ATRIAL FLUTTER, PAROXYSMAL (H): ICD-10-CM

## 2024-09-26 DIAGNOSIS — Z98.890 H/O MITRAL VALVE REPAIR: ICD-10-CM

## 2024-09-26 DIAGNOSIS — I48.20 CHRONIC ATRIAL FIBRILLATION (H): ICD-10-CM

## 2024-09-26 DIAGNOSIS — Z79.01 LONG TERM (CURRENT) USE OF ANTICOAGULANTS: ICD-10-CM

## 2024-09-26 DIAGNOSIS — I48.4 ATYPICAL ATRIAL FLUTTER (H): ICD-10-CM

## 2024-09-26 LAB — INR BLD: 2.4 (ref 0.9–1.1)

## 2024-09-26 PROCEDURE — 85610 PROTHROMBIN TIME: CPT

## 2024-09-26 PROCEDURE — 36416 COLLJ CAPILLARY BLOOD SPEC: CPT

## 2024-09-26 NOTE — PROGRESS NOTES
ANTICOAGULATION MANAGEMENT     Parish Bills 84 year old male is on warfarin with therapeutic INR result. (Goal INR 2.0-3.0)    Recent labs: (last 7 days)     09/26/24  0754   INR 2.4*       ASSESSMENT     Warfarin Lab Questionnaire    Warfarin Doses Last 7 Days      9/26/2024     7:59 AM   Dose in Tablet or Mg   TAB or MG? - 5mg warfarin tablets (evenings) milligram (mg)     Pt Rptd Dose SUNDAY MONDAY TUESDAY WED THURS FRIDAY SATURDAY 9/26/2024   7:59 AM 5 5 5 5 5 5 5         9/26/2024   Warfarin Lab Questionnaire   Missed doses within past 14 days? No   Changes in diet or alcohol within past 14 days? No   Medication changes since last result? No   Injuries or illness since last result? No   New shortness of breath, severe headaches or sudden changes in vision since last result? No   Abnormal bleeding since last result? No   Upcoming surgery, procedure? No   Best number to call with results? [phone]        Previous result: Therapeutic last 3 INR visits.    Additional findings: None       PLAN     Recommended plan for no diet, medication or health factor changes affecting INR     Dosing Instructions: Continue your current warfarin dose with next INR in 5-6 weeks       Summary  As of 9/26/2024      Full warfarin instructions:  5 mg every day   Next INR check:  10/31/2024               Telephone call with Parish who verbalizes understanding and agrees to plan    Lab visit scheduled - INR on 11/7/24 @ [facility]   - will be deer hunting before.    Education provided: Taking warfarin: Importance of taking warfarin as instructed  Goal range and lab monitoring: goal range and significance of current result    Plan made per [facility] anticoagulation protocol    Kori Ziegler RN  9/26/2024  Anticoagulation Clinic  [employer] for routing messages: nicole ODONNELL  [facility] patient phone line: [phone]        _______________________________________________________________________     Anticoagulation Episode Summary        Current INR goal:  2.0-3.0   TTR:  92.6% (1 y)   Target end date:  Indefinite   Send INR reminders to:  AMBERKARYNA RAYRANDA ODONNELL    Indications    Chronic atrial fibrillation (H) [I48.20]  H/O mitral valve repair [Z98.890]  S/P AVR [Z95.2]  A-fib (H) (Resolved) [I48.91]  Long term (current) use of anticoagulants [Z79.01]  Atrial flutter  paroxysmal (H) [I48.92]  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

## 2024-10-03 ENCOUNTER — TELEPHONE (OUTPATIENT)
Dept: FAMILY MEDICINE | Facility: CLINIC | Age: 84
End: 2024-10-03
Payer: COMMERCIAL

## 2024-10-03 NOTE — TELEPHONE ENCOUNTER
Patient calling in with back pain looking for appointment to be seen with PCP. Offered appointment for tomorrow and patient said couldn't wait that long. Patient stated [facility]    Celena Swift RN

## 2024-10-08 ENCOUNTER — OFFICE VISIT (OUTPATIENT)
Dept: FAMILY MEDICINE | Facility: CLINIC | Age: 84
End: 2024-10-08
Payer: COMMERCIAL

## 2024-10-08 VITALS
WEIGHT: 198 LBS | BODY MASS INDEX: 29.33 KG/M2 | DIASTOLIC BLOOD PRESSURE: 63 MMHG | RESPIRATION RATE: 20 BRPM | SYSTOLIC BLOOD PRESSURE: 100 MMHG | TEMPERATURE: 98.7 F | HEIGHT: 69 IN | HEART RATE: 60 BPM | OXYGEN SATURATION: 95 %

## 2024-10-08 DIAGNOSIS — M54.50 LUMBAR BACK PAIN: ICD-10-CM

## 2024-10-08 DIAGNOSIS — I10 PRIMARY HYPERTENSION: Primary | ICD-10-CM

## 2024-10-08 PROCEDURE — 99214 OFFICE O/P EST MOD 30 MIN: CPT | Performed by: FAMILY MEDICINE

## 2024-10-08 RX ORDER — PREDNISONE 20 MG/1
20 TABLET ORAL DAILY
Qty: 7 TABLET | Refills: 0 | Status: SHIPPED | OUTPATIENT
Start: 2024-10-08 | End: 2024-10-29

## 2024-10-08 ASSESSMENT — PATIENT HEALTH QUESTIONNAIRE - PHQ9
SUM OF ALL RESPONSES TO PHQ QUESTIONS 1-9: 9
SUM OF ALL RESPONSES TO PHQ QUESTIONS 1-9: 9
10. IF YOU CHECKED OFF ANY PROBLEMS, HOW DIFFICULT HAVE THESE PROBLEMS MADE IT FOR YOU TO DO YOUR WORK, TAKE CARE OF THINGS AT HOME, OR GET ALONG WITH OTHER PEOPLE: SOMEWHAT DIFFICULT

## 2024-10-08 NOTE — PROGRESS NOTES
"  Assessment & Plan     Lumbar back pain  Sudden worsening of severe lumbar back pain history of lumbar surgery years ago with likely advancing arthritic and stenotic changes  Seen at the ER prescribed muscle relaxants and a few pain pills  Patient's symptoms have improved but are still present and severe  He has not been taking the pain medication  We discussed a burst on steroids to try to take away inflammation otherwise referral to spine care likely further imaging will be needed in the form of MRI  If pain has not been improved or worsening consider short course of narcotics  - predniSONE (DELTASONE) 20 MG tablet  Dispense: 7 tablet; Refill: 0    Primary hypertension  Blood pressure at the low end of normal is not expressing any dizziness will continue to monitor symptoms if any lightheaded or dizziness with changing positions decrease on antihypertensive  After further discussion we discussed with patient to have his beta-blocker to avoid any orthostatic falls.          BMI  Estimated body mass index is 29.66 kg/m  as calculated from the following:    Height as of this encounter: 1.74 m (5' 8.5\").    Weight as of this encounter: 89.8 kg (198 lb).             Mateo Lugo is a 84 year old, presenting for the following health issues:  Follow Up (No concerns just back pain)        10/8/2024     2:47 PM   Additional Questions   Roomed by Monster LOPEZ MA   Accompanied by Wife ( Ileana )     HPI     84-year-old here for ER follow-up was seen a few days ago for severe lumbar back pain of sudden onset.  No acute trauma to his recollection.  Presents with his wife today.  Patient is a history of lumbar problems and surgery years ago.  Has undergone injections.  Suspect that there is worsening arthritic and possible stenosis issues with the back CT imaging showing no acute new finding but all chronic advancements of previous imaging.  Patient has had a little bit of improvement but still is quite a bit of discomfort.  " "Cannot find a comfortable position.  Discussed with him that likely the Flexeril is not doing for him which he agrees he will discontinue the medication.  He is trying avoid the use of the narcotics based on how he feels after taking them.  Discussed with him a course of prednisone to see if can help take away some of the inflammation and discomfort otherwise referral to spine care.  He will update me in a few days with symptoms.  Blood pressure is at goal range we will continue-to discussed with him although it is on the low end of normal and we might need to adjust medications if blood pressure is running low and he is symptomatic.              Objective    /63   Pulse 60   Temp 98.7  F (37.1  C) (Oral)   Resp 20   Ht 1.74 m (5' 8.5\")   Wt 89.8 kg (198 lb)   SpO2 95%   BMI 29.66 kg/m    Body mass index is 29.66 kg/m .  Physical Exam   GENERAL: alert and no distress  RESP: lungs clear to auscultation - no rales, rhonchi or wheezes  CV: regular rate and rhythm, normal S1 S2, no S3 or S4, no murmur, click or rub, no peripheral edema  MS: Pain located in lumbar region with minimal radiation to the lower extremities  PSYCH: mentation appears normal, affect normal/bright            Signed Electronically by: Isidro uA MD    Answers submitted by the patient for this visit:  Patient Health Questionnaire (Submitted on 10/8/2024)  If you checked off any problems, how difficult have these problems made it for you to do your work, take care of things at home, or get along with other people?: Somewhat difficult  PHQ9 TOTAL SCORE: 9    "

## 2024-10-15 ENCOUNTER — TELEPHONE (OUTPATIENT)
Dept: FAMILY MEDICINE | Facility: CLINIC | Age: 84
End: 2024-10-15
Payer: COMMERCIAL

## 2024-10-15 NOTE — TELEPHONE ENCOUNTER
Called and talked with Parish,     - completed 2 days ago Prednisone  x7 days  for back strain.   - back on Losartan for better control of his blood pressure.     - INR scheduled on 10/28/24 @ [facility].

## 2024-10-15 NOTE — TELEPHONE ENCOUNTER
Glad to hear the back is somewhat better after the prednisone.  The bruising increase is not surprising with the combination of blood thinners and prednisone.  This should get better now that you are no longer on prednisone.  I would continue losartan and monitor blood pressure.

## 2024-10-15 NOTE — TELEPHONE ENCOUNTER
FYI - Status Update    Who is Calling: patient    Update: Prednisone course ended yesterday, pain is definitely better following steroids. Low back pain is described as a nagging ache now.   Sweating and bruising easily following prednisone course.     Additionally, BP has been all over the place since coming off losartan 12.5mg daily. Thursday 10/09, BP went up to 176/89, 186/96. Patient added back the losartan. BP today is 132/68.    Does caller want a call/response back: Yes     Okay to leave a detailed message?: Yes at Home number on file [phone] (home)

## 2024-10-15 NOTE — TELEPHONE ENCOUNTER
FYI - Status Update    Who is Calling: patient    Update: patient wanted to speak to Javier nurse he wanted to update her with some of his meds and see if he needs to be seen sooner    Does caller want a call/response back: Yes     Okay to leave a detailed message?: Yes at Cell number on file:    Telephone Information:   Mobile [phone]

## 2024-10-24 ENCOUNTER — TRANSFERRED RECORDS (OUTPATIENT)
Dept: HEALTH INFORMATION MANAGEMENT | Facility: CLINIC | Age: 84
End: 2024-10-24
Payer: COMMERCIAL

## 2024-10-28 ENCOUNTER — LAB (OUTPATIENT)
Dept: LAB | Facility: CLINIC | Age: 84
End: 2024-10-28
Payer: COMMERCIAL

## 2024-10-28 ENCOUNTER — ANTICOAGULATION THERAPY VISIT (OUTPATIENT)
Dept: ANTICOAGULATION | Facility: CLINIC | Age: 84
End: 2024-10-28

## 2024-10-28 DIAGNOSIS — I48.4 ATYPICAL ATRIAL FLUTTER (H): ICD-10-CM

## 2024-10-28 DIAGNOSIS — Z98.890 H/O MITRAL VALVE REPAIR: ICD-10-CM

## 2024-10-28 DIAGNOSIS — I48.92 ATRIAL FLUTTER, PAROXYSMAL (H): ICD-10-CM

## 2024-10-28 DIAGNOSIS — I48.20 CHRONIC ATRIAL FIBRILLATION (H): ICD-10-CM

## 2024-10-28 DIAGNOSIS — I48.20 CHRONIC ATRIAL FIBRILLATION (H): Primary | ICD-10-CM

## 2024-10-28 DIAGNOSIS — Z95.2 S/P AVR: ICD-10-CM

## 2024-10-28 DIAGNOSIS — Z79.01 LONG TERM (CURRENT) USE OF ANTICOAGULANTS: ICD-10-CM

## 2024-10-28 LAB — INR BLD: 3.6 (ref 0.9–1.1)

## 2024-10-28 PROCEDURE — 85610 PROTHROMBIN TIME: CPT

## 2024-10-28 PROCEDURE — 36416 COLLJ CAPILLARY BLOOD SPEC: CPT

## 2024-10-28 NOTE — PROGRESS NOTES
ANTICOAGULATION MANAGEMENT     Parish Bills 84 year old male is on warfarin with supratherapeutic INR result. (Goal INR 2.0-3.0)    Recent labs: (last 7 days)     10/28/24  0850   INR 3.6*       ASSESSMENT     Warfarin Lab Questionnaire    Warfarin Doses Last 7 Days      10/28/2024     8:53 AM   Dose in Tablet or Mg   TAB or MG? - 5mg warfarin tablets (evenings)  milligram (mg)     Pt Rptd Dose JON MONDAY TUESDAY WED THURS FRIDAY SATURDAY   10/28/2024   8:53 AM 5 5 5 5 5 5 5         10/28/2024   Warfarin Lab Questionnaire   Missed doses within past 14 days? No   Changes in diet or alcohol within past 14 days? No   Medication changes since last result? Yes  - reported last dose taking ES-Tylenol 3 days ago.          - 10/8/24 Prednisone 20mg one tab daily.          - 10/3/24 prescribed: Tylenol 1000mg q6hrs, Flxeril PRN for muscle spasms, and Oxycodone only for severe pain.   Please list: prednisone  7days     Injuries or illness since last result? No  - reported back pain a little better.         - 10/24/24 OV with [facility] - iliac artery aneurysm. Showed slight enlargment.  F/unit(s) with Dr. Akira Reina in 3 months.         - 10/8/24 follow-up visit with PCP for worsening of sever lumbar back pain. If not any better, will refer to MRI and Spine Care          - Yes on 10/3/24 presented ED for lower back pain.   New shortness of breath, severe headaches or sudden changes in vision since last result? No   Abnormal bleeding since last result? No   Upcoming surgery, procedure? No   Best number to call with results? [phone]        Previous result: Therapeutic last 4 INR visits.    Additional findings: None       PLAN     Recommended plan for temporary change(s) affecting INR     Dosing Instructions: hold dose then continue your current warfarin dose with next INR in 2 weeks       Summary  As of 10/28/2024      Full warfarin instructions:  10/28: Hold; Otherwise 5 mg every day   Next INR check:   11/11/2024               Telephone call with Parish who verbalizes understanding and agrees to plan    Lab visit scheduled - INR on 11/12/24 @ [facility]    Education provided: Taking warfarin: Importance of taking warfarin as instructed  Goal range and lab monitoring: goal range and significance of current result  Interaction IS anticipated between warfarin and Prednisone and ES-Tylenol    Plan made per [facility] anticoagulation protocol    Kori Ziegler RN  10/28/2024  Anticoagulation Clinic  [employer] for routing messages: p AMBERKARYNA LUC ODONNELL  [facility] patient phone line: [phone]        _______________________________________________________________________     Anticoagulation Episode Summary       Current INR goal:  2.0-3.0   TTR:  88.3% (1 y)   Target end date:  Indefinite   Send INR reminders to:  SHANICE ODONNELL    Indications    Chronic atrial fibrillation (H) [I48.20]  H/O mitral valve repair [Z98.890]  S/P AVR [Z95.2]  A-fib (H) (Resolved) [I48.91]  Long term (current) use of anticoagulants [Z79.01]  Atrial flutter  paroxysmal (H) [I48.92]  Atypical atrial flutter (H) [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

## 2024-10-29 ENCOUNTER — TELEPHONE (OUTPATIENT)
Dept: ANTICOAGULATION | Facility: CLINIC | Age: 84
End: 2024-10-29

## 2024-10-29 ENCOUNTER — OFFICE VISIT (OUTPATIENT)
Dept: FAMILY MEDICINE | Facility: CLINIC | Age: 84
End: 2024-10-29
Payer: COMMERCIAL

## 2024-10-29 VITALS
WEIGHT: 194.06 LBS | HEIGHT: 69 IN | BODY MASS INDEX: 28.74 KG/M2 | RESPIRATION RATE: 12 BRPM | TEMPERATURE: 98.1 F | SYSTOLIC BLOOD PRESSURE: 103 MMHG | OXYGEN SATURATION: 97 % | HEART RATE: 60 BPM | DIASTOLIC BLOOD PRESSURE: 63 MMHG

## 2024-10-29 DIAGNOSIS — I48.20 CHRONIC ATRIAL FIBRILLATION (H): Primary | ICD-10-CM

## 2024-10-29 DIAGNOSIS — L03.115 CELLULITIS OF LEG, RIGHT: Primary | ICD-10-CM

## 2024-10-29 DIAGNOSIS — Z79.01 LONG TERM (CURRENT) USE OF ANTICOAGULANTS: ICD-10-CM

## 2024-10-29 DIAGNOSIS — Z95.2 S/P AVR: ICD-10-CM

## 2024-10-29 DIAGNOSIS — Z98.890 H/O MITRAL VALVE REPAIR: ICD-10-CM

## 2024-10-29 DIAGNOSIS — I48.92 ATRIAL FLUTTER, PAROXYSMAL (H): ICD-10-CM

## 2024-10-29 DIAGNOSIS — I10 PRIMARY HYPERTENSION: ICD-10-CM

## 2024-10-29 DIAGNOSIS — I48.4 ATYPICAL ATRIAL FLUTTER (H): ICD-10-CM

## 2024-10-29 PROCEDURE — 99214 OFFICE O/P EST MOD 30 MIN: CPT

## 2024-10-29 RX ORDER — CEPHALEXIN 500 MG/1
500 CAPSULE ORAL 2 TIMES DAILY
Status: CANCELLED | OUTPATIENT
Start: 2024-10-29

## 2024-10-29 RX ORDER — SULFAMETHOXAZOLE AND TRIMETHOPRIM 800; 160 MG/1; MG/1
1 TABLET ORAL 2 TIMES DAILY
Qty: 14 TABLET | Refills: 0 | Status: SHIPPED | OUTPATIENT
Start: 2024-10-29 | End: 2024-11-05

## 2024-10-29 ASSESSMENT — PATIENT HEALTH QUESTIONNAIRE - PHQ9
SUM OF ALL RESPONSES TO PHQ QUESTIONS 1-9: 7
SUM OF ALL RESPONSES TO PHQ QUESTIONS 1-9: 7
10. IF YOU CHECKED OFF ANY PROBLEMS, HOW DIFFICULT HAVE THESE PROBLEMS MADE IT FOR YOU TO DO YOUR WORK, TAKE CARE OF THINGS AT HOME, OR GET ALONG WITH OTHER PEOPLE: SOMEWHAT DIFFICULT

## 2024-10-29 NOTE — PROGRESS NOTES
Assessment & Plan  Cellulitis of leg, right  Patient presents today with complaints of right lower leg redness and swelling.  He unfortunately fell onto that right knee resulting in a wound on exam today appears to be healing although discussed I am somewhat concerned about the black appearance of the scab.  I would recommend he closely monitor the wound itself and consider referral with the wound clinic if not healing.  The exam today is consistent with a cellulitis.  Reassuringly, he has no systemic signs of infection.  He is not in pain.  He is hemodynamically stable without evidence of hypotension or tachycardia. 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  I would recommend reassessment by his primary care provider in 7 days to assess for the need to extend therapy.  We also discussed that if anytime while on antibiotics symptoms are worsening or he develops more systemic symptoms such as fever or chills, he needs to be seen immediately in an emergency room. It should be noted that he has a history of cellulitis of the right leg that necessitated IV antibiotics back in 2020, hence the need for very close follow up.  Long term (current) use of anticoagulants  Patient is chronically maintained on warfarin and followed by the anticoagulation clinic.  His last INR was yesterday and was just slightly above goal range at 3.6.  Given the interaction with warfarin and Bactrim, I have sent a message to the anticoagulation clinic to assess for the need of any dose adjustments or more frequent INR monitoring with this addition.  Primary hypertension  BP today 103/63. Current therapies include metoprolol - he is also on furosemide. Recommend close monitoring of blood pressure given evidence of infection today.      Mateo Lugo is a 84 year old, presenting for the following health issues:  Leg Problem (RT lower leg.  "Red and swollen. Sx started a few days ago.)        10/29/2024    10:05 AM   Additional Questions   Roomed by faizan   Accompanied by Wife-Ileana         10/29/2024    10:05 AM   Patient Reported Additional Medications   Patient reports taking the following new medications no     Fall last week to right knee  2 or 3 days ago began noticing spreading of the redness. He then developed warmth and some swelling to the leg.   No pain  No systemic fevers or chills. Overall feels well.  They have tried some Aquaphor to the lower leg with wrapping but no other home cares  He has had cellulitis of this same leg back in 2020 that actually required IV antibiotics.     History of Present Illness       Reason for visit:  Leg swelling  Symptom onset:  1-3 days ago  Symptoms include:  Swelling and redness  Symptom intensity:  Moderate  Symptom progression:  Worsening  Had these symptoms before:  No  What makes it worse:  No  What makes it better:  No   He is taking medications regularly.           Objective    /63 (BP Location: Left arm, Patient Position: Sitting, Cuff Size: Adult Large)   Pulse 60   Temp 98.1  F (36.7  C) (Oral)   Resp 12   Ht 1.74 m (5' 8.5\")   Wt 88 kg (194 lb 1 oz)   SpO2 97%   BMI 29.08 kg/m    Body mass index is 29.08 kg/m .    Physical Exam  Vitals and nursing note reviewed.   Constitutional:       General: He is not in acute distress.     Appearance: Normal appearance. He is not ill-appearing.   Cardiovascular:      Rate and Rhythm: Normal rate and regular rhythm.   Pulmonary:      Effort: Pulmonary effort is normal. No respiratory distress.   Skin:     Comments: Right lower leg: Scab with black/eschar appearing tissue present to anterior knee. Surrounding erythema and warmth extending down into the anterior lower leg. Mild, non-pitting edema. No weeping or drainage. See photo.   Neurological:      Mental Status: He is alert.   Psychiatric:         Mood and Affect: Mood normal.         Behavior: " Behavior normal.         Thought Content: Thought content normal.             Media Information    Document Information    Other: Photograph   Right leg   10/29/2024 10:31 AM   Attached To:   Office Visit on 10/29/24 with Libby Vargas APRN CNP   Source Information    Libby Vargas APRN CNP  [facility]   Document History      Signed Electronically by: PABLO Maier CNP

## 2024-10-29 NOTE — PATIENT INSTRUCTIONS
I have sent in a prescription for an antibiotic that you will take twice daily for 7 days for a skin infection of your right leg.  I recommend keeping that area clean and dry.  I would not apply any topical medications to the scab to keep it covered if there is catching on clothing.  Please see your primary care provider in follow-up in approximately 1 week for reassessment.  We may need to consider extending the antibiotics.  If at any point, you develop a fever, chills or the redness is spreading/worsening you need to be seen immediately in an emergency room.  If you develop pain in the back of your calf also need to be seen immediately.  I have also let your warfarin team know that I am prescribing this antibiotic as they may need to adjust your warfarin dosing or the timing of your next INR check.

## 2024-10-29 NOTE — ASSESSMENT & PLAN NOTE
Patient is chronically maintained on warfarin and followed by the anticoagulation clinic.  His last INR was yesterday and was just slightly above goal range at 3.6.  Given the interaction with warfarin and Bactrim, I have sent a message to the anticoagulation clinic to assess for the need of any dose adjustments or more frequent INR monitoring with this addition.

## 2024-10-29 NOTE — TELEPHONE ENCOUNTER
"ANTICOAGULATION  MANAGEMENT     Interacting Medication Review    Interacting medication(s): Sulfamethoxazole-Trimethoprim (Bactrim) 800/160mg one dablet 2x/day with warfarin.    Duration: 7 days (10/29 to 11/7/24)    Indication: Cellulitis, right leg    New medication?: Yes, interaction may increase INR and risk of bleeding. Closer INR monitoring recommended. and With sulfamethoxazole-Trimethoprim, [facility] protocol Appendix G recommends empiric reduction of warfarin dose in therapeutic/supratherapeutic patients.        PLAN     Decrease your warfarin dose (21.4% change) with next INR in 3 days        New warfarin dose: 2.5mg on Tues/Thurs/Sat and 5mg all other days.  Recommend next INR: 11/1/24    Telephone call with Lexis who verbalizes understanding and agrees to plan    Daily doses modified on anticoagulation calendar for interaction <= 7 days    [facility] priority set/moved to \"high\" for new major drug interaction    INR scheduled on 11/1/24 @ [facility].    Plan made with [facility] Pharmacist Natasha Ziegler RN  10/29/2024  Anticoagulation Clinic  [facility] for routing messages: nicole ODONNELL  [facility] patient phone line: [phone]  "

## 2024-10-29 NOTE — ASSESSMENT & PLAN NOTE
BP today 103/63. Current therapies include metoprolol - he is also on furosemide. Recommend close monitoring of blood pressure given evidence of infection today.

## 2024-10-29 NOTE — ASSESSMENT & PLAN NOTE
Patient presents today with complaints of right lower leg redness and swelling.  He unfortunately fell onto that right knee resulting in a wound on exam today appears to be healing although discussed I am somewhat concerned about the black appearance of the scab.  I would recommend he closely monitor the wound itself and consider referral with the wound clinic if not healing.  The exam today is consistent with a cellulitis.  Reassuringly, he has no systemic signs of infection.  He is not in pain.  He is hemodynamically stable without evidence of hypotension or tachycardia. 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  I would recommend reassessment by his primary care provider in 7 days to assess for the need to extend therapy.  We also discussed that if anytime while on antibiotics symptoms are worsening or he develops more systemic symptoms such as fever or chills, he needs to be seen immediately in an emergency room. It should be noted that he has a history of cellulitis of the right leg that necessitated IV antibiotics back in 2020, hence the need for very close follow up.

## 2024-11-01 ENCOUNTER — LAB (OUTPATIENT)
Dept: LAB | Facility: CLINIC | Age: 84
End: 2024-11-01
Payer: COMMERCIAL

## 2024-11-01 ENCOUNTER — ANTICOAGULATION THERAPY VISIT (OUTPATIENT)
Dept: ANTICOAGULATION | Facility: CLINIC | Age: 84
End: 2024-11-01

## 2024-11-01 DIAGNOSIS — I48.20 CHRONIC ATRIAL FIBRILLATION (H): Primary | ICD-10-CM

## 2024-11-01 DIAGNOSIS — I48.4 ATYPICAL ATRIAL FLUTTER (H): ICD-10-CM

## 2024-11-01 DIAGNOSIS — Z98.890 H/O MITRAL VALVE REPAIR: ICD-10-CM

## 2024-11-01 DIAGNOSIS — I48.20 CHRONIC ATRIAL FIBRILLATION (H): ICD-10-CM

## 2024-11-01 DIAGNOSIS — I48.92 ATRIAL FLUTTER, PAROXYSMAL (H): ICD-10-CM

## 2024-11-01 DIAGNOSIS — Z79.01 LONG TERM (CURRENT) USE OF ANTICOAGULANTS: ICD-10-CM

## 2024-11-01 DIAGNOSIS — Z95.2 S/P AVR: ICD-10-CM

## 2024-11-01 LAB — INR BLD: 2.8 (ref 0.9–1.1)

## 2024-11-01 PROCEDURE — 85610 PROTHROMBIN TIME: CPT

## 2024-11-01 PROCEDURE — 36416 COLLJ CAPILLARY BLOOD SPEC: CPT

## 2024-11-01 NOTE — PROGRESS NOTES
ANTICOAGULATION MANAGEMENT     Parish Bills 84 year old male is on warfarin with therapeutic INR result. (Goal INR 2.0-3.0)    Recent labs: (last 7 days)     11/01/24  0900   INR 2.8*       ASSESSMENT     Source(s): Chart Review and Patient/Caregiver Call     Warfarin doses taken: Warfarin taken as instructed  Diet: No new diet changes identified  Medication/supplement changes:  Yes.   Bactrim-/160mg one tab 2x/day for 7 daysm from 10/29-11/5/24.  (Warfarin dose was decreased d/t known major interaction with warfarin).  New illness, injury, or hospitalization: Yes   Reported right leg wound is improving, not red anymore and wound looks good w/o signs of infection.   10/29/24 OV for right leg cellulitis  Signs or symptoms of bleeding or clotting: No  Previous result: Supratherapeutic at 3.6 on 10/28/24.  Additional findings: None       PLAN     Recommended plan for temporary change(s) affecting INR     Dosing Instructions: Continue your current warfarin dose with next INR in 1 week       Summary  As of 11/1/2024      Full warfarin instructions:  11/2: 2.5 mg; Otherwise 5 mg every day   Next INR check:  11/8/2024               Telephone call with Parish who verbalizes understanding and agrees to plan    Lab visit scheduled - INR on 11/7/24 @ [facility]    Education provided: Taking warfarin: Importance of taking warfarin as instructed  Goal range and lab monitoring: goal range and significance of current result  Interaction IS anticipated between warfarin and Bactrim-DS    Plan made per [facility] anticoagulation protocol    Kori Ziegler RN  11/1/2024  Anticoagulation Clinic  [facility] for routing messages: nicole ODONNELL  [facility] patient phone line: [phone]        _______________________________________________________________________     Anticoagulation Episode Summary       Current INR goal:  2.0-3.0   TTR:  87.4% (1 y)   Target end date:  Indefinite   Send INR reminders to:  SHANICE ODONNELL     Indications    Chronic atrial fibrillation (H) [I48.20]  H/O mitral valve repair [Z98.890]  S/P AVR [Z95.2]  A-fib (H) (Resolved) [I48.91]  Long term (current) use of anticoagulants [Z79.01]  Atrial flutter  paroxysmal (H) [I48.92]  Atypical atrial flutter (H) [I48.4]             Comments:  --             Anticoagulation Care Providers       Provider Role Specialty Phone number    Isidro Au MD Referring [facility] [phone]

## 2024-11-04 DIAGNOSIS — I10 ESSENTIAL HYPERTENSION: ICD-10-CM

## 2024-11-04 NOTE — TELEPHONE ENCOUNTER
Pt states he only has 1 left. Needs it sent to local [facility] this time instead of [facility] since  he will run out by the time its delivered from [facility]

## 2024-11-04 NOTE — TELEPHONE ENCOUNTER
Called patient to clarify dosage. Patient reports taking 50 mg (full tab) of metoprolol succinate daily.    Jassi Lvie RN     [facility]

## 2024-11-05 RX ORDER — METOPROLOL SUCCINATE 50 MG/1
50 TABLET, EXTENDED RELEASE ORAL DAILY
Qty: 90 TABLET | Refills: 3 | Status: SHIPPED | OUTPATIENT
Start: 2024-11-05

## 2024-11-05 NOTE — TELEPHONE ENCOUNTER
Patient is taking 50 mg daily.   He currently has no tablets left so needs this called in ASAP so he can get it.

## 2024-11-07 ENCOUNTER — LAB (OUTPATIENT)
Dept: LAB | Facility: CLINIC | Age: 84
End: 2024-11-07
Payer: COMMERCIAL

## 2024-11-07 ENCOUNTER — TELEPHONE (OUTPATIENT)
Dept: FAMILY MEDICINE | Facility: CLINIC | Age: 84
End: 2024-11-07

## 2024-11-07 ENCOUNTER — ANTICOAGULATION THERAPY VISIT (OUTPATIENT)
Dept: ANTICOAGULATION | Facility: CLINIC | Age: 84
End: 2024-11-07

## 2024-11-07 DIAGNOSIS — I48.20 CHRONIC ATRIAL FIBRILLATION (H): Primary | ICD-10-CM

## 2024-11-07 DIAGNOSIS — Z95.2 S/P AVR: ICD-10-CM

## 2024-11-07 DIAGNOSIS — I48.4 ATYPICAL ATRIAL FLUTTER (H): ICD-10-CM

## 2024-11-07 DIAGNOSIS — I48.20 CHRONIC ATRIAL FIBRILLATION (H): ICD-10-CM

## 2024-11-07 DIAGNOSIS — Z98.890 H/O MITRAL VALVE REPAIR: ICD-10-CM

## 2024-11-07 DIAGNOSIS — I48.92 ATRIAL FLUTTER, PAROXYSMAL (H): ICD-10-CM

## 2024-11-07 DIAGNOSIS — Z79.01 LONG TERM (CURRENT) USE OF ANTICOAGULANTS: ICD-10-CM

## 2024-11-07 LAB — INR BLD: 3.6 (ref 0.9–1.1)

## 2024-11-07 PROCEDURE — 85610 PROTHROMBIN TIME: CPT

## 2024-11-07 PROCEDURE — 36416 COLLJ CAPILLARY BLOOD SPEC: CPT

## 2024-11-07 NOTE — PROGRESS NOTES
ANTICOAGULATION MANAGEMENT     Parish Bills 84 year old male is on warfarin with supratherapeutic INR result. (Goal INR 2.0-3.0)    Recent labs: (last 7 days)     11/07/24  0904   INR 3.6*       ASSESSMENT     Warfarin Lab Questionnaire    Warfarin Doses Last 7 Days      11/7/2024     9:06 AM   Dose in Tablet or Mg   TAB or MG? milligram (mg)     Pt Rptd Dose SUNDAY MONDAY TUESDAY WED THURS FRIDAY SATURDAY 11/7/2024   9:06 AM 5 5 5 5 5 5 5         11/7/2024   Warfarin Lab Questionnaire   Missed doses within past 14 days? No   Changes in diet or alcohol within past 14 days? No   Medication changes since last result? Yes - completed Bactrim-/160mg one tab 2x/day for 7 days, from 10/29-11/5/24    Please list: do not know     Injuries or illness since last result? No  - reported right leg wound looks good with no infection noted.         - Yes,   10/29/24 OV for right leg cellulitis      New shortness of breath, severe headaches or sudden changes in vision since last result? No   Abnormal bleeding since last result? No   Upcoming surgery, procedure? No   Best number to call with results? [phone]        Previous result: Therapeutic last visit at 2.8; previously outside of goal range at 3.6    Additional findings: None       PLAN     Recommended plan for temporary change(s) affecting INR     Dosing Instructions: hold dose then continue your current warfarin dose with next INR in 1 week       Summary  As of 11/7/2024      Full warfarin instructions:  11/7: Hold; Otherwise 5 mg every day   Next INR check:  11/14/2024               Telephone call with Parish who verbalizes understanding and agrees to plan    Lab visit scheduled - INR on 11/12/24 @ [facility]    Education provided: Taking warfarin: Importance of taking warfarin as instructed  Goal range and lab monitoring: goal range and significance of current result    Plan made per [facility] anticoagulation protocol    Kori Ziegler RN  11/7/2024  Anticoagulation  Clinic  [employer] for routing messages: p SHANICE ODONNELL  [facility] patient phone line: [phone]        _______________________________________________________________________     Anticoagulation Episode Summary       Current INR goal:  2.0-3.0   TTR:  86.2% (1 y)   Target end date:  Indefinite   Send INR reminders to:  SHANICE RAYNUPURDEMI ODONNELL    Indications    Chronic atrial fibrillation (H) [I48.20]  H/O mitral valve repair [Z98.890]  S/P AVR [Z95.2]  A-fib (H) (Resolved) [I48.91]  Long term (current) use of anticoagulants [Z79.01]  Atrial flutter  paroxysmal (H) [I48.92]  Atypical atrial flutter (H) [I48.4]             Comments:  --             Anticoagulation Care Providers       Provider Role Specialty Phone number    Isidro Au MD Referring Family Medicine [phone]

## 2024-11-12 ENCOUNTER — LAB (OUTPATIENT)
Dept: LAB | Facility: CLINIC | Age: 84
End: 2024-11-12
Payer: COMMERCIAL

## 2024-11-12 ENCOUNTER — ANTICOAGULATION THERAPY VISIT (OUTPATIENT)
Dept: ANTICOAGULATION | Facility: CLINIC | Age: 84
End: 2024-11-12

## 2024-11-12 DIAGNOSIS — I48.4 ATYPICAL ATRIAL FLUTTER (H): ICD-10-CM

## 2024-11-12 DIAGNOSIS — I48.20 CHRONIC ATRIAL FIBRILLATION (H): Primary | ICD-10-CM

## 2024-11-12 DIAGNOSIS — Z95.2 S/P AVR: ICD-10-CM

## 2024-11-12 DIAGNOSIS — Z79.01 LONG TERM (CURRENT) USE OF ANTICOAGULANTS: ICD-10-CM

## 2024-11-12 DIAGNOSIS — Z98.890 H/O MITRAL VALVE REPAIR: ICD-10-CM

## 2024-11-12 DIAGNOSIS — I48.92 ATRIAL FLUTTER, PAROXYSMAL (H): ICD-10-CM

## 2024-11-12 DIAGNOSIS — I48.20 CHRONIC ATRIAL FIBRILLATION (H): ICD-10-CM

## 2024-11-12 LAB — INR BLD: 2 (ref 0.9–1.1)

## 2024-11-12 PROCEDURE — 36416 COLLJ CAPILLARY BLOOD SPEC: CPT

## 2024-11-12 PROCEDURE — 85610 PROTHROMBIN TIME: CPT

## 2024-11-12 NOTE — PROGRESS NOTES
ANTICOAGULATION MANAGEMENT     Parish Bills 84 year old male is on warfarin with therapeutic INR result. (Goal INR 2.0-3.0)    Recent labs: (last 7 days)     11/12/24  0810   INR 2.0*       ASSESSMENT     Source(s): Chart Review and Patient/Caregiver Call     Warfarin doses taken: Held warfarin dose on 11/7/24, recently which may be affecting INR  Diet: No new diet changes identified  Medication/supplement changes:  Yes   Resumed warfarin maintenance dose on 11/8/24 after completion of ABX.   Completed 7 days of Bactrim-DS from 10/29 - 11/5/24.  (Warfarin dose was decreased by 21.4% d/t known major interaction with warfarin).   New illness, injury, or hospitalization: No.   Right leg cellulitis is healing well with no signs of infection.  Signs or symptoms of bleeding or clotting: No  Previous result: Supratherapeutic at 3.6 on 11/7/24.  Additional findings: None       PLAN     Recommended plan for no diet, medication or health factor changes affecting INR     Dosing Instructions: Continue your current warfarin dose with next INR in 2 weeks       Summary  As of 11/12/2024      Full warfarin instructions:  5 mg every day   Next INR check:  11/26/2024               Telephone call with Parish who verbalizes understanding and agrees to plan    Lab visit scheduled - INR on 11/26/24 @ [facility]    Education provided: Taking warfarin: Importance of taking warfarin as instructed  Goal range and lab monitoring: goal range and significance of current result    Plan made per [facility] anticoagulation protocol    Kori Ziegler RN  11/12/2024  Anticoagulation Clinic  [facility] for routing messages: nicole ODONNELL  [facility] patient phone line: [phone]        _______________________________________________________________________     Anticoagulation Episode Summary       Current INR goal:  2.0-3.0   TTR:  85.7% (1 y)   Target end date:  Indefinite   Send INR reminders to:  SHANICE ODONNELL    Indications    Chronic  atrial fibrillation (H) [I48.20]  H/O mitral valve repair [Z98.890]  S/P AVR [Z95.2]  A-fib (H) (Resolved) [I48.91]  Long term (current) use of anticoagulants [Z79.01]  Atrial flutter  paroxysmal (H) [I48.92]  Atypical atrial flutter (H) [I48.4]             Comments:  --             Anticoagulation Care Providers       Provider Role Specialty Phone number    Isidro Au MD [facility] [phone]

## 2024-11-20 ENCOUNTER — IMMUNIZATION (OUTPATIENT)
Dept: FAMILY MEDICINE | Facility: CLINIC | Age: 84
End: 2024-11-20
Payer: COMMERCIAL

## 2024-11-22 NOTE — TELEPHONE ENCOUNTER
FYI - Status Update    Who is Calling: patient    Update: called back to talk to brandon will not be home until afternoon     Does caller want a call/response back: Yes     Okay to leave a detailed message?: Yes at Home number on file [phone] (home)  
Called and talked with Parish - see INR telephone encounter  
23

## 2024-11-26 ENCOUNTER — LAB (OUTPATIENT)
Dept: LAB | Facility: CLINIC | Age: 84
End: 2024-11-26
Payer: COMMERCIAL

## 2024-11-26 ENCOUNTER — TELEPHONE (OUTPATIENT)
Dept: FAMILY MEDICINE | Facility: CLINIC | Age: 84
End: 2024-11-26

## 2024-11-26 ENCOUNTER — ANTICOAGULATION THERAPY VISIT (OUTPATIENT)
Dept: ANTICOAGULATION | Facility: CLINIC | Age: 84
End: 2024-11-26

## 2024-11-26 ENCOUNTER — IMMUNIZATION (OUTPATIENT)
Dept: FAMILY MEDICINE | Facility: CLINIC | Age: 84
End: 2024-11-26
Payer: COMMERCIAL

## 2024-11-26 DIAGNOSIS — I48.20 CHRONIC ATRIAL FIBRILLATION (H): Primary | ICD-10-CM

## 2024-11-26 DIAGNOSIS — I48.4 ATYPICAL ATRIAL FLUTTER (H): ICD-10-CM

## 2024-11-26 DIAGNOSIS — Z95.2 S/P AVR: ICD-10-CM

## 2024-11-26 DIAGNOSIS — Z98.890 H/O MITRAL VALVE REPAIR: ICD-10-CM

## 2024-11-26 DIAGNOSIS — Z79.01 LONG TERM (CURRENT) USE OF ANTICOAGULANTS: ICD-10-CM

## 2024-11-26 DIAGNOSIS — I48.92 ATRIAL FLUTTER, PAROXYSMAL (H): ICD-10-CM

## 2024-11-26 DIAGNOSIS — I48.20 CHRONIC ATRIAL FIBRILLATION (H): ICD-10-CM

## 2024-11-26 LAB — INR BLD: 2.2 (ref 0.9–1.1)

## 2024-11-26 PROCEDURE — 91320 SARSCV2 VAC 30MCG TRS-SUC IM: CPT

## 2024-11-26 PROCEDURE — 36416 COLLJ CAPILLARY BLOOD SPEC: CPT

## 2024-11-26 PROCEDURE — 90480 ADMN SARSCOV2 VAC 1/ONLY CMP: CPT

## 2024-11-26 PROCEDURE — 85610 PROTHROMBIN TIME: CPT

## 2024-11-26 NOTE — TELEPHONE ENCOUNTER
General Call    Contacts       Contact Date/Time Type Contact Phone/Fax    11/26/2024 11:31 AM CST Phone (Incoming) Parish Bills (Self) [phone] ()          Reason for Call:  Returning call    What are your questions or concerns:  Patient returning Tonja's call , but she was unavailable    Date of last appointment with provider: 11/26/24    Okay to leave a detailed message?: Yes at Home number on file [phone] ([place])

## 2024-11-26 NOTE — PROGRESS NOTES
ANTICOAGULATION MANAGEMENT     Parish Bills 84 year old male is on warfarin with therapeutic INR result. (Goal INR 2.0-3.0)    Recent labs: (last 7 days)     11/26/24  0757   INR 2.2*       ASSESSMENT     Warfarin Lab Questionnaire    Warfarin Doses Last 7 Days      11/26/2024     8:01 AM   Dose in Tablet or Mg   TAB or MG? - 5mg warfarin tablets (evenings) milligram (mg)     Pt Rptd Dose SUNDAY MONDAY TUESDAY WED THURS FRIDAY SATURDAY 11/26/2024   8:01 AM 5 5 5 5 5 5 5         11/26/2024   Warfarin Lab Questionnaire   Missed doses within past 14 days? No   Changes in diet or alcohol within past 14 days? No   Medication changes since last result? No   Injuries or illness since last result? No   New shortness of breath, severe headaches or sudden changes in vision since last result? No   Abnormal bleeding since last result? No   Upcoming surgery, procedure? No        Previous result: Therapeutic last visit at 2.0; previously outside of goal range at 3.6    Additional findings:  vaccinated 11/26/24 -COVID-19       PLAN     Recommended plan for no diet, medication or health factor changes affecting INR     Dosing Instructions: Continue your current warfarin dose with next INR in 4 weeks       Summary  As of 11/26/2024      Full warfarin instructions:  5 mg every day   Next INR check:  12/24/2024               Telephone call with Parish who verbalizes understanding and agrees to plan    Lab visit scheduled - INR on 12/30/24 @ [facility]    Education provided: Taking warfarin: Importance of taking warfarin as instructed  Goal range and lab monitoring: goal range and significance of current result    Plan made per [facility] anticoagulation protocol    Kori Ziegler RN  11/26/2024  Anticoagulation Clinic  [employer] [place] for routing messages: nicole CALLE [facility] patient phone line: [phone]        _______________________________________________________________________     Anticoagulation Episode Summary        Current INR goal:  2.0-3.0   TTR:  85.7% (1 y)   Target end date:  Indefinite   Send INR reminders to:  SHANICE CALLE BELLE    Indications    Chronic atrial fibrillation (H) [I48.20]  H/O mitral valve repair [Z98.890]  S/P AVR [Z95.2]  A-fib (H) (Resolved) [I48.91]  Long term (current) use of anticoagulants [Z79.01]  Atrial flutter  paroxysmal (H) [I48.92]  Atypical atrial flutter (H) [I48.4]             Comments:  --             Anticoagulation Care Providers       Provider Role Specialty Phone number    Isidro Au MD Referring [facility] [phone]

## 2024-11-29 DIAGNOSIS — F32.0 CURRENT MILD EPISODE OF MAJOR DEPRESSIVE DISORDER, UNSPECIFIED WHETHER RECURRENT (H): ICD-10-CM

## 2024-12-02 RX ORDER — BUPROPION HYDROCHLORIDE 150 MG/1
TABLET ORAL
Qty: 100 TABLET | Refills: 2 | Status: SHIPPED | OUTPATIENT
Start: 2024-12-02

## 2024-12-30 ENCOUNTER — ANTICOAGULATION THERAPY VISIT (OUTPATIENT)
Dept: ANTICOAGULATION | Facility: CLINIC | Age: 84
End: 2024-12-30

## 2024-12-30 ENCOUNTER — LAB (OUTPATIENT)
Dept: LAB | Facility: CLINIC | Age: 84
End: 2024-12-30
Payer: COMMERCIAL

## 2024-12-30 ENCOUNTER — DOCUMENTATION ONLY (OUTPATIENT)
Dept: ANTICOAGULATION | Facility: CLINIC | Age: 84
End: 2024-12-30

## 2024-12-30 DIAGNOSIS — I48.20 CHRONIC ATRIAL FIBRILLATION (H): ICD-10-CM

## 2024-12-30 DIAGNOSIS — Z98.890 H/O MITRAL VALVE REPAIR: ICD-10-CM

## 2024-12-30 DIAGNOSIS — I48.92 ATRIAL FLUTTER, PAROXYSMAL (H): ICD-10-CM

## 2024-12-30 DIAGNOSIS — I48.4 ATYPICAL ATRIAL FLUTTER (H): ICD-10-CM

## 2024-12-30 DIAGNOSIS — I48.20 CHRONIC ATRIAL FIBRILLATION (H): Primary | ICD-10-CM

## 2024-12-30 DIAGNOSIS — Z79.01 LONG TERM (CURRENT) USE OF ANTICOAGULANTS: ICD-10-CM

## 2024-12-30 DIAGNOSIS — Z95.2 S/P AVR: ICD-10-CM

## 2024-12-30 LAB — INR BLD: 2.7 (ref 0.9–1.1)

## 2024-12-30 PROCEDURE — 36416 COLLJ CAPILLARY BLOOD SPEC: CPT

## 2024-12-30 PROCEDURE — 85610 PROTHROMBIN TIME: CPT

## 2024-12-30 NOTE — PROGRESS NOTES
ANTICOAGULATION CLINIC REFERRAL RENEWAL REQUEST       An annual renewal order is required for all patients referred to [facility]tico[facility].?  Please review and sign the pended referral order for Parish Bills.       ANTICOAGULATION SUMMARY      Warfarin indication(s)   Atrial Fibrillation    Mechanical heart valve present?  NO  Bioprosthetic valve, mitral valve annuloplasty ring       Current goal range   INR: 2.0-3.0     Goal appropriate for indication? Goal INR 2-3, standard for indication(s) above     Time in Therapeutic Range (TTR)  (Goal > 60%) 85.7%       Office visit with referring provider's group within last year yes on 10/8/24       Vibha Parada, RN  [facility]tico[facility]

## 2024-12-30 NOTE — PROGRESS NOTES
ANTICOAGULATION MANAGEMENT     Parish Bills 84 year old male is on warfarin with therapeutic INR result. (Goal INR 2.0-3.0)    Recent labs: (last 7 days)     12/30/24  0815   INR 2.7*       ASSESSMENT     Warfarin Lab Questionnaire    Warfarin Doses Last 7 Days      12/30/2024     8:17 AM   Dose in Tablet or Mg   TAB or MG? milligram (mg)     Pt Rptd Dose SUNDAY MONDAY TUESDAY WED THURS FRIDAY SATURDAY 12/30/2024   8:17 AM 5 5 5 5 5 5 5         12/30/2024   Warfarin Lab Questionnaire   Missed doses within past 14 days? No   Changes in diet or alcohol within past 14 days? No   Medication changes since last result? No   Injuries or illness since last result? No   New shortness of breath, severe headaches or sudden changes in vision since last result? No   Abnormal bleeding since last result? No   Upcoming surgery, procedure? No     Previous result: Therapeutic last 2(+) visits  Additional findings: None       PLAN     Recommended plan for no diet, medication or health factor changes affecting INR     Dosing Instructions: Continue your current warfarin dose with next INR in 4-5 weeks       Summary  As of 12/30/2024      Full warfarin instructions:  5 mg every day   Next INR check:  2/3/2025               Detailed voice message left for Parish with dosing instructions and follow up date.     Contact [phone] to schedule and with any changes, questions or concerns.     Education provided: Goal range and lab monitoring: goal range and significance of current result  Contact [phone] with any changes, questions or concerns.     Plan made per [facility] anticoagulation protocol    Vibha Parada RN  12/30/2024  Anticoagulation Clinic  [employer] for routing messages: nicole ODONNELL  [facility] patient phone line: [phone]        _______________________________________________________________________     Anticoagulation Episode Summary       Current INR goal:  2.0-3.0   TTR:  85.7% (1 y)   Target end date:   Indefinite   Send INR reminders to:  [facility]    Indications    Chronic atrial fibrillation (H) [I48.20]  H/O mitral valve repair [Z98.890]  S/P AVR [Z95.2]  A-fib (H) (Resolved) [I48.91]  Long term (current) use of anticoagulants [Z79.01]  Atrial flutter  paroxysmal (H) [I48.92]  Atypical atrial flutter (H) [I48.4]             Comments:  --             Anticoagulation Care Providers       Provider Role Specialty Phone number    Isidro Au MD Referring Family Medicine [phone]

## 2025-01-29 DIAGNOSIS — I48.4 ATYPICAL ATRIAL FLUTTER (H): Primary | ICD-10-CM

## 2025-01-29 DIAGNOSIS — Z79.01 LONG TERM (CURRENT) USE OF ANTICOAGULANTS: ICD-10-CM

## 2025-01-29 DIAGNOSIS — I48.20 CHRONIC ATRIAL FIBRILLATION (H): ICD-10-CM

## 2025-01-29 RX ORDER — WARFARIN SODIUM 5 MG/1
TABLET ORAL
Qty: 100 TABLET | Refills: 2 | Status: SHIPPED | OUTPATIENT
Start: 2025-01-29

## 2025-02-03 ENCOUNTER — ANTICOAGULATION THERAPY VISIT (OUTPATIENT)
Dept: ANTICOAGULATION | Facility: CLINIC | Age: 85
End: 2025-02-03

## 2025-02-03 ENCOUNTER — LAB (OUTPATIENT)
Dept: LAB | Facility: CLINIC | Age: 85
End: 2025-02-03
Payer: COMMERCIAL

## 2025-02-03 DIAGNOSIS — Z95.2 S/P AVR: ICD-10-CM

## 2025-02-03 DIAGNOSIS — I48.4 ATYPICAL ATRIAL FLUTTER (H): ICD-10-CM

## 2025-02-03 DIAGNOSIS — I48.20 CHRONIC ATRIAL FIBRILLATION (H): ICD-10-CM

## 2025-02-03 DIAGNOSIS — I48.20 CHRONIC ATRIAL FIBRILLATION (H): Primary | ICD-10-CM

## 2025-02-03 DIAGNOSIS — Z79.01 LONG TERM (CURRENT) USE OF ANTICOAGULANTS: ICD-10-CM

## 2025-02-03 DIAGNOSIS — I48.92 ATRIAL FLUTTER, PAROXYSMAL (H): ICD-10-CM

## 2025-02-03 DIAGNOSIS — Z98.890 H/O MITRAL VALVE REPAIR: ICD-10-CM

## 2025-02-03 LAB — INR BLD: 2.7 (ref 0.9–1.1)

## 2025-02-03 PROCEDURE — 85610 PROTHROMBIN TIME: CPT

## 2025-02-03 PROCEDURE — 36416 COLLJ CAPILLARY BLOOD SPEC: CPT

## 2025-02-03 NOTE — PROGRESS NOTES
ANTICOAGULATION MANAGEMENT     Parish Bills 84 year old male is on warfarin with therapeutic INR result. (Goal INR 2.0-3.0)    Recent labs: (last 7 days)     02/03/25  0831   INR 2.7*       ASSESSMENT     Source(s): Chart Review and Patient/Caregiver Call     Warfarin doses taken: Warfarin taken as instructed  Diet: No new diet changes identified  Medication/supplement changes:  Yes.   1/27/25 decreased Furosemide from 40mg am and 20mg pm, to 20mg (morning) and PRN 20mg (evening)  New illness, injury, or hospitalization: No.   1/27/25 - [facility] follow-up s/p AVR.   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 Recommended continue following the EVAR with serial CT angiograms.   Signs or symptoms of bleeding or clotting: No  Previous result: Therapeutic last 3 INR visits  Additional findings: None       PLAN     Recommended plan for ongoing change(s) affecting INR     Dosing Instructions: Continue your current warfarin dose with next INR in 4-5 weeks       Summary  As of 2/3/2025      Full warfarin instructions:  5 mg every day   Next INR check:  3/10/2025               Telephone call with Parish who verbalizes understanding and agrees to plan    Lab visit scheduled - INR on 3/3/25 @ [facility]    Education provided: Taking warfarin: Importance of taking warfarin as instructed  Goal range and lab monitoring: goal range and significance of current result  Interaction IS NOT anticipated between warfarin and Furosemid    Plan made per [facility] anticoagulation protocol    Kori Ziegler, RN  2/3/2025  Anticoagulation Clinic  [employer] for routing messages: p SHANICE ODONNELL  [facility] patient phone line: [phone]        _______________________________________________________________________     Anticoagulation Episode Summary       Current INR goal:  2.0-3.0   TTR:  85.7% (1 y)   Target end date:  Indefinite   Send INR reminders to:  SHANICE ODONNELL    Indications    Chronic  atrial fibrillation (H) [I48.20]  H/O mitral valve repair [Z98.890]  S/P AVR [Z95.2]  A-fib (H) (Resolved) [I48.91]  Long term (current) use of anticoagulants [Z79.01]  Atrial flutter  paroxysmal (H) [I48.92]  Atypical atrial flutter (H) [I48.4]             Comments:  --             Anticoagulation Care Providers       Provider Role Specialty Phone number    Isidro Au MD [facility] [phone]

## 2025-02-24 ENCOUNTER — OFFICE VISIT (OUTPATIENT)
Dept: FAMILY MEDICINE | Facility: CLINIC | Age: 85
End: 2025-02-24
Payer: COMMERCIAL

## 2025-02-24 VITALS
HEIGHT: 69 IN | TEMPERATURE: 98.3 F | DIASTOLIC BLOOD PRESSURE: 69 MMHG | OXYGEN SATURATION: 94 % | HEART RATE: 61 BPM | WEIGHT: 195 LBS | SYSTOLIC BLOOD PRESSURE: 133 MMHG | BODY MASS INDEX: 28.88 KG/M2 | RESPIRATION RATE: 20 BRPM

## 2025-02-24 DIAGNOSIS — E78.2 MIXED HYPERLIPIDEMIA: ICD-10-CM

## 2025-02-24 DIAGNOSIS — I50.23 ACUTE ON CHRONIC SYSTOLIC HEART FAILURE (H): ICD-10-CM

## 2025-02-24 DIAGNOSIS — C20 MALIGNANT TUMOR OF RECTUM (H): ICD-10-CM

## 2025-02-24 DIAGNOSIS — I10 PRIMARY HYPERTENSION: ICD-10-CM

## 2025-02-24 DIAGNOSIS — Z00.00 HEALTH CARE MAINTENANCE: Primary | ICD-10-CM

## 2025-02-24 DIAGNOSIS — M16.11 OSTEOARTHRITIS OF RIGHT HIP, UNSPECIFIED OSTEOARTHRITIS TYPE: ICD-10-CM

## 2025-02-24 DIAGNOSIS — I71.40 ABDOMINAL AORTIC ANEURYSM (AAA) WITHOUT RUPTURE, UNSPECIFIED PART: ICD-10-CM

## 2025-02-24 DIAGNOSIS — I48.92 ATRIAL FLUTTER, PAROXYSMAL (H): ICD-10-CM

## 2025-02-24 DIAGNOSIS — I35.9 AORTIC VALVE DISORDER: ICD-10-CM

## 2025-02-24 DIAGNOSIS — F33.1 MODERATE EPISODE OF RECURRENT MAJOR DEPRESSIVE DISORDER (H): ICD-10-CM

## 2025-02-24 DIAGNOSIS — M65.4 DE QUERVAIN'S DISEASE (TENOSYNOVITIS): ICD-10-CM

## 2025-02-24 PROCEDURE — G0439 PPPS, SUBSEQ VISIT: HCPCS | Performed by: FAMILY MEDICINE

## 2025-02-24 PROCEDURE — G2211 COMPLEX E/M VISIT ADD ON: HCPCS | Performed by: FAMILY MEDICINE

## 2025-02-24 PROCEDURE — 99214 OFFICE O/P EST MOD 30 MIN: CPT | Mod: 25 | Performed by: FAMILY MEDICINE

## 2025-02-24 SDOH — HEALTH STABILITY: PHYSICAL HEALTH: ON AVERAGE, HOW MANY MINUTES DO YOU ENGAGE IN EXERCISE AT THIS LEVEL?: 0 MIN

## 2025-02-24 SDOH — HEALTH STABILITY: PHYSICAL HEALTH: ON AVERAGE, HOW MANY DAYS PER WEEK DO YOU ENGAGE IN MODERATE TO STRENUOUS EXERCISE (LIKE A BRISK WALK)?: 0 DAYS

## 2025-02-24 ASSESSMENT — ANXIETY QUESTIONNAIRES
8. IF YOU CHECKED OFF ANY PROBLEMS, HOW DIFFICULT HAVE THESE MADE IT FOR YOU TO DO YOUR WORK, TAKE CARE OF THINGS AT HOME, OR GET ALONG WITH OTHER PEOPLE?: NOT DIFFICULT AT ALL
4. TROUBLE RELAXING: NOT AT ALL
6. BECOMING EASILY ANNOYED OR IRRITABLE: NOT AT ALL
7. FEELING AFRAID AS IF SOMETHING AWFUL MIGHT HAPPEN: NOT AT ALL
GAD7 TOTAL SCORE: 0
IF YOU CHECKED OFF ANY PROBLEMS ON THIS QUESTIONNAIRE, HOW DIFFICULT HAVE THESE PROBLEMS MADE IT FOR YOU TO DO YOUR WORK, TAKE CARE OF THINGS AT HOME, OR GET ALONG WITH OTHER PEOPLE: NOT DIFFICULT AT ALL
1. FEELING NERVOUS, ANXIOUS, OR ON EDGE: NOT AT ALL
7. FEELING AFRAID AS IF SOMETHING AWFUL MIGHT HAPPEN: NOT AT ALL
3. WORRYING TOO MUCH ABOUT DIFFERENT THINGS: NOT AT ALL
2. NOT BEING ABLE TO STOP OR CONTROL WORRYING: NOT AT ALL
5. BEING SO RESTLESS THAT IT IS HARD TO SIT STILL: NOT AT ALL

## 2025-02-24 ASSESSMENT — PATIENT HEALTH QUESTIONNAIRE - PHQ9
10. IF YOU CHECKED OFF ANY PROBLEMS, HOW DIFFICULT HAVE THESE PROBLEMS MADE IT FOR YOU TO DO YOUR WORK, TAKE CARE OF THINGS AT HOME, OR GET ALONG WITH OTHER PEOPLE: SOMEWHAT DIFFICULT
SUM OF ALL RESPONSES TO PHQ QUESTIONS 1-9: 6
SUM OF ALL RESPONSES TO PHQ QUESTIONS 1-9: 6

## 2025-02-24 ASSESSMENT — SOCIAL DETERMINANTS OF HEALTH (SDOH): HOW OFTEN DO YOU GET TOGETHER WITH FRIENDS OR RELATIVES?: ONCE A WEEK

## 2025-02-24 NOTE — PROGRESS NOTES
"Preventive Care Visit  [facility]  Isidro Au MD, Family Medicine  Feb 24, 2025      Assessment & Plan     Health care maintenance  Patient here for annual exam  Obtain lab work couple weeks ago which was reviewed with him today    De Quervain's disease (tenosynovitis)  Ongoing problems with de Quervain's tenosynovitis has not obtained a brace yet we did distribute 1 for him at clinic today    Moderate episode of recurrent major depressive disorder (H)  Feels current dosing of SSRI working well    Osteoarthritis of right hip, unspecified osteoarthritis type  Patient is had a flareup of his right hip this has been replaced about 9 years ago in a couple years ago had a infection  His wife had a fall over the weekend and he thinks he exacerbated the hip with trying to get her up  He wants to monitor at this time    Abdominal aortic aneurysm (AAA) without rupture, unspecified part  History of aneurysm repair following with vascular recent imaging stable    Aortic Stenosis  History of aortic stenosis bicuspid valve  On chronic warfarin    Malignant tumor of rectum (H)  History of cancer no active treatment    Acute on chronic systolic heart failure (H)  History of heart failure euvolemic on examination today recent cardiology went down on furosemide and has been doing well feeling stable    Atrial flutter, paroxysmal (H)  Patient has a pacemaker  History of atrial flutter is in sinus rhythm today    Primary hypertension  Patient's blood pressure at goal range continue with current medications    Mixed hyperlipidemia  Patient on statin therapy recent lipid levels checked at goal range      Patient has been advised of split billing requirements and indicates understanding: Yes        BMI  Estimated body mass index is 28.88 kg/m  as calculated from the following:    Height as of this encounter: 1.75 m (5' 8.9\").    Weight as of this encounter: 88.5 kg (195 lb).   Weight management " plan: Discussed healthy diet and exercise guidelines    Counseling  Appropriate preventive services were addressed with this patient via screening, questionnaire, or discussion as appropriate for fall prevention, nutrition, physical activity, Tobacco-use cessation, social engagement, weight loss and cognition.  Checklist reviewing preventive services available has been given to the patient.  Reviewed patient's diet, addressing concerns and/or questions.   The patient was provided with written information regarding signs of hearing loss.   The patient's PHQ-9 score is consistent with mild depression. He was provided with information regarding depression.           Mateo Lugo is a 84 year old, presenting for the following:  Wellness Visit        2/24/2025     7:54 AM   Additional Questions   Roomed by Lisa LAND LPN           HPI  Patient here for annual exam  Obtain blood work prior a couple weeks ago reviewed laboratory results with him today  Following with multiple specialty including cardiology recently decreased and furosemide-he is euvolemic on examination today does not feel swelling has worsened since going down on furosemide.  Blood pressure at goal range.  History of rectal cancer years ago.  Following with cardiology and vascular history of aneurysm repair.  Patient has a pacer-very sedentary has not been getting a lot of exercise recently his wife has been dealing with some falls and syncope he feels he exacerbated his right hip with trying to get up off the ground over the weekend.  He has had his right hip replaced and had an infection in a couple years ago he is hoping is not have any kind of recurrence irritation he was to monitor at this time.  His current serotonin medication working well for his depression.  Discussed the importance of physical activity.  Patient did miss his words of mini cog today through the clock correctly missed all 3 words.  History of aortic stenosis bicuspid valve.  His  blood pressure is at goal range is tolerating his current statin.  Continues to use tamsulosin to help with BPH symptoms.  Ongoing right thumb pain for de Quervain's tenosynovitis discussed a thumb spica splint for him today.  Distributed from clinic.        Health Care Directive  Patient does not have a Health Care Directive: Patient states has Advance Directive and will bring in a copy to clinic.      2/24/2025   General Health   How would you rate your overall physical health? (!) FAIR   Feel stress (tense, anxious, or unable to sleep) Not at all         2/24/2025   Nutrition   Diet: Low salt         2/24/2025   Exercise   Days per week of moderate/strenous exercise 0 days   Average minutes spent exercising at this level 0 min   (!) EXERCISE CONCERN      2/24/2025   Social Factors   Frequency of gathering with friends or relatives Once a week   Worry food won't last until get money to buy more No   Food not last or not have enough money for food? No   Do you have housing? (Housing is defined as stable permanent housing and does not include staying ouside in a car, in a tent, in an abandoned building, in an overnight shelter, or couch-surfing.) Yes   Are you worried about losing your housing? No   Lack of transportation? No   Unable to get utilities (heat,electricity)? No         2/24/2025   Fall Risk   Fallen 2 or more times in the past year? Yes    Trouble with walking or balance? Yes    Gait Speed Test (Document in seconds) 4   Gait Speed Test Interpretation Less than or equal to 5.00 seconds - PASS       Proxy-reported          2/24/2025   Activities of Daily Living- Home Safety   Needs help with the following daily activites None of the above   Safety concerns in the home None of the above         2/24/2025   Dental   Dentist two times every year? Yes         2/24/2025   Hearing Screening   Hearing concerns? (!) IT'S HARDER TO UNDERSTAND WOMEN'S VOICES THAN MEN'S VOICES.         2/24/2025   Driving Risk  Screening   Patient/family members have concerns about driving No         2025   General Alertness/Fatigue Screening   Have you been more tired than usual lately? No         2025   Urinary Incontinence Screening   Bothered by leaking urine in past 6 months No          Today's PHQ-9 Score:       2025     7:42 AM   PHQ-9 SCORE   PHQ-9 Total Score MyChart 6 (Mild depression)   PHQ-9 Total Score 6        Patient-reported         2025     8:04 AM   NADIA-7 SCORE   Total Score 0 (minimal anxiety)   Total Score 0        Patient-reported           2025   Substance Use   Alcohol more than 3/day or more than 7/wk No   Do you have a current opioid prescription? No   How severe/bad is pain from 1 to 10? 0/10 (No Pain)   Do you use any other substances recreationally? No     Social History     Tobacco Use    Smoking status: Former     Current packs/day: 0.00     Types: Cigarettes     Quit date: 9/15/1980     Years since quittin.4    Smokeless tobacco: Never   Vaping Use    Vaping status: Never Used   Substance Use Topics    Alcohol use: No     Comment: Alcoholic Drinks/day: quit     Drug use: No                 Reviewed and updated as needed this visit by Provider                      Current providers sharing in care for this patient include:  Patient Care Team:  Isidro Au MD as PCP - General (Family Medicine)  Isidro Au MD as Assigned PCP    The following health maintenance items are reviewed in Epic and correct as of today:  Health Maintenance   Topic Date Due    HF ACTION PLAN  Never done    ANNUAL REVIEW OF HM ORDERS  Never done    DEPRESSION ACTION PLAN  Never done    MEDICARE ANNUAL WELLNESS VISIT  Never done    ZOSTER IMMUNIZATION (2 of 3) 2013    RSV VACCINE (1 - 1-dose 75+ series) Never done    COVID-19 Vaccine ( season) 2025    BMP  2025    PHQ-9  2025    ALT  2026    LIPID  2026    CBC  2026    FALL RISK  "ASSESSMENT  02/24/2026    COLORECTAL CANCER SCREENING  04/11/2029    ADVANCE CARE PLANNING  02/24/2030    DTAP/TDAP/TD IMMUNIZATION (2 - Td or Tdap) 07/15/2032    TSH W/FREE T4 REFLEX  Completed    INFLUENZA VACCINE  Completed    Pneumococcal Vaccine: 50+ Years  Completed    HPV IMMUNIZATION  Aged Out    MENINGITIS IMMUNIZATION  Aged Out            Objective    Exam  /69   Pulse 61   Temp 98.3  F (36.8  C) (Oral)   Resp 20   Ht 1.75 m (5' 8.9\")   Wt 88.5 kg (195 lb)   SpO2 94%   BMI 28.88 kg/m     Estimated body mass index is 28.88 kg/m  as calculated from the following:    Height as of this encounter: 1.75 m (5' 8.9\").    Weight as of this encounter: 88.5 kg (195 lb).    Physical Exam  GENERAL: alert and no distress  GENERAL: alert, no distress, and slow to get out of the chair  EYES: Eyes grossly normal to inspection, PERRL and conjunctivae and sclerae normal  RESP: lungs clear to auscultation - no rales, rhonchi or wheezes  CV: regular rates and rhythm and no peripheral edema  MS: no gross musculoskeletal defects noted, no edema  SKIN: Abrasion right elbow  PSYCH: mentation appears normal, affect normal/bright        2/24/2025   Mini Cog   Clock Draw Score 2 Normal   3 Item Recall 0 objects recalled   Mini Cog Total Score 2            The longitudinal plan of care for the diagnosis(es)/condition(s) as documented were addressed during this visit. Due to the added complexity in care, I will continue to support Parish in the subsequent management and with ongoing continuity of care.  Signed Electronically by: Isidro Au MD    "

## 2025-03-03 ENCOUNTER — ANTICOAGULATION THERAPY VISIT (OUTPATIENT)
Dept: ANTICOAGULATION | Facility: CLINIC | Age: 85
End: 2025-03-03

## 2025-03-03 ENCOUNTER — LAB (OUTPATIENT)
Dept: LAB | Facility: CLINIC | Age: 85
End: 2025-03-03
Payer: COMMERCIAL

## 2025-03-03 DIAGNOSIS — I48.20 CHRONIC ATRIAL FIBRILLATION (H): Primary | ICD-10-CM

## 2025-03-03 DIAGNOSIS — I48.92 ATRIAL FLUTTER, PAROXYSMAL (H): ICD-10-CM

## 2025-03-03 DIAGNOSIS — I48.20 CHRONIC ATRIAL FIBRILLATION (H): ICD-10-CM

## 2025-03-03 DIAGNOSIS — Z79.01 LONG TERM (CURRENT) USE OF ANTICOAGULANTS: ICD-10-CM

## 2025-03-03 DIAGNOSIS — I48.4 ATYPICAL ATRIAL FLUTTER (H): ICD-10-CM

## 2025-03-03 DIAGNOSIS — Z98.890 H/O MITRAL VALVE REPAIR: ICD-10-CM

## 2025-03-03 DIAGNOSIS — Z95.2 S/P AVR: ICD-10-CM

## 2025-03-03 LAB — INR BLD: 3.2 (ref 0.9–1.1)

## 2025-03-03 PROCEDURE — 36416 COLLJ CAPILLARY BLOOD SPEC: CPT

## 2025-03-03 PROCEDURE — 85610 PROTHROMBIN TIME: CPT

## 2025-03-03 NOTE — PROGRESS NOTES
ANTICOAGULATION MANAGEMENT     Parish Bills 84 year old male is on warfarin with supratherapeutic INR result. (Goal INR 2.0-3.0)    Recent labs: (last 7 days)     03/03/25  0814   INR 3.2*       ASSESSMENT     Warfarin Lab Questionnaire    Warfarin Doses Last 7 Days      3/3/2025     8:22 AM   Dose in Tablet or Mg   TAB or MG? milligram (mg)     Pt Rptd Dose JON MONDAY TUESDAY WED THURS FRIDAY SATURDAY   3/3/2025   8:22 AM 5 5 5 5 5 5 5         3/3/2025   Warfarin Lab Questionnaire   Missed doses within past 14 days? No   Changes in diet or alcohol within past 14 days? No   Medication changes since last result? No   Injuries or illness since last result? Yes         - reported had 2 more events prior to ED visit on the right nares, that he was able to achieve immediate hemostasis.     New shortness of breath, severe headaches or sudden changes in vision since last result? No   Abnormal bleeding since last result? No - reported in ED for left epistaxis with no trauma on 2/28/25. Was advised to use Aquaphor or nasal saline in the nose twice a day for the next week.  If bleeding recurs and last more than 20 min, to return to ED.  INR was therapeutic at 2.7.     Upcoming surgery, procedure? No     Previous result: Therapeutic last 4 INR visits.    Additional findings: None       PLAN     Recommended plan for ongoing change(s) affecting INR     Dosing Instructions: decrease your warfarin dose (7.1% change) with next INR in 2 weeks       Summary  As of 3/3/2025      Full warfarin instructions:  2.5 mg every Mon; 5 mg all other days   Next INR check:  3/17/2025               Telephone call with Parish who verbalizes understanding and agrees to plan   - with recurrent epistaxis of right / left nares, decreased weekly warfarin dose.    Lab visit scheduled - INR on 3/17/25 @ [facility]    Education provided: Taking warfarin: Importance of taking warfarin as instructed  Goal range and lab monitoring: goal range and significance  of current result  Symptom monitoring: monitoring for bleeding signs and symptoms and when to seek medical attention/emergency care    Plan made per [facility] anticoagulation protocol    Kori Ziegler RN  3/3/2025  Anticoagulation Clinic  Epic pool for routing messages: nicole SHANICE ODONNELL  [facility] patient phone line: [phone]        _______________________________________________________________________     Anticoagulation Episode Summary       Current INR goal:  2.0-3.0   TTR:  82.6% (1 y)   Target end date:  Indefinite   Send INR reminders to:  SHANICE ODONNELL    Indications    Chronic atrial fibrillation (H) [I48.20]  H/O mitral valve repair [Z98.890]  S/P AVR [Z95.2]  A-fib (H) (Resolved) [I48.91]  Long term (current) use of anticoagulants [Z79.01]  Atrial flutter  paroxysmal (H) [I48.92]  Atypical atrial flutter (H) [I48.4]             Comments:  --             Anticoagulation Care Providers       Provider Role Specialty Phone number    Isidro Au MD Referring Family Medicine [phone]

## 2025-03-17 ENCOUNTER — LAB (OUTPATIENT)
Dept: LAB | Facility: CLINIC | Age: 85
End: 2025-03-17
Payer: COMMERCIAL

## 2025-03-17 ENCOUNTER — ANTICOAGULATION THERAPY VISIT (OUTPATIENT)
Dept: ANTICOAGULATION | Facility: CLINIC | Age: 85
End: 2025-03-17

## 2025-03-17 DIAGNOSIS — I48.20 CHRONIC ATRIAL FIBRILLATION (H): ICD-10-CM

## 2025-03-17 DIAGNOSIS — I48.20 CHRONIC ATRIAL FIBRILLATION (H): Primary | ICD-10-CM

## 2025-03-17 DIAGNOSIS — I48.4 ATYPICAL ATRIAL FLUTTER (H): ICD-10-CM

## 2025-03-17 DIAGNOSIS — Z98.890 H/O MITRAL VALVE REPAIR: ICD-10-CM

## 2025-03-17 DIAGNOSIS — Z79.01 LONG TERM (CURRENT) USE OF ANTICOAGULANTS: ICD-10-CM

## 2025-03-17 DIAGNOSIS — I48.92 ATRIAL FLUTTER, PAROXYSMAL (H): ICD-10-CM

## 2025-03-17 DIAGNOSIS — Z95.2 S/P AVR: ICD-10-CM

## 2025-03-17 LAB — INR BLD: 2.4 (ref 0.9–1.1)

## 2025-03-17 PROCEDURE — 36416 COLLJ CAPILLARY BLOOD SPEC: CPT

## 2025-03-17 PROCEDURE — 85610 PROTHROMBIN TIME: CPT

## 2025-03-17 NOTE — PROGRESS NOTES
ANTICOAGULATION MANAGEMENT     Parish Bills 84 year old male is on warfarin with therapeutic INR result. (Goal INR 2.0-3.0)    Recent labs: (last 7 days)     03/17/25  0807   INR 2.4*       ASSESSMENT     Warfarin Lab Questionnaire    Warfarin Doses Last 7 Days      3/17/2025     8:08 AM   Dose in Tablet or Mg   TAB or MG? - 5mg warfarin tabs (evenings) milligram (mg)     Pt Rptd Dose JON MONDAY TUESDAY WED THURS FRIDAY SATURDAY   3/17/2025   8:08 AM 5 2.5 5 5 5 5 5         3/17/2025   Warfarin Lab Questionnaire   Missed doses within past 14 days? No   Changes in diet or alcohol within past 14 days? No   Medication changes since last result? No - weekly warfarin dose was decreased by 7.1% on 3/3/25.     Injuries or illness since last result? No   New shortness of breath, severe headaches or sudden changes in vision since last result? No   Abnormal bleeding since last result? No - reported has not had any events of epistaxis since a decrease in warfarin dose.     Upcoming surgery, procedure? No     Previous result: Supratherapeutic at 3.2 on 3/3/25.    Additional findings: None       PLAN     Recommended plan for no diet, medication or health factor changes affecting INR     Dosing Instructions: Continue your current warfarin dose with next INR in 2 weeks       Summary  As of 3/17/2025      Full warfarin instructions:  2.5 mg every Mon; 5 mg all other days   Next INR check:  3/31/2025               Telephone call with Parish who verbalizes understanding and agrees to plan    Lab visit scheduled - INR on 3/31/25 @ [facility]    Education provided: Taking warfarin: Importance of taking warfarin as instructed  Goal range and lab monitoring: goal range and significance of current result    Plan made per [facility] anticoagulation protocol    Kori Ziegler, RN  3/17/2025  Anticoagulation Clinic  Divided for routing messages: nicole PRASAD [facility] patient phone line:  [phone]        _______________________________________________________________________     Anticoagulation Episode Summary       Current INR goal:  2.0-3.0   TTR:  81.7% (1 y)   Target end date:  Indefinite   Send INR reminders to:  [facility]    Indications    Chronic atrial fibrillation (H) [I48.20]  H/O mitral valve repair [Z98.890]  S/P AVR [Z95.2]  A-fib (H) (Resolved) [I48.91]  Long term (current) use of anticoagulants [Z79.01]  Atrial flutter  paroxysmal (H) [I48.92]  Atypical atrial flutter (H) [I48.4]             Comments:  --             Anticoagulation Care Providers       Provider Role Specialty Phone number    Isidro Au MD Referring Family Medicine [phone]

## 2025-03-31 ENCOUNTER — LAB (OUTPATIENT)
Dept: LAB | Facility: CLINIC | Age: 85
End: 2025-03-31
Payer: COMMERCIAL

## 2025-03-31 ENCOUNTER — ANTICOAGULATION THERAPY VISIT (OUTPATIENT)
Dept: ANTICOAGULATION | Facility: CLINIC | Age: 85
End: 2025-03-31

## 2025-03-31 DIAGNOSIS — I48.92 ATRIAL FLUTTER, PAROXYSMAL (H): ICD-10-CM

## 2025-03-31 DIAGNOSIS — I48.4 ATYPICAL ATRIAL FLUTTER (H): ICD-10-CM

## 2025-03-31 DIAGNOSIS — I48.20 CHRONIC ATRIAL FIBRILLATION (H): ICD-10-CM

## 2025-03-31 DIAGNOSIS — I48.20 CHRONIC ATRIAL FIBRILLATION (H): Primary | ICD-10-CM

## 2025-03-31 DIAGNOSIS — Z98.890 H/O MITRAL VALVE REPAIR: ICD-10-CM

## 2025-03-31 DIAGNOSIS — Z79.01 LONG TERM (CURRENT) USE OF ANTICOAGULANTS: ICD-10-CM

## 2025-03-31 DIAGNOSIS — Z95.2 S/P AVR: ICD-10-CM

## 2025-03-31 LAB — INR BLD: 2.9 (ref 0.9–1.1)

## 2025-03-31 PROCEDURE — 85610 PROTHROMBIN TIME: CPT

## 2025-03-31 PROCEDURE — 36416 COLLJ CAPILLARY BLOOD SPEC: CPT

## 2025-03-31 NOTE — PROGRESS NOTES
ANTICOAGULATION MANAGEMENT     Parish SANCHEZ Sanju 84 year old male is on warfarin with therapeutic INR result. (Goal INR 2.0-3.0)    Recent labs: (last 7 days)     03/31/25  0811   INR 2.9*       ASSESSMENT     Warfarin Lab Questionnaire    Warfarin Doses Last 7 Days      3/31/2025     8:14 AM   Dose in Tablet or Mg   TAB or MG? milligram (mg)     Pt Rptd Dose JON MONDAY TUESDAY WED THURS FRIDAY SATURDAY   3/31/2025   8:14 AM 5 2.5 5 5 5 5 5         3/31/2025   Warfarin Lab Questionnaire   Missed doses within past 14 days? No   Changes in diet or alcohol within past 14 days? No   Medication changes since last result? No   Injuries or illness since last result? No   New shortness of breath, severe headaches or sudden changes in vision since last result? No   Abnormal bleeding since last result? No-he has not had any nose bleeds.    Upcoming surgery, procedure? No     Previous result: Therapeutic last visit; previously outside of goal range  Additional findings: None       PLAN     Recommended plan for no diet, medication or health factor changes affecting INR     Dosing Instructions: Continue your current warfarin dose with next INR in 3 weeks       Summary  As of 3/31/2025      Full warfarin instructions:  2.5 mg every Mon; 5 mg all other days   Next INR check:  4/21/2025               Telephone call with Parish who agrees to plan and repeated back plan correctly    Lab visit scheduled    Education provided: Please call back if any changes to your diet, medications or how you've been taking warfarin  Goal range and lab monitoring: goal range and significance of current result and Importance of therapeutic range  Contact [phone] with any changes, questions or concerns.     Plan made per [facility] anticoagulation protocol    Laura Hauser RN  3/31/2025  Anticoagulation Clinic  [employer] for routing messages: nicole ODONNELL  [facility] patient phone line:  [phone]        _______________________________________________________________________     Anticoagulation Episode Summary       Current INR goal:  2.0-3.0   TTR:  81.7% (1 y)   Target end date:  Indefinite   Send INR reminders to:  [facility]    Indications    Chronic atrial fibrillation (H) [I48.20]  H/O mitral valve repair [Z98.890]  S/P AVR [Z95.2]  A-fib (H) (Resolved) [I48.91]  Long term (current) use of anticoagulants [Z79.01]  Atrial flutter  paroxysmal (H) [I48.92]  Atypical atrial flutter (H) [I48.4]             Comments:  --             Anticoagulation Care Providers       Provider Role Specialty Phone number    Isidro Au MD Referring Family Medicine [phone]

## 2025-04-01 DIAGNOSIS — I10 ESSENTIAL HYPERTENSION: ICD-10-CM

## 2025-04-01 NOTE — TELEPHONE ENCOUNTER
The patient called requesting to switch from 75mg to 50mg of Metoprolol, reporting that the current dose does not help with his hands symptoms. He is asking for a call back to be informed of Dr. Au's response regarding the dose adjustment.

## 2025-04-02 RX ORDER — METOPROLOL SUCCINATE 50 MG/1
50 TABLET, EXTENDED RELEASE ORAL DAILY
Qty: 90 TABLET | Refills: 1 | Status: SHIPPED | OUTPATIENT
Start: 2025-04-02

## 2025-04-02 NOTE — TELEPHONE ENCOUNTER
Patient called in today and told me that he does not want to keep the increased dosage at 75MG. Wants to know if he can be prescribed 50MG again for his prescription. Stated that his BP has not changed at all from what he can see at home on his reader and his hand symptoms have not improved.  Looking for response and refill of what dosage of medication Dr. Au would be appropriate at this time.

## 2025-04-02 NOTE — TELEPHONE ENCOUNTER
Have patient decrease back to 50 mg if he has not noted any improvement.  I will send a new prescription to the pharmacy.  If hand tremors worsen with a decrease in dosing he can contact me and we can discuss alternatives.

## 2025-04-03 NOTE — TELEPHONE ENCOUNTER
Called and informed patient wife of message as patient was not available. She stated understanding and will update patient. If he has any questions she will have him reach back out to us.

## 2025-04-21 ENCOUNTER — ANTICOAGULATION THERAPY VISIT (OUTPATIENT)
Dept: ANTICOAGULATION | Facility: CLINIC | Age: 85
End: 2025-04-21

## 2025-04-21 ENCOUNTER — LAB (OUTPATIENT)
Dept: LAB | Facility: CLINIC | Age: 85
End: 2025-04-21
Payer: COMMERCIAL

## 2025-04-21 DIAGNOSIS — Z79.01 LONG TERM (CURRENT) USE OF ANTICOAGULANTS: ICD-10-CM

## 2025-04-21 DIAGNOSIS — I48.4 ATYPICAL ATRIAL FLUTTER (H): ICD-10-CM

## 2025-04-21 DIAGNOSIS — I48.92 ATRIAL FLUTTER, PAROXYSMAL (H): ICD-10-CM

## 2025-04-21 DIAGNOSIS — Z95.2 S/P AVR: ICD-10-CM

## 2025-04-21 DIAGNOSIS — Z98.890 H/O MITRAL VALVE REPAIR: ICD-10-CM

## 2025-04-21 DIAGNOSIS — I48.20 CHRONIC ATRIAL FIBRILLATION (H): Primary | ICD-10-CM

## 2025-04-21 DIAGNOSIS — I48.20 CHRONIC ATRIAL FIBRILLATION (H): ICD-10-CM

## 2025-04-21 LAB — INR BLD: 3.3 (ref 0.9–1.1)

## 2025-04-21 PROCEDURE — 85610 PROTHROMBIN TIME: CPT

## 2025-04-21 PROCEDURE — 36416 COLLJ CAPILLARY BLOOD SPEC: CPT

## 2025-04-21 NOTE — PROGRESS NOTES
ANTICOAGULATION MANAGEMENT     Parish Bills 84 year old male is on warfarin with supratherapeutic INR result. (Goal INR 2.0-3.0)    Recent labs: (last 7 days)     04/21/25  0755   INR 3.3*       ASSESSMENT     Warfarin Lab Questionnaire    Warfarin Doses Last 7 Days      4/21/2025     7:57 AM   Dose in Tablet or Mg   TAB or MG? - 5mg warfarin tabs (evenings) milligram (mg)     Pt Rptd Dose SUNDAY MONDAY TUESDAY WED THURS FRIDAY SATURDAY 4/21/2025   7:57 AM 5 2.5 5 5 5 5 5         4/21/2025   Warfarin Lab Questionnaire   Missed doses within past 14 days? No   Changes in diet or alcohol within past 14 days? No   Medication changes since last result? No   Injuries or illness since last result? No   New shortness of breath, severe headaches or sudden changes in vision since last result? No   Abnormal bleeding since last result? No - did report same left nares recurrent epistaxis.   Upcoming surgery, procedure? No     Previous result: Therapeutic last 2 INR visits.(2.9. 2.4)   - INR trended up with no new changes with usual regimen.    Additional findings: None       PLAN     Recommended plan for temporary change(s) affecting INR     Dosing Instructions: decrease your warfarin dose (7.7% change) with next INR in 2 weeks       Summary  As of 4/21/2025      Full warfarin instructions:  2.5 mg every Mon, Thu; 5 mg all other days   Next INR check:  5/5/2025               Telephone call with Parish who verbalizes understanding and agrees to plan    Lab visit scheduled - INR on 5/5/25 @ [facility].    Education provided: Taking warfarin: Importance of taking warfarin as instructed  Goal range and lab monitoring: goal range and significance of current result  Dietary considerations: importance of consistent vitamin K intake  Symptom monitoring: monitoring for bleeding signs and symptoms    Plan made per [facility] anticoagulation protocol    Kori Ziegler, RN  4/21/2025  Anticoagulation Clinic  [employer] for routing messages: nicole  SHANICE ODONNELL  [facility] patient phone line: [phone]        _______________________________________________________________________     Anticoagulation Episode Summary       Current INR goal:  2.0-3.0   TTR:  77.4% (1 y)   Target end date:  Indefinite   Send INR reminders to:  SHANICE ODONNELL    Indications    Chronic atrial fibrillation (H) [I48.20]  H/O mitral valve repair [Z98.890]  S/P AVR [Z95.2]  A-fib (H) (Resolved) [I48.91]  Long term (current) use of anticoagulants [Z79.01]  Atrial flutter  paroxysmal (H) [I48.92]  Atypical atrial flutter (H) [I48.4]             Comments:  --             Anticoagulation Care Providers       Provider Role Specialty Phone number    Isidro Au MD Referring Family Medicine [phone]

## 2025-05-05 ENCOUNTER — ANTICOAGULATION THERAPY VISIT (OUTPATIENT)
Dept: ANTICOAGULATION | Facility: CLINIC | Age: 85
End: 2025-05-05

## 2025-05-05 ENCOUNTER — LAB (OUTPATIENT)
Dept: LAB | Facility: CLINIC | Age: 85
End: 2025-05-05
Payer: COMMERCIAL

## 2025-05-05 DIAGNOSIS — I48.20 CHRONIC ATRIAL FIBRILLATION (H): Primary | ICD-10-CM

## 2025-05-05 DIAGNOSIS — I48.92 ATRIAL FLUTTER, PAROXYSMAL (H): ICD-10-CM

## 2025-05-05 DIAGNOSIS — Z98.890 H/O MITRAL VALVE REPAIR: ICD-10-CM

## 2025-05-05 DIAGNOSIS — I48.20 CHRONIC ATRIAL FIBRILLATION (H): ICD-10-CM

## 2025-05-05 DIAGNOSIS — Z95.2 S/P AVR: ICD-10-CM

## 2025-05-05 DIAGNOSIS — I48.4 ATYPICAL ATRIAL FLUTTER (H): ICD-10-CM

## 2025-05-05 DIAGNOSIS — Z79.01 LONG TERM (CURRENT) USE OF ANTICOAGULANTS: ICD-10-CM

## 2025-05-05 LAB — INR BLD: 1.9 (ref 0.9–1.1)

## 2025-05-05 PROCEDURE — 85610 PROTHROMBIN TIME: CPT

## 2025-05-05 PROCEDURE — 36416 COLLJ CAPILLARY BLOOD SPEC: CPT

## 2025-05-05 NOTE — PROGRESS NOTES
ANTICOAGULATION MANAGEMENT     Parish Bills 84 year old male is on warfarin with subtherapeutic INR result. (Goal INR 2.0-3.0)    Recent labs: (last 7 days)     05/05/25  0811   INR 1.9*       ASSESSMENT     Warfarin Lab Questionnaire    Warfarin Doses Last 7 Days      5/5/2025     8:05 AM   Dose in Tablet or Mg   TAB or MG? - 5mg warfarin tabs (evenings)  milligram (mg)     Pt Rptd Dose SUNDAY MONDAY TUESDAY WED THURS FRIDAY SATURDAY 5/5/2025   8:05 AM 5 2.5 5 5 2.5 5 5         5/5/2025   Warfarin Lab Questionnaire   Missed doses within past 14 days? No   Changes in diet or alcohol within past 14 days? No   Medication changes since last result? No - weekly warfarin dose was decreased by 7.7% on 4/21/25.     Injuries or illness since last result? No   New shortness of breath, severe headaches or sudden changes in vision since last result? No   Abnormal bleeding since last result? No - reported he only had one epistaxis event and nose bleed stopped immediately 2 wks ago.     Upcoming surgery, procedure? No     Previous result: Supratherapeutic at 3.3 on 4/21/25.   - INR trended up with recurrent epistaxis events.  Warfarin dose.was decreased/    Additional findings: None       PLAN     Recommended plan for no diet, medication or health factor changes affecting INR     Dosing Instructions: Continue your current warfarin dose with next INR in 2 weeks       Summary  As of 5/5/2025      Full warfarin instructions:  2.5 mg every Mon, Thu; 5 mg all other days   Next INR check:  5/19/2025               Telephone call with Parish who verbalizes understanding and agrees to plan    Lab visit scheduled - INR on 5/19/25 @ [facility]    Education provided: Taking warfarin: Importance of taking warfarin as instructed  Goal range and lab monitoring: goal range and significance of current result  Symptom monitoring: monitoring for bleeding signs and symptoms    Plan made per [facility] anticoagulation protocol    Kori Ziegler,  RN  5/5/2025  Anticoagulation Clinic  [facility] for routing messages: p SHANICE ODONNELL  [facility] patient phone line: [phone]        _______________________________________________________________________     Anticoagulation Episode Summary       Current INR goal:  2.0-3.0   TTR:  76.3% (1 y)   Target end date:  Indefinite   Send INR reminders to:  AMBERKARYNA LUC ODONNELL    Indications    Chronic atrial fibrillation (H) [I48.20]  H/O mitral valve repair [Z98.890]  S/P AVR [Z95.2]  A-fib (H) (Resolved) [I48.91]  Long term (current) use of anticoagulants [Z79.01]  Atrial flutter  paroxysmal (H) [I48.92]  Atypical atrial flutter (H) [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

## 2025-05-12 ENCOUNTER — OFFICE VISIT (OUTPATIENT)
Dept: FAMILY MEDICINE | Facility: CLINIC | Age: 85
End: 2025-05-12
Payer: COMMERCIAL

## 2025-05-12 VITALS
TEMPERATURE: 97.7 F | HEIGHT: 69 IN | RESPIRATION RATE: 16 BRPM | SYSTOLIC BLOOD PRESSURE: 127 MMHG | HEART RATE: 61 BPM | WEIGHT: 195.4 LBS | BODY MASS INDEX: 28.94 KG/M2 | DIASTOLIC BLOOD PRESSURE: 66 MMHG | OXYGEN SATURATION: 97 %

## 2025-05-12 DIAGNOSIS — G25.0 ESSENTIAL TREMOR: ICD-10-CM

## 2025-05-12 DIAGNOSIS — R60.9 EDEMA, UNSPECIFIED TYPE: ICD-10-CM

## 2025-05-12 DIAGNOSIS — R04.0 EPISTAXIS: Primary | ICD-10-CM

## 2025-05-12 PROCEDURE — 3078F DIAST BP <80 MM HG: CPT | Performed by: FAMILY MEDICINE

## 2025-05-12 PROCEDURE — 99213 OFFICE O/P EST LOW 20 MIN: CPT | Performed by: FAMILY MEDICINE

## 2025-05-12 PROCEDURE — G2211 COMPLEX E/M VISIT ADD ON: HCPCS | Performed by: FAMILY MEDICINE

## 2025-05-12 PROCEDURE — 3074F SYST BP LT 130 MM HG: CPT | Performed by: FAMILY MEDICINE

## 2025-05-12 ASSESSMENT — PATIENT HEALTH QUESTIONNAIRE - PHQ9
10. IF YOU CHECKED OFF ANY PROBLEMS, HOW DIFFICULT HAVE THESE PROBLEMS MADE IT FOR YOU TO DO YOUR WORK, TAKE CARE OF THINGS AT HOME, OR GET ALONG WITH OTHER PEOPLE: SOMEWHAT DIFFICULT
SUM OF ALL RESPONSES TO PHQ QUESTIONS 1-9: 4
SUM OF ALL RESPONSES TO PHQ QUESTIONS 1-9: 4

## 2025-05-12 NOTE — PROGRESS NOTES
"  Assessment & Plan     Epistaxis  Recurrent epistaxis requiring a couple ER visits has had it cauterized in the past  Discussed referral to ENT for cauterization  - Adult ENT  Referral    Edema, unspecified type  Patient's cardiologist cut down on diuretic slightly has had a little bit of edema but tolerable he will monitor    Essential tremor  Patient has an essential tremor  We discussed medication options but he wants to monitor at this time  If worsening consider adjustment of beta-blocker versus alternatives                  Subjective   Parish is a 84 year old, presenting for the following health issues:  Sinus Problem (Bloody nose issue, happened 5x within the past month, first time landed in E.R, has had to have nose cauterized 2x before, happens out of left nostril)        5/12/2025     3:43 PM   Additional Questions   Roomed by Jessica AVITIA   Accompanied by self     Sinus Problem     History of Present Illness       Reason for visit:  Bloody nose mainly from left nostril   He is taking medications regularly.        Patient here because of repetitive nosebleeds they have required cauterization a couple times in the past.  Patient is on warfarin and is noted the left side to always have the bleeding recently.  We discussed referral to ENT for cauterization.  Slight edema in the bilateral lower extremities since cardiology cut down on his diuretics due to some renal insufficiency kidney function has improved back to baseline and he is tolerant of the slight edema he would like to monitor.  Patient again discusses his tremor and is noted to be become more bothersome when eating discussed with him medication options which he would like to avoid at this time-discussed firmly gripping his glass or silverware to try to help slow down the essential tremor.              Objective    /66   Pulse 61   Temp 97.7  F (36.5  C) (Oral)   Resp 16   Ht 1.747 m (5' 8.78\")   Wt 88.6 kg (195 lb 6.4 oz)   " SpO2 97%   BMI 29.04 kg/m    Body mass index is 29.04 kg/m .  Physical Exam   GENERAL: alert and no distress  RESP: lungs clear to auscultation - no rales, rhonchi or wheezes  CV: regular rates and rhythm, grade 4/6 systolic murmur heard best over the L SB, and 1+ bilateral lower extremity pitting edema to shin  NEURO: tremor bilateral hands not pill-rolling and mentation intact  PSYCH: mentation appears normal, affect normal/bright          The longitudinal plan of care for the diagnosis(es)/condition(s) as documented were addressed during this visit. Due to the added complexity in care, I will continue to support Parish in the subsequent management and with ongoing continuity of care.  Signed Electronically by: Isidro Au MD

## 2025-05-19 ENCOUNTER — TELEPHONE (OUTPATIENT)
Dept: FAMILY MEDICINE | Facility: CLINIC | Age: 85
End: 2025-05-19

## 2025-05-19 DIAGNOSIS — N40.1 BENIGN PROSTATIC HYPERPLASIA WITH URINARY FREQUENCY: ICD-10-CM

## 2025-05-19 DIAGNOSIS — R35.0 BENIGN PROSTATIC HYPERPLASIA WITH URINARY FREQUENCY: ICD-10-CM

## 2025-05-19 RX ORDER — TAMSULOSIN HYDROCHLORIDE 0.4 MG/1
0.4 CAPSULE ORAL DAILY
Qty: 90 CAPSULE | Refills: 3 | OUTPATIENT
Start: 2025-05-19

## 2025-05-19 NOTE — TELEPHONE ENCOUNTER
Please have patient call and see if [facility] is in his network- that group will likely get him in sooner.

## 2025-05-19 NOTE — TELEPHONE ENCOUNTER
General Call    Contacts       Contact Date/Time Type Contact Phone/Fax    05/19/2025 08:23 AM CDT Phone (Incoming) Parish Bills (Self) [phone] (H)          Reason for Call:  Patient can not get into ENT until July and September with [facility]    What are your questions or concerns:  He was told to call Dr Au if he can not be seen urgently for his bloody nose.    I did advise to call his insurance and see what other ENT clinics he can go to that are in his network and he advised he was told to call back and let Dr Au know.    Please advise next steps.    Date of last appointment with provider: 5/12/25    Okay to leave a detailed message?: Yes at Home number on file [phone] (home)

## 2025-05-20 ENCOUNTER — LAB (OUTPATIENT)
Dept: LAB | Facility: CLINIC | Age: 85
End: 2025-05-20
Payer: COMMERCIAL

## 2025-05-20 ENCOUNTER — TRANSFERRED RECORDS (OUTPATIENT)
Dept: HEALTH INFORMATION MANAGEMENT | Facility: CLINIC | Age: 85
End: 2025-05-20

## 2025-05-20 ENCOUNTER — ANTICOAGULATION THERAPY VISIT (OUTPATIENT)
Dept: ANTICOAGULATION | Facility: CLINIC | Age: 85
End: 2025-05-20

## 2025-05-20 DIAGNOSIS — I48.4 ATYPICAL ATRIAL FLUTTER (H): ICD-10-CM

## 2025-05-20 DIAGNOSIS — I48.20 CHRONIC ATRIAL FIBRILLATION (H): Primary | ICD-10-CM

## 2025-05-20 DIAGNOSIS — I48.92 ATRIAL FLUTTER, PAROXYSMAL (H): ICD-10-CM

## 2025-05-20 DIAGNOSIS — Z95.2 S/P AVR: ICD-10-CM

## 2025-05-20 DIAGNOSIS — Z98.890 H/O MITRAL VALVE REPAIR: ICD-10-CM

## 2025-05-20 DIAGNOSIS — I48.20 CHRONIC ATRIAL FIBRILLATION (H): ICD-10-CM

## 2025-05-20 DIAGNOSIS — Z79.01 LONG TERM (CURRENT) USE OF ANTICOAGULANTS: ICD-10-CM

## 2025-05-20 LAB — INR BLD: 2 (ref 0.9–1.1)

## 2025-05-20 PROCEDURE — 85610 PROTHROMBIN TIME: CPT

## 2025-05-20 PROCEDURE — 36416 COLLJ CAPILLARY BLOOD SPEC: CPT

## 2025-05-20 NOTE — TELEPHONE ENCOUNTER
Patient was called and I left him a voicemail to call us back and go over note from Dr. Au listed below this:      Please have patient call and see if [facility] is in his network- that group will likely get him in sooner.

## 2025-05-20 NOTE — PROGRESS NOTES
ANTICOAGULATION MANAGEMENT     Parish SANCHEZ Sanju 84 year old male is on warfarin with therapeutic INR result. (Goal INR 2.0-3.0)    Recent labs: (last 7 days)     05/20/25  1132   INR 2.0*       ASSESSMENT     Warfarin Lab Questionnaire    Warfarin Doses Last 7 Days      5/20/2025    11:34 AM   Dose in Tablet or Mg   TAB or MG? milligram (mg)     Pt Rptd Dose SUNDAY MONDAY TUESDAY WED THURS 5/20/2025  11:34 AM 5 2.5 5 5 2.5         5/20/2025   Warfarin Lab Questionnaire   Missed doses within past 14 days? No   Changes in diet or alcohol within past 14 days? No   Medication changes since last result? Yes    reported stopped Aspirin one week ago.  (Known to decrease INR after stopping Aspirin therapy. Monitor INR closely and adjust dose based on INR rsults)     Injuries or illness since last result? Yes  - reported ENT appt today for potential cauterization for ongoing epistaxis events.          - OV on 5/12/25 due to recurrent epistaxis and sinus problems.  Referral to [facility].   New shortness of breath, severe headaches or sudden changes in vision since last result? No   Abnormal bleeding since last result? Yes   If yes, please explain: nose bleed          - still ongoing nosebleed, despite stopping Aspirin one week ago.     Upcoming surgery, procedure? No     Previous result: Subtherapeutic at 1.9 on 5/5/25.    Additional findings: None       PLAN     Recommended plan for ongoing change(s) affecting INR     Dosing Instructions: Continue your current warfarin dose with next INR in 1 week       Summary  As of 5/20/2025      Full warfarin instructions:  2.5 mg every Mon, Thu; 5 mg all other days   Next INR check:  5/30/2025               Telephone call with Parish who verbalizes understanding and agrees to plan   - No adjustments made to warfarin doses today.  Patient has ENT appt this afternoon for potential cauterization.              - in addition, Aspirin was stopped one week ago, will give it time to wash out,  then scheduled INR in one wk, and will adjust wawrfarin dose based on INR results.    Lab visit scheduled - INR 5/27/25 @ [facility]    Education provided: Taking warfarin: Importance of taking warfarin as instructed  Goal range and lab monitoring: goal range and significance of current result  Interaction IS anticipated between warfarin and stopped Aspirin one week ago    Plan made per [facility] anticoagulation protocol    Kori Ziegler RN  5/20/2025  Anticoagulation Clinic  [place] for routing messages: nicloe ODONNELL  [facility] patient phone line: [phone]        _______________________________________________________________________     Anticoagulation Episode Summary       Current INR goal:  2.0-3.0   TTR:  72.1% (1 y)   Target end date:  Indefinite   Send INR reminders to:  SHANICE ODONNELL    Indications    Chronic atrial fibrillation (H) [I48.20]  H/O mitral valve repair [Z98.890]  S/P AVR [Z95.2]  A-fib (H) (Resolved) [I48.91]  Long term (current) use of anticoagulants [Z79.01]  Atrial flutter  paroxysmal (H) [I48.92]  Atypical atrial 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

## 2025-05-21 NOTE — TELEPHONE ENCOUNTER
Reach out to patient to discuss.  Patient states that he actually located his last prescription that came in the mail for this and has plenty.    Jassi Live RN     [facility]

## 2025-05-25 DIAGNOSIS — N40.1 BENIGN PROSTATIC HYPERPLASIA WITH URINARY FREQUENCY: ICD-10-CM

## 2025-05-25 DIAGNOSIS — R35.0 BENIGN PROSTATIC HYPERPLASIA WITH URINARY FREQUENCY: ICD-10-CM

## 2025-05-27 RX ORDER — TAMSULOSIN HYDROCHLORIDE 0.4 MG/1
0.4 CAPSULE ORAL DAILY
Qty: 100 CAPSULE | Refills: 2 | OUTPATIENT
Start: 2025-05-27

## 2025-05-28 ENCOUNTER — LAB (OUTPATIENT)
Dept: LAB | Facility: CLINIC | Age: 85
End: 2025-05-28
Payer: COMMERCIAL

## 2025-05-28 ENCOUNTER — ANTICOAGULATION THERAPY VISIT (OUTPATIENT)
Dept: ANTICOAGULATION | Facility: CLINIC | Age: 85
End: 2025-05-28

## 2025-05-28 DIAGNOSIS — I48.20 CHRONIC ATRIAL FIBRILLATION (H): Primary | ICD-10-CM

## 2025-05-28 DIAGNOSIS — Z98.890 H/O MITRAL VALVE REPAIR: ICD-10-CM

## 2025-05-28 DIAGNOSIS — I48.20 CHRONIC ATRIAL FIBRILLATION (H): ICD-10-CM

## 2025-05-28 DIAGNOSIS — Z79.01 LONG TERM (CURRENT) USE OF ANTICOAGULANTS: ICD-10-CM

## 2025-05-28 DIAGNOSIS — I48.92 ATRIAL FLUTTER, PAROXYSMAL (H): ICD-10-CM

## 2025-05-28 DIAGNOSIS — Z95.2 S/P AVR: ICD-10-CM

## 2025-05-28 DIAGNOSIS — I48.4 ATYPICAL ATRIAL FLUTTER (H): ICD-10-CM

## 2025-05-28 LAB — INR BLD: 1.8 (ref 0.9–1.1)

## 2025-05-28 PROCEDURE — 85610 PROTHROMBIN TIME: CPT

## 2025-05-28 PROCEDURE — 36416 COLLJ CAPILLARY BLOOD SPEC: CPT

## 2025-05-28 NOTE — PROGRESS NOTES
ANTICOAGULATION MANAGEMENT     Parish Bills 84 year old male is on warfarin with subtherapeutic INR result. (Goal INR 2.0-3.0)    Recent labs: (last 7 days)     05/28/25  0829   INR 1.8*       ASSESSMENT     Warfarin Lab Questionnaire    Warfarin Doses Last 7 Days      5/28/2025     8:32 AM   Dose in Tablet or Mg   TAB or MG? - 5mg warfarin tabs (evenings) milligram (mg)     Pt Rptd Dose SUNDAY MONDAY TUESDAY WED THURS FRIDAY SATURDAY 5/28/2025   8:32 AM 5 2.5 5 5 2.5 5 5         5/28/2025   Warfarin Lab Questionnaire   Missed doses within past 14 days? No   Changes in diet or alcohol within past 14 days? No   Medication changes since last result? Yes   Please list: [place]          - recently stopped Aspirin.     Injuries or illness since last result? No   - 5/20/25 ENT visit for recurrent epistaxis with left septum cauterized.     New shortness of breath, severe headaches or sudden changes in vision since last result? No   Abnormal bleeding since last result? No - reported has not had any recurrence of epistaxis.       Upcoming surgery, procedure? No     Previous result: Therapeutic last visit at 2.0; previously outside of goal range at 1.9    Additional findings: None       PLAN     Recommended plan for ongoing change(s) affecting INR     Dosing Instructions: Increase your warfarin dose (8.3% change) with next INR in 2 weeks       Summary  As of 5/28/2025      Full warfarin instructions:  2.5 mg every Mon; 5 mg all other days   Next INR check:  6/11/2025               Telephone call with Parish who verbalizes understanding and agrees to plan    Lab visit scheduled - INR on 6/11/25 @ [facility]    Education provided: Taking warfarin: Importance of taking warfarin as instructed  Goal range and lab monitoring: goal range and significance of current result  Interaction IS anticipated between warfarin and stopped Aspirin    Plan made per [facility] anticoagulation protocol    Kori Ziegler, RN  5/28/2025  Anticoagulation  Clinic  [facility] for routing messages: p SHANICE ODONNELL  [facility] patient phone line: [phone]        _______________________________________________________________________     Anticoagulation Episode Summary       Current INR goal:  2.0-3.0   TTR:  70.0% (1 y)   Target end date:  Indefinite   Send INR reminders to:  SHANICE RAYNUPURDEMI ODONNELL    Indications    Chronic atrial fibrillation (H) [I48.20]  H/O mitral valve repair [Z98.890]  S/P AVR [Z95.2]  A-fib (H) (Resolved) [I48.91]  Long term (current) use of anticoagulants [Z79.01]  Atrial flutter  paroxysmal (H) [I48.92]  Atypical atrial flutter (H) [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

## 2025-06-09 DIAGNOSIS — Z00.00 HEALTH CARE MAINTENANCE: ICD-10-CM

## 2025-06-09 DIAGNOSIS — I10 ESSENTIAL HYPERTENSION: ICD-10-CM

## 2025-06-11 ENCOUNTER — LAB (OUTPATIENT)
Dept: LAB | Facility: CLINIC | Age: 85
End: 2025-06-11
Payer: COMMERCIAL

## 2025-06-11 ENCOUNTER — RESULTS FOLLOW-UP (OUTPATIENT)
Dept: ANTICOAGULATION | Facility: CLINIC | Age: 85
End: 2025-06-11

## 2025-06-11 ENCOUNTER — ANTICOAGULATION THERAPY VISIT (OUTPATIENT)
Dept: ANTICOAGULATION | Facility: CLINIC | Age: 85
End: 2025-06-11

## 2025-06-11 DIAGNOSIS — I48.20 CHRONIC ATRIAL FIBRILLATION (H): Primary | ICD-10-CM

## 2025-06-11 DIAGNOSIS — F33.1 MODERATE EPISODE OF RECURRENT MAJOR DEPRESSIVE DISORDER (H): ICD-10-CM

## 2025-06-11 DIAGNOSIS — I48.20 CHRONIC ATRIAL FIBRILLATION (H): ICD-10-CM

## 2025-06-11 DIAGNOSIS — Z98.890 H/O MITRAL VALVE REPAIR: ICD-10-CM

## 2025-06-11 DIAGNOSIS — Z95.2 S/P AVR: ICD-10-CM

## 2025-06-11 DIAGNOSIS — I48.4 ATYPICAL ATRIAL FLUTTER (H): ICD-10-CM

## 2025-06-11 DIAGNOSIS — Z79.01 LONG TERM (CURRENT) USE OF ANTICOAGULANTS: ICD-10-CM

## 2025-06-11 DIAGNOSIS — I48.92 ATRIAL FLUTTER, PAROXYSMAL (H): ICD-10-CM

## 2025-06-11 LAB — INR BLD: 2 (ref 0.9–1.1)

## 2025-06-11 PROCEDURE — 36416 COLLJ CAPILLARY BLOOD SPEC: CPT

## 2025-06-11 PROCEDURE — 85610 PROTHROMBIN TIME: CPT

## 2025-06-11 RX ORDER — PAROXETINE 30 MG/1
30 TABLET, FILM COATED ORAL EVERY MORNING
Qty: 90 TABLET | Refills: 0 | Status: SHIPPED | OUTPATIENT
Start: 2025-06-11

## 2025-06-11 NOTE — TELEPHONE ENCOUNTER
"Please send in prescription. Patient is low and last script was sent at \"no print out\".  Pharmacy attached.   "

## 2025-06-11 NOTE — PROGRESS NOTES
ANTICOAGULATION MANAGEMENT     Parish Bills 84 year old male is on warfarin with therapeutic INR result. (Goal INR 2.0-3.0)    Recent labs: (last 7 days)     06/11/25  0757   INR 2.0*       ASSESSMENT     Warfarin Lab Questionnaire    Warfarin Doses Last 7 Days      6/11/2025     7:59 AM   Dose in Tablet or Mg   TAB or MG? milligram (mg)     Pt Rptd Dose SUNDAY MONDAY TUESDAY WED THURS FRIDAY SATURDAY 6/11/2025   7:59 AM 5 2.5 5 5 5 5 5         6/11/2025   Warfarin Lab Questionnaire   Missed doses within past 14 days? No   Changes in diet or alcohol within past 14 days? No   Medication changes since last result? No   Injuries or illness since last result? No   New shortness of breath, severe headaches or sudden changes in vision since last result? No   Abnormal bleeding since last result? No   Upcoming surgery, procedure? No     Previous result: Subtherapeutic  Additional findings: None       PLAN     Recommended plan for no diet, medication or health factor changes affecting INR     Dosing Instructions: Continue your current warfarin dose with next INR in 2-3 weeks       Summary  As of 6/11/2025      Full warfarin instructions:  2.5 mg every Mon; 5 mg all other days   Next INR check:  7/2/2025               Detailed voice message left for Parish with dosing instructions and follow up date.     Contact [phone] to schedule and with any changes, questions or concerns.     Education provided: Please call back if any changes to your diet, medications or how you've been taking warfarin  Contact [phone] with any changes, questions or concerns.     Plan made per [facility] anticoagulation protocol    Christen Quiles RN  6/11/2025  Anticoagulation Clinic  [facility] for routing messages: nicole CALLE [facility] patient phone line: [phone]        _______________________________________________________________________     Anticoagulation Episode Summary       Current INR goal:  2.0-3.0   TTR:  66.5% (1  y)   Target end date:  Indefinite   [facility]    Indications    Chronic atrial fibrillation (H) [I48.20]  H/O mitral valve repair [Z98.890]  S/P AVR [Z95.2]  A-fib (H) (Resolved) [I48.91]  Long term (current) use of anticoagulants [Z79.01]  Atrial flutter  paroxysmal (H) [I48.92]  Atypical atrial flutter (H) [I48.4]             Comments:  --             Anticoagulation Care Providers       Provider Role Specialty Phone number    Isidro Au MD Referring Family Medicine [phone]

## 2025-06-12 DIAGNOSIS — F33.1 MODERATE EPISODE OF RECURRENT MAJOR DEPRESSIVE DISORDER (H): ICD-10-CM

## 2025-06-12 RX ORDER — PAROXETINE 30 MG/1
30 TABLET, FILM COATED ORAL EVERY MORNING
Qty: 90 TABLET | Refills: 0 | OUTPATIENT
Start: 2025-06-12

## 2025-06-12 RX ORDER — LOSARTAN POTASSIUM 25 MG/1
12.5 TABLET ORAL DAILY
Qty: 45 TABLET | Refills: 1 | Status: SHIPPED | OUTPATIENT
Start: 2025-06-12

## 2025-07-01 ENCOUNTER — ANTICOAGULATION THERAPY VISIT (OUTPATIENT)
Dept: ANTICOAGULATION | Facility: CLINIC | Age: 85
End: 2025-07-01

## 2025-07-01 ENCOUNTER — LAB (OUTPATIENT)
Dept: LAB | Facility: CLINIC | Age: 85
End: 2025-07-01
Payer: COMMERCIAL

## 2025-07-01 DIAGNOSIS — I48.4 ATYPICAL ATRIAL FLUTTER (H): ICD-10-CM

## 2025-07-01 DIAGNOSIS — Z98.890 H/O MITRAL VALVE REPAIR: ICD-10-CM

## 2025-07-01 DIAGNOSIS — I48.20 CHRONIC ATRIAL FIBRILLATION (H): ICD-10-CM

## 2025-07-01 DIAGNOSIS — Z79.01 LONG TERM (CURRENT) USE OF ANTICOAGULANTS: ICD-10-CM

## 2025-07-01 DIAGNOSIS — I48.20 CHRONIC ATRIAL FIBRILLATION (H): Primary | ICD-10-CM

## 2025-07-01 DIAGNOSIS — Z95.2 S/P AVR: ICD-10-CM

## 2025-07-01 DIAGNOSIS — I48.92 ATRIAL FLUTTER, PAROXYSMAL (H): ICD-10-CM

## 2025-07-01 LAB — INR BLD: 2.1 (ref 0.9–1.1)

## 2025-07-01 PROCEDURE — 85610 PROTHROMBIN TIME: CPT

## 2025-07-01 PROCEDURE — 36416 COLLJ CAPILLARY BLOOD SPEC: CPT

## 2025-07-01 NOTE — PROGRESS NOTES
ANTICOAGULATION MANAGEMENT     Parish Bills 84 year old male is on warfarin with therapeutic INR result. (Goal INR 2.0-3.0)    Recent labs: (last 7 days)     07/01/25  0805   INR 2.1*       ASSESSMENT     Warfarin Lab Questionnaire    Warfarin Doses Last 7 Days      7/1/2025     8:07 AM   Dose in Tablet or Mg   TAB or MG? - 5mg warfarin tabs (evenings) milligram (mg)     Pt Rptd Dose SUNDAY MONDAY TUESDAY WED THURS FRIDAY SATURDAY 7/1/2025   8:07 AM 5 2.5 5 5 5 5 5         7/1/2025   Warfarin Lab Questionnaire   Missed doses within past 14 days? No   Changes in diet or alcohol within past 14 days? No   Medication changes since last result? No   Injuries or illness since last result? No   New shortness of breath, severe headaches or sudden changes in vision since last result? No   Abnormal bleeding since last result? No   Upcoming surgery, procedure? No     Previous result: Therapeutic last visit at 2.0; previously outside of goal range at 1.8    Additional findings: None       PLAN     Recommended plan for no diet, medication or health factor changes affecting INR     Dosing Instructions: Continue your current warfarin dose with next INR in 3-4 weeks       Summary  As of 7/1/2025      Full warfarin instructions:  2.5 mg every Mon; 5 mg all other days   Next INR check:  7/22/2025               Detailed voice message left for Parish with dosing instructions and follow up date.     Contact [phone] to schedule and with any changes, questions or concerns.     Education provided: Written instructions provided    Plan made per [facility] anticoagulation protocol    Zahra Gonzalez RN  7/1/2025  Anticoagulation Clinic  [facility] for routing messages: nicole ODONNELL  [facility] patient phone line: [phone]        _______________________________________________________________________     Anticoagulation Episode Summary       Current INR goal:  2.0-3.0   TTR:  71.9% (1 y)   Target end date:  Indefinite   Send INR  reminders to:  [facility]    Indications    Chronic atrial fibrillation (H) [I48.20]  H/O mitral valve repair [Z98.890]  S/P AVR [Z95.2]  A-fib (H) (Resolved) [I48.91]  Long term (current) use of anticoagulants [Z79.01]  Atrial flutter  paroxysmal (H) [I48.92]  Atypical atrial flutter (H) [I48.4]             Comments:  --             Anticoagulation Care Providers       Provider Role Specialty Phone number    Isidro Au MD Referring Family Medicine [phone]

## 2025-07-23 ENCOUNTER — LAB (OUTPATIENT)
Dept: LAB | Facility: CLINIC | Age: 85
End: 2025-07-23
Payer: COMMERCIAL

## 2025-07-23 ENCOUNTER — ANTICOAGULATION THERAPY VISIT (OUTPATIENT)
Dept: ANTICOAGULATION | Facility: CLINIC | Age: 85
End: 2025-07-23

## 2025-07-23 DIAGNOSIS — I48.20 CHRONIC ATRIAL FIBRILLATION (H): Primary | ICD-10-CM

## 2025-07-23 DIAGNOSIS — I48.92 ATRIAL FLUTTER, PAROXYSMAL (H): ICD-10-CM

## 2025-07-23 DIAGNOSIS — Z95.2 S/P AVR: ICD-10-CM

## 2025-07-23 DIAGNOSIS — I48.20 CHRONIC ATRIAL FIBRILLATION (H): ICD-10-CM

## 2025-07-23 DIAGNOSIS — I48.4 ATYPICAL ATRIAL FLUTTER (H): ICD-10-CM

## 2025-07-23 DIAGNOSIS — Z79.01 LONG TERM (CURRENT) USE OF ANTICOAGULANTS: ICD-10-CM

## 2025-07-23 DIAGNOSIS — Z98.890 H/O MITRAL VALVE REPAIR: ICD-10-CM

## 2025-07-23 LAB — INR BLD: 1.7 (ref 0.9–1.1)

## 2025-07-23 PROCEDURE — 85610 PROTHROMBIN TIME: CPT

## 2025-07-23 PROCEDURE — 36416 COLLJ CAPILLARY BLOOD SPEC: CPT

## 2025-07-23 NOTE — PROGRESS NOTES
ANTICOAGULATION MANAGEMENT     Parish Bills 84 year old male is on warfarin with subtherapeutic INR result. (Goal INR 2.0-3.0)    Recent labs: (last 7 days)     07/23/25  0821   INR 1.7*       ASSESSMENT     Source(s): Chart Review and Patient/Caregiver Call     Warfarin doses taken: Warfarin taken as instructed  Diet: No new diet changes identified  Medication/supplement changes: None noted  New illness, injury, or hospitalization: No  Signs or symptoms of bleeding or clotting: No  Previous result: Therapeutic last 2 INR results; (2.1, 2.0)  Additional findings: None       PLAN     Recommended plan for no diet, medication or health factor changes affecting INR     Dosing Instructions: booster dose then Increase your warfarin dose (7.7% change) with next INR in 2 weeks       Summary  As of 7/23/2025      Full warfarin instructions:  7/23: 7.5 mg; Otherwise 5 mg every day   Next INR check:  8/6/2025               Telephone call with Parish who verbalizes understanding and agrees to plan    Lab visit scheduled - INR on 8/4/25 @ [facility]    Education provided: Taking warfarin: Importance of taking warfarin as instructed  Goal range and lab monitoring: goal range and significance of current result    Plan made per [facility] anticoagulation protocol    Kori Ziegler RN  7/23/2025  Anticoagulation Clinic  [place] for routing messages: nicole ODONNELL  [facility] patient phone line: [phone]        _______________________________________________________________________     Anticoagulation Episode Summary       Current INR goal:  2.0-3.0   TTR:  69.0% (1 y)   Target end date:  Indefinite   Send INR reminders to:  SHANICE ODONNELL    Indications    Chronic atrial fibrillation (H) [I48.20]  H/O mitral valve repair [Z98.890]  S/P AVR [Z95.2]  A-fib (H) (Resolved) [I48.91]  Long term (current) use of anticoagulants [Z79.01]  Atrial flutter  paroxysmal (H) [I48.92]  Atypical atrial flutter (H) [I48.4]              Comments:  --             Anticoagulation Care Providers       Provider Role Specialty Phone number    Isidro Au MD Referring Family Medicine [phone]

## 2025-08-04 ENCOUNTER — LAB (OUTPATIENT)
Dept: LAB | Facility: CLINIC | Age: 85
End: 2025-08-04
Payer: COMMERCIAL

## 2025-08-04 ENCOUNTER — ANTICOAGULATION THERAPY VISIT (OUTPATIENT)
Dept: ANTICOAGULATION | Facility: CLINIC | Age: 85
End: 2025-08-04

## 2025-08-04 DIAGNOSIS — Z98.890 H/O MITRAL VALVE REPAIR: ICD-10-CM

## 2025-08-04 DIAGNOSIS — Z95.2 S/P AVR: ICD-10-CM

## 2025-08-04 DIAGNOSIS — I48.20 CHRONIC ATRIAL FIBRILLATION (H): ICD-10-CM

## 2025-08-04 DIAGNOSIS — I48.4 ATYPICAL ATRIAL FLUTTER (H): ICD-10-CM

## 2025-08-04 DIAGNOSIS — I48.20 CHRONIC ATRIAL FIBRILLATION (H): Primary | ICD-10-CM

## 2025-08-04 DIAGNOSIS — I48.92 ATRIAL FLUTTER, PAROXYSMAL (H): ICD-10-CM

## 2025-08-04 DIAGNOSIS — Z79.01 LONG TERM (CURRENT) USE OF ANTICOAGULANTS: ICD-10-CM

## 2025-08-04 LAB — INR BLD: 2.4 (ref 0.9–1.1)

## 2025-08-04 PROCEDURE — 85610 PROTHROMBIN TIME: CPT

## 2025-08-04 PROCEDURE — 36416 COLLJ CAPILLARY BLOOD SPEC: CPT

## 2025-08-25 ENCOUNTER — ANTICOAGULATION THERAPY VISIT (OUTPATIENT)
Dept: ANTICOAGULATION | Facility: CLINIC | Age: 85
End: 2025-08-25

## 2025-08-25 ENCOUNTER — LAB (OUTPATIENT)
Dept: LAB | Facility: CLINIC | Age: 85
End: 2025-08-25
Payer: COMMERCIAL

## 2025-08-25 DIAGNOSIS — I48.20 CHRONIC ATRIAL FIBRILLATION (H): Primary | ICD-10-CM

## 2025-08-25 DIAGNOSIS — Z79.01 LONG TERM (CURRENT) USE OF ANTICOAGULANTS: ICD-10-CM

## 2025-08-25 DIAGNOSIS — I48.4 ATYPICAL ATRIAL FLUTTER (H): ICD-10-CM

## 2025-08-25 DIAGNOSIS — I48.20 CHRONIC ATRIAL FIBRILLATION (H): ICD-10-CM

## 2025-08-25 DIAGNOSIS — I48.92 ATRIAL FLUTTER, PAROXYSMAL (H): ICD-10-CM

## 2025-08-25 DIAGNOSIS — Z98.890 H/O MITRAL VALVE REPAIR: ICD-10-CM

## 2025-08-25 DIAGNOSIS — Z95.2 S/P AVR: ICD-10-CM

## 2025-08-25 LAB — INR BLD: 2.6 (ref 0.9–1.1)

## 2025-08-25 PROCEDURE — 85610 PROTHROMBIN TIME: CPT

## 2025-08-25 PROCEDURE — 36416 COLLJ CAPILLARY BLOOD SPEC: CPT
